# Patient Record
Sex: MALE | Race: WHITE | NOT HISPANIC OR LATINO | Employment: OTHER | URBAN - METROPOLITAN AREA
[De-identification: names, ages, dates, MRNs, and addresses within clinical notes are randomized per-mention and may not be internally consistent; named-entity substitution may affect disease eponyms.]

---

## 2017-02-07 DIAGNOSIS — I10 ESSENTIAL HYPERTENSION: Primary | Chronic | ICD-10-CM

## 2017-02-07 NOTE — TELEPHONE ENCOUNTER
lisinopril (PRINIVIL,ZESTRIL) 2.5 MG tablet      Last Written Prescription Date: 12/21/15  Last Fill Quantity: 90, # refills: 3  Last Office Visit with G, P or [facility] prescribing provider: 9/20/16       POTASSIUM   Date Value Ref Range Status   09/20/2016 4.3 3.4 - 5.3 mmol/L Final     CREATININE   Date Value Ref Range Status   09/20/2016 0.86 0.66 - 1.25 mg/dL Final     BP Readings from Last 3 Encounters:   09/26/16 124/80   09/20/16 110/78   09/02/16 120/70

## 2017-02-09 RX ORDER — LISINOPRIL 2.5 MG/1
2.5 TABLET ORAL DAILY
Qty: 90 TABLET | Refills: 1 | Status: SHIPPED | OUTPATIENT
Start: 2017-02-09 | End: 2017-08-02

## 2017-02-09 NOTE — TELEPHONE ENCOUNTER
Prescription approved per [facility], [facility] or [facility] refill protocol.  Mindy Rajan RN  [facility] [employer]

## 2017-08-02 ENCOUNTER — OFFICE VISIT (OUTPATIENT)
Dept: FAMILY MEDICINE | Facility: CLINIC | Age: 72
End: 2017-08-02
Payer: COMMERCIAL

## 2017-08-02 VITALS
HEART RATE: 76 BPM | RESPIRATION RATE: 14 BRPM | TEMPERATURE: 97 F | SYSTOLIC BLOOD PRESSURE: 136 MMHG | WEIGHT: 164 LBS | BODY MASS INDEX: 24.86 KG/M2 | HEIGHT: 68 IN | DIASTOLIC BLOOD PRESSURE: 94 MMHG

## 2017-08-02 DIAGNOSIS — F43.9 STRESS: ICD-10-CM

## 2017-08-02 DIAGNOSIS — I10 ESSENTIAL HYPERTENSION: Primary | Chronic | ICD-10-CM

## 2017-08-02 PROCEDURE — 99213 OFFICE O/P EST LOW 20 MIN: CPT | Performed by: FAMILY MEDICINE

## 2017-08-02 RX ORDER — CARVEDILOL 3.12 MG/1
3.12 TABLET ORAL 2 TIMES DAILY WITH MEALS
Qty: 60 TABLET | Refills: 1 | Status: SHIPPED | OUTPATIENT
Start: 2017-08-02 | End: 2017-08-30

## 2017-08-02 RX ORDER — LISINOPRIL 2.5 MG/1
2.5 TABLET ORAL DAILY
Qty: 90 TABLET | Refills: 1 | Status: SHIPPED | OUTPATIENT
Start: 2017-08-02 | End: 2017-08-30

## 2017-08-02 NOTE — NURSING NOTE
"Chief Complaint   Patient presents with     Hypertension       Initial BP (!) 140/96  Pulse 76  Temp 97  F (36.1  C) (Tympanic)  Resp 14  Ht 5' 7.5\" (1.715 m)  Wt 164 lb (74.4 kg)  BMI 25.31 kg/m2 Estimated body mass index is 25.31 kg/(m^2) as calculated from the following:    Height as of this encounter: 5' 7.5\" (1.715 m).    Weight as of this encounter: 164 lb (74.4 kg).  Medication Reconciliation: complete     Gosia Fontenot CMA      "

## 2017-08-02 NOTE — PROGRESS NOTES
SUBJECTIVE:                                                    Stan[place] Andersen is a 72 year old male who presents to clinic today for the following health issues:      Hypertension Follow-up      Outpatient blood pressures are being checked at home.  Results are 130-150's/70-90's.    Low Salt Diet: not monitoring salt        Amount of exercise or physical activity: None    Problems taking medications regularly: No    Medication side effects: none  Diet: regular (no restrictions)          Problem list and histories reviewed & adjusted, as indicated.  Additional history: as documented    Patient Active Problem List   Diagnosis     Health Care Home     Chest pain     Hyperlipidemia     Zollinger-Patricio syndrome     Family history of coronary artery disease     Change in bowel function     Essential hypertension     ACP (advance care planning)     Left inguinal hernia     Stress     Past Surgical History:   Procedure Laterality Date     ABDOMEN SURGERY  2003    Exploratory laparoscopy     HC EXCISE VARICOCELE  age 21     varicocele repair     KIDNEY SURGERY      lithotripsy x 3     LAPAROSCOPIC HERNIORRHAPHY INGUINAL Left 9/26/2016    Procedure: LAPAROSCOPIC HERNIORRHAPHY INGUINAL;  Surgeon: Ike Catalan MD;  Location: [facility]     STOMACH SURGERY  2003    exploratory scoping looking for tumors related to ZE syndrome       Social History   Substance Use Topics     Smoking status: Never Smoker     Smokeless tobacco: Never Used     Alcohol use Yes      Comment: seldom     Family History   Problem Relation Age of Onset     HEART DISEASE Mother      ablation     C.A.D. Mother      HEART DISEASE Father      Respiratory Father      COPD     C.A.D. Father      Neurologic Disorder Son      Spinal bifida             Reviewed and updated as needed this visit by clinical staffTobacco  Allergies  Meds       Reviewed and updated as needed this visit by Provider  Tobacco         ROS:  Constitutional, HEENT,  "cardiovascular, pulmonary, gi and gu systems are negative, except as otherwise noted.  CONSTITUTIONAL:NEGATIVE for fever, chills, change in weight  RESP:NEGATIVE for significant cough or SOB  CV: NEGATIVE for chest pain, palpitations or peripheral edema  PSYCHIATRIC: NEGATIVE for changes in mood or affect and stress due to 90 year old mother having lots of heart issues      OBJECTIVE:                                                    BP (!) 136/94  Pulse 76  Temp 97  F (36.1  C) (Tympanic)  Resp 14  Ht 5' 7.5\" (1.715 m)  Wt 164 lb (74.4 kg)  BMI 25.31 kg/m2  Body mass index is 25.31 kg/(m^2).  GENERAL APPEARANCE: healthy, alert and no distress         ASSESSMENT/PLAN:                                                        ICD-10-CM    1. Essential hypertension I10 carvedilol (COREG) 3.125 MG tablet     lisinopril (PRINIVIL/ZESTRIL) 2.5 MG tablet   2. Stress F43.9        Patient Instructions   Will add carvedilol and see back in one month.      Arjun Burns MD  [facility]    "

## 2017-08-02 NOTE — MR AVS SNAPSHOT
"              After Visit Summary   2017    [place] Andersen    MRN: [id number]           Patient Information     Date Of Birth          1945        Visit Information        Provider Department      2017 10:30 AM Arjun Burns MD [facility]        Today's Diagnoses     Essential hypertension    -  1    Stress          Care Instructions    Will add carvedilol and see back in one month.          Follow-ups after your visit        Who to contact     If you have questions or need follow up information about today's clinic visit or your schedule please contact [facility] directly at [phone].  Normal or non-critical lab and imaging results will be communicated to you by MyChart, letter or phone within 4 business days after the clinic has received the results. If you do not hear from us within 7 days, please contact the clinic through MyChart or phone. If you have a critical or abnormal lab result, we will notify you by phone as soon as possible.  Submit refill requests through twtMob or call your pharmacy and they will forward the refill request to us. Please allow 3 business days for your refill to be completed.          Additional Information About Your Visit        MyChart Information     twtMob lets you send messages to your doctor, view your test results, renew your prescriptions, schedule appointments and more. To sign up, go to [email or web address] . Click on \"Log in\" on the left side of the screen, which will take you to the Welcome page. Then click on \"Sign up Now\" on the right side of the page.     You will be asked to enter the access code listed below, as well as some personal information. Please follow the directions to create your username and password.     Your access code is: [email or web address]  Expires: 10/31/2017 11:02 AM     Your access code will  in 90 days. If you need help or a new code, please call your " "[facility] or [phone].        Care EveryWhere ID     This is your Care EveryWhere ID. This could be used by other organizations to access your [place] medical records  [id number]        Your Vitals Were     Pulse Temperature Respirations Height BMI (Body Mass Index)       76 97  F (36.1  C) (Tympanic) 14 5' 7.5\" (1.715 m) 25.31 kg/m2        Blood Pressure from Last 3 Encounters:   08/02/17 (!) 136/94   09/26/16 124/80   09/20/16 110/78    Weight from Last 3 Encounters:   08/02/17 164 lb (74.4 kg)   09/26/16 162 lb 9.6 oz (73.8 kg)   09/20/16 162 lb (73.5 kg)              Today, you had the following     No orders found for display         Today's Medication Changes          These changes are accurate as of: 8/2/17 11:02 AM.  If you have any questions, ask your nurse or doctor.               Start taking these medicines.        Dose/Directions    carvedilol 3.125 MG tablet   Commonly known as:  COREG   Used for:  Essential hypertension   Started by:  Arjun Burns MD        Dose:  3.125 mg   Take 1 tablet (3.125 mg) by mouth 2 times daily (with meals)   Quantity:  60 tablet   Refills:  1            Where to get your medicines      These medications were sent to [id number] - [facility] [address] [facility] [address]  [address] [facility] [address]    Hours:  24-hours Phone:  [phone]     carvedilol 3.125 MG tablet    lisinopril 2.5 MG tablet                Primary Care Provider Office Phone # Fax #    Arjun Burns [phone]       [facility] [address]  [facility] [address]        Equal Access to Services     MARILIN ALANIZ AH: Carla reed Somaxi, waaxda luqadaha, qaybta kaalmada adeegyarodri, zack benjamin. So [facility] [phone].    ATENCIÓN: Si habla español, tiene a fuller disposición servicios gratuitos de asistencia lingüística. Llame al [phone].    We comply " with applicable federal civil rights laws and [place] laws. We do not discriminate on the basis of race, color, national origin, age, disability sex, sexual orientation or gender identity.            Thank you!     Thank you for choosing [facility]  for your care. Our goal is always to provide you with excellent care. Hearing back from our patients is one way we can continue to improve our services. Please take a few minutes to complete the written survey that you may receive in the mail after your visit with us. Thank you!             Your Updated Medication List - Protect others around you: Learn how to safely use, store and throw away your medicines at www.disposemymeds.org.          This list is accurate as of: 8/2/17 11:02 AM.  Always use your most recent med list.                   Brand Name Dispense Instructions for use Diagnosis    aspirin 81 MG EC tablet     90 tablet    Take 1 tablet by mouth daily.    Chest pain       carvedilol 3.125 MG tablet    COREG    60 tablet    Take 1 tablet (3.125 mg) by mouth 2 times daily (with meals)    Essential hypertension       lisinopril 2.5 MG tablet    PRINIVIL/Zestril    90 tablet    Take 1 tablet (2.5 mg) by mouth daily    Essential hypertension       magnesium 250 MG tablet      Take 1 tablet by mouth daily        PRILOSEC PO      Take 20 mg by mouth 3 times daily. Patient takes it in the morning, with dinner and at bedtime for Patricio-Zollinger syndrome. Patient takes Prilosec OTC.        vitamin D 2000 UNITS Caps      Take 1 capsule by mouth daily

## 2017-08-30 ENCOUNTER — OFFICE VISIT (OUTPATIENT)
Dept: FAMILY MEDICINE | Facility: CLINIC | Age: 72
End: 2017-08-30
Payer: COMMERCIAL

## 2017-08-30 VITALS
RESPIRATION RATE: 16 BRPM | HEART RATE: 77 BPM | WEIGHT: 164 LBS | SYSTOLIC BLOOD PRESSURE: 136 MMHG | DIASTOLIC BLOOD PRESSURE: 90 MMHG | BODY MASS INDEX: 25.31 KG/M2 | TEMPERATURE: 97 F

## 2017-08-30 DIAGNOSIS — I10 ESSENTIAL HYPERTENSION: Chronic | ICD-10-CM

## 2017-08-30 PROCEDURE — 99213 OFFICE O/P EST LOW 20 MIN: CPT | Performed by: FAMILY MEDICINE

## 2017-08-30 RX ORDER — LISINOPRIL 5 MG/1
5 TABLET ORAL DAILY
Qty: 30 TABLET | Refills: 1 | Status: SHIPPED | OUTPATIENT
Start: 2017-08-30 | End: 2017-12-18

## 2017-08-30 NOTE — MR AVS SNAPSHOT
"              After Visit Summary   8/30/2017    [place] Andersen    MRN: [id number]           Patient Information     Date Of Birth          1945        Visit Information        Provider Department      8/30/2017 1:00 PM Arjun Burns MD [facility]        Today's Diagnoses     Essential hypertension          Care Instructions    Will see back in one month. Carvedilol was discontinued and lisinopril was increased.          Follow-ups after your visit        Follow-up notes from your care team     Return in about 4 weeks (around 9/27/2017) for BP Recheck.      Who to contact     If you have questions or need follow up information about today's clinic visit or your schedule please contact [facility] directly at [phone].  Normal or non-critical lab and imaging results will be communicated to you by MyChart, letter or phone within 4 business days after the clinic has received the results. If you do not hear from us within 7 days, please contact the clinic through MyChart or phone. If you have a critical or abnormal lab result, we will notify you by phone as soon as possible.  Submit refill requests through GetMyBoat or call your pharmacy and they will forward the refill request to us. Please allow 3 business days for your refill to be completed.          Additional Information About Your Visit        MyChart Information     GetMyBoat lets you send messages to your doctor, view your test results, renew your prescriptions, schedule appointments and more. To sign up, go to [email or web address] . Click on \"Log in\" on the left side of the screen, which will take you to the Welcome page. Then click on \"Sign up Now\" on the right side of the page.     You will be asked to enter the access code listed below, as well as some personal information. Please follow the directions to create your username and password.     Your access code is: " [email or web address]  Expires: 10/31/2017 11:02 AM     Your access code will  in 90 days. If you need help or a new code, please call your [place] clinic or [phone].        Care EveryWhere ID     This is your Care EveryWhere ID. This could be used by other organizations to access your [place] medical records  [id number]        Your Vitals Were     Pulse Temperature Respirations BMI (Body Mass Index)          77 97  F (36.1  C) (Tympanic) 16 25.31 kg/m2         Blood Pressure from Last 3 Encounters:   17 136/90   17 (!) 136/94   16 124/80    Weight from Last 3 Encounters:   17 164 lb (74.4 kg)   17 164 lb (74.4 kg)   16 162 lb 9.6 oz (73.8 kg)              Today, you had the following     No orders found for display         Today's Medication Changes          These changes are accurate as of: 17  1:18 PM.  If you have any questions, ask your nurse or doctor.               These medicines have changed or have updated prescriptions.        Dose/Directions    lisinopril 5 MG tablet   Commonly known as:  PRINIVIL/ZESTRIL   This may have changed:    - medication strength  - how much to take   Used for:  Essential hypertension   Changed by:  Arjun Burns MD        Dose:  5 mg   Take 1 tablet (5 mg) by mouth daily   Quantity:  30 tablet   Refills:  1         Stop taking these medicines if you haven't already. Please contact your care team if you have questions.     carvedilol 3.125 MG tablet   Commonly known as:  COREG   Stopped by:  Arjun Burns MD                Where to get your medicines      These medications were sent to [facility] - [place], [place] - [address] AT [facility] [address], [facility] [address]    Hours:  24-hours Phone:  [phone]     lisinopril 5 MG tablet                Primary Care Provider Office Phone # Fax #    Arjun Burns [phone] [address]  [facility] [address]        Equal Access to Services     SAYRA ALANIZ : Hadii kelly bejarano brianna Sorenettaali, waaxda luqadaha, qaybta kameganrodri lewcandacerodri, zack wrightluismarciano benjamin. So [facility] [phone].    ATENCIÓN: Si habla español, tiene a fuller disposición servicios gratuitos de asistencia lingüística. Llame al [phone].    We comply with applicable federal civil rights laws and [place] laws. We do not discriminate on the basis of race, color, national origin, age, disability sex, sexual orientation or gender identity.            Thank you!     Thank you for choosing [facility]  for your care. Our goal is always to provide you with excellent care. Hearing back from our patients is one way we can continue to improve our services. Please take a few minutes to complete the written survey that you may receive in the mail after your visit with us. Thank you!             Your Updated Medication List - Protect others around you: Learn how to safely use, store and throw away your medicines at www.disposemymeds.org.          This list is accurate as of: 8/30/17  1:18 PM.  Always use your most recent med list.                   Brand Name Dispense Instructions for use Diagnosis    aspirin 81 MG EC tablet     90 tablet    Take 1 tablet by mouth daily.    Chest pain       lisinopril 5 MG tablet    PRINIVIL/ZESTRIL    30 tablet    Take 1 tablet (5 mg) by mouth daily    Essential hypertension       magnesium 250 MG tablet      Take 1 tablet by mouth daily        PRILOSEC PO      Take 20 mg by mouth 3 times daily. Patient takes it in the morning, with dinner and at bedtime for Patricio-Zollinger syndrome. Patient takes Prilosec OTC.        vitamin D 2000 UNITS Caps      Take 1 capsule by mouth daily

## 2017-08-30 NOTE — NURSING NOTE
"Chief Complaint   Patient presents with     Hypertension       Initial /88  Pulse 77  Temp 97  F (36.1  C) (Tympanic)  Resp 16  Wt 164 lb (74.4 kg)  BMI 25.31 kg/m2 Estimated body mass index is 25.31 kg/(m^2) as calculated from the following:    Height as of 8/2/17: 5' 7.5\" (1.715 m).    Weight as of this encounter: 164 lb (74.4 kg).  Medication Reconciliation: complete     Gosia Fontenot CMA      "

## 2017-08-30 NOTE — PROGRESS NOTES
SUBJECTIVE:   [place] Andersen is a 72 year old male who presents to clinic today for the following health issues:      Hypertension Follow-up      Outpatient blood pressures are being checked at home.  Results are 165/90.    Low Salt Diet: no added salt        Amount of exercise or physical activity: None    Problems taking medications regularly: No    Medication side effects: none  Diet: low salt          Problem list and histories reviewed & adjusted, as indicated.  Additional history: getting lightheaded when he takes carvedilol    Patient Active Problem List   Diagnosis     Health Care Home     Chest pain     Hyperlipidemia     Zollinger-Patricio syndrome     Family history of coronary artery disease     Change in bowel function     Essential hypertension     ACP (advance care planning)     Left inguinal hernia     Stress     Past Surgical History:   Procedure Laterality Date     ABDOMEN SURGERY  2003    Exploratory laparoscopy     HC EXCISE VARICOCELE  age 21     varicocele repair     KIDNEY SURGERY      lithotripsy x 3     LAPAROSCOPIC HERNIORRHAPHY INGUINAL Left 9/26/2016    Procedure: LAPAROSCOPIC HERNIORRHAPHY INGUINAL;  Surgeon: Ike Catalan MD;  Location: [facility]     STOMACH SURGERY  2003    exploratory scoping looking for tumors related to ZE syndrome       Social History   Substance Use Topics     Smoking status: Never Smoker     Smokeless tobacco: Never Used     Alcohol use Yes      Comment: seldom     Family History   Problem Relation Age of Onset     HEART DISEASE Mother      ablation     C.A.D. Mother      HEART DISEASE Father      Respiratory Father      COPD     C.A.D. Father      Neurologic Disorder Son      Spinal bifida             Reviewed and updated as needed this visit by clinical staffTobacco  Allergies  Meds  Med Hx  Surg Hx  Fam Hx  Soc Hx      Reviewed and updated as needed this visit by Provider         ROS:  RESP:NEGATIVE for significant cough or SOB  CV: NEGATIVE for  chest pain, palpitations or peripheral edema    OBJECTIVE:                                                    /90  Pulse 77  Temp 97  F (36.1  C) (Tympanic)  Resp 16  Wt 164 lb (74.4 kg)  BMI 25.31 kg/m2  Body mass index is 25.31 kg/(m^2).  GENERAL APPEARANCE: healthy, alert and no distress         ASSESSMENT/PLAN:                                                        ICD-10-CM    1. Essential hypertension I10 lisinopril (PRINIVIL/ZESTRIL) 5 MG tablet       Patient Instructions   Will see back in one month. Carvedilol was discontinued and lisinopril was increased.      Arjun Burns MD  [facility]

## 2017-09-25 ENCOUNTER — OFFICE VISIT (OUTPATIENT)
Dept: FAMILY MEDICINE | Facility: CLINIC | Age: 72
End: 2017-09-25
Payer: COMMERCIAL

## 2017-09-25 VITALS
RESPIRATION RATE: 16 BRPM | TEMPERATURE: 97.4 F | SYSTOLIC BLOOD PRESSURE: 130 MMHG | DIASTOLIC BLOOD PRESSURE: 86 MMHG | BODY MASS INDEX: 25 KG/M2 | HEART RATE: 84 BPM | WEIGHT: 162 LBS

## 2017-09-25 DIAGNOSIS — I10 ESSENTIAL HYPERTENSION: Primary | Chronic | ICD-10-CM

## 2017-09-25 DIAGNOSIS — F43.9 STRESS: ICD-10-CM

## 2017-09-25 DIAGNOSIS — Z12.5 SCREENING FOR PROSTATE CANCER: ICD-10-CM

## 2017-09-25 DIAGNOSIS — E78.2 MIXED HYPERLIPIDEMIA: Chronic | ICD-10-CM

## 2017-09-25 LAB
ALBUMIN UR-MCNC: NEGATIVE MG/DL
APPEARANCE UR: CLEAR
BASOPHILS # BLD AUTO: 0 10E9/L (ref 0–0.2)
BASOPHILS NFR BLD AUTO: 0.3 %
BILIRUB UR QL STRIP: NEGATIVE
COLOR UR AUTO: YELLOW
DIFFERENTIAL METHOD BLD: NORMAL
EOSINOPHIL # BLD AUTO: 0.2 10E9/L (ref 0–0.7)
EOSINOPHIL NFR BLD AUTO: 3.5 %
ERYTHROCYTE [DISTWIDTH] IN BLOOD BY AUTOMATED COUNT: 14.1 % (ref 10–15)
GLUCOSE UR STRIP-MCNC: NEGATIVE MG/DL
HCT VFR BLD AUTO: 48.4 % (ref 40–53)
HGB BLD-MCNC: 16.2 G/DL (ref 13.3–17.7)
HGB UR QL STRIP: ABNORMAL
KETONES UR STRIP-MCNC: NEGATIVE MG/DL
LEUKOCYTE ESTERASE UR QL STRIP: NEGATIVE
LYMPHOCYTES # BLD AUTO: 1.5 10E9/L (ref 0.8–5.3)
LYMPHOCYTES NFR BLD AUTO: 22 %
MCH RBC QN AUTO: 29.1 PG (ref 26.5–33)
MCHC RBC AUTO-ENTMCNC: 33.5 G/DL (ref 31.5–36.5)
MCV RBC AUTO: 87 FL (ref 78–100)
MONOCYTES # BLD AUTO: 0.8 10E9/L (ref 0–1.3)
MONOCYTES NFR BLD AUTO: 11.4 %
NEUTROPHILS # BLD AUTO: 4.4 10E9/L (ref 1.6–8.3)
NEUTROPHILS NFR BLD AUTO: 62.8 %
NITRATE UR QL: NEGATIVE
PH UR STRIP: 5.5 PH (ref 5–7)
PLATELET # BLD AUTO: 252 10E9/L (ref 150–450)
RBC # BLD AUTO: 5.56 10E12/L (ref 4.4–5.9)
RBC #/AREA URNS AUTO: ABNORMAL /HPF
SOURCE: ABNORMAL
SP GR UR STRIP: 1.02 (ref 1–1.03)
UROBILINOGEN UR STRIP-ACNC: 0.2 EU/DL (ref 0.2–1)
WBC # BLD AUTO: 7 10E9/L (ref 4–11)
WBC #/AREA URNS AUTO: ABNORMAL /HPF

## 2017-09-25 PROCEDURE — 85025 COMPLETE CBC W/AUTO DIFF WBC: CPT | Performed by: FAMILY MEDICINE

## 2017-09-25 PROCEDURE — 81001 URINALYSIS AUTO W/SCOPE: CPT | Performed by: FAMILY MEDICINE

## 2017-09-25 PROCEDURE — 99213 OFFICE O/P EST LOW 20 MIN: CPT | Performed by: FAMILY MEDICINE

## 2017-09-25 PROCEDURE — 80053 COMPREHEN METABOLIC PANEL: CPT | Performed by: FAMILY MEDICINE

## 2017-09-25 PROCEDURE — 80061 LIPID PANEL: CPT | Performed by: FAMILY MEDICINE

## 2017-09-25 PROCEDURE — G0103 PSA SCREENING: HCPCS | Performed by: FAMILY MEDICINE

## 2017-09-25 PROCEDURE — 36415 COLL VENOUS BLD VENIPUNCTURE: CPT | Performed by: FAMILY MEDICINE

## 2017-09-25 NOTE — PATIENT INSTRUCTIONS
Will change lisinopril to HS and see back in one month with his machine to cross check to ours. Will check labs as noted.

## 2017-09-25 NOTE — NURSING NOTE
"Chief Complaint   Patient presents with     Hypertension       Initial /88  Pulse 84  Temp 97.4  F (36.3  C) (Tympanic)  Resp 16  Wt 162 lb (73.5 kg)  BMI 25 kg/m2 Estimated body mass index is 25 kg/(m^2) as calculated from the following:    Height as of 8/2/17: 5' 7.5\" (1.715 m).    Weight as of this encounter: 162 lb (73.5 kg).  Medication Reconciliation: complete     Gosia Fontenot CMA      "

## 2017-09-25 NOTE — LETTER
September 30, 2017      [place] Andersen  [address] [address] [place] [id number]        Dear ,    We are writing to inform you of your test results.    Your test results fall within the expected range(s) or remain unchanged from previous results.  Please continue with current treatment plan.    Resulted Orders   UA with Microscopic   Result Value Ref Range    Color Urine Yellow     Appearance Urine Clear     Glucose Urine Negative NEG^Negative mg/dL    Bilirubin Urine Negative NEG^Negative    Ketones Urine Negative NEG^Negative mg/dL    Specific Gravity Urine 1.020 1.003 - 1.035    pH Urine 5.5 5.0 - 7.0 pH    Protein Albumin Urine Negative NEG^Negative mg/dL    Urobilinogen Urine 0.2 0.2 - 1.0 EU/dL    Nitrite Urine Negative NEG^Negative    Blood Urine Moderate (A) NEG^Negative    Leukocyte Esterase Urine Negative NEG^Negative    Source Midstream Urine     WBC Urine O - 2 OTO2^O - 2 /HPF    RBC Urine O - 2 OTO2^O - 2 /HPF   CBC with platelets differential   Result Value Ref Range    WBC 7.0 4.0 - 11.0 10e9/L    RBC Count 5.56 4.4 - 5.9 10e12/L    Hemoglobin 16.2 13.3 - 17.7 g/dL    Hematocrit 48.4 40.0 - 53.0 %    MCV 87 78 - 100 fl    MCH 29.1 26.5 - 33.0 pg    MCHC 33.5 31.5 - 36.5 g/dL    RDW 14.1 10.0 - 15.0 %    Platelet Count 252 150 - 450 10e9/L    Diff Method Automated Method     % Neutrophils 62.8 %    % Lymphocytes 22.0 %    % Monocytes 11.4 %    % Eosinophils 3.5 %    % Basophils 0.3 %    Absolute Neutrophil 4.4 1.6 - 8.3 10e9/L    Absolute Lymphocytes 1.5 0.8 - 5.3 10e9/L    Absolute Monocytes 0.8 0.0 - 1.3 10e9/L    Absolute Eosinophils 0.2 0.0 - 0.7 10e9/L    Absolute Basophils 0.0 0.0 - 0.2 10e9/L   Lipid Profile   Result Value Ref Range    Cholesterol 198 <200 mg/dL    Triglycerides 138 <150 mg/dL      Comment:      Fasting specimen    HDL Cholesterol 42 >39 mg/dL    LDL Cholesterol Calculated 128 (H) <100 mg/dL      Comment:      Above desirable:  100-129 mg/dl  Borderline High:   130-159 mg/dL  High:             160-189 mg/dL  Very high:       >189 mg/dl      Non HDL Cholesterol 156 (H) <130 mg/dL      Comment:      Above Desirable:  130-159 mg/dl  Borderline high:  160-189 mg/dl  High:             190-219 mg/dl  Very high:       >219 mg/dl     Comprehensive metabolic panel   Result Value Ref Range    Sodium 140 133 - 144 mmol/L    Potassium 3.9 3.4 - 5.3 mmol/L    Chloride 106 94 - 109 mmol/L    Carbon Dioxide 27 20 - 32 mmol/L    Anion Gap 7 3 - 14 mmol/L    Glucose 90 70 - 99 mg/dL      Comment:      Fasting specimen    Urea Nitrogen 18 7 - 30 mg/dL    Creatinine 0.87 0.66 - 1.25 mg/dL    GFR Estimate 86 >60 mL/min/1.7m2      Comment:      Non  GFR Calc    GFR Estimate If Black >90 >60 mL/min/1.7m2      Comment:       GFR Calc    Calcium 9.1 8.5 - 10.1 mg/dL    Bilirubin Total 0.9 0.2 - 1.3 mg/dL    Albumin 3.9 3.4 - 5.0 g/dL    Protein Total 6.9 6.8 - 8.8 g/dL    Alkaline Phosphatase 61 40 - 150 U/L    ALT 37 0 - 70 U/L    AST 23 0 - 45 U/L   Prostate spec antigen screen   Result Value Ref Range    PSA 2.40 0 - 4 ug/L      Comment:      Assay Method:  Chemiluminescence using Siemens Vista analyzer       If you have any questions or concerns, please call the clinic at the number listed above.       Sincerely,        Arjun Burns MD

## 2017-09-25 NOTE — PROGRESS NOTES
SUBJECTIVE:   Stanford Andersen is a 72 year old male who presents to clinic today for the following health issues:      Hypertension Follow-up      Outpatient blood pressures are being checked at home.  Results are 139/90 this morning.    Low Salt Diet: no added salt        Amount of exercise or physical activity: None    Problems taking medications regularly: No    Medication side effects: none  Diet: regular (no restrictions)          Problem list and histories reviewed & adjusted, as indicated.  Additional history: as documented    Patient Active Problem List   Diagnosis     Health Care Home     Chest pain     Hyperlipidemia     Zollinger-Patricio syndrome     Family history of coronary artery disease     Change in bowel function     Essential hypertension     ACP (advance care planning)     Left inguinal hernia     Stress     Past Surgical History:   Procedure Laterality Date     ABDOMEN SURGERY  2003    Exploratory laparoscopy     HC EXCISE VARICOCELE  age 21     varicocele repair     KIDNEY SURGERY      lithotripsy x 3     LAPAROSCOPIC HERNIORRHAPHY INGUINAL Left 9/26/2016    Procedure: LAPAROSCOPIC HERNIORRHAPHY INGUINAL;  Surgeon: Ike Catalan MD;  Location: [facility]     STOMACH SURGERY  2003    exploratory scoping looking for tumors related to ZE syndrome       Social History   Substance Use Topics     Smoking status: Never Smoker     Smokeless tobacco: Never Used     Alcohol use Yes      Comment: seldom     Family History   Problem Relation Age of Onset     HEART DISEASE Mother      ablation     C.A.D. Mother      HEART DISEASE Father      Respiratory Father      COPD     C.A.D. Father      Neurologic Disorder Son      Spinal bifida             Reviewed and updated as needed this visit by clinical staffTobacco  Allergies  Meds  Med Hx  Surg Hx  Fam Hx  Soc Hx      Reviewed and updated as needed this visit by Provider  Tobacco         ROS:  Constitutional, HEENT, cardiovascular, pulmonary, gi  and gu systems are negative, except as otherwise noted.  RESP:NEGATIVE for significant cough or SOB  CV: NEGATIVE for chest pain, palpitations or peripheral edema      OBJECTIVE:                                                    /86  Pulse 84  Temp 97.4  F (36.3  C) (Tympanic)  Resp 16  Wt 162 lb (73.5 kg)  BMI 25 kg/m2  Body mass index is 25 kg/(m^2).  GENERAL APPEARANCE: healthy, alert and no distress         ASSESSMENT/PLAN:                                                        ICD-10-CM    1. Essential hypertension I10 UA with Microscopic     CBC with platelets differential     Comprehensive metabolic panel   2. Stress F43.9     caring for his 90 year old mother in his home   3. Mixed hyperlipidemia E78.2 Lipid Profile   4. Screening for prostate cancer Z12.5 Prostate spec antigen screen       Patient Instructions   Will change lisinopril to HS and see back in one month with his machine to cross check to ours. Will check labs as noted.      Arjun Burns MD  [facility]

## 2017-09-25 NOTE — MR AVS SNAPSHOT
After Visit Summary   9/25/2017    Stanford Andersen    MRN: [id number]           Patient Information     Date Of Birth          1945        Visit Information        Provider Department      9/25/2017 1:00 PM Arjun Burns MD [facility]        Today's Diagnoses     Essential hypertension    -  1    Stress        Mixed hyperlipidemia        Screening for prostate cancer          Care Instructions    Will change lisinopril to HS and see back in one month with his machine to cross check to ours. Will check labs as noted.          Follow-ups after your visit        Follow-up notes from your care team     Return in about 4 weeks (around 10/23/2017) for Routine Visit, BP Recheck.      Your next 10 appointments already scheduled     Oct 23, 2017  1:00 PM CDT   SHORT with Arjun Burns MD   [facility]    [address] [place] [address] [id number] [phone]              Who to contact     If you have questions or need follow up information about today's clinic visit or your schedule please contact [facility] directly at [phone].  Normal or non-critical lab and imaging results will be communicated to you by MyChart, letter or phone within 4 business days after the clinic has received the results. If you do not hear from us within 7 days, please contact the clinic through MyChart or phone. If you have a critical or abnormal lab result, we will notify you by phone as soon as possible.  Submit refill requests through [employer] or call your pharmacy and they will forward the refill request to us. Please allow 3 business days for your refill to be completed.          Additional Information About Your Visit        MyChart Information     [employer] lets you send messages to your doctor, view your test results, renew your prescriptions,  "schedule appointments and more. To sign up, go to [email or web address] . Click on \"Log in\" on the left side of the screen, which will take you to the Welcome page. Then click on \"Sign up Now\" on the right side of the page.     You will be asked to enter the access code listed below, as well as some personal information. Please follow the directions to create your username and password.     Your access code is: [email or web address]  Expires: 10/31/2017 11:02 AM     Your access code will  in 90 days. If you need help or a new code, please call your [place] clinic or [phone].        Care EveryWhere ID     This is your Care EveryWhere ID. This could be used by other organizations to access your [place] medical records  [id number]        Your Vitals Were     Pulse Temperature Respirations BMI (Body Mass Index)          84 97.4  F (36.3  C) (Tympanic) 16 25 kg/m2         Blood Pressure from Last 3 Encounters:   17 130/86   17 136/90   17 (!) 136/94    Weight from Last 3 Encounters:   17 162 lb (73.5 kg)   17 164 lb (74.4 kg)   17 164 lb (74.4 kg)              We Performed the Following     CBC with platelets differential     Comprehensive metabolic panel     Lipid Profile     Prostate spec antigen screen     UA with Microscopic        Primary Care Provider Office Phone # Fax #    Arjun Burns [phone] [address] [facility] [address]        Equal Access to Services     [facility]: Hadii aad carlee hadasho Soomaali, waaxda luqadaha, qaybta kaalmada viraj, zack diggs . So [facility] [phone].    ATENCIÓN: Si habla español, tiene a fuller disposición servicios gratuitos de asistencia lingüística. Llame al [phone].    We comply with applicable federal civil rights laws and [place] laws. We do not discriminate on the basis of race, color, national origin, age, disability sex, sexual orientation or gender " identity.            Thank you!     Thank you for choosing [facility]  for your care. Our goal is always to provide you with excellent care. Hearing back from our patients is one way we can continue to improve our services. Please take a few minutes to complete the written survey that you may receive in the mail after your visit with us. Thank you!             Your Updated Medication List - Protect others around you: Learn how to safely use, store and throw away your medicines at www.disposemymeds.org.          This list is accurate as of: 9/25/17 11:59 PM.  Always use your most recent med list.                   Brand Name Dispense Instructions for use Diagnosis    aspirin 81 MG EC tablet     90 tablet    Take 1 tablet by mouth daily.    Chest pain       lisinopril 5 MG tablet    PRINIVIL/ZESTRIL    30 tablet    Take 1 tablet (5 mg) by mouth daily    Essential hypertension       magnesium 250 MG tablet      Take 1 tablet by mouth daily        PRILOSEC PO      Take 20 mg by mouth 3 times daily. Patient takes it in the morning, with dinner and at bedtime for Patricio-Zollinger syndrome. Patient takes Prilosec OTC.        vitamin D 2000 UNITS Caps      Take 1 capsule by mouth daily

## 2017-09-26 LAB
ALBUMIN SERPL-MCNC: 3.9 G/DL (ref 3.4–5)
ALP SERPL-CCNC: 61 U/L (ref 40–150)
ALT SERPL W P-5'-P-CCNC: 37 U/L (ref 0–70)
ANION GAP SERPL CALCULATED.3IONS-SCNC: 7 MMOL/L (ref 3–14)
AST SERPL W P-5'-P-CCNC: 23 U/L (ref 0–45)
BILIRUB SERPL-MCNC: 0.9 MG/DL (ref 0.2–1.3)
BUN SERPL-MCNC: 18 MG/DL (ref 7–30)
CALCIUM SERPL-MCNC: 9.1 MG/DL (ref 8.5–10.1)
CHLORIDE SERPL-SCNC: 106 MMOL/L (ref 94–109)
CHOLEST SERPL-MCNC: 198 MG/DL
CO2 SERPL-SCNC: 27 MMOL/L (ref 20–32)
CREAT SERPL-MCNC: 0.87 MG/DL (ref 0.66–1.25)
GFR SERPL CREATININE-BSD FRML MDRD: 86 ML/MIN/1.7M2
GLUCOSE SERPL-MCNC: 90 MG/DL (ref 70–99)
HDLC SERPL-MCNC: 42 MG/DL
LDLC SERPL CALC-MCNC: 128 MG/DL
NONHDLC SERPL-MCNC: 156 MG/DL
POTASSIUM SERPL-SCNC: 3.9 MMOL/L (ref 3.4–5.3)
PROT SERPL-MCNC: 6.9 G/DL (ref 6.8–8.8)
PSA SERPL-ACNC: 2.4 UG/L (ref 0–4)
SODIUM SERPL-SCNC: 140 MMOL/L (ref 133–144)
TRIGL SERPL-MCNC: 138 MG/DL

## 2017-11-06 ENCOUNTER — OFFICE VISIT (OUTPATIENT)
Dept: FAMILY MEDICINE | Facility: CLINIC | Age: 72
End: 2017-11-06
Payer: COMMERCIAL

## 2017-11-06 VITALS
TEMPERATURE: 97.6 F | HEART RATE: 86 BPM | SYSTOLIC BLOOD PRESSURE: 124 MMHG | RESPIRATION RATE: 16 BRPM | DIASTOLIC BLOOD PRESSURE: 80 MMHG | WEIGHT: 166 LBS | BODY MASS INDEX: 25.62 KG/M2

## 2017-11-06 DIAGNOSIS — I10 ESSENTIAL HYPERTENSION: Primary | Chronic | ICD-10-CM

## 2017-11-06 DIAGNOSIS — M79.672 PAIN OF LEFT HEEL: ICD-10-CM

## 2017-11-06 PROCEDURE — 99213 OFFICE O/P EST LOW 20 MIN: CPT | Mod: 25 | Performed by: FAMILY MEDICINE

## 2017-11-06 PROCEDURE — 28190 REMOVAL OF FOOT FOREIGN BODY: CPT | Performed by: FAMILY MEDICINE

## 2017-11-06 NOTE — PROGRESS NOTES
SUBJECTIVE:   Stanford Andersen is a 72 year old male who presents to clinic today for the following health issues:      Hypertension Follow-up      Outpatient blood pressures are being checked at home.  Results are today was 148/87 this morning.    Low Salt Diet: no added salt        Amount of exercise or physical activity: None    Problems taking medications regularly: No    Medication side effects: none    Diet: regular (no restrictions)    Musculoskeletal problem/pain      Duration: 2 days    Description  Location: bottom of lt foot- heel    Intensity:  moderate    Accompanying signs and symptoms: swelling and redness    History  Previous similar problem: no   Previous evaluation:  none    Precipitating or alleviating factors:  Trauma or overuse: YES- stepped on something 2-3 months ago- no idea what  Aggravating factors include: walking    Therapies tried and outcome: nothing              Problem list and histories reviewed & adjusted, as indicated.  Additional history: as documented    Patient Active Problem List   Diagnosis     Health Care Home     Chest pain     Hyperlipidemia     Zollinger-Patricio syndrome     Family history of coronary artery disease     Change in bowel function     Essential hypertension     ACP (advance care planning)     Left inguinal hernia     Stress     Screening for prostate cancer     Past Surgical History:   Procedure Laterality Date     ABDOMEN SURGERY  2003    Exploratory laparoscopy     HC EXCISE VARICOCELE  age 21     varicocele repair     KIDNEY SURGERY      lithotripsy x 3     LAPAROSCOPIC HERNIORRHAPHY INGUINAL Left 9/26/2016    Procedure: LAPAROSCOPIC HERNIORRHAPHY INGUINAL;  Surgeon: Ike Catalan MD;  [facility]     STOMACH SURGERY  2003    exploratory scoping looking for tumors related to ZE syndrome       Social History   Substance Use Topics     Smoking status: Never Smoker     Smokeless tobacco: Never Used     Alcohol use Yes      Comment: seldom      Family History   Problem Relation Age of Onset     HEART DISEASE Mother      ablation     C.A.D. Mother      HEART DISEASE Father      Respiratory Father      COPD     C.A.D. Father      Neurologic Disorder Son      Spinal bifida             Reviewed and updated as needed this visit by clinical staffTobacco  Allergies  Med Hx  Surg Hx  Fam Hx  Soc Hx      Reviewed and updated as needed this visit by Provider         ROS:  Constitutional, neuro, ENT, endocrine, pulmonary, cardiac, gastrointestinal, genitourinary, musculoskeletal, integument and psychiatric systems are negative, except as otherwise noted.      OBJECTIVE:                                                    /80  Pulse 86  Temp 97.6  F (36.4  C) (Tympanic)  Resp 16  Wt 166 lb (75.3 kg)  BMI 25.62 kg/m2  Body mass index is 25.62 kg/(m^2).  GENERAL APPEARANCE: healthy, alert and no distress  SKIN: The left heel showed a 3 mm open area that was exquisitely tender to touch.  I gently pared that back with a 15 blade scalpel and while I did not see any foreign body when I did get it pared down to the base the discomfort in his heel is gone.  I suspect there was a foreign body in there that I removed with the scalpel blade.         ASSESSMENT/PLAN:                                                        ICD-10-CM    1. Essential hypertension I10    2. Pain of left heel M79.672        Patient Instructions   I suspect that the patient did have a foreign body in his left heel because he was exquisitely tender to touch.  That sensitivity went away after a period the lesion back down to the base.  While I didn't see any foreign body out I suspect it did but I just missed it.  The patient tolerated the procedure well.    I refilled his lisinopril which he now takes at bedtime.  We did discuss the possibility of decreasing that to 2-1/2 mg on a daily basis but will wait until his next visit to see if we should do that.  He will follow up in 3 months  sooner as needed.      Arjun Burns MD  [facility]

## 2017-11-06 NOTE — PATIENT INSTRUCTIONS
I suspect that the patient did have a foreign body in his left heel because he was exquisitely tender to touch.  That sensitivity went away after a period the lesion back down to the base.  While I didn't see any foreign body out I suspect it did but I just missed it.  The patient tolerated the procedure well.    I refilled his lisinopril which he now takes at bedtime.  We did discuss the possibility of decreasing that to 2-1/2 mg on a daily basis but will wait until his next visit to see if we should do that.  He will follow up in 3 months sooner as needed.

## 2017-11-06 NOTE — MR AVS SNAPSHOT
After Visit Summary   11/6/2017    Stanford Andersen    MRN: [id number]           Patient Information     Date Of Birth          1945        Visit Information        Provider Department      11/6/2017 9:15 AM Arjun Burns MD [facility]        Today's Diagnoses     Essential hypertension    -  1    Pain of left heel          Care Instructions    I suspect that the patient did have a foreign body in his left heel because he was exquisitely tender to touch.  That sensitivity went away after a period the lesion back down to the base.  While I didn't see any foreign body out I suspect it did but I just missed it.  The patient tolerated the procedure well.    I refilled his lisinopril which he now takes at bedtime.  We did discuss the possibility of decreasing that to 2-1/2 mg on a daily basis but will wait until his next visit to see if we should do that.  He will follow up in 3 months sooner as needed.          Follow-ups after your visit        Follow-up notes from your care team     Return in about 3 months (around 2/6/2018) for BP Recheck, Routine Visit.      Who to contact     If you have questions or need follow up information about today's clinic visit or your schedule please contact [facility] directly at [phone].  Normal or non-critical lab and imaging results will be communicated to you by MyChart, letter or phone within 4 business days after the clinic has received the results. If you do not hear from us within 7 days, please contact the clinic through MyChart or phone. If you have a critical or abnormal lab result, we will notify you by phone as soon as possible.  Submit refill requests through [employer] or call your pharmacy and they will forward the refill request to us. Please allow 3 business days for your refill to be completed.          Additional Information About Your Visit        MyChart Information      "[facility] lets you send messages to your doctor, view your test results, renew your prescriptions, schedule appointments and more. To sign up, go to [email or web address] . Click on \"Log in\" on the left side of the screen, which will take you to the Welcome page. Then click on \"Sign up Now\" on the right side of the page.     You will be asked to enter the access code listed below, as well as some personal information. Please follow the directions to create your username and password.     Your access code is: [email or web address]  Expires: 2018  2:35 PM     Your access code will  in 90 days. If you need help or a new code, please call your [place] clinic or [phone].        Care EveryWhere ID     This is your [facility] EveryWhere ID. This could be used by other organizations to access your [place] medical records  [id number]        Your Vitals Were     Pulse Temperature Respirations BMI (Body Mass Index)          86 97.6  F (36.4  C) (Tympanic) 16 25.62 kg/m2         Blood Pressure from Last 3 Encounters:   17 124/80   17 130/86   17 136/90    Weight from Last 3 Encounters:   17 166 lb (75.3 kg)   17 162 lb (73.5 kg)   17 164 lb (74.4 kg)              Today, you had the following     No orders found for display       Primary Care Provider Office Phone # Fax #    Arjun Burns [phone]       [address] [facility] [address]        Equal Access to Services     [facility]: Hadii kelly Porras, wacarltonda ronny, qaybta zack brambila. So [facility] [phone].    ATENCIÓN: Si habla español, tiene a fuller disposición servicios gratuitos de asistencia lingüística. [place] al [phone].    We comply with applicable federal civil rights laws and [place] laws. We do not discriminate on the basis of race, color, national origin, age, disability, sex, sexual orientation, or gender identity.       "      Thank you!     Thank you for choosing [facility]  for your care. Our goal is always to provide you with excellent care. Hearing back from our patients is one way we can continue to improve our services. Please take a few minutes to complete the written survey that you may receive in the mail after your visit with us. Thank you!             Your Updated Medication List - Protect others around you: Learn how to safely use, store and throw away your medicines at www.disposemymeds.org.          This list is accurate as of: 11/6/17  2:35 PM.  Always use your most recent med list.                   Brand Name Dispense Instructions for use Diagnosis    aspirin 81 MG EC tablet     90 tablet    Take 1 tablet by mouth daily.    Chest pain       lisinopril 5 MG tablet    PRINIVIL/ZESTRIL    30 tablet    Take 1 tablet (5 mg) by mouth daily    Essential hypertension       magnesium 250 MG tablet      Take 1 tablet by mouth daily        PRILOSEC PO      Take 20 mg by mouth 3 times daily. Patient takes it in the morning, with dinner and at bedtime for Patricio-Zollinger syndrome. Patient takes Prilosec OTC.        vitamin D 2000 UNITS Caps      Take 1 capsule by mouth daily

## 2017-11-06 NOTE — NURSING NOTE
"Chief Complaint   Patient presents with     Hypertension     Foot Pain     LT foot       Initial /78  Pulse 86  Temp 97.6  F (36.4  C) (Tympanic)  Resp 16  Wt 166 lb (75.3 kg)  BMI 25.62 kg/m2 Estimated body mass index is 25.62 kg/(m^2) as calculated from the following:    Height as of 8/2/17: 5' 7.5\" (1.715 m).    Weight as of this encounter: 166 lb (75.3 kg).  Medication Reconciliation: complete     Gosia Fontenot CMA      "

## 2017-12-18 DIAGNOSIS — I10 ESSENTIAL HYPERTENSION: Chronic | ICD-10-CM

## 2017-12-20 RX ORDER — LISINOPRIL 5 MG/1
TABLET ORAL
Qty: 90 TABLET | Refills: 2 | Status: SHIPPED | OUTPATIENT
Start: 2017-12-20 | End: 2018-10-23

## 2017-12-20 NOTE — TELEPHONE ENCOUNTER
Last OV 12/20/2017.  Requested Prescriptions   Pending Prescriptions Disp Refills     lisinopril (PRINIVIL/ZESTRIL) 5 MG tablet [Pharmacy Med Name: LISINOPRIL 5MG TABLETS] 30 tablet 0     Sig: TAKE 1 TABLET(5 MG) BY MOUTH DAILY    ACE Inhibitors (Including Combos) Protocol Failed    12/18/2017  4:02 PM       Failed - Blood pressure under 140/90    BP Readings from Last 3 Encounters:   11/06/17 124/80   09/25/17 130/86   08/30/17 136/90                Passed - Recent or future visit with authorizing provider's specialty    Patient had office visit in the last year or has a visit in the next 30 days with authorizing provider.  See chart review.              Passed - Patient is age 18 or older       Passed - Normal serum creatinine on file in past 12 months    Recent Labs   Lab Test  09/25/17   1318   CR  0.87            Passed - Normal serum potassium on file in past 12 months    Recent Labs   Lab Test  09/25/17   1318   POTASSIUM  3.9

## 2018-09-10 ENCOUNTER — OFFICE VISIT (OUTPATIENT)
Dept: FAMILY MEDICINE | Facility: CLINIC | Age: 73
End: 2018-09-10
Payer: COMMERCIAL

## 2018-09-10 VITALS
DIASTOLIC BLOOD PRESSURE: 70 MMHG | HEART RATE: 88 BPM | BODY MASS INDEX: 25.08 KG/M2 | OXYGEN SATURATION: 92 % | WEIGHT: 162.5 LBS | SYSTOLIC BLOOD PRESSURE: 118 MMHG | TEMPERATURE: 98.3 F

## 2018-09-10 DIAGNOSIS — H91.93 BILATERAL HEARING LOSS, UNSPECIFIED HEARING LOSS TYPE: ICD-10-CM

## 2018-09-10 DIAGNOSIS — R26.9 ABNORMALITY OF GAIT: Primary | ICD-10-CM

## 2018-09-10 PROCEDURE — 99214 OFFICE O/P EST MOD 30 MIN: CPT | Performed by: FAMILY MEDICINE

## 2018-09-10 NOTE — PATIENT INSTRUCTIONS
With the patient's hearing loss and balance issues we decided to start with a consultation from ear nose and throat.  It has been a number of years since the patient had a complete physical so he will set up a an appointment for that in the next month.  We will do his lab tests at that point.  If he starts to have more issues he will return sooner.

## 2018-09-10 NOTE — PROGRESS NOTES
SUBJECTIVE:   Stanford Andersen is a 73 year old male who presents to clinic today for the following health issues:      balance      Duration: since  but worse in the last 3-4 months    Description (location/character/radiation): body    Intensity:  moderate, severe    Accompanying signs and symptoms: fine if walking but once stopped feels like body swaying, if bend down feels like falling over    History (similar episodes/previous evaluation): ongoing    Precipitating or alleviating factors: None    Therapies tried and outcome: None           Problem list and histories reviewed & adjusted, as indicated.  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  She  a couple of months ago.  He is now starting to look at taking care of stuff for himself.    6 years ago he landed in the hospital for 2-3 days with severe vertigo and nausea and vomiting.  Since that time his balance has been off just a little bit.  Now if he is walking, he is just fine.  However if he standing on either an uneven surface or a soft surface he starts to lose his balance and has to move his feet to catch himself.  Doing things like washing his face at the sink in the morning he has to hold onto the sink where he feels like he is going to fall over.  He does not have any trouble driving a car at this point.  Head movements do not seem to make too much difference for him.  He denies any vertigo type symptoms.  However, he has lost some hearing particularly over the last couple of years.  The episode of vertigo that he had 6 years ago was described to him as a virus in his inner ear.    Patient Active Problem List   Diagnosis     Health Care Home     Chest pain     Hyperlipidemia     Zollinger-Patricio syndrome     Family history of coronary artery disease     Change in bowel function     Essential hypertension     ACP (advance care planning)     Left inguinal hernia     Stress      Screening for prostate cancer     Past Surgical History:   Procedure Laterality Date     ABDOMEN SURGERY  2003    Exploratory laparoscopy     HC EXCISE VARICOCELE  age 21     varicocele repair     KIDNEY SURGERY      lithotripsy x 3     LAPAROSCOPIC HERNIORRHAPHY INGUINAL Left 9/26/2016    Procedure: LAPAROSCOPIC HERNIORRHAPHY INGUINAL;  Surgeon: Ike Catalan MD;  Location: [facility]     STOMACH SURGERY  2003    exploratory scoping looking for tumors related to ZE syndrome       Social History   Substance Use Topics     Smoking status: Never Smoker     Smokeless tobacco: Never Used     Alcohol use Yes      Comment: seldom     Family History   Problem Relation Age of Onset     HEART DISEASE Mother      ablation     C.A.D. Mother      HEART DISEASE Father      Respiratory Father      COPD     C.A.D. Father      Neurologic Disorder Son      Spinal bifida           Reviewed and updated as needed this visit by clinical staff  Tobacco  Allergies  Meds  Med Hx  Surg Hx  Fam Hx  Soc Hx      Reviewed and updated as needed this visit by Provider         ROS:  Constitutional, HEENT, cardiovascular, pulmonary, gi and gu systems are negative, except as otherwise noted.    OBJECTIVE:                                                    /70 (Cuff Size: Adult Large)  Pulse 88  Temp 98.3  F (36.8  C) (Tympanic)  Wt 162 lb 8 oz (73.7 kg)  SpO2 92%  BMI 25.08 kg/m2  Body mass index is 25.08 kg/(m^2).  GENERAL APPEARANCE: healthy, alert and no distress  EYES: Eyes grossly normal to inspection, PERRL and conjunctivae and sclerae normal  HENT: ear canals and TM's normal and nose and mouth without ulcers or lesions  MS: extremities normal- no gross deformities noted  NEURO: Normal strength and tone, mentation intact, speech normal, cranial nerves 2-12 intact and Romberg negative         ASSESSMENT/PLAN:                                                        ICD-10-CM    1. Abnormality of gait R26.9 OTOLARYNGOLOGY  REFERRAL   2. Bilateral hearing loss, unspecified hearing loss type H91.93 OTOLARYNGOLOGY REFERRAL   Return in about 4 weeks (around 10/8/2018) for Physical Exam.    Patient Instructions   With the patient's hearing loss and balance issues we decided to start with a consultation from ear nose and throat.  It has been a number of years since the patient had a complete physical so he will set up a an appointment for that in the next month.  We will do his lab tests at that point.  If he starts to have more issues he will return sooner.      Arjun Burns MD  [facility]

## 2018-09-10 NOTE — MR AVS SNAPSHOT
After Visit Summary   9/10/2018    Stanford Andersen    MRN: [id number]           Patient Information     Date Of Birth          1945        Visit Information        Provider Department      9/10/2018 3:45 PM Arjun Burns MD [facility]        Today's Diagnoses     Abnormality of gait    -  1    Bilateral hearing loss, unspecified hearing loss type           Follow-ups after your visit        Additional Services     OTOLARYNGOLOGY REFERRAL       Your provider has referred you to: [facility]: [facility] [place] [phone]   [email or web address]    Please be aware that coverage of these services is subject to the terms and limitations of your health insurance plan.  Call member services at your health plan with any benefit or coverage questions.      Please bring the following with you to your appointment:    (1) Any X-Rays, CTs or MRIs which have been performed.  Contact the facility where they were done to arrange for  prior to your scheduled appointment.   (2) List of current medications  (3) This referral request   (4) Any documents/labs given to you for this referral                  Follow-up notes from your care team     Return in about 4 weeks (around 10/8/2018) for Physical Exam.      Who to contact     If you have questions or need follow up information about today's clinic visit or your schedule please contact [facility] directly at [phone].  Normal or non-critical lab and imaging results will be communicated to you by MyChart, letter or phone within 4 business days after the clinic has received the results. If you do not hear from us within 7 days, please contact the clinic through MyChart or phone. If you have a critical or abnormal lab result, we will notify you by phone as soon as possible.  Submit refill requests through [employer] or call your  pharmacy and they will forward the refill request to us. Please allow 3 business days for your refill to be completed.          Additional Information About Your Visit        Care EveryWhere ID     This is your Care EveryWhere ID. This could be used by other organizations to access your [place] medical records  [id number]        Your Vitals Were     Pulse Temperature Pulse Oximetry BMI (Body Mass Index)          88 98.3  F (36.8  C) (Tympanic) 92% 25.08 kg/m2         Blood Pressure from Last 3 Encounters:   09/10/18 118/70   11/06/17 124/80   09/25/17 130/86    Weight from Last 3 Encounters:   09/10/18 162 lb 8 oz (73.7 kg)   11/06/17 166 lb (75.3 kg)   09/25/17 162 lb (73.5 kg)              We Performed the Following     OTOLARYNGOLOGY REFERRAL        Primary Care Provider Office Phone # Fax #    Arjun Burns [phone]       [address] [facility] [address]        Equal Access to Services     MARILIN ALANIZ : Hadii aad ku hadasho Soomaali, waaxda luqadaha, qaybta kaalmada adeegyada, waxay idiin hayvirgenn vipin diggs . So [facility] [phone].    ATENCIÓN: Si habla español, tiene a fuller disposición servicios gratuitos de asistencia lingüística. [facility] [phone].    We comply with applicable federal civil rights laws and [place] laws. We do not discriminate on the basis of race, color, national origin, age, disability, sex, sexual orientation, or gender identity.            Thank you!     Thank you for choosing [facility]  for your care. Our goal is always to provide you with excellent care. Hearing back from our patients is one way we can continue to improve our services. Please take a few minutes to complete the written survey that you may receive in the mail after your visit with us. Thank you!             Your Updated Medication List - Protect others around you: Learn how to safely use, store and throw away your medicines at  www.disposemymeds.org.          This list is accurate as of 9/10/18  4:06 PM.  Always use your most recent med list.                   Brand Name Dispense Instructions for use Diagnosis    aspirin 81 MG EC tablet     90 tablet    Take 1 tablet by mouth daily.    Chest pain       lisinopril 5 MG tablet    PRINIVIL/ZESTRIL    90 tablet    TAKE 1 TABLET(5 MG) BY MOUTH DAILY    Essential hypertension       magnesium 250 MG tablet      Take 1 tablet by mouth daily        PRILOSEC PO      Take 20 mg by mouth 3 times daily. Patient takes it in the morning, with dinner and at bedtime for Patricio-Zollinger syndrome. Patient takes Prilosec OTC.        vitamin D 2000 units Caps      Take 1 capsule by mouth daily

## 2018-09-17 ENCOUNTER — TRANSFERRED RECORDS (OUTPATIENT)
Dept: HEALTH INFORMATION MANAGEMENT | Facility: CLINIC | Age: 73
End: 2018-09-17

## 2018-10-08 ENCOUNTER — OFFICE VISIT (OUTPATIENT)
Dept: FAMILY MEDICINE | Facility: CLINIC | Age: 73
End: 2018-10-08
Payer: COMMERCIAL

## 2018-10-08 VITALS
TEMPERATURE: 97.2 F | OXYGEN SATURATION: 93 % | SYSTOLIC BLOOD PRESSURE: 120 MMHG | HEART RATE: 72 BPM | WEIGHT: 168 LBS | DIASTOLIC BLOOD PRESSURE: 72 MMHG | BODY MASS INDEX: 25.92 KG/M2

## 2018-10-08 DIAGNOSIS — N52.9 ERECTILE DYSFUNCTION, UNSPECIFIED ERECTILE DYSFUNCTION TYPE: ICD-10-CM

## 2018-10-08 DIAGNOSIS — Z12.5 SCREENING FOR PROSTATE CANCER: ICD-10-CM

## 2018-10-08 DIAGNOSIS — K43.9 VENTRAL HERNIA WITHOUT OBSTRUCTION OR GANGRENE: ICD-10-CM

## 2018-10-08 DIAGNOSIS — Z23 NEED FOR PROPHYLACTIC VACCINATION AND INOCULATION AGAINST INFLUENZA: ICD-10-CM

## 2018-10-08 DIAGNOSIS — R35.1 NOCTURIA: ICD-10-CM

## 2018-10-08 DIAGNOSIS — E78.2 MIXED HYPERLIPIDEMIA: Chronic | ICD-10-CM

## 2018-10-08 DIAGNOSIS — Z00.00 MEDICARE ANNUAL WELLNESS VISIT, SUBSEQUENT: Primary | ICD-10-CM

## 2018-10-08 DIAGNOSIS — R20.2 NUMBNESS AND TINGLING OF BOTH FEET: ICD-10-CM

## 2018-10-08 DIAGNOSIS — I10 ESSENTIAL HYPERTENSION: Chronic | ICD-10-CM

## 2018-10-08 DIAGNOSIS — R20.0 NUMBNESS AND TINGLING OF BOTH FEET: ICD-10-CM

## 2018-10-08 DIAGNOSIS — E16.4 ZOLLINGER-ELLISON SYNDROME: ICD-10-CM

## 2018-10-08 LAB
ALBUMIN UR-MCNC: NEGATIVE MG/DL
APPEARANCE UR: CLEAR
BASOPHILS # BLD AUTO: 0 10E9/L (ref 0–0.2)
BASOPHILS NFR BLD AUTO: 0.4 %
BILIRUB UR QL STRIP: NEGATIVE
COLOR UR AUTO: YELLOW
DIFFERENTIAL METHOD BLD: NORMAL
EOSINOPHIL # BLD AUTO: 0.3 10E9/L (ref 0–0.7)
EOSINOPHIL NFR BLD AUTO: 3.8 %
ERYTHROCYTE [DISTWIDTH] IN BLOOD BY AUTOMATED COUNT: 14 % (ref 10–15)
GLUCOSE UR STRIP-MCNC: NEGATIVE MG/DL
HCT VFR BLD AUTO: 47.4 % (ref 40–53)
HGB BLD-MCNC: 15.7 G/DL
HGB UR QL STRIP: ABNORMAL
KETONES UR STRIP-MCNC: NEGATIVE MG/DL
LEUKOCYTE ESTERASE UR QL STRIP: NEGATIVE
LYMPHOCYTES # BLD AUTO: 1.7 10E9/L (ref 0.8–5.3)
LYMPHOCYTES NFR BLD AUTO: 22.2 %
MCH RBC QN AUTO: 29.3 PG (ref 26.5–33)
MCHC RBC AUTO-ENTMCNC: 33.1 G/DL (ref 31.5–36.5)
MCV RBC AUTO: 88 FL (ref 78–100)
MONOCYTES # BLD AUTO: 0.8 10E9/L (ref 0–1.3)
MONOCYTES NFR BLD AUTO: 10.6 %
NEUTROPHILS # BLD AUTO: 4.7 10E9/L (ref 1.6–8.3)
NEUTROPHILS NFR BLD AUTO: 63 %
NITRATE UR QL: NEGATIVE
PH UR STRIP: 6.5 PH (ref 5–7)
PLATELET # BLD AUTO: 227 10E9/L (ref 150–450)
RBC # BLD AUTO: 5.36 10E12/L (ref 4.4–5.9)
RBC #/AREA URNS AUTO: ABNORMAL /HPF
SOURCE: ABNORMAL
SP GR UR STRIP: 1.01 (ref 1–1.03)
UROBILINOGEN UR STRIP-ACNC: 0.2 EU/DL (ref 0.2–1)
WBC # BLD AUTO: 7.4 10E9/L (ref 4–11)
WBC #/AREA URNS AUTO: ABNORMAL /HPF

## 2018-10-08 PROCEDURE — 80053 COMPREHEN METABOLIC PANEL: CPT | Performed by: FAMILY MEDICINE

## 2018-10-08 PROCEDURE — G0439 PPPS, SUBSEQ VISIT: HCPCS | Mod: 25 | Performed by: FAMILY MEDICINE

## 2018-10-08 PROCEDURE — 81001 URINALYSIS AUTO W/SCOPE: CPT | Performed by: FAMILY MEDICINE

## 2018-10-08 PROCEDURE — 85025 COMPLETE CBC W/AUTO DIFF WBC: CPT | Performed by: FAMILY MEDICINE

## 2018-10-08 PROCEDURE — G0008 ADMIN INFLUENZA VIRUS VAC: HCPCS | Performed by: FAMILY MEDICINE

## 2018-10-08 PROCEDURE — 36415 COLL VENOUS BLD VENIPUNCTURE: CPT | Performed by: FAMILY MEDICINE

## 2018-10-08 PROCEDURE — G0103 PSA SCREENING: HCPCS | Performed by: FAMILY MEDICINE

## 2018-10-08 PROCEDURE — 90662 IIV NO PRSV INCREASED AG IM: CPT | Performed by: FAMILY MEDICINE

## 2018-10-08 PROCEDURE — 80061 LIPID PANEL: CPT | Performed by: FAMILY MEDICINE

## 2018-10-08 RX ORDER — TAMSULOSIN HYDROCHLORIDE 0.4 MG/1
0.4 CAPSULE ORAL DAILY
Qty: 30 CAPSULE | Refills: 3 | Status: SHIPPED | OUTPATIENT
Start: 2018-10-08 | End: 2019-01-07

## 2018-10-08 RX ORDER — FINASTERIDE 5 MG/1
5 TABLET, FILM COATED ORAL DAILY
Qty: 90 TABLET | Refills: 1 | Status: SHIPPED | OUTPATIENT
Start: 2018-10-08 | End: 2019-05-10

## 2018-10-08 ASSESSMENT — ACTIVITIES OF DAILY LIVING (ADL)
CURRENT_FUNCTION: NO ASSISTANCE NEEDED
I_NEED_ASSISTANCE_FOR_THE_FOLLOWING_DAILY_ACTIVITIES:: NO ASSISTANCE IS NEEDED

## 2018-10-08 NOTE — MR AVS SNAPSHOT
After Visit Summary   10/8/2018    Stanford Andersen    MRN: [id number]           Patient Information     Date Of Birth          1945        Visit Information        Provider Department      10/8/2018 10:30 AM Arjun Burns MD [facility]        Today's Diagnoses     Medicare annual wellness visit, subsequent    -  1    Essential hypertension        Mixed hyperlipidemia        Screening for prostate cancer        Zollinger-Patricio syndrome        Nocturia        Erectile dysfunction, unspecified erectile dysfunction type        Ventral hernia without obstruction or gangrene        Numbness and tingling of both feet          Care Instructions      Preventive Health Recommendations:       Male Ages 65 and over    Yearly exam:             See your health care provider every year in order to  o   Review health changes.   o   Discuss preventive care.    o   Review your medicines if your doctor has prescribed any.    Talk with your health care provider about whether you should have a test to screen for prostate cancer (PSA).    Every 3 years, have a diabetes test (fasting glucose). If you are at risk for diabetes, you should have this test more often.    Every 5 years, have a cholesterol test. Have this test more often if you are at risk for high cholesterol or heart disease.     Every 10 years, have a colonoscopy. Or, have a yearly FIT test (stool test). These exams will check for colon cancer.    Talk to with your health care provider about screening for Abdominal Aortic Aneurysm if you have a family history of AAA or have a history of smoking.  Shots:     Get a flu shot each year.     Get a tetanus shot every 10 years.     Talk to your doctor about your pneumonia vaccines. There are now two you should receive - Pneumovax (PPSV 23) and Prevnar (PCV 13).    Talk to your pharmacist about a shingles vaccine.     Talk to your doctor about the hepatitis B  vaccine.  Nutrition:     Eat at least 5 servings of fruits and vegetables each day.     Eat whole-grain bread, whole-wheat pasta and brown rice instead of white grains and rice.     Get adequate Calcium and Vitamin D.   Lifestyle    Exercise for at least 150 minutes a week (30 minutes a day, 5 days a week). This will help you control your weight and prevent disease.     Limit alcohol to one drink per day.     No smoking.     Wear sunscreen to prevent skin cancer.     See your dentist every six months for an exam and cleaning.     See your eye doctor every 1 to 2 years to screen for conditions such as glaucoma, macular degeneration and cataracts.          Follow-ups after your visit        Additional Services     GENERAL SURG ADULT REFERRAL       Your provider has referred you to: [facility]: [facility] [place] [phone]   [email or web address]    Please be aware that coverage of these services is subject to the terms and limitations of your health insurance plan.  Call member services at your health plan with any benefit or coverage questions.      Please bring the following with you to your appointment:    (1) Any X-Rays, CTs or MRIs which have been performed.  Contact the facility where they were done to arrange for  prior to your scheduled appointment.   (2) List of current medications   (3) This referral request   (4) Any documents/labs given to you for this referral            NEUROLOGY ADULT REFERRAL       Your provider has referred you for the following:   [facility] at [facility]: [facility] [place] [phone]   [email or web address]    Please be aware that coverage of these services is subject to the terms and limitations of your health insurance plan.  Call member services at your health plan with any benefit or coverage questions.      Please bring the following with you to your appointment:    (1) Any X-Rays, CTs  or MRIs which have been performed.  Contact the facility where they were done to arrange for  prior to your scheduled appointment.    (2) List of current medications  (3) This referral request   (4) Any documents/labs given to you for this referral                  Follow-up notes from your care team     Return in about 3 months (around 1/8/2019) for nocturia.      Who to contact     If you have questions or need follow up information about today's clinic visit or your schedule please contact [facility] directly at [phone].  Normal or non-critical lab and imaging results will be communicated to you by MyChart, letter or phone within 4 business days after the clinic has received the results. If you do not hear from us within 7 days, please contact the clinic through MyChart or phone. If you have a critical or abnormal lab result, we will notify you by phone as soon as possible.  Submit refill requests through [employer] or call your pharmacy and they will forward the refill request to us. Please allow 3 business days for your refill to be completed.          Additional Information About Your Visit        Care EveryWhere ID     This is your Care EveryWhere ID. This could be used by other organizations to access your [place] medical records  [id number]        Your Vitals Were     Pulse Temperature Pulse Oximetry BMI (Body Mass Index)          72 97.2  F (36.2  C) (Tympanic) 93% 25.92 kg/m2         Blood Pressure from Last 3 Encounters:   10/08/18 120/72   09/10/18 118/70   11/06/17 124/80    Weight from Last 3 Encounters:   10/08/18 168 lb (76.2 kg)   09/10/18 162 lb 8 oz (73.7 kg)   11/06/17 166 lb (75.3 kg)              We Performed the Following     CBC with platelets differential     Comprehensive metabolic panel     GENERAL SURG ADULT REFERRAL     Lipid Profile     NEUROLOGY ADULT REFERRAL     Prostate spec antigen screen     UA with Microscopic          Today's Medication  Changes          These changes are accurate as of 10/8/18 11:08 AM.  If you have any questions, ask your nurse or doctor.               Start taking these medicines.        Dose/Directions    finasteride 5 MG tablet   Commonly known as:  PROSCAR   Used for:  Nocturia   Started by:  Arjun Burns MD        Dose:  5 mg   Take 1 tablet (5 mg) by mouth daily   Quantity:  90 tablet   Refills:  1       tamsulosin 0.4 MG capsule   Commonly known as:  FLOMAX   Used for:  Nocturia   Started by:  Arjun Burns MD        Dose:  0.4 mg   Take 1 capsule (0.4 mg) by mouth daily   Quantity:  30 capsule   Refills:  3            Where to get your medicines      These medications were sent to [facility] [id number] [place] [address] [address] [facility] [address], [facility] [address]     Phone:  [phone]     finasteride 5 MG tablet    tamsulosin 0.4 MG capsule                Primary Care Provider Office Phone # Fax #    Arjun Burns [phone] [address]  [facility] [address]        Equal Access to Services     SAYRA ALANIZ AH: Hadii aad ku hadasho Soomaali, waaxda luqadaha, qaybta kaalmada adeegyada, waxay idiin hayvirgenn vipin kharamarciano labalwinder benjamin. So [facility] [phone].    ATENCIÓN: Si habla español, tiene a fuller disposición servicios gratuitos de asistencia lingüística. [place] al [phone].    We comply with applicable federal civil rights laws and [place] laws. We do not discriminate on the basis of race, color, national origin, age, disability, sex, sexual orientation, or gender identity.            Thank you!     Thank you for choosing [facility]  for your care. Our goal is always to provide you with excellent care. Hearing back from our patients is one way we can continue to improve our services. Please take a few minutes to complete the written survey that you may receive in the mail after your  visit with us. Thank you!             Your Updated Medication List - Protect others around you: Learn how to safely use, store and throw away your medicines at www.disposemymeds.org.          This list is accurate as of 10/8/18 11:08 AM.  Always use your most recent med list.                   Brand Name Dispense Instructions for use Diagnosis    aspirin 81 MG EC tablet     90 tablet    Take 1 tablet by mouth daily.    Chest pain       finasteride 5 MG tablet    PROSCAR    90 tablet    Take 1 tablet (5 mg) by mouth daily    Nocturia       lisinopril 5 MG tablet    PRINIVIL/ZESTRIL    90 tablet    TAKE 1 TABLET(5 MG) BY MOUTH DAILY    Essential hypertension       magnesium 250 MG tablet      Take 1 tablet by mouth daily        PRILOSEC PO      Take 20 mg by mouth 3 times daily. Patient takes it in the morning, with dinner and at bedtime for Patricio-Zollinger syndrome. Patient takes Prilosec OTC.        tamsulosin 0.4 MG capsule    FLOMAX    30 capsule    Take 1 capsule (0.4 mg) by mouth daily    Nocturia       vitamin D 2000 units Caps      Take 1 capsule by mouth daily

## 2018-10-08 NOTE — PROGRESS NOTES

## 2018-10-08 NOTE — PROGRESS NOTES
"  SUBJECTIVE:   Stanford Andersen is a 73 year old male who presents for Preventive Visit.  {PVP to remind patient that this is not necessarily a physical exam; physical exam may or may not be done:632047::\"click delete button to remove this line now\"}  {PVP to inform patient that additional E&M charge may apply, if additional problems addressed:866957::\"click delete button to remove this line now\"}  Are you in the first 12 months of your Medicare Part B coverage?  {No Yes:787952::\"No\"}    Healthy Habits:    Do you get at least three servings of calcium containing foods daily (dairy, green leafy vegetables, etc.)? {YES/NO, DAIRY INTAKE:972324::\"yes\"}    Amount of exercise or daily activities, outside of work: {AMOUNT EXERCISE:785610}    Problems taking medications regularly {Yes /No default:984272::\"No\"}    Medication side effects: {Yes /No default.:889536::\"No\"}    Have you had an eye exam in the past two years? {YESNOBLANK:365768}    Do you see a dentist twice per year? {YESNOBLANK:847582}    Do you have sleep apnea, excessive snoring or daytime drowsiness?{YESNOBLANK:211034}      Ability to successfully perform activities of daily living: {YES/NO (MEDICARE):932513::\"Yes, no assistance needed\"}    Home safety:  {IPPE SAFETY CONCERNS:419068::\"none identified\"}     Hearing impairment: {NO/YES:798951}    Fall risk:  {Document Fall Risk in the Assessments Section of the Navigator:107347}    {If any of the above assessments are answered yes, consider ordering appropriate referrals (Optional):676799::\"click delete button to remove this line now\"}    {AWV Cognitive Screenin}    {Outside tests to abstract? :778945}    {additional problems to add (Optional):618126}    Reviewed and updated as needed this visit by clinical staff  Tobacco  Allergies  Meds  Med Hx  Surg Hx  Fam Hx  Soc Hx        Reviewed and updated as needed this visit by Provider        Social History   Substance Use Topics     Smoking status: " "Never Smoker     Smokeless tobacco: Never Used     Alcohol use Yes      Comment: seldom       If you drink alcohol do you typically have >3 drinks per day or >7 drinks per week? {ETOH :590279}                        Today's PHQ-2 Score:   PHQ-2 ( 1999 [employer]) 10/8/2018 9/25/2017   Q1: Little interest or pleasure in doing things 2 0   Q2: Feeling down, depressed or hopeless 1 0   PHQ-2 Score 3 0   Q1: Little interest or pleasure in doing things More than half the days -   Q2: Feeling down, depressed or hopeless Several days -   PHQ-2 Score 3 -     {PHQ-2 LOOK IN ASSESSMENTS (Optional) :938718}  Do you feel safe in your environment - {YES/NO/NA:612359}    Do you have a Health Care Directive?: {HEALTHCARE DIRECTIVE STATUS:179027}    Current providers sharing in care for this patient include:   Patient Care Team:  Arjun Burns MD as PCP - General (Family Practice)    The following health maintenance items are reviewed in Epic and correct as of today:  Health Maintenance   Topic Date Due     AORTIC ANEURYSM SCREENING (SYSTEM ASSIGNED)  03/17/2011     PNEUMOCOCCAL (2 of 2 - PCV13) 01/04/2013     INFLUENZA VACCINE (1) 09/01/2018     PHQ-2 Q1 YR  09/25/2018     FALL RISK ASSESSMENT  11/06/2018     ADVANCE DIRECTIVE PLANNING Q5 YRS  12/21/2020     LIPID SCREEN Q5 YR MALE (SYSTEM ASSIGNED)  09/25/2022     TETANUS IMMUNIZATION (SYSTEM ASSIGNED)  09/04/2023     COLON CANCER SCREEN (SYSTEM ASSIGNED)  10/01/2023     HEPATITIS C SCREENING  Completed     {Chronicprobdata (Optional):070959}    {Decision Support (Optional):023529}    ROS:  {ROS COMP:883901}    OBJECTIVE:   There were no vitals taken for this visit. Estimated body mass index is 25.08 kg/(m^2) as calculated from the following:    Height as of 8/2/17: 5' 7.5\" (1.715 m).    Weight as of 9/10/18: 162 lb 8 oz (73.7 kg).  EXAM:   {Exam :073384}    {Diagnostic Test Results (Optional):072870::\"Diagnostic Test Results:\",\"none \"}    ASSESSMENT / PLAN:   {Diag " "Picklist:643982}    End of Life Planning:  Patient currently has an advanced directive: { :704347}    COUNSELING:  {Medicare Counselin}    BP Readings from Last 1 Encounters:   09/10/18 118/70     Estimated body mass index is 25.08 kg/(m^2) as calculated from the following:    Height as of 17: 5' 7.5\" (1.715 m).    Weight as of 9/10/18: 162 lb 8 oz (73.7 kg).    {BP Counseling- Complete if BP >= 120/80  (Optional):460095}  {Weight Management Plan (ACO) Complete if BMI is abnormal-  Ages 18-64  BMI >24.9.  Age 65+ with BMI <23 or >30 (Optional):144304}     reports that he has never smoked. He has never used smokeless tobacco.  {Tobacco Cessation -- Complete if patient is a smoker (Optional):007341}    Appropriate preventive services were discussed with this patient, including applicable screening as appropriate for cardiovascular disease, diabetes, osteopenia/osteoporosis, and glaucoma.  As appropriate for age/gender, discussed screening for colorectal cancer, prostate cancer, breast cancer, and cervical cancer. Checklist reviewing preventive services available has been given to the patient.    Reviewed patients plan of care and provided an AVS. The {CarePlan:821350} for Stanford meets the Care Plan requirement. This Care Plan has been established and reviewed with the {PATIENT, FAMILY MEMBER, CAREGIVER:857375}.    Counseling Resources:  ATP IV Guidelines  Pooled Cohorts Equation Calculator  Breast Cancer Risk Calculator  FRAX Risk Assessment  ICSI Preventive Guidelines  Dietary Guidelines for Americans,   [employer]'s MyPlate  ASA Prophylaxis  Lung CA Screening    Arjun Burns MD  [facility]  "

## 2018-10-08 NOTE — LETTER
October 10, 2018      Stanford Andersen  [address] [address] [place] [id number]        Dear ,    We are writing to inform you of your test results.    Your triglycerides are high.  They can be affected by sweets, alcohol, pasta, bread and potatoes.  We should repeat these in 6 months.The rest of your tests looked just fine and need no attention.    Resulted Orders   Lipid Profile   Result Value Ref Range    Cholesterol 196 <200 mg/dL    Triglycerides 164 (H) <150 mg/dL      Comment:      Borderline high:  150-199 mg/dl  High:             200-499 mg/dl  Very high:       >499 mg/dl  Fasting specimen      HDL Cholesterol 41 >39 mg/dL    LDL Cholesterol Calculated 122 (H) <100 mg/dL      Comment:      Above desirable:  100-129 mg/dl  Borderline High:  130-159 mg/dL  High:             160-189 mg/dL  Very high:       >189 mg/dl      Non HDL Cholesterol 155 (H) <130 mg/dL      Comment:      Above Desirable:  130-159 mg/dl  Borderline high:  160-189 mg/dl  High:             190-219 mg/dl  Very high:       >219 mg/dl     UA with Microscopic   Result Value Ref Range    Color Urine Yellow     Appearance Urine Clear     Glucose Urine Negative NEG^Negative mg/dL    Bilirubin Urine Negative NEG^Negative    Ketones Urine Negative NEG^Negative mg/dL    Specific Gravity Urine 1.015 1.003 - 1.035    pH Urine 6.5 5.0 - 7.0 pH    Protein Albumin Urine Negative NEG^Negative mg/dL    Urobilinogen Urine 0.2 0.2 - 1.0 EU/dL    Nitrite Urine Negative NEG^Negative    Blood Urine Trace (A) NEG^Negative    Leukocyte Esterase Urine Negative NEG^Negative    Source Midstream Urine     WBC Urine 0 - 5 OTO5^0 - 5 /HPF    RBC Urine O - 2 OTO2^O - 2 /HPF   CBC with platelets differential   Result Value Ref Range    WBC 7.4 4.0 - 11.0 10e9/L    RBC Count 5.36 4.4 - 5.9 10e12/L    Hemoglobin 15.7 g/dL    Hematocrit 47.4 40.0 - 53.0 %    MCV 88 78 - 100 fl    MCH 29.3 26.5 - 33.0 pg    MCHC 33.1 31.5 - 36.5 g/dL    RDW 14.0 10.0 - 15.0 %     Platelet Count 227 150 - 450 10e9/L    Diff Method Automated Method     % Neutrophils 63.0 %    % Lymphocytes 22.2 %    % Monocytes 10.6 %    % Eosinophils 3.8 %    % Basophils 0.4 %    Absolute Neutrophil 4.7 1.6 - 8.3 10e9/L    Absolute Lymphocytes 1.7 0.8 - 5.3 10e9/L    Absolute Monocytes 0.8 0.0 - 1.3 10e9/L    Absolute Eosinophils 0.3 0.0 - 0.7 10e9/L    Absolute Basophils 0.0 0.0 - 0.2 10e9/L   Comprehensive metabolic panel   Result Value Ref Range    Sodium 141 133 - 144 mmol/L    Potassium 3.8 3.4 - 5.3 mmol/L    Chloride 107 94 - 109 mmol/L    Carbon Dioxide 26 20 - 32 mmol/L    Anion Gap 8 3 - 14 mmol/L    Glucose 81 70 - 99 mg/dL      Comment:      Fasting specimen    Urea Nitrogen 14 7 - 30 mg/dL    Creatinine 0.82 0.66 - 1.25 mg/dL    GFR Estimate >90 >60 mL/min/1.7m2      Comment:      Non  GFR Calc    GFR Estimate If Black >90 >60 mL/min/1.7m2      Comment:       GFR Calc    Calcium 9.0 8.5 - 10.1 mg/dL    Bilirubin Total 0.7 0.2 - 1.3 mg/dL    Albumin 3.7 3.4 - 5.0 g/dL    Protein Total 6.7 (L) 6.8 - 8.8 g/dL    Alkaline Phosphatase 63 40 - 150 U/L    ALT 31 0 - 70 U/L    AST 21 0 - 45 U/L   Prostate spec antigen screen   Result Value Ref Range    PSA 2.03 0 - 4 ug/L      Comment:      Assay Method:  Chemiluminescence using Siemens Vista analyzer       If you have any questions or concerns, please call the clinic at the number listed above.       Sincerely,        Arjun Burns MD

## 2018-10-08 NOTE — PROGRESS NOTES
SUBJECTIVE:   Stanford Andersen is a 73 year old male who presents for Preventive Visit.      Are you in the first 12 months of your Medicare coverage?  No    Physical   Annual:     Getting at least 3 servings of Calcium per day:  NO    Bi-annual eye exam:  NO    Dental care twice a year:  Yes    Sleep apnea or symptoms of sleep apnea:  None    Diet:  Regular (no restrictions)    Frequency of exercise:  1 day/week    Duration of exercise:  Less than 15 minutes    Taking medications regularly:  Yes    Medication side effects:  None    Additional concerns today:  YES    Ability to successfully perform activities of daily living: no assistance needed    Home Safety:  Throw rugs in the hallway    Hearing Impairment: difficulty following dialogue in the theater        Fall risk:  Fallen 2 or more times in the past year?: No  Any fall with injury in the past year?: No    COGNITIVE SCREEN  1) Repeat 3 items (Leader, Season, Table)    2) Clock draw: NORMAL  3) 3 item recall: Recalls 2 objects   Results: NORMAL clock, 1-2 items recalled: COGNITIVE IMPAIRMENT LESS LIKELY    Mini-CogTM Copyright JYOTHI Borja. Licensed by the author for use in [facility]; reprinted with permission (noam@[facility]). All rights reserved.        Reviewed and updated as needed this visit by clinical staff  Tobacco  Allergies  Meds  Med Hx  Surg Hx  Fam Hx  Soc Hx        Reviewed and updated as needed this visit by Provider        Social History   Substance Use Topics     Smoking status: Never Smoker     Smokeless tobacco: Never Used     Alcohol use Yes      Comment: seldom       Alcohol Use 10/8/2018   If you drink alcohol do you typically have greater than 3 drinks per day OR greater than 7 drinks per week? No   No flowsheet data found.            Today's PHQ-2 Score:   PHQ-2 ( 1999 [employer]) 10/8/2018   Q1: Little interest or pleasure in doing things 2   Q2: Feeling down, depressed or hopeless 1   PHQ-2 Score 3   Q1: Little interest or  pleasure in doing things More than half the days   Q2: Feeling down, depressed or hopeless Several days   PHQ-2 Score 3       Do you feel safe in your environment - Yes    Do you have a Health Care Directive?: No: Advance care planning was reviewed with patient; patient declined at this time.    Current providers sharing in care for this patient include:   Patient Care Team:  Arjun Burns MD as PCP - General (Family Practice)    The following health maintenance items are reviewed in Epic and correct as of today:  Health Maintenance   Topic Date Due     AORTIC ANEURYSM SCREENING (SYSTEM ASSIGNED)  03/17/2011     PNEUMOCOCCAL (2 of 2 - PCV13) 01/04/2013     INFLUENZA VACCINE (1) 09/01/2018     PHQ-2 Q1 YR  09/25/2018     FALL RISK ASSESSMENT  11/06/2018     ADVANCE DIRECTIVE PLANNING Q5 YRS  12/21/2020     LIPID SCREEN Q5 YR MALE (SYSTEM ASSIGNED)  09/25/2022     TETANUS IMMUNIZATION (SYSTEM ASSIGNED)  09/04/2023     COLON CANCER SCREEN (SYSTEM ASSIGNED)  10/01/2023     HEPATITIS C SCREENING  Completed     Patient Active Problem List   Diagnosis     Health Care Home     Chest pain     Hyperlipidemia     Zollinger-Patricio syndrome     Family history of coronary artery disease     Change in bowel function     Essential hypertension     ACP (advance care planning)     Left inguinal hernia     Stress     Screening for prostate cancer     Erectile dysfunction, unspecified erectile dysfunction type     Ventral hernia without obstruction or gangrene     Numbness and tingling of both feet     Past Surgical History:   Procedure Laterality Date     ABDOMEN SURGERY  2003    Exploratory laparoscopy     HC EXCISE VARICOCELE  age 21     varicocele repair     KIDNEY SURGERY      lithotripsy x 3     LAPAROSCOPIC HERNIORRHAPHY INGUINAL Left 9/26/2016    Procedure: LAPAROSCOPIC HERNIORRHAPHY INGUINAL;  Surgeon: Ike Catalan MD;  Location: [facility]     LITHOTRIPSY      three times     STOMACH SURGERY  2003     "exploratory scoping looking for tumors related to ZE syndrome       Social History   Substance Use Topics     Smoking status: Never Smoker     Smokeless tobacco: Never Used     Alcohol use Yes      Comment: seldom     Family History   Problem Relation Age of Onset     HEART DISEASE Mother      ablation     C.A.D. Mother      HEART DISEASE Father      Respiratory Father      COPD     C.A.D. Father      Neurologic Disorder Son      Spinal bifida               Review of Systems  CONSTITUTIONAL: NEGATIVE for fever, chills, change in weight  INTEGUMENTARY/SKIN: NEGATIVE for worrisome rashes, moles or lesions  EYES: NEGATIVE for vision changes or irritation  ENT/MOUTH: NEGATIVE for ear, mouth and throat problems  RESP: NEGATIVE for significant cough or SOB  BREAST: NEGATIVE for masses, tenderness or discharge  CV: NEGATIVE for chest pain, palpitations or peripheral edema  GI: POSITIVE for mass just above the belly button   male :positive for and nocturia x 1-2  MUSCULOSKELETAL: NEGATIVE for significant arthralgias or myalgia  NEURO: POSITIVE for dizziness/lightheadedness and is in therapy  ENDOCRINE: NEGATIVE for temperature intolerance, skin/hair changes  HEME: NEGATIVE for bleeding problems  PSYCHIATRIC: NEGATIVE for changes in mood or affect    OBJECTIVE:   /72 (Cuff Size: Adult Large)  Pulse 72  Temp 97.2  F (36.2  C) (Tympanic)  Wt 168 lb (76.2 kg)  SpO2 93%  BMI 25.92 kg/m2 Estimated body mass index is 25.92 kg/(m^2) as calculated from the following:    Height as of 8/2/17: 5' 7.5\" (1.715 m).    Weight as of this encounter: 168 lb (76.2 kg).  Physical Exam  GENERAL: healthy, alert and no distress  EYES: Eyes grossly normal to inspection, PERRL and conjunctivae and sclerae normal  HENT: ear canals and TM's normal, nose and mouth without ulcers or lesions  NECK: no adenopathy, no asymmetry, masses, or scars and thyroid normal to palpation  RESP: lungs clear to auscultation - no rales, rhonchi or " wheezes  CV: regular rate and rhythm, normal S1 S2, no S3 or S4, no murmur, click or rub, no peripheral edema and peripheral pulses strong  ABDOMEN: soft, nontender, no hepatosplenomegaly, no masses and bowel sounds normal   (male): normal male genitalia without lesions or urethral discharge, no hernia  RECTAL: normal sphincter tone, no rectal masses, prostate normal size, smooth, nontender without nodules or masses  MS: no gross musculoskeletal defects noted, no edema  SKIN: no suspicious lesions or rashes  NEURO: Normal strength and tone, mentation intact and speech normal  PSYCH: mentation appears normal, affect normal/bright  LYMPH: no cervical, supraclavicular, axillary, or inguinal adenopathy        ASSESSMENT / PLAN:       ICD-10-CM    1. Medicare annual wellness visit, subsequent Z00.00    2. Essential hypertension I10 UA with Microscopic     CBC with platelets differential     Comprehensive metabolic panel   3. Mixed hyperlipidemia E78.2 Lipid Profile   4. Screening for prostate cancer Z12.5 Prostate spec antigen screen   5. Zollinger-Patricio syndrome E16.4    6. Nocturia R35.1 finasteride (PROSCAR) 5 MG tablet     tamsulosin (FLOMAX) 0.4 MG capsule   7. Erectile dysfunction, unspecified erectile dysfunction type N52.9    8. Ventral hernia without obstruction or gangrene K43.9 GENERAL SURG ADULT REFERRAL   9. Numbness and tingling of both feet R20.0 NEUROLOGY ADULT REFERRAL    R20.2    10. Need for prophylactic vaccination and inoculation against influenza Z23 FLU VACCINE, INCREASED ANTIGEN, PRESV FREE, AGE 65+ [04825]     Vaccine Administration, Initial [61052]       End of Life Planning:  Patient currently has an advanced directive: No.  I have verified the patient's ablity to prepare an advanced directive/make health care decisions.  Literature was provided to assist patient in preparing an advanced directive.    COUNSELING:  Reviewed preventive health counseling, as reflected in patient instructions    "    Regular exercise       Healthy diet/nutrition       Immunizations    Vaccinated for: Influenza          BP Readings from Last 1 Encounters:   10/08/18 120/72     Estimated body mass index is 25.92 kg/(m^2) as calculated from the following:    Height as of 8/2/17: 5' 7.5\" (1.715 m).    Weight as of this encounter: 168 lb (76.2 kg).           reports that he has never smoked. He has never used smokeless tobacco.      Appropriate preventive services were discussed with this patient, including applicable screening as appropriate for cardiovascular disease, diabetes, osteopenia/osteoporosis, and glaucoma.  As appropriate for age/gender, discussed screening for colorectal cancer, prostate cancer, breast cancer, and cervical cancer. Checklist reviewing preventive services available has been given to the patient.    Reviewed patients plan of care and provided an AVS. The Basic Care Plan (routine screening as documented in Health Maintenance) for Stanford meets the Care Plan requirement. This Care Plan has been established and reviewed with the Patient.    Counseling Resources:  ATP IV Guidelines  Pooled Cohorts Equation Calculator  Breast Cancer Risk Calculator  FRAX Risk Assessment  ICSI Preventive Guidelines  Dietary Guidelines for Americans, 2010  [employer]'s MyPlate  ASA Prophylaxis  Lung CA Screening    Arjun Burns MD  [facility]  Answers for HPI/ROS submitted by the patient on 10/8/2018   PHQ-2 Score: 3  PHQ9 TOTAL SCORE: Incomplete    "

## 2018-10-09 LAB
ALBUMIN SERPL-MCNC: 3.7 G/DL (ref 3.4–5)
ALP SERPL-CCNC: 63 U/L (ref 40–150)
ALT SERPL W P-5'-P-CCNC: 31 U/L (ref 0–70)
ANION GAP SERPL CALCULATED.3IONS-SCNC: 8 MMOL/L (ref 3–14)
AST SERPL W P-5'-P-CCNC: 21 U/L (ref 0–45)
BILIRUB SERPL-MCNC: 0.7 MG/DL (ref 0.2–1.3)
BUN SERPL-MCNC: 14 MG/DL (ref 7–30)
CALCIUM SERPL-MCNC: 9 MG/DL (ref 8.5–10.1)
CHLORIDE SERPL-SCNC: 107 MMOL/L (ref 94–109)
CHOLEST SERPL-MCNC: 196 MG/DL
CO2 SERPL-SCNC: 26 MMOL/L (ref 20–32)
CREAT SERPL-MCNC: 0.82 MG/DL (ref 0.66–1.25)
GFR SERPL CREATININE-BSD FRML MDRD: >90 ML/MIN/1.7M2
GLUCOSE SERPL-MCNC: 81 MG/DL (ref 70–99)
HDLC SERPL-MCNC: 41 MG/DL
LDLC SERPL CALC-MCNC: 122 MG/DL
NONHDLC SERPL-MCNC: 155 MG/DL
POTASSIUM SERPL-SCNC: 3.8 MMOL/L (ref 3.4–5.3)
PROT SERPL-MCNC: 6.7 G/DL (ref 6.8–8.8)
PSA SERPL-ACNC: 2.03 UG/L (ref 0–4)
SODIUM SERPL-SCNC: 141 MMOL/L (ref 133–144)
TRIGL SERPL-MCNC: 164 MG/DL

## 2018-10-23 DIAGNOSIS — I10 ESSENTIAL HYPERTENSION: Chronic | ICD-10-CM

## 2018-10-24 RX ORDER — LISINOPRIL 5 MG/1
TABLET ORAL
Qty: 90 TABLET | Refills: 3 | Status: SHIPPED | OUTPATIENT
Start: 2018-10-24 | End: 2019-12-10

## 2018-10-24 NOTE — TELEPHONE ENCOUNTER
"Last OV 10/08/2018.  Requested Prescriptions   Pending Prescriptions Disp Refills     lisinopril (PRINIVIL/ZESTRIL) 5 MG tablet [Pharmacy Med Name: LISINOPRIL 5MG TABLETS] 90 tablet 0     Sig: TAKE 1 TABLET(5 MG) BY MOUTH DAILY    ACE Inhibitors (Including Combos) Protocol Passed    10/23/2018  6:52 PM       Passed - Blood pressure under 140/90 in past 12 months    BP Readings from Last 3 Encounters:   10/08/18 120/72   09/10/18 118/70   11/06/17 124/80                Passed - Recent (12 mo) or future (30 days) visit within the authorizing provider's specialty    Patient had office visit in the last 12 months or has a visit in the next 30 days with authorizing provider or within the authorizing provider's specialty.  See \"Patient Info\" tab in inbasket, or \"Choose Columns\" in Meds & Orders section of the refill encounter.             Passed - Patient is age 18 or older       Passed - Normal serum creatinine on file in past 12 months    Recent Labs   Lab Test  10/08/18   1113   CR  0.82            Passed - Normal serum potassium on file in past 12 months    Recent Labs   Lab Test  10/08/18   1113   POTASSIUM  3.8             Prescription approved per [facility] Refill Protocol.    "

## 2018-11-06 ENCOUNTER — OFFICE VISIT (OUTPATIENT)
Dept: SURGERY | Facility: CLINIC | Age: 73
End: 2018-11-06
Payer: COMMERCIAL

## 2018-11-06 ENCOUNTER — TELEPHONE (OUTPATIENT)
Dept: SURGERY | Facility: CLINIC | Age: 73
End: 2018-11-06

## 2018-11-06 VITALS
OXYGEN SATURATION: 93 % | HEART RATE: 94 BPM | SYSTOLIC BLOOD PRESSURE: 102 MMHG | WEIGHT: 168 LBS | DIASTOLIC BLOOD PRESSURE: 64 MMHG | HEIGHT: 68 IN | BODY MASS INDEX: 25.46 KG/M2

## 2018-11-06 DIAGNOSIS — K43.9 VENTRAL HERNIA WITHOUT OBSTRUCTION OR GANGRENE: Primary | ICD-10-CM

## 2018-11-06 PROCEDURE — 99214 OFFICE O/P EST MOD 30 MIN: CPT | Performed by: SURGERY

## 2018-11-06 NOTE — PROGRESS NOTES
"Surgery Consultation, [facility]         Ike Catalan MD    Stanford Andersen MRN# [id number]   YOB: 1945 Age: 73 year old     PCP:  Arjun Burns [phone]    Chief Complaint:  Umbilical hernia    History of Present Illness:  Stanford Andersen is a 73 year old male who presented with a bulge near the umbilicus. The bulge comes and goes but has gotten larger over time. It is uncomfortable when the patient is engaging in exertional activity.  Patient has a history of previous umbilical hernia repair which was performed primarily.  This was performed in conjunction with a laparoscopic inguinal hernia repair, which I performed some years ago.  The patient is here to discuss possible surgical repair of the umbilical hernia.    PMH:  Stanford Andersen  has a past medical history of Chronic back pain; Gastro-oesophageal reflux disease; Renal disease; and Zollinger-Patricio syndrome.  PSH:  Stanford Andersen  has a past surgical history that includes Kidney surgery; Stomach surgery (2003); Abdomen surgery (2003); Laparoscopic herniorrhaphy inguinal (Left, 9/26/2016); EXCISE VARICOCELE (age 21 ); and lithotripsy.    Home medications and allergies reviewed.    Social History:  Stanford Andersen  reports that he has never smoked. He has never used smokeless tobacco. He reports that he drinks alcohol. He reports that he does not use illicit drugs.  Family History:  Stanford Andersen family history includes C.A.D. in his father and mother; HEART DISEASE in his father and mother; Neurologic Disorder in his son; Respiratory in his father.    ROS:  The 10 point Review of Systems is negative other than noted in the HPI.    Physical Exam:  Blood pressure 102/64, pulse 94, height 5' 8\" (1.727 m), weight 168 lb (76.2 kg), SpO2 93 %.  168 lbs 0 oz  Thin gentleman in no distress.  Pupils equal round and reactive to light.   No cervical lymphadenopathy or thyromegaly.   Lung fields clear, " breathing comfortably.   Heart normal sinus rhythm.  No murmurs rubs or gallops.  Abdomen soft, nontender, nondistended.  Vague area of fullness above the umbilicus.  Mild tenderness.  Scars above and below the umbilicus.       Assessment/plan:  Pleasant healthy gentleman with what appears to be an umbilical hernia. I explained that these are usually not a cause for small bowel incarceration or obstruction, but do become larger over time. They can certainly be uncomfortable and repair is warranted. I recommended an open umbilical hernia repair with mesh. This would normally be done with IV sedation and local. Risks of surgery were explained to the patient, including hernia recurrence, wound infection, or mesh removal.  Patient was going to notify the office when they were ready to schedule surgery.    Dave Catalan M.D.  [facility]  [phone]    Please route or send letter to:  Primary Care Provider (PCP) and Referring Provider

## 2018-11-06 NOTE — MR AVS SNAPSHOT
"              After Visit Summary   11/6/2018    Stanford Andersen    MRN: [id number]           Patient Information     Date Of Birth          1945        Visit Information        Provider Department      11/6/2018 10:45 AM Ike Catalan MD [facility]       Follow-ups after your visit        Your next 10 appointments already scheduled     Nov 06, 2018 10:45 AM CST   CONSULT with Ike Catalan MD   [facility]    [address] [place] [address], [address]  [facility] [id number] [phone]            Jan 07, 2019 11:45 AM CST   SHORT with Arjun Burns MD   [facility]    [address] [place] [address]  [address]  [facility] [id number] [phone]              Who to contact     If you have questions or need follow up information about today's clinic visit or your schedule please contact [facility] directly at [phone].  Normal or non-critical lab and imaging results will be communicated to you by MyChart, letter or phone within 4 business days after the clinic has received the results. If you do not hear from us within 7 days, please contact the clinic through MyChart or phone. If you have a critical or abnormal lab result, we will notify you by phone as soon as possible.  Submit refill requests through [facility] or call your pharmacy and they will forward the refill request to us. Please allow 3 business days for your refill to be completed.          Additional Information About Your Visit        Care EveryWhere ID     This is your Care EveryWhere ID. This could be used by other organizations to access your [facility] medical records  [id number]        Your Vitals Were     Pulse Height Pulse Oximetry BMI (Body Mass Index)          94 5' 8\" (1.727 m) 93% 25.54 kg/m2         Blood Pressure from Last 3 " Encounters:   11/06/18 102/64   10/08/18 120/72   09/10/18 118/70    Weight from Last 3 Encounters:   11/06/18 168 lb (76.2 kg)   10/08/18 168 lb (76.2 kg)   09/10/18 162 lb 8 oz (73.7 kg)              Today, you had the following     No orders found for display       Primary Care Provider Office Phone # Fax #    Arjun Burns [phone] [address] [facility] [address]        Equal Access to Services     [facility]: Hadii aad ku hadasho Soomaali, waaxda luqadaha, qaybta kaalmada adepiliyarodri, zack diggs . So [facility] [phone].    ATENCIÓN: Si habla español, tiene a fuller disposición servicios gratuitos de asistencia lingüística. [facility] [phone].    We comply with applicable federal civil rights laws and [place] laws. We do not discriminate on the basis of race, color, national origin, age, disability, sex, sexual orientation, or gender identity.            Thank you!     Thank you for choosing [facility]  for your care. Our goal is always to provide you with excellent care. Hearing back from our patients is one way we can continue to improve our services. Please take a few minutes to complete the written survey that you may receive in the mail after your visit with us. Thank you!             Your Updated Medication List - Protect others around you: Learn how to safely use, store and throw away your medicines at www.disposemymeds.org.          This list is accurate as of 11/6/18 10:42 AM.  Always use your most recent med list.                   Brand Name Dispense Instructions for use Diagnosis    aspirin 81 MG EC tablet     90 tablet    Take 1 tablet by mouth daily.    Chest pain       finasteride 5 MG tablet    PROSCAR    90 tablet    Take 1 tablet (5 mg) by mouth daily    Nocturia       lisinopril 5 MG tablet    PRINIVIL/ZESTRIL    90 tablet    TAKE 1 TABLET(5 MG) BY MOUTH DAILY    Essential hypertension       magnesium 250  MG tablet      Take 1 tablet by mouth daily        PRILOSEC PO      Take 20 mg by mouth 3 times daily. Patient takes it in the morning, with dinner and at bedtime for Patricio-Zollinger syndrome. Patient takes Prilosec OTC.        tamsulosin 0.4 MG capsule    FLOMAX    30 capsule    Take 1 capsule (0.4 mg) by mouth daily    Nocturia

## 2018-11-06 NOTE — TELEPHONE ENCOUNTER
Type of surgery: Open umbilical hernia repair  Location of surgery: [facility]  Date and time of surgery: 11/30/18 at 12:45pm  Surgeon: Dr. Ike Catalan  Pre-Op Appt Date: Patient to schedule  Post-Op Appt Date: Patient to schedule   Packet sent out: Yes  Pre-cert/Authorization completed:  Not Applicable  Date: 11/6/18

## 2018-11-06 NOTE — LETTER
"Surgery Consultation, [facility]      November 6, 2018    Ike Catalan MD     Stanford Andersen MRN# [id number]   YOB: 1945 Age: 73 year old      PCP:  Arjun Burns [phone]     Chief Complaint:  Umbilical hernia     History of Present Illness:  Stanford Andersen is a 73 year old male who presented with a bulge near the umbilicus. The bulge comes and goes but has gotten larger over time. It is uncomfortable when the patient is engaging in exertional activity.  Patient has a history of previous umbilical hernia repair which was performed primarily.  This was performed in conjunction with a laparoscopic inguinal hernia repair, which I performed some years ago. The patient is here to discuss possible surgical repair of the umbilical hernia.     PMH:  Stanford Andersen  has a past medical history of Chronic back pain; Gastro-oesophageal reflux disease; Renal disease; and Zollinger-Patricio syndrome.  PSH:  Stanford Andersen  has a past surgical history that includes Kidney surgery; Stomach surgery (2003); Abdomen surgery (2003); Laparoscopic herniorrhaphy inguinal (Left, 9/26/2016); EXCISE VARICOCELE (age 21 ); and lithotripsy.     Home medications and allergies reviewed.     Social History:  Stanford Andersen  reports that he has never smoked. He has never used smokeless tobacco. He reports that he drinks alcohol. He reports that he does not use illicit drugs.  Family History:  Stanford Andersen family history includes C.A.D. in his father and mother; HEART DISEASE in his father and mother; Neurologic Disorder in his son; Respiratory in his father.     ROS:  The 10 point Review of Systems is negative other than noted in the HPI.     Physical Exam:  Blood pressure 102/64, pulse 94, height 5' 8\" (1.727 m), weight 168 lb (76.2 kg), SpO2 93 %.  168 lbs 0 oz  Thin gentleman in no distress.  Pupils equal round and reactive to light.   No cervical lymphadenopathy or thyromegaly. "   Lung fields clear, breathing comfortably.   Heart normal sinus rhythm.  No murmurs rubs or gallops.  Abdomen soft, nontender, nondistended.  Vague area of fullness above the umbilicus.  Mild tenderness.  Scars above and below the umbilicus.      Assessment/plan:  Pleasant healthy gentleman with what appears to be an umbilical hernia. I explained that these are usually not a cause for small bowel incarceration or obstruction, but do become larger over time. They can certainly be uncomfortable and repair is warranted. I recommended an open umbilical hernia repair with mesh. This would normally be done with IV sedation and local. Risks of surgery were explained to the patient, including hernia recurrence, wound infection, or mesh removal.  Patient was going to notify the office when they were ready to schedule surgery.     Dave Catalan M.D.  [facility]  [phone]

## 2018-11-19 ENCOUNTER — OFFICE VISIT (OUTPATIENT)
Dept: FAMILY MEDICINE | Facility: CLINIC | Age: 73
End: 2018-11-19
Payer: COMMERCIAL

## 2018-11-19 VITALS
SYSTOLIC BLOOD PRESSURE: 110 MMHG | WEIGHT: 165 LBS | BODY MASS INDEX: 25.09 KG/M2 | TEMPERATURE: 96.5 F | HEART RATE: 86 BPM | DIASTOLIC BLOOD PRESSURE: 70 MMHG | OXYGEN SATURATION: 93 %

## 2018-11-19 DIAGNOSIS — Z01.818 PREOP GENERAL PHYSICAL EXAM: Primary | ICD-10-CM

## 2018-11-19 DIAGNOSIS — E78.2 MIXED HYPERLIPIDEMIA: Chronic | ICD-10-CM

## 2018-11-19 DIAGNOSIS — K43.9 VENTRAL HERNIA WITHOUT OBSTRUCTION OR GANGRENE: ICD-10-CM

## 2018-11-19 DIAGNOSIS — I10 ESSENTIAL HYPERTENSION: Chronic | ICD-10-CM

## 2018-11-19 LAB — HGB BLD-MCNC: 16.2 G/DL (ref 13.3–17.7)

## 2018-11-19 PROCEDURE — 85018 HEMOGLOBIN: CPT | Performed by: FAMILY MEDICINE

## 2018-11-19 PROCEDURE — 80051 ELECTROLYTE PANEL: CPT | Performed by: FAMILY MEDICINE

## 2018-11-19 PROCEDURE — 93000 ELECTROCARDIOGRAM COMPLETE: CPT | Performed by: FAMILY MEDICINE

## 2018-11-19 PROCEDURE — 84520 ASSAY OF UREA NITROGEN: CPT | Performed by: FAMILY MEDICINE

## 2018-11-19 PROCEDURE — 82565 ASSAY OF CREATININE: CPT | Performed by: FAMILY MEDICINE

## 2018-11-19 PROCEDURE — 36415 COLL VENOUS BLD VENIPUNCTURE: CPT | Performed by: FAMILY MEDICINE

## 2018-11-19 PROCEDURE — 99214 OFFICE O/P EST MOD 30 MIN: CPT | Mod: 25 | Performed by: FAMILY MEDICINE

## 2018-11-19 NOTE — MR AVS SNAPSHOT
After Visit Summary   11/19/2018    Stanford Anderesn    MRN: [id number]           Patient Information     Date Of Birth          1945        Visit Information        Provider Department      11/19/2018 1:45 PM Arjun Burns MD [facility]        Today's Diagnoses     Preop general physical exam    -  1    Ventral hernia without obstruction or gangrene        Mixed hyperlipidemia        Essential hypertension          Care Instructions      Before Your Surgery      Call your surgeon if there is any change in your health. This includes signs of a cold or flu (such as a sore throat, runny nose, cough, rash or fever).    Do not smoke, drink alcohol or take over the counter medicine (unless your surgeon or primary care doctor tells you to) for the 24 hours before and after surgery.    If you take prescribed drugs: Follow your doctor s orders about which medicines to take and which to stop until after surgery.    Eating and drinking prior to surgery: follow the instructions from your surgeon    Take a shower or bath the night before surgery. Use the soap your surgeon gave you to gently clean your skin. If you do not have soap from your surgeon, use your regular soap. Do not shave or scrub the surgery site.  Wear clean pajamas and have clean sheets on your bed.           Follow-ups after your visit        Your next 10 appointments already scheduled     Nov 30, 2018   Procedure with Ike Catalan MD   [facility] (--)    [address]  [facility] [address]   [phone]            Nov 30, 2018 12:30 PM CST   [facility] Same Day Surgery with Ike Catalan MD   [facility] ([facility])    [facility] ([facility])   [phone]            Jan 07, 2019 11:45 AM CST   SHORT with Arjun Bursn MD   [facility]  [facility] ([facility])    [address]  [facility] [address] [phone]              Who to contact     If you have questions or need follow up information about today's clinic visit or your schedule please contact [facility] directly at [phone].  Normal or non-critical lab and imaging results will be communicated to you by MyChart, letter or phone within 4 business days after the clinic has received the results. If you do not hear from us within 7 days, please contact the clinic through MyChart or phone. If you have a critical or abnormal lab result, we will notify you by phone as soon as possible.  Submit refill requests through [facility] or call your pharmacy and they will forward the refill request to us. Please allow 3 business days for your refill to be completed.          Additional Information About Your Visit        Care EveryWhere ID     This is your Care EveryWhere ID. This could be used by other organizations to access your [facility] medical records  [id number]        Your Vitals Were     Pulse Temperature Pulse Oximetry BMI (Body Mass Index)          86 96.5  F (35.8  C) (Tympanic) 93% 25.09 kg/m2         Blood Pressure from Last 3 Encounters:   11/19/18 110/70   11/06/18 102/64   10/08/18 120/72    Weight from Last 3 Encounters:   11/19/18 165 lb (74.8 kg)   11/06/18 168 lb (76.2 kg)   10/08/18 168 lb (76.2 kg)              We Performed the Following     Creatinine     EKG 12-lead complete w/read - Clinics     Electrolyte panel (Na, K, Cl, CO2, Anion gap)     Hemoglobin     Urea nitrogen        Primary Care Provider Office Phone # Fax #    Arjun Burns [phone] [address] [facility] [address]        Equal Access to Services     SAYRA ALANIZ : Carla Porras, waaxda luqadaha, qaybta kaalmarodri cali, zack gregory  la'virgenn sourav. So [facility] [phone].    ATENCIÓN: Si habla maurice, tiene a fuller disposición servicios gratuitos de asistencia lingüística. [place] al [phone].    We comply with applicable federal civil rights laws and [place] laws. We do not discriminate on the basis of race, color, national origin, age, disability, sex, sexual orientation, or gender identity.            Thank you!     Thank you for choosing [facility]  for your care. Our goal is always to provide you with excellent care. Hearing back from our patients is one way we can continue to improve our services. Please take a few minutes to complete the written survey that you may receive in the mail after your visit with us. Thank you!             Your Updated Medication List - Protect others around you: Learn how to safely use, store and throw away your medicines at www.disposemymeds.org.          This list is accurate as of 11/19/18  2:21 PM.  Always use your most recent med list.                   Brand Name Dispense Instructions for use Diagnosis    aspirin 81 MG EC tablet     90 tablet    Take 1 tablet by mouth daily.    Chest pain       finasteride 5 MG tablet    PROSCAR    90 tablet    Take 1 tablet (5 mg) by mouth daily    Nocturia       lisinopril 5 MG tablet    PRINIVIL/ZESTRIL    90 tablet    TAKE 1 TABLET(5 MG) BY MOUTH DAILY    Essential hypertension       magnesium 250 MG tablet      Take 1 tablet by mouth daily        PRILOSEC PO      Take 20 mg by mouth 3 times daily. Patient takes it in the morning, with dinner and at bedtime for Patricio-Zollinger syndrome. Patient takes Prilosec OTC.        tamsulosin 0.4 MG capsule    FLOMAX    30 capsule    Take 1 capsule (0.4 mg) by mouth daily    Nocturia

## 2018-11-19 NOTE — PROGRESS NOTES
[facility]  [address]  [facility] [address]  [phone]  Dept: [phone]    PRE-OP EVALUATION:  Today's date: 2018    Stanford Andersen (: 1945) presents for pre-operative evaluation assessment as requested by Dr. cedillo.  He requires evaluation and anesthesia risk assessment prior to undergoing surgery/procedure for treatment of hernia repair .    Proposed Surgery/ Procedure: hernia repair  Date of Surgery/ Procedure: 18  Time of Surgery/ Procedure: 330 pm  Hospital/Surgical Facility: [facility]  Fax number for surgical facility:   Primary Physician: Arjun Burns  Type of Anesthesia Anticipated: to be determined    Patient has a Health Care Directive or Living Will:  NO    1. NO - Do you have a history of heart attack, stroke, stent, bypass or surgery on an artery in the head, neck, heart or legs?  2. yes - Do you ever have any pain or discomfort in your chest?with stress/anxiety  3. NO - Do you have a history of  Heart Failure?  4. NO - Are you troubled by shortness of breath when: walking on the level, up a slight hill or at night?  5. NO - Do you currently have a cold, bronchitis or other respiratory infection?  6. yes - Do you have a cough, shortness of breath or wheezing?cough the last two days  7. NO - Do you sometimes get pains in the calves of your legs when you walk?  8. NO - Do you or anyone in your family have previous history of blood clots?  9. NO - Do you or does anyone in your family have a serious bleeding problem such as prolonged bleeding following surgeries or cuts?  10. NO - Have you ever had problems with anemia or been told to take iron pills?  11. NO - Have you had any abnormal blood loss such as black, tarry or bloody stools, or abnormal vaginal bleeding?  12. NO - Have you ever had a blood transfusion?  13. yes - Have you or any of your relatives ever had problems with anesthesia?mother had  problems waking up  14. NO - Do you have sleep apnea, excessive snoring or daytime drowsiness?  15. NO - Do you have any prosthetic heart valves?  16. NO - Do you have prosthetic joints?  17. NO - Is there any chance that you may be pregnant?      HPI:     HPI related to upcoming procedure: hernia around the umbilicus      See problem list for active medical problems.  Problems all longstanding and stable, except as noted/documented.  See ROS for pertinent symptoms related to these conditions.                                                                                                                                                          .    MEDICAL HISTORY:     Patient Active Problem List    Diagnosis Date Noted     Erectile dysfunction, unspecified erectile dysfunction type 10/08/2018     Priority: Medium     Ventral hernia without obstruction or gangrene 10/08/2018     Priority: Medium     Numbness and tingling of both feet 10/08/2018     Priority: Medium     Screening for prostate cancer 09/25/2017     Priority: Medium     Stress 08/02/2017     Priority: Medium     ACP (advance care planning) 12/21/2015     Priority: Medium     Advance Care Planning 12/21/2015: ACP Review of Chart / Resources Provided:  Reviewed chart for advance care plan.  Stanford Andersen has no plan or code status on file. Discussed available resources and provided with information. . Added by Zee Stewart             Essential hypertension 02/05/2014     Priority: Medium     Hyperlipidemia 09/04/2013     Priority: Medium     Family history of coronary artery disease 09/04/2013     Priority: Medium     Change in bowel function 09/04/2013     Priority: Medium     Zollinger-Patricio syndrome      Priority: Medium     Health Care Home 03/02/2012     Priority: Medium     FPA [facility] for .  Clara Daniels RN-BC    [phone]   DX V65.8 REPLACED WITH [id number]ALTH CARE HOME (04/08/2013)       Chest pain 03/02/2012      Priority: Medium      Past Medical History:   Diagnosis Date     Chronic back pain      Gastro-oesophageal reflux disease      Renal disease     kidney stone     Zollinger-Patricio syndrome      Past Surgical History:   Procedure Laterality Date     ABDOMEN SURGERY  2003    Exploratory laparoscopy     HC EXCISE VARICOCELE  age 21     varicocele repair     KIDNEY SURGERY      lithotripsy x 3     LAPAROSCOPIC HERNIORRHAPHY INGUINAL Left 9/26/2016    Procedure: LAPAROSCOPIC HERNIORRHAPHY INGUINAL;  Surgeon: Ike Catalan MD;  Location:  [facility]     LITHOTRIPSY      three times     STOMACH SURGERY  2003    exploratory scoping looking for tumors related to ZE syndrome     Current Outpatient Prescriptions   Medication Sig Dispense Refill     aspirin 81 MG EC tablet Take 1 tablet by mouth daily. 90 tablet 3     finasteride (PROSCAR) 5 MG tablet Take 1 tablet (5 mg) by mouth daily 90 tablet 1     lisinopril (PRINIVIL/ZESTRIL) 5 MG tablet TAKE 1 TABLET(5 MG) BY MOUTH DAILY 90 tablet 3     magnesium 250 MG tablet Take 1 tablet by mouth daily       Omeprazole (PRILOSEC PO) Take 20 mg by mouth 3 times daily. Patient takes it in the morning, with dinner and at bedtime for Patricio-Zollinger syndrome. Patient takes Prilosec OTC.       tamsulosin (FLOMAX) 0.4 MG capsule Take 1 capsule (0.4 mg) by mouth daily 30 capsule 3     OTC products: None, except as noted above    Allergies   Allergen Reactions     Dye [Contrast Dye] Anaphylaxis and Hives     Penicillins Anaphylaxis     Swelling, arm turned blue     Oxycodone-Acetaminophen Rash      Latex Allergy: NO    Social History   Substance Use Topics     Smoking status: Never Smoker     Smokeless tobacco: Never Used     Alcohol use Yes      Comment: seldom     History   Drug Use No       REVIEW OF SYSTEMS:   CONSTITUTIONAL: NEGATIVE for fever, chills, change in weight  INTEGUMENTARY/SKIN: NEGATIVE for worrisome rashes, moles or lesions  EYES: NEGATIVE for vision changes or  irritation  ENT/MOUTH: NEGATIVE for ear, mouth and throat problems  RESP: NEGATIVE for significant cough or SOB  BREAST: NEGATIVE for masses, tenderness or discharge  CV: NEGATIVE for chest pain, palpitations or peripheral edema  GI: NEGATIVE for nausea, abdominal pain, heartburn, or change in bowel habits  : NEGATIVE for frequency, dysuria, or hematuria  MUSCULOSKELETAL: NEGATIVE for significant arthralgias or myalgia  NEURO: NEGATIVE for weakness, dizziness or paresthesias  ENDOCRINE: NEGATIVE for temperature intolerance, skin/hair changes  HEME: NEGATIVE for bleeding problems  PSYCHIATRIC: NEGATIVE for changes in mood or affect    EXAM:   /70 (Cuff Size: Adult Large)  Pulse 86  Temp 96.5  F (35.8  C) (Tympanic)  Wt 165 lb (74.8 kg)  SpO2 93%  BMI 25.09 kg/m2    GENERAL APPEARANCE: healthy, alert and no distress     EYES: EOMI,  PERRL     HENT: ear canals and TM's normal and nose and mouth without ulcers or lesions     NECK: no adenopathy, no asymmetry, masses, or scars and thyroid normal to palpation     RESP: lungs clear to auscultation - no rales, rhonchi or wheezes     CV: regular rates and rhythm, normal S1 S2, no S3 or S4 and no murmur, click or rub     ABDOMEN:  soft, nontender, no HSM or masses and bowel sounds normal     MS: extremities normal- no gross deformities noted, no evidence of inflammation in joints, FROM in all extremities.     SKIN: no suspicious lesions or rashes     NEURO: Normal strength and tone, sensory exam grossly normal, mentation intact and speech normal     PSYCH: mentation appears normal. and affect normal/bright     LYMPHATICS: No cervical adenopathy    DIAGNOSTICS:   EKG done and normal and unchanged    Recent Labs   Lab Test  10/08/18   1113  09/25/17   1318   HGB  15.7  16.2   PLT  227  252   NA  141  140   POTASSIUM  3.8  3.9   CR  0.82  0.87        IMPRESSION:   Reason for surgery/procedure: ventral hernia      The proposed surgical procedure is considered  INTERMEDIATE risk.    REVISED CARDIAC RISK INDEX  The patient has the following serious cardiovascular risks for perioperative complications such as (MI, PE, VFib and 3  AV Block):  No serious cardiac risks  INTERPRETATION: 0 risks: Class I (very low risk - 0.4% complication rate)    The patient has the following additional risks for perioperative complications:  No identified additional risks      ICD-10-CM    1. Preop general physical exam Z01.818 Hemoglobin     EKG 12-lead complete w/read - Clinics     Electrolyte panel (Na, K, Cl, CO2, Anion gap)     Creatinine     Urea nitrogen   2. Ventral hernia without obstruction or gangrene K43.9    3. Mixed hyperlipidemia E78.2    4. Essential hypertension I10        RECOMMENDATIONS:         --Patient is to take all scheduled medications on the day of surgery EXCEPT for modifications listed below.    APPROVAL GIVEN to proceed with proposed procedure, without further diagnostic evaluation       Signed Electronically by: Arjun Burns MD    Copy of this evaluation report is provided to requesting physician.    [facility] Preop Guidelines    Revised Cardiac Risk Index

## 2018-11-19 NOTE — LETTER
November 20, 2018      Stanford Andersen  [address] [address] [place] [id number]        Dear ,    We are writing to inform you of your test results.    Your tests are all normal.  Have a great holiday season.    Resulted Orders   Hemoglobin   Result Value Ref Range    Hemoglobin 16.2 13.3 - 17.7 g/dL   Electrolyte panel (Na, K, Cl, CO2, Anion gap)   Result Value Ref Range    Sodium 139 133 - 144 mmol/L    Potassium 4.2 3.4 - 5.3 mmol/L    Chloride 104 94 - 109 mmol/L    Carbon Dioxide 28 20 - 32 mmol/L    Anion Gap 7 3 - 14 mmol/L   Creatinine   Result Value Ref Range    Creatinine 0.95 0.66 - 1.25 mg/dL    GFR Estimate 78 >60 mL/min/1.7m2      Comment:      Non  GFR Calc    GFR Estimate If Black >90 >60 mL/min/1.7m2      Comment:       GFR Calc   Urea nitrogen   Result Value Ref Range    Urea Nitrogen 20 7 - 30 mg/dL       If you have any questions or concerns, please call the clinic at the number listed above.       Sincerely,        Arjun Burns MD

## 2018-11-20 LAB
ANION GAP SERPL CALCULATED.3IONS-SCNC: 7 MMOL/L (ref 3–14)
BUN SERPL-MCNC: 20 MG/DL (ref 7–30)
CHLORIDE SERPL-SCNC: 104 MMOL/L (ref 94–109)
CO2 SERPL-SCNC: 28 MMOL/L (ref 20–32)
CREAT SERPL-MCNC: 0.95 MG/DL (ref 0.66–1.25)
GFR SERPL CREATININE-BSD FRML MDRD: 78 ML/MIN/1.7M2
POTASSIUM SERPL-SCNC: 4.2 MMOL/L (ref 3.4–5.3)
SODIUM SERPL-SCNC: 139 MMOL/L (ref 133–144)

## 2018-11-27 NOTE — H&P (VIEW-ONLY)
[facility]  [address]  [facility] [address]  [phone]  Dept: [phone]    PRE-OP EVALUATION:  Today's date: 2018    Stanford Andersen (: 1945) presents for pre-operative evaluation assessment as requested by Dr. cedillo.  He requires evaluation and anesthesia risk assessment prior to undergoing surgery/procedure for treatment of hernia repair .    Proposed Surgery/ Procedure: hernia repair  Date of Surgery/ Procedure: 18  Time of Surgery/ Procedure: 330 pm  Hospital/Surgical Facility: [facility]  Fax number for surgical facility:   Primary Physician: Arjun Burns  Type of Anesthesia Anticipated: to be determined    Patient has a Health Care Directive or Living Will:  NO    1. NO - Do you have a history of heart attack, stroke, stent, bypass or surgery on an artery in the head, neck, heart or legs?  2. yes - Do you ever have any pain or discomfort in your chest?with stress/anxiety  3. NO - Do you have a history of  Heart Failure?  4. NO - Are you troubled by shortness of breath when: walking on the level, up a slight hill or at night?  5. NO - Do you currently have a cold, bronchitis or other respiratory infection?  6. yes - Do you have a cough, shortness of breath or wheezing?cough the last two days  7. NO - Do you sometimes get pains in the calves of your legs when you walk?  8. NO - Do you or anyone in your family have previous history of blood clots?  9. NO - Do you or does anyone in your family have a serious bleeding problem such as prolonged bleeding following surgeries or cuts?  10. NO - Have you ever had problems with anemia or been told to take iron pills?  11. NO - Have you had any abnormal blood loss such as black, tarry or bloody stools, or abnormal vaginal bleeding?  12. NO - Have you ever had a blood transfusion?  13. yes - Have you or any of your relatives ever had problems with anesthesia?mother had  problems waking up  14. NO - Do you have sleep apnea, excessive snoring or daytime drowsiness?  15. NO - Do you have any prosthetic heart valves?  16. NO - Do you have prosthetic joints?  17. NO - Is there any chance that you may be pregnant?      HPI:     HPI related to upcoming procedure: hernia around the umbilicus      See problem list for active medical problems.  Problems all longstanding and stable, except as noted/documented.  See ROS for pertinent symptoms related to these conditions.                                                                                                                                                          .    MEDICAL HISTORY:     Patient Active Problem List    Diagnosis Date Noted     Erectile dysfunction, unspecified erectile dysfunction type 10/08/2018     Priority: Medium     Ventral hernia without obstruction or gangrene 10/08/2018     Priority: Medium     Numbness and tingling of both feet 10/08/2018     Priority: Medium     Screening for prostate cancer 09/25/2017     Priority: Medium     Stress 08/02/2017     Priority: Medium     ACP (advance care planning) 12/21/2015     Priority: Medium     Advance Care Planning 12/21/2015: ACP Review of Chart / Resources Provided:  Reviewed chart for advance care plan.  Stanford Andersen has no plan or code status on file. Discussed available resources and provided with information. . Added by Zee Stewart             Essential hypertension 02/05/2014     Priority: Medium     Hyperlipidemia 09/04/2013     Priority: Medium     Family history of coronary artery disease 09/04/2013     Priority: Medium     Change in bowel function 09/04/2013     Priority: Medium     Zollinger-Patricio syndrome      Priority: Medium     Health Care Home 03/02/2012     Priority: Medium     [facility]PA [facility] for .  Clara Daniels RN-BC    [phone]   DX V65.8 REPLACED WITH [id number] HEALTH CARE HOME (04/08/2013)       Chest pain 03/02/2012      Priority: Medium      Past Medical History:   Diagnosis Date     Chronic back pain      Gastro-oesophageal reflux disease      Renal disease     kidney stone     Zollinger-Patricio syndrome      Past Surgical History:   Procedure Laterality Date     ABDOMEN SURGERY  2003    Exploratory laparoscopy     HC EXCISE VARICOCELE  age 21     varicocele repair     KIDNEY SURGERY      lithotripsy x 3     LAPAROSCOPIC HERNIORRHAPHY INGUINAL Left 9/26/2016    Procedure: LAPAROSCOPIC HERNIORRHAPHY INGUINAL;  Surgeon: Ike Catalan MD;  Location:  [facility]     LITHOTRIPSY      three times     STOMACH SURGERY  2003    exploratory scoping looking for tumors related to ZE syndrome     Current Outpatient Prescriptions   Medication Sig Dispense Refill     aspirin 81 MG EC tablet Take 1 tablet by mouth daily. 90 tablet 3     finasteride (PROSCAR) 5 MG tablet Take 1 tablet (5 mg) by mouth daily 90 tablet 1     lisinopril (PRINIVIL/ZESTRIL) 5 MG tablet TAKE 1 TABLET(5 MG) BY MOUTH DAILY 90 tablet 3     magnesium 250 MG tablet Take 1 tablet by mouth daily       Omeprazole (PRILOSEC PO) Take 20 mg by mouth 3 times daily. Patient takes it in the morning, with dinner and at bedtime for Patricio-Zollinger syndrome. Patient takes Prilosec OTC.       tamsulosin (FLOMAX) 0.4 MG capsule Take 1 capsule (0.4 mg) by mouth daily 30 capsule 3     OTC products: None, except as noted above    Allergies   Allergen Reactions     Dye [Contrast Dye] Anaphylaxis and Hives     Penicillins Anaphylaxis     Swelling, arm turned blue     Oxycodone-Acetaminophen Rash      Latex Allergy: NO    Social History   Substance Use Topics     Smoking status: Never Smoker     Smokeless tobacco: Never Used     Alcohol use Yes      Comment: seldom     History   Drug Use No       REVIEW OF SYSTEMS:   CONSTITUTIONAL: NEGATIVE for fever, chills, change in weight  INTEGUMENTARY/SKIN: NEGATIVE for worrisome rashes, moles or lesions  EYES: NEGATIVE for vision changes or  irritation  ENT/MOUTH: NEGATIVE for ear, mouth and throat problems  RESP: NEGATIVE for significant cough or SOB  BREAST: NEGATIVE for masses, tenderness or discharge  CV: NEGATIVE for chest pain, palpitations or peripheral edema  GI: NEGATIVE for nausea, abdominal pain, heartburn, or change in bowel habits  : NEGATIVE for frequency, dysuria, or hematuria  MUSCULOSKELETAL: NEGATIVE for significant arthralgias or myalgia  NEURO: NEGATIVE for weakness, dizziness or paresthesias  ENDOCRINE: NEGATIVE for temperature intolerance, skin/hair changes  HEME: NEGATIVE for bleeding problems  PSYCHIATRIC: NEGATIVE for changes in mood or affect    EXAM:   /70 (Cuff Size: Adult Large)  Pulse 86  Temp 96.5  F (35.8  C) (Tympanic)  Wt 165 lb (74.8 kg)  SpO2 93%  BMI 25.09 kg/m2    GENERAL APPEARANCE: healthy, alert and no distress     EYES: EOMI,  PERRL     HENT: ear canals and TM's normal and nose and mouth without ulcers or lesions     NECK: no adenopathy, no asymmetry, masses, or scars and thyroid normal to palpation     RESP: lungs clear to auscultation - no rales, rhonchi or wheezes     CV: regular rates and rhythm, normal S1 S2, no S3 or S4 and no murmur, click or rub     ABDOMEN:  soft, nontender, no HSM or masses and bowel sounds normal     MS: extremities normal- no gross deformities noted, no evidence of inflammation in joints, FROM in all extremities.     SKIN: no suspicious lesions or rashes     NEURO: Normal strength and tone, sensory exam grossly normal, mentation intact and speech normal     PSYCH: mentation appears normal. and affect normal/bright     LYMPHATICS: No cervical adenopathy    DIAGNOSTICS:   EKG done and normal and unchanged    Recent Labs   Lab Test  10/08/18   1113  09/25/17   1318   HGB  15.7  16.2   PLT  227  252   NA  141  140   POTASSIUM  3.8  3.9   CR  0.82  0.87        IMPRESSION:   Reason for surgery/procedure: ventral hernia      The proposed surgical procedure is considered  INTERMEDIATE risk.    REVISED CARDIAC RISK INDEX  The patient has the following serious cardiovascular risks for perioperative complications such as (MI, PE, VFib and 3  AV Block):  No serious cardiac risks  INTERPRETATION: 0 risks: Class I (very low risk - 0.4% complication rate)    The patient has the following additional risks for perioperative complications:  No identified additional risks      ICD-10-CM    1. Preop general physical exam Z01.818 Hemoglobin     EKG 12-lead complete w/read - Clinics     Electrolyte panel (Na, K, Cl, CO2, Anion gap)     Creatinine     Urea nitrogen   2. Ventral hernia without obstruction or gangrene K43.9    3. Mixed hyperlipidemia E78.2    4. Essential hypertension I10        RECOMMENDATIONS:         --Patient is to take all scheduled medications on the day of surgery EXCEPT for modifications listed below.    APPROVAL GIVEN to proceed with proposed procedure, without further diagnostic evaluation       Signed Electronically by: Arjun Burns MD    Copy of this evaluation report is provided to requesting physician.    [facility] Preop Guidelines    Revised Cardiac Risk Index

## 2018-11-30 ENCOUNTER — HOSPITAL ENCOUNTER (OUTPATIENT)
Facility: CLINIC | Age: 73
Discharge: HOME | End: 2018-11-30
Attending: SURGERY | Admitting: SURGERY
Payer: COMMERCIAL

## 2018-11-30 ENCOUNTER — APPOINTMENT (OUTPATIENT)
Dept: SURGERY | Facility: PHYSICIAN GROUP | Age: 73
End: 2018-11-30
Payer: COMMERCIAL

## 2018-11-30 ENCOUNTER — ANESTHESIA (OUTPATIENT)
Dept: SURGERY | Facility: CLINIC | Age: 73
End: 2018-11-30
Payer: COMMERCIAL

## 2018-11-30 ENCOUNTER — ANESTHESIA EVENT (OUTPATIENT)
Dept: SURGERY | Facility: CLINIC | Age: 73
End: 2018-11-30
Payer: COMMERCIAL

## 2018-11-30 ENCOUNTER — SURGERY (OUTPATIENT)
Age: 73
End: 2018-11-30

## 2018-11-30 VITALS
TEMPERATURE: 97 F | WEIGHT: 164 LBS | HEIGHT: 68 IN | RESPIRATION RATE: 19 BRPM | BODY MASS INDEX: 24.86 KG/M2 | OXYGEN SATURATION: 94 % | DIASTOLIC BLOOD PRESSURE: 70 MMHG | HEART RATE: 49 BPM | SYSTOLIC BLOOD PRESSURE: 106 MMHG

## 2018-11-30 DIAGNOSIS — K42.9 UMBILICAL HERNIA WITHOUT OBSTRUCTION AND WITHOUT GANGRENE: Primary | ICD-10-CM

## 2018-11-30 PROCEDURE — 25000128 H RX IP 250 OP 636: Performed by: NURSE ANESTHETIST, CERTIFIED REGISTERED

## 2018-11-30 PROCEDURE — 49585 ZZHC REPAIR UMBILICAL HERN,5+Y/O,REDUC: CPT | Mod: AS | Performed by: PHYSICIAN ASSISTANT

## 2018-11-30 PROCEDURE — 37000008 ZZH ANESTHESIA TECHNICAL FEE, 1ST 30 MIN: Performed by: SURGERY

## 2018-11-30 PROCEDURE — 25000125 ZZHC RX 250: Performed by: SURGERY

## 2018-11-30 PROCEDURE — 25000132 ZZH RX MED GY IP 250 OP 250 PS 637: Performed by: SURGERY

## 2018-11-30 PROCEDURE — 49585 ZZHC REPAIR UMBILICAL HERN,5+Y/O,REDUC: CPT | Performed by: SURGERY

## 2018-11-30 PROCEDURE — 36000052 ZZH SURGERY LEVEL 2 EA 15 ADDTL MIN: Performed by: SURGERY

## 2018-11-30 PROCEDURE — 40000169 ZZH STATISTIC PRE-PROCEDURE ASSESSMENT I: Performed by: SURGERY

## 2018-11-30 PROCEDURE — 71000027 ZZH RECOVERY PHASE 2 EACH 15 MINS: Performed by: SURGERY

## 2018-11-30 PROCEDURE — 27210794 ZZH OR GENERAL SUPPLY STERILE: Performed by: SURGERY

## 2018-11-30 PROCEDURE — 37000009 ZZH ANESTHESIA TECHNICAL FEE, EACH ADDTL 15 MIN: Performed by: SURGERY

## 2018-11-30 PROCEDURE — 36000050 ZZH SURGERY LEVEL 2 1ST 30 MIN: Performed by: SURGERY

## 2018-11-30 PROCEDURE — 71000012 ZZH RECOVERY PHASE 1 LEVEL 1 FIRST HR: Performed by: SURGERY

## 2018-11-30 RX ORDER — SODIUM CHLORIDE, SODIUM LACTATE, POTASSIUM CHLORIDE, CALCIUM CHLORIDE 600; 310; 30; 20 MG/100ML; MG/100ML; MG/100ML; MG/100ML
INJECTION, SOLUTION INTRAVENOUS CONTINUOUS
Status: DISCONTINUED | OUTPATIENT
Start: 2018-11-30 | End: 2018-11-30 | Stop reason: HOSPADM

## 2018-11-30 RX ORDER — PROPOFOL 10 MG/ML
INJECTION, EMULSION INTRAVENOUS CONTINUOUS PRN
Status: DISCONTINUED | OUTPATIENT
Start: 2018-11-30 | End: 2018-11-30

## 2018-11-30 RX ORDER — NALOXONE HYDROCHLORIDE 0.4 MG/ML
.1-.4 INJECTION, SOLUTION INTRAMUSCULAR; INTRAVENOUS; SUBCUTANEOUS
Status: DISCONTINUED | OUTPATIENT
Start: 2018-11-30 | End: 2018-11-30 | Stop reason: HOSPADM

## 2018-11-30 RX ORDER — ONDANSETRON 4 MG/1
4 TABLET, ORALLY DISINTEGRATING ORAL EVERY 30 MIN PRN
Status: DISCONTINUED | OUTPATIENT
Start: 2018-11-30 | End: 2018-11-30 | Stop reason: HOSPADM

## 2018-11-30 RX ORDER — TRAMADOL HYDROCHLORIDE 50 MG/1
50 TABLET ORAL
Status: DISCONTINUED | OUTPATIENT
Start: 2018-11-30 | End: 2018-11-30

## 2018-11-30 RX ORDER — ONDANSETRON 2 MG/ML
INJECTION INTRAMUSCULAR; INTRAVENOUS PRN
Status: DISCONTINUED | OUTPATIENT
Start: 2018-11-30 | End: 2018-11-30

## 2018-11-30 RX ORDER — ONDANSETRON 2 MG/ML
4 INJECTION INTRAMUSCULAR; INTRAVENOUS EVERY 30 MIN PRN
Status: DISCONTINUED | OUTPATIENT
Start: 2018-11-30 | End: 2018-11-30 | Stop reason: HOSPADM

## 2018-11-30 RX ORDER — OXYCODONE AND ACETAMINOPHEN 5; 325 MG/1; MG/1
1 TABLET ORAL
Status: DISCONTINUED | OUTPATIENT
Start: 2018-11-30 | End: 2018-11-30 | Stop reason: HOSPADM

## 2018-11-30 RX ORDER — OXYCODONE AND ACETAMINOPHEN 5; 325 MG/1; MG/1
1-2 TABLET ORAL EVERY 6 HOURS PRN
Qty: 20 TABLET | Refills: 0 | Status: SHIPPED | OUTPATIENT
Start: 2018-11-30 | End: 2019-04-16

## 2018-11-30 RX ORDER — FENTANYL CITRATE 50 UG/ML
25-50 INJECTION, SOLUTION INTRAMUSCULAR; INTRAVENOUS
Status: DISCONTINUED | OUTPATIENT
Start: 2018-11-30 | End: 2018-11-30 | Stop reason: HOSPADM

## 2018-11-30 RX ORDER — SODIUM CHLORIDE, SODIUM LACTATE, POTASSIUM CHLORIDE, CALCIUM CHLORIDE 600; 310; 30; 20 MG/100ML; MG/100ML; MG/100ML; MG/100ML
INJECTION, SOLUTION INTRAVENOUS CONTINUOUS PRN
Status: DISCONTINUED | OUTPATIENT
Start: 2018-11-30 | End: 2018-11-30

## 2018-11-30 RX ORDER — CLINDAMYCIN PHOSPHATE 900 MG/50ML
900 INJECTION, SOLUTION INTRAVENOUS SEE ADMIN INSTRUCTIONS
Status: DISCONTINUED | OUTPATIENT
Start: 2018-11-30 | End: 2018-11-30 | Stop reason: HOSPADM

## 2018-11-30 RX ORDER — CLINDAMYCIN PHOSPHATE 900 MG/50ML
900 INJECTION, SOLUTION INTRAVENOUS
Status: COMPLETED | OUTPATIENT
Start: 2018-11-30 | End: 2018-11-30

## 2018-11-30 RX ORDER — FENTANYL CITRATE 50 UG/ML
INJECTION, SOLUTION INTRAMUSCULAR; INTRAVENOUS PRN
Status: DISCONTINUED | OUTPATIENT
Start: 2018-11-30 | End: 2018-11-30

## 2018-11-30 RX ORDER — HYDROMORPHONE HYDROCHLORIDE 1 MG/ML
.3-.5 INJECTION, SOLUTION INTRAMUSCULAR; INTRAVENOUS; SUBCUTANEOUS EVERY 5 MIN PRN
Status: DISCONTINUED | OUTPATIENT
Start: 2018-11-30 | End: 2018-11-30 | Stop reason: HOSPADM

## 2018-11-30 RX ORDER — ACETAMINOPHEN 325 MG/1
650 TABLET ORAL ONCE
Status: COMPLETED | OUTPATIENT
Start: 2018-11-30 | End: 2018-11-30

## 2018-11-30 RX ORDER — PROPOFOL 10 MG/ML
INJECTION, EMULSION INTRAVENOUS PRN
Status: DISCONTINUED | OUTPATIENT
Start: 2018-11-30 | End: 2018-11-30

## 2018-11-30 RX ORDER — TRAMADOL HYDROCHLORIDE 50 MG/1
50 TABLET ORAL EVERY 6 HOURS PRN
Qty: 20 TABLET | Refills: 0 | Status: SHIPPED | OUTPATIENT
Start: 2018-11-30 | End: 2018-11-30

## 2018-11-30 RX ADMIN — SODIUM CHLORIDE, POTASSIUM CHLORIDE, SODIUM LACTATE AND CALCIUM CHLORIDE: 600; 310; 30; 20 INJECTION, SOLUTION INTRAVENOUS at 13:02

## 2018-11-30 RX ADMIN — FENTANYL CITRATE 50 MCG: 50 INJECTION, SOLUTION INTRAMUSCULAR; INTRAVENOUS at 13:06

## 2018-11-30 RX ADMIN — ONDANSETRON 4 MG: 2 INJECTION INTRAMUSCULAR; INTRAVENOUS at 13:12

## 2018-11-30 RX ADMIN — FENTANYL CITRATE 50 MCG: 50 INJECTION, SOLUTION INTRAMUSCULAR; INTRAVENOUS at 13:02

## 2018-11-30 RX ADMIN — ACETAMINOPHEN 650 MG: 325 TABLET, FILM COATED ORAL at 14:30

## 2018-11-30 RX ADMIN — PROPOFOL 20 MG: 10 INJECTION, EMULSION INTRAVENOUS at 13:11

## 2018-11-30 RX ADMIN — BUPIVACAINE HYDROCHLORIDE AND EPINEPHRINE BITARTRATE 20 ML: 5; .005 INJECTION, SOLUTION EPIDURAL; INTRACAUDAL; PERINEURAL at 13:28

## 2018-11-30 RX ADMIN — CLINDAMYCIN PHOSPHATE 900 MG: 18 INJECTION, SOLUTION INTRAVENOUS at 13:06

## 2018-11-30 RX ADMIN — PROPOFOL 50 MCG/KG/MIN: 10 INJECTION, EMULSION INTRAVENOUS at 13:11

## 2018-11-30 ASSESSMENT — LIFESTYLE VARIABLES: TOBACCO_USE: 0

## 2018-11-30 NOTE — OP NOTE
General Surgery Operative Note    PREOPERATIVE DIAGNOSIS:  UMBILLICAL HERNIA     POSTOPERATIVE DIAGNOSIS:  Umbilical hernia, post-operative scarring    PROCEDURE:  OPEN UMBILLICAL HERNIA REPAIR, SCAR REVISION    ANESTHESIA:  MAC and local    PREOPERATIVE MEDICATIONS:  Cleocin IV.    SURGEON:  Iek Catalan MD    ASSISTANT:  Carey Wilson PA-C    INDICATIONS:  Umbilical Hernia, mark--umbilical pain    DESCRIPTION OF PROCEDURE: The patient was taken to the operating suite and given IV sedation. The area around the umbilicus was prepped and draped in a sterile fashion. Surgeon initiated timeout was acknowledged. We made a curvilinear incision below the umbilicus, through an existing scar, and took this down through skin and subcutaneous tissue. I dissected this down to the level of the fascia and began clearing the fascia off. There was significant scar in the area which was removed. I was able to get around the umbilical stalk with a clamp and I cut the umbilical stalk off of the underlying hernia sac and fascial edge. I cleared off the fascial edge around the hernia defect which measured approximately 2 mm.  Given the size of the defect, I elected to close it primarily with an 0-Vicryl suture.   No other areas suspicious for hernia were encountered. The wound was irrigated. I then tacked the underside of the umbilical skin down to the fascia using a 3-0 Vicryl. Wound was closed in layers using 3-0 and 4-0 Vicryl and Steri-Strips. At the conclusion of the case, all lap and needle counts were correct.     ESTIMATED BLOOD LOSS:  2 mL    INTRAOPERATIVE FINDINGS:  2 mm umbilical hernia with surrounding scar.    Ike Catalan MD, MD

## 2018-11-30 NOTE — DISCHARGE INSTRUCTIONS
Same Day Surgery Discharge Instructions for  Sedation and General Anesthesia       It's not unusual to feel dizzy, light-headed or faint for up to 24 hours after surgery or while taking pain medication.  If you have these symptoms: sit for a few minutes before standing and have someone assist you when you get up to walk or use the bathroom.      You should rest and relax for the next 24 hours. We recommend you make arrangements to have an adult stay with you for at least 24 hours after your discharge.  Avoid hazardous and strenuous activity.      DO NOT DRIVE any vehicle or operate mechanical equipment for 24 hours following the end of your surgery.  Even though you may feel normal, your reactions may be affected by the medication you have received.      Do not drink alcoholic beverages for 24 hours following surgery.       Slowly progress to your regular diet as you feel able. It's not unusual to feel nauseated and/or vomit after receiving anesthesia.  If you develop these symptoms, drink clear liquids (apple juice, ginger ale, broth, 7-up, etc. ) until you feel better.  If your nausea and vomiting persists for 24 hours, please notify your surgeon.        All narcotic pain medications, along with inactivity and anesthesia, can cause constipation. Drinking plenty of liquids and increasing fiber intake will help.      For any questions of a medical nature, call your surgeon.      Do not make important decisions for 24 hours.      If you had general anesthesia, you may have a sore throat for a couple of days related to the breathing tube used during surgery.  You may use Cepacol lozenges to help with this discomfort.  If it worsens or if you develop a fever, contact your surgeon.       If you feel your pain is not well managed with the pain medications prescribed by your surgeon, please contact your surgeon's office to let them know so they can address your concerns.     Today you were given 650 mg of Tylenol at 2:30  p.m. The recommended daily maximum dose is 4000 mg.       [facility]  Discharge Instructions: Post-Operative Umbilical or Ventral Hernia    ACTIVITY    Take frequent, short walks and increase your activity gradually.    Avoid strenuous physical activity or heavy lifting greater than 15lbs. for 3-4 weeks.  You may climb stairs.    You may drive without restrictions when you are not using any prescription pain medication and comfortable in a car.    You may return to work/school when you are comfortable without any prescription pain medication.    WOUND CARE    You may remove your bandage and shower 48 hours after the surgery.  Pat your incisions dry and leave open to air.  Re-apply dressing (Band-aid or gauze/tape) as needed for drainage.    You may have steri-strips (looks like tape) on your incision.  You may peel off the steri-strips 2 weeks after your surgery if they have not peeled off on their own.    Do not soak your incisions in a tub or pool for 2 weeks.     Do not apply any lotions, creams, or ointments to your incision.    A ridge under the incision is normal and will gradually resolve.    DIET    Start with liquids, then gradually resume your regular diet as tolerated.    Drink plenty of fluids to stay hydrated.    PAIN    Expect some tenderness and discomfort at the incision site(s).  Use the prescribed pain medication at your discretion.  Expect gradual resolution of your pain over several days.    You may take ibuprofen with food (unless you have been told not to) instead of or in addition to your prescribed pain medication.  If you are taking Norco or Percocet, do not take any additional acetaminophen/APAP/Tylenol.      Do not drink alcohol or drive while you are taking pain medications.    You may apply ice to your incisions in 20 minute intervals as needed for the next 48 hours.  After that time, consider switching to heat if you  prefer.    EXPECTATIONS    Pain medications can cause constipation.  Limit use when possible.  Take over the counter stool softener/stimulant, such as Colace or Senna, 1-2 times a day with plenty of water.  You may take a mild over the counter laxative, such as Miralax or a supository as needed.        You may discontinue these medications once you are having regular bowel movements and/or are no longer taking our narcotic pain medication.      RETURN APPOINTMENT    Our office will contact you approximately 2 weeks to check on your progress and answer questions you may have.  If you are doing well, you will not need to return for a follow up appointment.  If any concerns are identified over the phone, we will help you make an appointment to see a provider.    If you have not received a phone call, have any questions or concerns, or would like to be seen, please call us at [phone] and ask to speak with our nurse.  We are located at [address] [place] [address].      CALL OUR OFFICE IF YOU HAVE:     Chills or fever above 101 F.    Increased redness, warmth or drainage at your incisions.    Significant bleeding.    Pain not relieved by your pain medication or rest.    Increasing pain after the first 48 hours.    Any other concerns or questions.    Revised January 2018  **If you have concerns or questions about your procedure,    please contact Dr Catalan at  [phone]**

## 2018-11-30 NOTE — ANESTHESIA PREPROCEDURE EVALUATION
Procedure: Procedure(s):  OPEN UMBILLICAL HERNIA REPAIR WITH MESH   Preop diagnosis: UMBILLICAL HERNIA     Allergies   Allergen Reactions     Dye [Contrast Dye] Anaphylaxis and Hives     Penicillins Anaphylaxis     Swelling, arm turned blue     Oxycodone-Acetaminophen Rash       Past Medical History:   Diagnosis Date     Chronic back pain      Gastro-oesophageal reflux disease      Renal disease     kidney stone     Zollinger-Patricio syndrome        Past Surgical History:   Procedure Laterality Date     ABDOMEN SURGERY  2003    Exploratory laparoscopy     HC EXCISE VARICOCELE  age 21     varicocele repair     KIDNEY SURGERY      lithotripsy x 3     LAPAROSCOPIC HERNIORRHAPHY INGUINAL Left 9/26/2016    Procedure: LAPAROSCOPIC HERNIORRHAPHY INGUINAL;  Surgeon: Ike Catalan MD;  Location: [facility]     LITHOTRIPSY      three times     STOMACH SURGERY  2003    exploratory scoping looking for tumors related to ZE syndrome       Social History   Substance Use Topics     Smoking status: Never Smoker     Smokeless tobacco: Never Used     Alcohol use Yes      Comment: seldom       Prior to Admission medications    Medication Sig Start Date End Date Taking? Authorizing Provider   aspirin 81 MG EC tablet Take 1 tablet by mouth daily. 3/2/12   Kelsey Barth MD   finasteride (PROSCAR) 5 MG tablet Take 1 tablet (5 mg) by mouth daily 10/8/18   Arjun Burns MD   lisinopril (PRINIVIL/ZESTRIL) 5 MG tablet TAKE 1 TABLET(5 MG) BY MOUTH DAILY 10/24/18   Arjun Burns MD   magnesium 250 MG tablet Take 1 tablet by mouth daily    Reported, Patient   Omeprazole (PRILOSEC PO) Take 20 mg by mouth 3 times daily. Patient takes it in the morning, with dinner and at bedtime for Patricio-Zollinger syndrome. Patient takes Prilosec OTC.    Reported, Patient   tamsulosin (FLOMAX) 0.4 MG capsule Take 1 capsule (0.4 mg) by mouth daily 10/8/18   Arjun Burns MD     Current Facility-Administered Medications Ordered in  Epic   Medication Dose Route Frequency Last Rate Last Dose     clindamycin (CLEOCIN) infusion 900 mg  900 mg Intravenous Pre-Op/Pre-procedure x 1 dose         clindamycin (CLEOCIN) infusion 900 mg  900 mg Intravenous See Admin Instructions         No current [facility]-ordered outpatient prescriptions on file.         Wt Readings from Last 1 Encounters:   11/19/18 74.8 kg (165 lb)     Temp Readings from Last 1 Encounters:   11/19/18 35.8  C (96.5  F) (Tympanic)     BP Readings from Last 6 Encounters:   11/19/18 110/70   11/06/18 102/64   10/08/18 120/72   09/10/18 118/70   11/06/17 124/80   09/25/17 130/86     Pulse Readings from Last 4 Encounters:   11/19/18 86   11/06/18 94   10/08/18 72   09/10/18 88     Resp Readings from Last 1 Encounters:   11/06/17 16     SpO2 Readings from Last 1 Encounters:   11/19/18 93%     Recent Labs   Lab Test  11/19/18   1407  10/08/18   1113  09/25/17   1318   NA  139  141  140   POTASSIUM  4.2  3.8  3.9   CHLORIDE  104  107  106   CO2  28  26  27   ANIONGAP  7  8  7   GLC   --   81  90   BUN  20  14  18   CR  0.95  0.82  0.87   CLIFTON   --   9.0  9.1     Recent Labs   Lab Test  10/08/18   1113  09/25/17   1318   AST  21  23   ALT  31  37   ALKPHOS  63  61   BILITOTAL  0.7  0.9     Recent Labs   Lab Test  11/19/18   1407  10/08/18   1113  09/25/17   1318   WBC   --   7.4  7.0   HGB  16.2  15.7  16.2   PLT   --   227  252     No results for input(s): ABO, RH in the last 03086 hours.  No results for input(s): INR, PTT in the last 56172 hours.   Recent Labs   Lab Test  03/01/12   1940  03/01/12   1600  03/01/12   1125   TROPI  <0.012  <0.012  <0.012     No results for input(s): PH, PCO2, PO2, HCO3 in the last 55137 hours.  No results for input(s): HCG in the last 15477 hours.    No results found for this or any previous visit (from the past 744 hour(s)).    Anesthesia Evaluation     . Pt has had prior anesthetic. Type: General    No history of anesthetic complications          ROS/MED  HX    ENT/Pulmonary:      (-) tobacco use   Neurologic:     (+)neuropathy - Numbness and tingling of both feet,     Cardiovascular:     (+) Dyslipidemia, hypertension--CAD, --. : . . . :. . Previous cardiac testing date:results:date: results:ECG reviewed date:11/9/18 results:NSR date: results:         Angina: Chest pain with stress/anxiety.   METS/Exercise Tolerance:     Hematologic:         Musculoskeletal:   (+) , , other musculoskeletal- Chronic back pain      GI/Hepatic:     (+) GERD Other GI/Hepatic Zollinger-Patricio syndrome      Renal/Genitourinary:     (+) chronic renal disease,       Endo:         Psychiatric:     (+) psychiatric history anxiety      Infectious Disease:         Malignancy:         Other:    (+) H/O Chronic Pain,                   Physical Exam  Normal systems: cardiovascular and pulmonary    Airway   Mallampati: II  TM distance: >3 FB  Neck ROM: full    Dental   (+) caps    Cardiovascular       Pulmonary                     Anesthesia Plan      History & Physical Review  History and physical reviewed and following examination; no interval change.    ASA Status:  2 .    NPO Status:  > 8 hours    Plan for MAC Reason for MAC:  Deep or markedly invasive procedure (G8)  PONV prophylaxis:  Ondansetron (or other 5HT-3)       Postoperative Care  Postoperative pain management:  IV analgesics, Oral pain medications and Multi-modal analgesia.      Consents  Anesthetic plan, risks, benefits and alternatives discussed with:  Patient..                          .

## 2018-11-30 NOTE — ANESTHESIA POSTPROCEDURE EVALUATION
Patient: Stanford Andersen    Procedure(s):  OPEN UMBILLICAL HERNIA REPAIR     Diagnosis:UMBILLICAL HERNIA   Diagnosis Additional Information: No value filed.    Anesthesia Type:  MAC    Note:  Anesthesia Post Evaluation    Patient location during evaluation: PACU  Patient participation: Able to fully participate in evaluation  Level of consciousness: awake and alert  Pain management: adequate  Airway patency: patent  Cardiovascular status: acceptable and hemodynamically stable  Respiratory status: acceptable and unassisted  Hydration status: acceptable  PONV: none             Last vitals:  Vitals:    11/30/18 1400 11/30/18 1415 11/30/18 1430   BP: 113/68 106/70    Pulse:      Resp: 14 19    Temp:      SpO2: 92% 92% 94%         Electronically Signed By: Crispin Schmitz DO  November 30, 2018  4:54 PM

## 2018-11-30 NOTE — ANESTHESIA CARE TRANSFER NOTE
Patient: Stanford Andersen    Procedure(s):  OPEN UMBILLICAL HERNIA REPAIR     Diagnosis: UMBILLICAL HERNIA   Diagnosis Additional Information: No value filed.    Anesthesia Type:   MAC     Note:  Airway :Room Air  Patient transferred to:PACU  Comments: Talking. VSS      Vitals: (Last set prior to Anesthesia Care Transfer)    CRNA VITALS  11/30/2018 1305 - 11/30/2018 1346      11/30/2018             Pulse: 94    Ht Rate: 93    SpO2: 92 %    Resp Rate (set): 10                Electronically Signed By: VARINDER Higgins CRNA  November 30, 2018  1:46 PM

## 2018-11-30 NOTE — BRIEF OP NOTE
[facility] Brief Operative Note    Pre-operative diagnosis: UMBILLICAL HERNIA    Post-operative diagnosis Umbilical hernia      Procedure: Procedure(s):  OPEN UMBILLICAL HERNIA REPAIR    Surgeon(s): Surgeon(s) and Role:     * Ike Catalan MD - Primary     * Carey Wilson PA-C - Assisting   Estimated blood loss: 2 mL    Specimens: * No specimens in log *   Findings: Same as above.

## 2018-11-30 NOTE — OR NURSING
Patient states that Tramado lmade him very itchy last time he had surgery and he prefers Percocet.  Carey Robisoned informed.

## 2018-11-30 NOTE — IP AVS SNAPSHOT
[facility] PreOP/Phase II    [address]    [place] [address]    Phone:  [phone]                                       After Visit Summary   11/30/2018    Stanford Andersen    MRN: [id number]           After Visit Summary Signature Page     I have received my discharge instructions, and my questions have been answered. I have discussed any challenges I see with this plan with the nurse or doctor.    ..........................................................................................................................................  Patient/Patient Representative Signature      ..........................................................................................................................................  Patient Representative Print Name and Relationship to Patient    ..................................................               ................................................  Date                                   Time    ..........................................................................................................................................  Reviewed by Signature/Title    ...................................................              ..............................................  Date                                               Time          22EPIC Rev 08/18

## 2018-11-30 NOTE — IP AVS SNAPSHOT
MRN:[id number]                      After Visit Summary   11/30/2018    Stanford Andersen    MRN: [id number]           Thank you!     Thank you for choosing [facility] for your care. Our goal is always to provide you with excellent care. Hearing back from our patients is one way we can continue to improve our services. Please take a few minutes to complete the written survey that you may receive in the mail after you visit with us. Thank you!        Patient Information     Date Of Birth          1945        About your hospital stay     You were admitted on:  November 30, 2018 You last received care in the:  [facility] PreOP/Phase II    You were discharged on:  November 30, 2018       Who to Call     For medical emergencies, please call 911.  For non-urgent questions about your medical care, please call your primary care provider or clinic, [phone]  For questions related to your surgery, please call your surgery clinic        Attending Provider     Provider Ike Hunt MD Surgery       Primary Care Provider Office Phone # Fax #    Arjun Burns [phone]      After Care Instructions     Discharge Instructions       No follow up is needed. Our office will contact you within 2 weeks to check on your progress and answer any questions you may have.  If you have concerns before then or would like to see a provider, please call our office at [phone]                  Your next 10 appointments already scheduled     Jan 07, 2019 11:45 AM CST   SHORT with Arjun Burns MD   [facility] ([facility])    [address] [place] [id number] [phone]              Further instructions from your care team       Same Day Surgery Discharge Instructions for  Sedation and General Anesthesia       It's not unusual to feel dizzy, light-headed or faint  for up to 24 hours after surgery or while taking pain medication.  If you have these symptoms: sit for a few minutes before standing and have someone assist you when you get up to walk or use the bathroom.      You should rest and relax for the next 24 hours. We recommend you make arrangements to have an adult stay with you for at least 24 hours after your discharge.  Avoid hazardous and strenuous activity.      DO NOT DRIVE any vehicle or operate mechanical equipment for 24 hours following the end of your surgery.  Even though you may feel normal, your reactions may be affected by the medication you have received.      Do not drink alcoholic beverages for 24 hours following surgery.       Slowly progress to your regular diet as you feel able. It's not unusual to feel nauseated and/or vomit after receiving anesthesia.  If you develop these symptoms, drink clear liquids (apple juice, ginger ale, broth, 7-up, etc. ) until you feel better.  If your nausea and vomiting persists for 24 hours, please notify your surgeon.        All narcotic pain medications, along with inactivity and anesthesia, can cause constipation. Drinking plenty of liquids and increasing fiber intake will help.      For any questions of a medical nature, call your surgeon.      Do not make important decisions for 24 hours.      If you had general anesthesia, you may have a sore throat for a couple of days related to the breathing tube used during surgery.  You may use Cepacol lozenges to help with this discomfort.  If it worsens or if you develop a fever, contact your surgeon.       If you feel your pain is not well managed with the pain medications prescribed by your surgeon, please contact your surgeon's office to let them know so they can address your concerns.     Today you were given 650 mg of Tylenol at 2:30 p.m. The recommended daily maximum dose is 4000 mg.       [facility]  Discharge Instructions:  Post-Operative Umbilical or Ventral Hernia    ACTIVITY    Take frequent, short walks and increase your activity gradually.    Avoid strenuous physical activity or heavy lifting greater than 15lbs. for 3-4 weeks.  You may climb stairs.    You may drive without restrictions when you are not using any prescription pain medication and comfortable in a car.    You may return to work/school when you are comfortable without any prescription pain medication.    WOUND CARE    You may remove your bandage and shower 48 hours after the surgery.  Pat your incisions dry and leave open to air.  Re-apply dressing (Band-aid or gauze/tape) as needed for drainage.    You may have steri-strips (looks like tape) on your incision.  You may peel off the steri-strips 2 weeks after your surgery if they have not peeled off on their own.    Do not soak your incisions in a tub or pool for 2 weeks.     Do not apply any lotions, creams, or ointments to your incision.    A ridge under the incision is normal and will gradually resolve.    DIET    Start with liquids, then gradually resume your regular diet as tolerated.    Drink plenty of fluids to stay hydrated.    PAIN    Expect some tenderness and discomfort at the incision site(s).  Use the prescribed pain medication at your discretion.  Expect gradual resolution of your pain over several days.    You may take ibuprofen with food (unless you have been told not to) instead of or in addition to your prescribed pain medication.  If you are taking Norco or Percocet, do not take any additional acetaminophen/APAP/Tylenol.      Do not drink alcohol or drive while you are taking pain medications.    You may apply ice to your incisions in 20 minute intervals as needed for the next 48 hours.  After that time, consider switching to heat if you prefer.    EXPECTATIONS    Pain medications can cause constipation.  Limit use when possible.  Take over the counter stool softener/stimulant, such as Colace or  "Senna, 1-2 times a day with plenty of water.  You may take a mild over the counter laxative, such as Miralax or a supository as needed.        You may discontinue these medications once you are having regular bowel movements and/or are no longer taking our narcotic pain medication.      RETURN APPOINTMENT    Our office will contact you approximately 2 weeks to check on your progress and answer questions you may have.  If you are doing well, you will not need to return for a follow up appointment.  If any concerns are identified over the phone, we will help you make an appointment to see a provider.    If you have not received a phone call, have any questions or concerns, or would like to be seen, please call us at [phone] and ask to speak with our nurse.  We are located at [address] [place] [address].      CALL OUR OFFICE IF YOU HAVE:     Chills or fever above 101 F.    Increased redness, warmth or drainage at your incisions.    Significant bleeding.    Pain not relieved by your pain medication or rest.    Increasing pain after the first 48 hours.    Any other concerns or questions.    Revised January 2018  **If you have concerns or questions about your procedure,    please contact Dr Catalan at  [phone]**    Pending Results     No orders found from 11/28/2018 to 12/1/2018.            Admission Information     Date & Time Provider Department Dept. Phone    11/30/2018 Ike Catalan MD [facility] PreOP/Phase [phone]      Your Vitals Were     Blood Pressure Pulse Temperature Respirations Height Weight    106/70 49 97  F (36.1  C) (Temporal) 19 1.727 m (5' 8\") 74.4 kg (164 lb)    Pulse Oximetry BMI (Body Mass Index)                94% 24.94 kg/m2          Care EveryWhere ID     This is your Care EveryWhere ID. This could be used by other organizations to access your [facility] medical records  [id number]        Equal Access to Services     SAYRA ALANIZ AH: Hadii aad " carlee Porras, waaxda luqadaha, qaybta kaalmada adekuldeep, zack gregory lahandyallison sourav. So [facility] [phone].    ATENCIÓN: Si habla maurice, tiene a fuller disposición servicios gratuitos de asistencia lingüística. [place] al [phone].    We comply with applicable federal civil rights laws and [place] laws. We do not discriminate on the basis of race, color, national origin, age, disability, sex, sexual orientation, or gender identity.               Review of your medicines      START taking        Dose / Directions    oxyCODONE-acetaminophen 5-325 MG tablet   Commonly known as:  PERCOCET   Used for:  Umbilical hernia without obstruction and without gangrene        Dose:  1-2 tablet   Take 1-2 tablets by mouth every 6 hours as needed for pain   Quantity:  20 tablet   Refills:  0         CONTINUE these medicines which have NOT CHANGED        Dose / Directions    aspirin 81 MG EC tablet   Commonly known as:  ASA   Used for:  Chest pain        Dose:  81 mg   Take 1 tablet by mouth daily.   Quantity:  90 tablet   Refills:  3       finasteride 5 MG tablet   Commonly known as:  PROSCAR   Used for:  Nocturia        Dose:  5 mg   Take 1 tablet (5 mg) by mouth daily   Quantity:  90 tablet   Refills:  1       lisinopril 5 MG tablet   Commonly known as:  PRINIVIL/ZESTRIL   Used for:  Essential hypertension        TAKE 1 TABLET(5 MG) BY MOUTH DAILY   Quantity:  90 tablet   Refills:  3       magnesium 250 MG tablet        Dose:  1 tablet   Take 1 tablet by mouth daily   Refills:  0       PRILOSEC PO        Dose:  20 mg   Take 20 mg by mouth 3 times daily. Patient takes it in the morning, with dinner and at bedtime for Patricio-Zollinger syndrome. Patient takes Prilosec OTC.   Refills:  0       tamsulosin 0.4 MG capsule   Commonly known as:  FLOMAX   Used for:  Nocturia        Dose:  0.4 mg   Take 1 capsule (0.4 mg) by mouth daily   Quantity:  30 capsule   Refills:  3            Where to get your medicines       Some of these will need a paper prescription and others can be bought over the counter. Ask your nurse if you have questions.     Bring a paper prescription for each of these medications     oxyCODONE-acetaminophen 5-325 MG tablet                Protect others around you: Learn how to safely use, store and throw away your medicines at www.disposemymeds.org.        Information about OPIOIDS     PRESCRIPTION OPIOIDS: WHAT YOU NEED TO KNOW   We gave you an opioid (narcotic) pain medicine. It is important to manage your pain, but opioids are not always the best choice. You should first try all the other options your care team gave you. Take this medicine for as short a time (and as few doses) as possible.    Some activities can increase your pain, such as bandage changes or therapy sessions. It may help to take your pain medicine 30 to 60 minutes before these activities. Reduce your stress by getting enough sleep, working on hobbies you enjoy and practicing relaxation or meditation. Talk to your care team about ways to manage your pain beyond prescription opioids.    These medicines have risks:    DO NOT drive when on new or higher doses of pain medicine. These medicines can affect your alertness and reaction times, and you could be arrested for driving under the influence (DUI). If you need to use opioids long-term, talk to your care team about driving.    DO NOT operate heavy machinery    DO NOT do any other dangerous activities while taking these medicines.    DO NOT drink any alcohol while taking these medicines.     If the opioid prescribed includes acetaminophen, DO NOT take with any other medicines that contain acetaminophen. Read all labels carefully. Look for the word  acetaminophen  or  Tylenol.  Ask your pharmacist if you have questions or are unsure.    You can get addicted to pain medicines, especially if you have a history of addiction (chemical, alcohol or substance dependence). Talk to your care team  about ways to reduce this risk.    All opioids tend to cause constipation. Drink plenty of water and eat foods that have a lot of fiber, such as fruits, vegetables, prune juice, apple juice and high-fiber cereal. Take a laxative (Miralax, milk of magnesia, Colace, Senna) if you don t move your bowels at least every other day. Other side effects include upset stomach, sleepiness, dizziness, throwing up, tolerance (needing more of the medicine to have the same effect), physical dependence and slowed breathing.    Store your pills in a secure place, locked if possible. We will not replace any lost or stolen medicine. If you don t finish your medicine, please throw away (dispose) as directed by your pharmacist. The [place] Pollution [employer] has more information about safe disposal: https://www.pca.[facility].[place].us/living-green/managing-unwanted-medications             Medication List: This is a list of all your medications and when to take them. Check marks below indicate your daily home schedule. Keep this list as a reference.      Medications           Morning Afternoon Evening Bedtime As Needed    aspirin 81 MG EC tablet   Commonly known as:  ASA   Take 1 tablet by mouth daily.                                finasteride 5 MG tablet   Commonly known as:  PROSCAR   Take 1 tablet (5 mg) by mouth daily                                lisinopril 5 MG tablet   Commonly known as:  PRINIVIL/ZESTRIL   TAKE 1 TABLET(5 MG) BY MOUTH DAILY                                magnesium 250 MG tablet   Take 1 tablet by mouth daily                                oxyCODONE-acetaminophen 5-325 MG tablet   Commonly known as:  PERCOCET   Take 1-2 tablets by mouth every 6 hours as needed for pain                                PRILOSEC PO   Take 20 mg by mouth 3 times daily. Patient takes it in the morning, with dinner and at bedtime for Patricio-Zollinger syndrome. Patient takes Prilosec OTC.                                tamsulosin 0.4  MG capsule   Commonly known as:  FLOMAX   Take 1 capsule (0.4 mg) by mouth daily

## 2018-12-27 ENCOUNTER — TELEPHONE (OUTPATIENT)
Dept: SURGERY | Facility: CLINIC | Age: 73
End: 2018-12-27

## 2018-12-27 NOTE — TELEPHONE ENCOUNTER
Attempted to call patient for post op check.  No answer.  Not able to leave message as no voicemail came up.     Carey Wilson PA-C

## 2019-01-07 ENCOUNTER — OFFICE VISIT (OUTPATIENT)
Dept: FAMILY MEDICINE | Facility: CLINIC | Age: 74
End: 2019-01-07
Payer: COMMERCIAL

## 2019-01-07 VITALS
HEART RATE: 89 BPM | SYSTOLIC BLOOD PRESSURE: 100 MMHG | WEIGHT: 163 LBS | DIASTOLIC BLOOD PRESSURE: 64 MMHG | TEMPERATURE: 97.6 F | OXYGEN SATURATION: 93 % | BODY MASS INDEX: 24.78 KG/M2

## 2019-01-07 DIAGNOSIS — R39.15 URINARY URGENCY: ICD-10-CM

## 2019-01-07 DIAGNOSIS — I10 ESSENTIAL HYPERTENSION: ICD-10-CM

## 2019-01-07 DIAGNOSIS — R29.898 WEAKNESS OF BOTH LOWER EXTREMITIES: ICD-10-CM

## 2019-01-07 DIAGNOSIS — R35.1 NOCTURIA: ICD-10-CM

## 2019-01-07 DIAGNOSIS — R42 DIZZINESS: Primary | ICD-10-CM

## 2019-01-07 PROCEDURE — 99213 OFFICE O/P EST LOW 20 MIN: CPT | Performed by: FAMILY MEDICINE

## 2019-01-07 RX ORDER — TAMSULOSIN HYDROCHLORIDE 0.4 MG/1
0.4 CAPSULE ORAL DAILY
Qty: 90 CAPSULE | Refills: 1 | Status: SHIPPED | OUTPATIENT
Start: 2019-01-07 | End: 2019-09-13

## 2019-01-07 NOTE — PROGRESS NOTES
SUBJECTIVE:   Stanford Andersen is a 73 year old male who presents to clinic today for the following health issues:      Genitourinary symptoms      Duration: ongoing    Description:  When has to go its urgent needs bathroom asap or leaking occurs    Intensity:  moderate    Accompanying signs and symptoms (fever/discharge/nausea/vomiting/back or abdominal pain):  None    History (frequent UTI's/kidney stones/prostate problems): None  Sexually active: no     Precipitating or alleviating factors: new medication    Therapies tried and outcome: none   Outcome: Patient is no longer getting up 2-3 times a night to urinate.  Does occasionally get the urgency during the day and some dribbling at that time.      Dizziness      Duration: 2 months    Description   Feeling faint:  no   Feeling like the surroundings are moving: YES-sometimes  Loss of consciousness or falls: no     Intensity:  moderate    Accompanying signs and symptoms:   Nausea/vomitting: no   Palpitations: no   Weakness in arms or legs: YES-legs sometimes  Vision or speech changes: no   Ringing in ears (Tinnitus): YES  Hearing loss related to dizziness: YES  Other (fevers/chills/sweating/dyspnea): no     History (similar episodes/head trauma/previous evaluation/recent bleeding): Has been seen by ENT and had both an MRI of the head and what sounds like an EMG done of his legs.    Precipitating or alleviating factors (new meds/chemicals): None  Worse with activity/head movement: no     Therapies tried and outcome: None      Problem list and histories reviewed & adjusted, as indicated.  Additional history: The patient has had a consult with ear nose and throat.  We do not have the results of any of his tests noted above.    Patient Active Problem List   Diagnosis     Health Care Home     Chest pain     Hyperlipidemia     Zollinger-Patricio syndrome     Family history of coronary artery disease     Change in bowel function     Essential hypertension     ACP  (advance care planning)     Stress     Screening for prostate cancer     Erectile dysfunction, unspecified erectile dysfunction type     Ventral hernia without obstruction or gangrene     Numbness and tingling of both feet     Past Surgical History:   Procedure Laterality Date     ABDOMEN SURGERY  2003    Exploratory laparoscopy     HC EXCISE VARICOCELE  age 21     varicocele repair     HERNIORRHAPHY UMBILICAL N/A 11/30/2018    Procedure: OPEN UMBILLICAL HERNIA REPAIR ;  Surgeon: Ike Catalan MD;  Location: [facility] OR     KIDNEY SURGERY      lithotripsy x 3     LAPAROSCOPIC HERNIORRHAPHY INGUINAL Left 9/26/2016    Procedure: LAPAROSCOPIC HERNIORRHAPHY INGUINAL;  Surgeon: Ike Catalan MD;  Location:  [facility]     LITHOTRIPSY      three times     STOMACH SURGERY  2003    exploratory scoping looking for tumors related to ZE syndrome       Social History     Tobacco Use     Smoking status: Never Smoker     Smokeless tobacco: Never Used   Substance Use Topics     Alcohol use: Yes     Comment: seldom     Family History   Problem Relation Age of Onset     Heart Disease Mother         ablation     C.A.D. Mother      Heart Disease Father      Respiratory Father         COPD     C.A.D. Father      Neurologic Disorder Son         Spinal bifida           Reviewed and updated as needed this visit by clinical staff  Tobacco  Allergies  Meds  Med Hx  Surg Hx  Fam Hx  Soc Hx      Reviewed and updated as needed this visit by Provider         ROS:  Constitutional, HEENT, cardiovascular, pulmonary, gi and gu systems are negative, except as otherwise noted.    OBJECTIVE:                                                    /64 (Cuff Size: Adult Large)   Pulse 89   Temp 97.6  F (36.4  C) (Tympanic)   Wt 73.9 kg (163 lb)   SpO2 93%   BMI 24.78 kg/m    Body mass index is 24.78 kg/m .  GENERAL APPEARANCE: healthy, alert and no distress    Diagnostic test results:  No results found for this or any previous visit  (from the past 24 hour(s)).     ASSESSMENT/PLAN:                                                        ICD-10-CM    1. Dizziness R42    2. Nocturia R35.1 tamsulosin (FLOMAX) 0.4 MG capsule     CANCELED: UA with Microscopic   3. Urinary urgency R39.15    4. Weakness of both lower extremities R29.898    5. Essential hypertension I10        Patient Instructions   We will obtain testing that was done by ENT and evaluation of his dizziness.  He will follow-up in 3 months on this issue.  It is my understanding to date, that none of his tests have been helpful in coming up with a diagnosis.  He was told he may have both an inner ear and a peripheral problem with dizziness.  Same thing is true of his leg weakness.      Arjun Burns MD  [facility]

## 2019-01-07 NOTE — PATIENT INSTRUCTIONS
We will obtain testing that was done by ENT and evaluation of his dizziness.  He will follow-up in 3 months on this issue.  It is my understanding to date, that none of his tests have been helpful in coming up with a diagnosis.  He was told he may have both an inner ear and a peripheral problem with dizziness.  Same thing is true of his leg weakness.

## 2019-04-16 ENCOUNTER — OFFICE VISIT (OUTPATIENT)
Dept: FAMILY MEDICINE | Facility: CLINIC | Age: 74
End: 2019-04-16
Payer: COMMERCIAL

## 2019-04-16 VITALS
SYSTOLIC BLOOD PRESSURE: 110 MMHG | BODY MASS INDEX: 25.39 KG/M2 | DIASTOLIC BLOOD PRESSURE: 80 MMHG | TEMPERATURE: 98.6 F | OXYGEN SATURATION: 95 % | WEIGHT: 167 LBS | HEART RATE: 109 BPM | RESPIRATION RATE: 16 BRPM

## 2019-04-16 DIAGNOSIS — R10.9 FLANK PAIN: Primary | ICD-10-CM

## 2019-04-16 PROBLEM — R35.1 NOCTURIA: Status: ACTIVE | Noted: 2019-04-16

## 2019-04-16 LAB
ALBUMIN UR-MCNC: NEGATIVE MG/DL
APPEARANCE UR: CLEAR
BILIRUB UR QL STRIP: NEGATIVE
COLOR UR AUTO: YELLOW
GLUCOSE UR STRIP-MCNC: NEGATIVE MG/DL
HGB UR QL STRIP: NEGATIVE
KETONES UR STRIP-MCNC: NEGATIVE MG/DL
LEUKOCYTE ESTERASE UR QL STRIP: NEGATIVE
NITRATE UR QL: NEGATIVE
PH UR STRIP: 5.5 PH (ref 5–7)
SOURCE: NORMAL
SP GR UR STRIP: 1.02 (ref 1–1.03)
UROBILINOGEN UR STRIP-ACNC: 0.2 EU/DL (ref 0.2–1)

## 2019-04-16 PROCEDURE — 81003 URINALYSIS AUTO W/O SCOPE: CPT | Performed by: PHYSICIAN ASSISTANT

## 2019-04-16 PROCEDURE — 99214 OFFICE O/P EST MOD 30 MIN: CPT | Performed by: PHYSICIAN ASSISTANT

## 2019-04-16 NOTE — PATIENT INSTRUCTIONS
Call to schedule your imaging:    [place] or [facility] [phone] or [phone]    [facility]     [phone] or [phone]

## 2019-04-16 NOTE — PROGRESS NOTES
SUBJECTIVE:   Stanford Andersen is a 74 year old male who presents to clinic today for the following   health issues:      Kidney problem      Duration: a couple weeks    Description (location/character/radiation): pt is having pain in his kidneys    Intensity:  moderate    Accompanying signs and symptoms: none    History (similar episodes/previous evaluation): None    Precipitating or alleviating factors: None    Therapies tried and outcome: None     Reviewed and updated as needed this visit by clinical staff  Tobacco  Allergies  Meds  Problems  Med Hx  Surg Hx  Fam Hx  Soc Hx        Reviewed and updated as needed this visit by Provider  Tobacco  Allergies  Meds  Problems  Med Hx  Surg Hx  Fam Hx  Soc Hx        Additional complaints: None    HPI additional notes: Stanford presents today with   Chief Complaint   Patient presents with     Kidney Problem     Reports that pain is in his kidneys, not in his back. Worst pain is on the left side, mild pain on the right.  Last kidney stone was in 1993 and he has had a lithotripsy three times previously.  His UA is normal today.  Notes that he does not have any pain currently.     ROS:  C: Negative for fever, chills, recent change in weight  CV: Negative for chest pain or peripheral edema  : POSITIVE for hestancy. Negative for dysuria, hematuria and frequency, urgency  MS: Positive for flank pain  P: Negative for changes in mood or affect  ROS otherwise negative.    Chart Review:  History   Smoking Status     Never Smoker   Smokeless Tobacco     Never Used       Patient Active Problem List   Diagnosis     Health Care Home     Chest pain     Hyperlipidemia     Zollinger-Patricio syndrome     Family history of coronary artery disease     Change in bowel function     Essential hypertension     ACP (advance care planning)     Stress     Erectile dysfunction, unspecified erectile dysfunction type     Ventral hernia without obstruction or gangrene     Numbness and  tingling of both feet     Nocturia     Past Surgical History:   Procedure Laterality Date     ABDOMEN SURGERY  2003    Exploratory laparoscopy     HC EXCISE VARICOCELE  age 21     varicocele repair     HERNIORRHAPHY UMBILICAL N/A 11/30/2018    Procedure: OPEN UMBILLICAL HERNIA REPAIR ;  Surgeon: Ike Catalan MD;  Location:  OR     KIDNEY SURGERY      lithotripsy x 3     LAPAROSCOPIC HERNIORRHAPHY INGUINAL Left 9/26/2016    Procedure: LAPAROSCOPIC HERNIORRHAPHY INGUINAL;  Surgeon: Ike Catalan MD;  Location:  [facility]     LITHOTRIPSY      three times     STOMACH SURGERY  2003    exploratory scoping looking for tumors related to ZE syndrome     Problem list, Medication list, Allergies, Medical/Social/Surg hx reviewed in [employer], updated as appropriate.   OBJECTIVE:                                                    /80   Pulse 109   Temp 98.6  F (37  C)   Resp 16   Wt 75.8 kg (167 lb)   SpO2 95%   BMI 25.39 kg/m    Body mass index is 25.39 kg/m .  GENERAL: healthy, alert, in no acute distress  EYES: Grossly normal to inspection, EOMI, PERRL  HENT: Mucous mebranes moist.  MS: no gross deformities noted.  No CVA tenderness. No paralumbar tenderness.  SKIN: no suspicious lesions, no rashes  PSYCH: Alert and oriented times 3;  Able to articulate logical thoughts. Affect is normal.    Diagnostic test results:   Results for orders placed or performed in visit on 04/16/19 (from the past 24 hour(s))   UA reflex to Microscopic and Culture   Result Value Ref Range    Color Urine Yellow     Appearance Urine Clear     Glucose Urine Negative NEG^Negative mg/dL    Bilirubin Urine Negative NEG^Negative    Ketones Urine Negative NEG^Negative mg/dL    Specific Gravity Urine 1.020 1.003 - 1.035    Blood Urine Negative NEG^Negative    pH Urine 5.5 5.0 - 7.0 pH    Protein Albumin Urine Negative NEG^Negative mg/dL    Urobilinogen Urine 0.2 0.2 - 1.0 EU/dL    Nitrite Urine Negative NEG^Negative    Leukocyte  "Esterase Urine Negative NEG^Negative    Source Midstream Urine         ASSESSMENT/PLAN:                                                          ICD-10-CM    1. Flank pain R10.9 UA reflex to Microscopic and Culture     US Renal Complete     Pt presents with flank pain that he calls \"kidney pain, not back pain\".  Has a history of three lithotripsies in the past, most recently in 1993 and states he knows what pain due to kidney stones feels like.  Discussed normal urine today.  Will get a KUB US to check for stones.  Discussed he should continue to flomax for the time being.  No indication for antibiotics.  After US returns, will determine if pt needs to see urology or if stone/s will pass on their own.    OTC medications for pain control.    Please see patient instructions for treatment details.    Return in about 1 week (around 4/23/2019) for Recheck if not improving.    Arleen Caicedo PA-C  [facility]       "

## 2019-04-18 ENCOUNTER — HOSPITAL ENCOUNTER (OUTPATIENT)
Dept: ULTRASOUND IMAGING | Facility: CLINIC | Age: 74
Discharge: HOME | End: 2019-04-18
Attending: PHYSICIAN ASSISTANT | Admitting: PHYSICIAN ASSISTANT
Payer: COMMERCIAL

## 2019-04-18 DIAGNOSIS — R10.9 FLANK PAIN: ICD-10-CM

## 2019-04-18 PROCEDURE — 76770 US EXAM ABDO BACK WALL COMP: CPT

## 2019-04-19 DIAGNOSIS — N20.0 CALCULUS OF KIDNEY: Primary | ICD-10-CM

## 2019-04-19 DIAGNOSIS — N20.0 KIDNEY STONE: Primary | ICD-10-CM

## 2019-04-22 ENCOUNTER — OFFICE VISIT (OUTPATIENT)
Dept: UROLOGY | Facility: CLINIC | Age: 74
End: 2019-04-22
Payer: COMMERCIAL

## 2019-04-22 VITALS
SYSTOLIC BLOOD PRESSURE: 124 MMHG | WEIGHT: 167 LBS | HEIGHT: 68 IN | BODY MASS INDEX: 25.31 KG/M2 | DIASTOLIC BLOOD PRESSURE: 66 MMHG | HEART RATE: 80 BPM

## 2019-04-22 DIAGNOSIS — N20.0 KIDNEY STONE: ICD-10-CM

## 2019-04-22 LAB
ALBUMIN UR-MCNC: NEGATIVE MG/DL
APPEARANCE UR: CLEAR
BILIRUB UR QL STRIP: NEGATIVE
COLOR UR AUTO: YELLOW
GLUCOSE UR STRIP-MCNC: NEGATIVE MG/DL
HGB UR QL STRIP: NEGATIVE
KETONES UR STRIP-MCNC: NEGATIVE MG/DL
LEUKOCYTE ESTERASE UR QL STRIP: NEGATIVE
NITRATE UR QL: NEGATIVE
PH UR STRIP: 6 PH (ref 5–7)
PR INTERVAL - MUSE: 26
SOURCE: NORMAL
SP GR UR STRIP: <=1.005 (ref 1–1.03)
UROBILINOGEN UR STRIP-ACNC: 0.2 EU/DL (ref 0.2–1)

## 2019-04-22 PROCEDURE — 51798 US URINE CAPACITY MEASURE: CPT | Performed by: PHYSICIAN ASSISTANT

## 2019-04-22 PROCEDURE — 81003 URINALYSIS AUTO W/O SCOPE: CPT | Performed by: PHYSICIAN ASSISTANT

## 2019-04-22 PROCEDURE — 99204 OFFICE O/P NEW MOD 45 MIN: CPT | Mod: 25 | Performed by: PHYSICIAN ASSISTANT

## 2019-04-22 ASSESSMENT — PAIN SCALES - GENERAL: PAINLEVEL: NO PAIN (0)

## 2019-04-22 ASSESSMENT — MIFFLIN-ST. JEOR: SCORE: 1477.01

## 2019-04-22 NOTE — PROGRESS NOTES
"CC: Flank pain.    HPI: It is a pleasure to see Stanford Andersen, a pleasant 74 year old year old seen today in the urology clinic in consultation from Arleen Caicedo PA-C for evaluation of the above.  The flank pain has been intermittent since 2 weeks ago.  Nocturia 1 time per night (down from 3).     Denies gross hematuria, dysuria, fevers, chills, N/V. Long history of kidney stones.  In the 1980s and 1990s had multiple stone procedures. Was told he had \"sponge\" kidney on the right.    Started on Flomax/finasteride in Jan for urinary urgency and leakage and nocturia. Renal US 4/18/19 showed bilateral hydro. UA neg.     PSA 10/8/18 2.03.    Hx of Zollinger-Patricio syndrome several years ago.     RECENT IMAGING:  RENAL ULTRASOUND   4/18/2019 4:17 PM      HISTORY: Flank pain     COMPARISON: None.     FINDINGS: There is moderate bilateral hydronephrosis. There are  echogenic foci in the left kidney consistent with  stones.                                                                    IMPRESSION:  1. Moderate bilateral hydronephrosis.  2. Stones in the left kidney.    Past Medical History:   Diagnosis Date     Chronic back pain      Complication of anesthesia     URINARY RETENTION     Gastro-oesophageal reflux disease      Hypertension      Renal disease     kidney stone     Zollinger-Patricio syndrome        Past Surgical History:   Procedure Laterality Date     ABDOMEN SURGERY  2003    Exploratory laparoscopy     HC EXCISE VARICOCELE  age 21     varicocele repair     HERNIORRHAPHY UMBILICAL N/A 11/30/2018    Procedure: OPEN UMBILLICAL HERNIA REPAIR ;  Surgeon: Ike Catalan MD;  Location:  OR     KIDNEY SURGERY      lithotripsy x 3     LAPAROSCOPIC HERNIORRHAPHY INGUINAL Left 9/26/2016    Procedure: LAPAROSCOPIC HERNIORRHAPHY INGUINAL;  Surgeon: Ike Catalan MD;  [facility]:  [facility]     LITHOTRIPSY      three times     STOMACH SURGERY  2003    exploratory scoping looking for tumors related to " "ZE syndrome          Allergies   Allergen Reactions     Dye [Contrast Dye] Anaphylaxis and Hives     Penicillins Anaphylaxis     Swelling, arm turned blue     Tramadol Itching     Oxycodone-Acetaminophen Rash       Cannot display prior to admission medications because the patient has not been admitted in this contact.       FAMILY HISTORY: There is no family h/o  malignancy.  There is no family h/o urolithiasis.     SOCIAL HISTORY: The patient does not smoke cigarettes, Denies EtOH and illicit drug use.     ROS: A comprehensive 14 point ROS was obtained and otherwise negative except for that outlined above in the HPI.    GENERAL PHYSICAL EXAM:   Ht 1.727 m (5' 8\")   Wt 75.8 kg (167 lb)   BMI 25.39 kg/m    GEN: NAD  EYES: EOMI  MOUTH: MMM  NECK: Supple  RESP: Unlabored breathing  CARDIAC: No LE edema  SKIN: Warm  ABD: soft  NEURO: AAO      UA neg.      ASSESSMENT AND PLAN: Stanford Andersen is a very pleasant 74 year old year old with flank pain and stone disease hx, bilateral hydro.  -CT w/o to evaluate the pain, will call pt with results and recommended f/u. Discussed ESWL, PCNL, URS and follow up of bilateral hydro.     - Warning signs discussed including fevers, chills, N/V, gross hematuria, severe pain that is refractory to medications which should prompt more urgent evaluation. Patient understands to proceed to the ER should these warning signs occur outside of clinic hours.    I have enjoyed participating in the medical care of this very pleasant patient and will continue to keep you updated on her progress.  Please don't hesitate to contact me with any questions or concerns.      Wanda Owens PA-C  [facility]    30 minutes were spent with the patient today, > 50% in counseling and coordination of care for flank pain.    "

## 2019-04-22 NOTE — NURSING NOTE
Chief Complaint   Patient presents with     Clinic Care Coordination - Follow-up     Hydonephrosis, Stones      Sierra Tamayo LPN

## 2019-04-24 ENCOUNTER — HOSPITAL ENCOUNTER (OUTPATIENT)
Dept: CT IMAGING | Facility: CLINIC | Age: 74
Discharge: HOME | End: 2019-04-24
Attending: PHYSICIAN ASSISTANT | Admitting: PHYSICIAN ASSISTANT
Payer: COMMERCIAL

## 2019-04-24 DIAGNOSIS — N20.0 KIDNEY STONE: ICD-10-CM

## 2019-04-24 PROCEDURE — 74176 CT ABD & PELVIS W/O CONTRAST: CPT

## 2019-04-29 ENCOUNTER — HOSPITAL ENCOUNTER (OUTPATIENT)
Dept: GENERAL RADIOLOGY | Facility: CLINIC | Age: 74
Discharge: HOME | End: 2019-04-29
Attending: PHYSICIAN ASSISTANT | Admitting: PHYSICIAN ASSISTANT
Payer: COMMERCIAL

## 2019-04-29 DIAGNOSIS — N20.0 KIDNEY STONE: ICD-10-CM

## 2019-04-29 DIAGNOSIS — N20.0 KIDNEY STONE: Primary | ICD-10-CM

## 2019-04-29 PROCEDURE — 74019 RADEX ABDOMEN 2 VIEWS: CPT

## 2019-05-01 ENCOUNTER — TELEPHONE (OUTPATIENT)
Dept: UROLOGY | Facility: CLINIC | Age: 74
End: 2019-05-01

## 2019-05-01 DIAGNOSIS — N20.1 RIGHT URETERAL CALCULUS: Primary | ICD-10-CM

## 2019-05-01 RX ORDER — CIPROFLOXACIN 2 MG/ML
400 INJECTION, SOLUTION INTRAVENOUS
Status: CANCELLED | OUTPATIENT
Start: 2019-05-09

## 2019-05-01 NOTE — TELEPHONE ENCOUNTER
Wanted to review recommendations for ureteroscopy and laser lithotripsy for stone extraction. Unable to leave a voicemail at the number provided.

## 2019-05-03 ENCOUNTER — OFFICE VISIT (OUTPATIENT)
Dept: FAMILY MEDICINE | Facility: CLINIC | Age: 74
End: 2019-05-03
Payer: COMMERCIAL

## 2019-05-03 VITALS
BODY MASS INDEX: 25.76 KG/M2 | WEIGHT: 170 LBS | HEART RATE: 97 BPM | OXYGEN SATURATION: 93 % | DIASTOLIC BLOOD PRESSURE: 84 MMHG | TEMPERATURE: 97.5 F | RESPIRATION RATE: 14 BRPM | SYSTOLIC BLOOD PRESSURE: 108 MMHG | HEIGHT: 68 IN

## 2019-05-03 DIAGNOSIS — E78.2 MIXED HYPERLIPIDEMIA: Chronic | ICD-10-CM

## 2019-05-03 DIAGNOSIS — E16.4 ZOLLINGER-ELLISON SYNDROME: ICD-10-CM

## 2019-05-03 DIAGNOSIS — Z01.818 PREOP GENERAL PHYSICAL EXAM: Primary | ICD-10-CM

## 2019-05-03 DIAGNOSIS — N20.0 RECURRENT KIDNEY STONES: ICD-10-CM

## 2019-05-03 DIAGNOSIS — I10 ESSENTIAL HYPERTENSION: Chronic | ICD-10-CM

## 2019-05-03 LAB
ANION GAP SERPL CALCULATED.3IONS-SCNC: 7 MMOL/L (ref 3–14)
BUN SERPL-MCNC: 19 MG/DL (ref 7–30)
CALCIUM SERPL-MCNC: 9.5 MG/DL (ref 8.5–10.1)
CHLORIDE SERPL-SCNC: 107 MMOL/L (ref 94–109)
CO2 SERPL-SCNC: 26 MMOL/L (ref 20–32)
CREAT SERPL-MCNC: 0.98 MG/DL (ref 0.66–1.25)
GFR SERPL CREATININE-BSD FRML MDRD: 76 ML/MIN/{1.73_M2}
GLUCOSE SERPL-MCNC: 102 MG/DL (ref 70–99)
POTASSIUM SERPL-SCNC: 4 MMOL/L (ref 3.4–5.3)
SODIUM SERPL-SCNC: 140 MMOL/L (ref 133–144)

## 2019-05-03 PROCEDURE — 36415 COLL VENOUS BLD VENIPUNCTURE: CPT | Performed by: FAMILY MEDICINE

## 2019-05-03 PROCEDURE — 80048 BASIC METABOLIC PNL TOTAL CA: CPT | Performed by: FAMILY MEDICINE

## 2019-05-03 PROCEDURE — 99214 OFFICE O/P EST MOD 30 MIN: CPT | Performed by: FAMILY MEDICINE

## 2019-05-03 ASSESSMENT — MIFFLIN-ST. JEOR: SCORE: 1490.61

## 2019-05-03 NOTE — LETTER
May 6, 2019      Stanford Andersen  [address] [place] [id number]        Dear ,    We are writing to inform you of your test results.    Your kidney function and electrolytes are NORMAL.  Your blood sugar was just a bit above normal but you were not fasting so this is expected.    Resulted Orders   Basic metabolic panel  (Ca, Cl, CO2, Creat, Gluc, K, Na, BUN)   Result Value Ref Range    Sodium 140 133 - 144 mmol/L    Potassium 4.0 3.4 - 5.3 mmol/L    Chloride 107 94 - 109 mmol/L    Carbon Dioxide 26 20 - 32 mmol/L    Anion Gap 7 3 - 14 mmol/L    Glucose 102 (H) 70 - 99 mg/dL    Urea Nitrogen 19 7 - 30 mg/dL    Creatinine 0.98 0.66 - 1.25 mg/dL    GFR Estimate 76 >60 mL/min/[1.73_m2]      Comment:      Non  GFR Calc  Starting 12/18/2018, serum creatinine based estimated GFR (eGFR) will be   calculated using the Chronic Kidney Disease Epidemiology Collaboration   (CKD-EPI) equation.      GFR Estimate If Black 88 >60 mL/min/[1.73_m2]      Comment:       GFR Calc  Starting 12/18/2018, serum creatinine based estimated GFR (eGFR) will be   calculated using the Chronic Kidney Disease Epidemiology Collaboration   (CKD-EPI) equation.      Calcium 9.5 8.5 - 10.1 mg/dL       If you have any questions or concerns, please call the clinic at the number listed above.       Sincerely,        Kimberly Farooq, DO

## 2019-05-03 NOTE — PROGRESS NOTES
[facility]  [address]  [facility] [address]  [phone]  Dept: [phone]    PRE-OP EVALUATION:  Today's date: 5/3/2019    Stanford Andersen (: 1945) presents for pre-operative evaluation assessment as requested by Dr. Satinder Almanza.  He requires evaluation and anesthesia risk assessment prior to undergoing surgery/procedure for treatment of Kidney Stones.    Proposed Surgery/ Procedure: Cystoscopy right ureteroscopy/laser extraction of stone  Date of Surgery/ Procedure: 2019  Time of Surgery/ Procedure: 2:20PM  Hospital/Surgical Facility: [facility]    Primary Physician: Arjun Burns  Type of Anesthesia Anticipated: General    Patient has a Health Care Directive or Living Will:  NO    1. NO - Do you have a history of heart attack, stroke, stent, bypass or surgery on an artery in the head, neck, heart or legs?  2. YES - Do you ever have any pain or discomfort in your chest?  Thinks it is due to indigestion and anxiety---on/off.  None currently  3. NO - Do you have a history of  Heart Failure?  4. NO - Are you troubled by shortness of breath when: walking on the level, up a slight hill or at night?  5. NO - Do you currently have a cold, bronchitis or other respiratory infection?  6. YES - Do you have a cough, shortness of breath or wheezing?  Cough (due to allergies) for the past few weeks, gets this seasoanlly.    7. NO - Do you sometimes get pains in the calves of your legs when you walk?  8. NO - Do you or anyone in your family have previous history of blood clots?  9. NO - Do you or does anyone in your family have a serious bleeding problem such as prolonged bleeding following surgeries or cuts?  10. NO - Have you ever had problems with anemia or been told to take iron pills?  11. NO - Have you had any abnormal blood loss such as black, tarry or bloody stools, or abnormal vaginal bleeding?  12. NO - Have you ever had a  "blood transfusion?  13. YES - Have you or any of your relatives ever had problems with anesthesia?  Mother could \"never wake up\" and pt has personally had difficulties getting up from anesthesia.   14. NO - Do you have sleep apnea, excessive snoring or daytime drowsiness?  15. NO - Do you have any prosthetic heart valves?  16. NO - Do you have prosthetic joints?  17. NO - Is there any chance that you may be pregnant?      HPI:     HPI related to upcoming procedure:     Per recent Urology OV note and the patient...  Long history of kidney stones.  In the 1980s and 1990s had multiple stone procedures. Was told he had \"sponge\" kidney on the right.  Started on Flomax/finasteride in Jan for urinary urgency and leakage and nocturia. Renal US 4/18/19 showed bilateral hydro. UA was neg.      RENAL ULTRASOUND   4/18/2019:  1. Moderate bilateral hydronephrosis.  2. Stones in the left kidney.    PSA 10/8/18 2.03.    Medical history significant for:   Hx of Zollinger-Patricio syndrome several years ago.     On daily Prilosec for GERD  HTN: well controlled on Lisinopril 5 mg daily.  HLD: not taking any statin.  Diet controlled.   Just stopped taking daily ASA due to upcoming surgery.     MEDICAL HISTORY:     Patient Active Problem List    Diagnosis Date Noted     Recurrent kidney stones 05/03/2019     Priority: Medium     Nocturia 04/16/2019     Priority: Medium     Erectile dysfunction, unspecified erectile dysfunction type 10/08/2018     Priority: Medium     Ventral hernia without obstruction or gangrene 10/08/2018     Priority: Medium     Numbness and tingling of both feet 10/08/2018     Priority: Medium     Stress 08/02/2017     Priority: Medium     ACP (advance care planning) 12/21/2015     Priority: Medium     Advance Care Planning 12/21/2015: ACP Review of Chart / Resources Provided:  Reviewed chart for advance care plan.  Stanford Andersen has no plan or code status on file. Discussed available resources and provided with " information. . Added by Zee Stewart             Essential hypertension 02/05/2014     Priority: Medium     Hyperlipidemia 09/04/2013     Priority: Medium     Family history of coronary artery disease 09/04/2013     Priority: Medium     Change in bowel function 09/04/2013     Priority: Medium     Zollinger-Patricio syndrome      Priority: Medium     Health Care Home 03/02/2012     Priority: Medium     [facility] for .  Clara Bradshawgiulia RN-BC    [phone]   DX V65.8 REPLACED WITH [id number]ALTH CARE HOME (04/08/2013)       Chest pain 03/02/2012     Priority: Medium      Past Medical History:   Diagnosis Date     Chronic back pain      Complication of anesthesia     URINARY RETENTION     Gastro-oesophageal reflux disease      Hypertension      Renal disease     kidney stone     Zollinger-Patricio syndrome      Past Surgical History:   Procedure Laterality Date     ABDOMEN SURGERY  2003    Exploratory laparoscopy     HC EXCISE VARICOCELE  age 21     varicocele repair     HERNIORRHAPHY UMBILICAL N/A 11/30/2018    Procedure: OPEN UMBILLICAL HERNIA REPAIR ;  Surgeon: Ike Catalan MD;  Location: [facility]     KIDNEY SURGERY      lithotripsy x 3     LAPAROSCOPIC HERNIORRHAPHY INGUINAL Left 9/26/2016    Procedure: LAPAROSCOPIC HERNIORRHAPHY INGUINAL;  Surgeon: Ike Catalan MD;  Location:  [facility]     LITHOTRIPSY      three times     STOMACH SURGERY  2003    exploratory scoping looking for tumors related to ZE syndrome     Current Outpatient Medications   Medication Sig Dispense Refill     finasteride (PROSCAR) 5 MG tablet Take 1 tablet (5 mg) by mouth daily 90 tablet 1     lisinopril (PRINIVIL/ZESTRIL) 5 MG tablet TAKE 1 TABLET(5 MG) BY MOUTH DAILY 90 tablet 3     magnesium 250 MG tablet Take 1 tablet by mouth daily       Omeprazole (PRILOSEC PO) Take 20 mg by mouth 3 times daily. Patient takes it in the morning, with dinner and at bedtime for Patricio-Zollinger syndrome. Patient takes Prilosec  "OTC.       tamsulosin (FLOMAX) 0.4 MG capsule Take 1 capsule (0.4 mg) by mouth daily 90 capsule 1     aspirin 81 MG EC tablet Take 1 tablet by mouth daily. 90 tablet 3     OTC products: None, except as noted above    Allergies   Allergen Reactions     Dye [Contrast Dye] Anaphylaxis and Hives     Penicillins Anaphylaxis     Swelling, arm turned blue     Tramadol Itching     Oxycodone-Acetaminophen Rash      Latex Allergy: NO    Social History     Tobacco Use     Smoking status: Never Smoker     Smokeless tobacco: Never Used   Substance Use Topics     Alcohol use: Yes     Comment: seldom     History   Drug Use No       REVIEW OF SYSTEMS:   CONSTITUTIONAL: NEGATIVE for fever, chills, change in weight  INTEGUMENTARY/SKIN: NEGATIVE for worrisome rashes, moles or lesions  EYES: NEGATIVE for vision changes or irritation  ENT/MOUTH: NEGATIVE for ear, mouth and throat problems  RESP: NEGATIVE for significant cough or SOB  CV: NEGATIVE for chest pain, palpitations or peripheral edema  GI: NEGATIVE for nausea, abdominal pain, heartburn, or change in bowel habits  : NEGATIVE for frequency, dysuria, or hematuria  MUSCULOSKELETAL: NEGATIVE for significant arthralgias or myalgia  NEURO: NEGATIVE for weakness, dizziness or paresthesias  HEME: NEGATIVE for bleeding problems  PSYCHIATRIC: NEGATIVE for changes in mood or affect    EXAM:   /84 (BP Location: Left arm, Patient Position: Sitting, Cuff Size: Adult Regular)   Pulse 97   Temp 97.5  F (36.4  C) (Tympanic)   Resp 14   Ht 1.727 m (5' 8\")   Wt 77.1 kg (170 lb)   SpO2 93%   BMI 25.85 kg/m      GENERAL APPEARANCE: healthy, alert and no distress     EYES: EOMI,  PERRL     HENT: ear canals and TM's normal and nose and mouth without ulcers or lesions     NECK: no adenopathy, no asymmetry, masses, or scars and thyroid normal to palpation     RESP: lungs clear to auscultation - no rales, rhonchi or wheezes     CV: regular rates and rhythm, normal S1 S2, no S3 or S4 and " no murmur, click or rub     ABDOMEN:  soft, nontender, no HSM or masses and bowel sounds normal     MS: extremities normal- no gross deformities noted, no evidence of inflammation in joints, FROM in all extremities.     SKIN: no suspicious lesions or rashes     NEURO: Normal strength and tone, sensory exam grossly normal, mentation intact and speech normal     PSYCH: mentation appears normal. and affect normal/bright     LYMPHATICS: No cervical adenopathy    DIAGNOSTICS:     EKG (2018): appears normal, NSR, normal axis, normal intervals, no acute ST/T changes c/w ischemia, no LVH by voltage criteria, unchanged from previous tracings  Labs Resulted Today:   Results for orders placed or performed during the hospital encounter of 19   XR KUB    Narrative    ABDOMEN ONE VIEW 2019 3:26 PM    HISTORY:  74-year-old patient with bilateral nephrolithiasis.      Impression    IMPRESSION: Since CT exam on 2019, left renal  nephrolithiasis is better visualized than right as right is obscured  by overlying bowel gas. Calcification overlies the inferior pole of  the left kidney measuring up to 1.2 cm transverse. Numerous  calcifications overlie the left kidney. No dilated air-filled loops of  small bowel.  Slight elevation of the right hemidiaphragm.    ARMEN WEEKS MD       Recent Labs   Lab Test 18  1407 10/08/18  1113 17  1318   HGB 16.2 15.7 16.2   PLT  --  227 252    141 140   POTASSIUM 4.2 3.8 3.9   CR 0.95 0.82 0.87        IMPRESSION:   Stanford Andersen (: 1945) presents for pre-operative evaluation assessment as requested by Dr. Satinder Almanza.  He requires evaluation and anesthesia risk assessment prior to undergoing surgery/procedure for treatment of Kidney Stones.    The proposed surgical procedure is considered LOW risk.    REVISED CARDIAC RISK INDEX  The patient has the following serious cardiovascular risks for perioperative complications such as (MI, PE, VFib  and 3  AV Block):  No serious cardiac risks  INTERPRETATION: 0 risks: Class I (very low risk - 0.4% complication rate)    The patient has the following additional risks for perioperative complications:  No identified additional risks      ICD-10-CM    1. Preop general physical exam Z01.818 Basic metabolic panel  (Ca, Cl, CO2, Creat, Gluc, K, Na, BUN)   2. Essential hypertension I10 Basic metabolic panel  (Ca, Cl, CO2, Creat, Gluc, K, Na, BUN)   3. Mixed hyperlipidemia E78.2    4. Zollinger-Patricio syndrome E16.4    5. Recurrent kidney stones N20.0        RECOMMENDATIONS:     --Consult hospital rounder / IM to assist post-op medical management    Obstructive Sleep Apnea (or suspected sleep apnea)  Hospital staff are advised to monitor for sleep related oxygen desaturations due to suspicion of NIDHI      --Patient is to take all scheduled medications on the day of surgery EXCEPT for modifications listed below.    APPROVAL GIVEN to proceed with proposed procedure, without further diagnostic evaluation       Signed Electronically by: Kimberly Farooq DO    Copy of this evaluation report is provided to requesting physician.    [facility] Preop Guidelines    Revised Cardiac Risk Index

## 2019-05-07 ENCOUNTER — TELEPHONE (OUTPATIENT)
Dept: UROLOGY | Facility: CLINIC | Age: 74
End: 2019-05-07

## 2019-05-07 NOTE — TELEPHONE ENCOUNTER
Called and informed patient of surgical plan. Cornelius BARBER----- Message from Yuni Ha sent at 5/7/2019  1:06 PM CDT -----      ----- Message -----  From: Satinder Almanza MD  Sent: 5/7/2019  12:47 PM  To: Yuni Ha    Have nurse call him.  Right sided ureteral calculus. Will look at left side also, but no ureteral stone  Add on left retrograde for his surgery please, possible left ureteroscopy  ----- Message -----  From: Yuni Ha  Sent: 5/7/2019  11:09 AM  To: Satinder Almanza MD    Stanford's surgery is scheduled for right side and he's confused because he's been having left flank pain. Is this correct?

## 2019-05-23 ENCOUNTER — APPOINTMENT (OUTPATIENT)
Dept: GENERAL RADIOLOGY | Facility: CLINIC | Age: 74
End: 2019-05-23
Attending: UROLOGY
Payer: COMMERCIAL

## 2019-05-23 ENCOUNTER — ANESTHESIA EVENT (OUTPATIENT)
Dept: SURGERY | Facility: CLINIC | Age: 74
End: 2019-05-23
Payer: COMMERCIAL

## 2019-05-23 ENCOUNTER — HOSPITAL ENCOUNTER (OUTPATIENT)
Facility: CLINIC | Age: 74
Discharge: HOME | End: 2019-05-23
Attending: UROLOGY | Admitting: UROLOGY
Payer: COMMERCIAL

## 2019-05-23 ENCOUNTER — ANESTHESIA (OUTPATIENT)
Dept: SURGERY | Facility: CLINIC | Age: 74
End: 2019-05-23
Payer: COMMERCIAL

## 2019-05-23 VITALS
HEIGHT: 68 IN | HEART RATE: 75 BPM | SYSTOLIC BLOOD PRESSURE: 146 MMHG | WEIGHT: 167 LBS | OXYGEN SATURATION: 94 % | RESPIRATION RATE: 12 BRPM | TEMPERATURE: 98.4 F | DIASTOLIC BLOOD PRESSURE: 92 MMHG | BODY MASS INDEX: 25.31 KG/M2

## 2019-05-23 DIAGNOSIS — N20.1 RIGHT URETERAL CALCULUS: ICD-10-CM

## 2019-05-23 LAB
COPATH REPORT: NORMAL
INTERPRETATION ECG - MUSE: NORMAL

## 2019-05-23 PROCEDURE — 25000125 ZZHC RX 250: Performed by: NURSE ANESTHETIST, CERTIFIED REGISTERED

## 2019-05-23 PROCEDURE — 25000128 H RX IP 250 OP 636: Performed by: NURSE ANESTHETIST, CERTIFIED REGISTERED

## 2019-05-23 PROCEDURE — 36000058 ZZH SURGERY LEVEL 3 EA 15 ADDTL MIN: Performed by: UROLOGY

## 2019-05-23 PROCEDURE — 71000027 ZZH RECOVERY PHASE 2 EACH 15 MINS: Performed by: UROLOGY

## 2019-05-23 PROCEDURE — 25800030 ZZH RX IP 258 OP 636: Performed by: ANESTHESIOLOGY

## 2019-05-23 PROCEDURE — 52356 CYSTO/URETERO W/LITHOTRIPSY: CPT | Mod: RT | Performed by: UROLOGY

## 2019-05-23 PROCEDURE — 37000009 ZZH ANESTHESIA TECHNICAL FEE, EACH ADDTL 15 MIN: Performed by: UROLOGY

## 2019-05-23 PROCEDURE — 27211024 ZZHC OR SUPPLY OTHER OPNP: Performed by: UROLOGY

## 2019-05-23 PROCEDURE — 74019 RADEX ABDOMEN 2 VIEWS: CPT

## 2019-05-23 PROCEDURE — 71000012 ZZH RECOVERY PHASE 1 LEVEL 1 FIRST HR: Performed by: UROLOGY

## 2019-05-23 PROCEDURE — 40000306 ZZH STATISTIC PRE PROC ASSESS II: Performed by: UROLOGY

## 2019-05-23 PROCEDURE — 71000013 ZZH RECOVERY PHASE 1 LEVEL 1 EA ADDTL HR: Performed by: UROLOGY

## 2019-05-23 PROCEDURE — 88300 SURGICAL PATH GROSS: CPT | Mod: 26 | Performed by: UROLOGY

## 2019-05-23 PROCEDURE — C1769 GUIDE WIRE: HCPCS | Performed by: UROLOGY

## 2019-05-23 PROCEDURE — 36000060 ZZH SURGERY LEVEL 3 W FLUORO 1ST 30 MIN: Performed by: UROLOGY

## 2019-05-23 PROCEDURE — C2617 STENT, NON-COR, TEM W/O DEL: HCPCS | Performed by: UROLOGY

## 2019-05-23 PROCEDURE — 40000277 XR SURGERY CARM FLUORO LESS THAN 5 MIN W STILLS: Mod: TC

## 2019-05-23 PROCEDURE — 93010 ELECTROCARDIOGRAM REPORT: CPT | Performed by: INTERNAL MEDICINE

## 2019-05-23 PROCEDURE — 25000128 H RX IP 250 OP 636: Performed by: ANESTHESIOLOGY

## 2019-05-23 PROCEDURE — 25000132 ZZH RX MED GY IP 250 OP 250 PS 637: Performed by: ANESTHESIOLOGY

## 2019-05-23 PROCEDURE — 27210794 ZZH OR GENERAL SUPPLY STERILE: Performed by: UROLOGY

## 2019-05-23 PROCEDURE — 82365 CALCULUS SPECTROSCOPY: CPT | Performed by: UROLOGY

## 2019-05-23 PROCEDURE — 25800025 ZZH RX 258: Performed by: UROLOGY

## 2019-05-23 PROCEDURE — 37000008 ZZH ANESTHESIA TECHNICAL FEE, 1ST 30 MIN: Performed by: UROLOGY

## 2019-05-23 PROCEDURE — 88300 SURGICAL PATH GROSS: CPT | Performed by: UROLOGY

## 2019-05-23 PROCEDURE — 25000128 H RX IP 250 OP 636: Performed by: UROLOGY

## 2019-05-23 DEVICE — STENT URETERAL POLARIS ULTRA 5FRX24CM M0061921220: Type: IMPLANTABLE DEVICE | Site: URETER | Status: FUNCTIONAL

## 2019-05-23 RX ORDER — LIDOCAINE HYDROCHLORIDE 10 MG/ML
INJECTION, SOLUTION INFILTRATION; PERINEURAL PRN
Status: DISCONTINUED | OUTPATIENT
Start: 2019-05-23 | End: 2019-05-23

## 2019-05-23 RX ORDER — LABETALOL 20 MG/4 ML (5 MG/ML) INTRAVENOUS SYRINGE
10 EVERY 5 MIN PRN
Status: DISCONTINUED | OUTPATIENT
Start: 2019-05-23 | End: 2019-05-23 | Stop reason: HOSPADM

## 2019-05-23 RX ORDER — LIDOCAINE 40 MG/G
CREAM TOPICAL
Status: DISCONTINUED | OUTPATIENT
Start: 2019-05-23 | End: 2019-05-23 | Stop reason: HOSPADM

## 2019-05-23 RX ORDER — ALBUTEROL SULFATE 0.83 MG/ML
2.5 SOLUTION RESPIRATORY (INHALATION) EVERY 4 HOURS PRN
Status: DISCONTINUED | OUTPATIENT
Start: 2019-05-23 | End: 2019-05-23 | Stop reason: HOSPADM

## 2019-05-23 RX ORDER — PROPOFOL 10 MG/ML
INJECTION, EMULSION INTRAVENOUS PRN
Status: DISCONTINUED | OUTPATIENT
Start: 2019-05-23 | End: 2019-05-23

## 2019-05-23 RX ORDER — FENTANYL CITRATE 50 UG/ML
25-50 INJECTION, SOLUTION INTRAMUSCULAR; INTRAVENOUS
Status: DISCONTINUED | OUTPATIENT
Start: 2019-05-23 | End: 2019-05-23 | Stop reason: HOSPADM

## 2019-05-23 RX ORDER — HYDROMORPHONE HYDROCHLORIDE 1 MG/ML
.3-.5 INJECTION, SOLUTION INTRAMUSCULAR; INTRAVENOUS; SUBCUTANEOUS EVERY 10 MIN PRN
Status: DISCONTINUED | OUTPATIENT
Start: 2019-05-23 | End: 2019-05-23 | Stop reason: HOSPADM

## 2019-05-23 RX ORDER — ONDANSETRON 2 MG/ML
4 INJECTION INTRAMUSCULAR; INTRAVENOUS EVERY 30 MIN PRN
Status: DISCONTINUED | OUTPATIENT
Start: 2019-05-23 | End: 2019-05-23 | Stop reason: HOSPADM

## 2019-05-23 RX ORDER — MEPERIDINE HYDROCHLORIDE 50 MG/ML
12.5 INJECTION INTRAMUSCULAR; INTRAVENOUS; SUBCUTANEOUS
Status: DISCONTINUED | OUTPATIENT
Start: 2019-05-23 | End: 2019-05-23 | Stop reason: HOSPADM

## 2019-05-23 RX ORDER — NEOSTIGMINE METHYLSULFATE 1 MG/ML
VIAL (ML) INJECTION PRN
Status: DISCONTINUED | OUTPATIENT
Start: 2019-05-23 | End: 2019-05-23

## 2019-05-23 RX ORDER — DEXAMETHASONE SODIUM PHOSPHATE 4 MG/ML
INJECTION, SOLUTION INTRA-ARTICULAR; INTRALESIONAL; INTRAMUSCULAR; INTRAVENOUS; SOFT TISSUE PRN
Status: DISCONTINUED | OUTPATIENT
Start: 2019-05-23 | End: 2019-05-23

## 2019-05-23 RX ORDER — ONDANSETRON 4 MG/1
4 TABLET, ORALLY DISINTEGRATING ORAL EVERY 30 MIN PRN
Status: DISCONTINUED | OUTPATIENT
Start: 2019-05-23 | End: 2019-05-23 | Stop reason: HOSPADM

## 2019-05-23 RX ORDER — SODIUM CHLORIDE, SODIUM LACTATE, POTASSIUM CHLORIDE, CALCIUM CHLORIDE 600; 310; 30; 20 MG/100ML; MG/100ML; MG/100ML; MG/100ML
INJECTION, SOLUTION INTRAVENOUS CONTINUOUS
Status: DISCONTINUED | OUTPATIENT
Start: 2019-05-23 | End: 2019-05-23 | Stop reason: HOSPADM

## 2019-05-23 RX ORDER — GLYCOPYRROLATE 0.2 MG/ML
INJECTION, SOLUTION INTRAMUSCULAR; INTRAVENOUS PRN
Status: DISCONTINUED | OUTPATIENT
Start: 2019-05-23 | End: 2019-05-23

## 2019-05-23 RX ORDER — ONDANSETRON 2 MG/ML
INJECTION INTRAMUSCULAR; INTRAVENOUS PRN
Status: DISCONTINUED | OUTPATIENT
Start: 2019-05-23 | End: 2019-05-23

## 2019-05-23 RX ORDER — NALOXONE HYDROCHLORIDE 0.4 MG/ML
.1-.4 INJECTION, SOLUTION INTRAMUSCULAR; INTRAVENOUS; SUBCUTANEOUS
Status: DISCONTINUED | OUTPATIENT
Start: 2019-05-23 | End: 2019-05-23 | Stop reason: HOSPADM

## 2019-05-23 RX ORDER — CIPROFLOXACIN 2 MG/ML
400 INJECTION, SOLUTION INTRAVENOUS
Status: COMPLETED | OUTPATIENT
Start: 2019-05-23 | End: 2019-05-23

## 2019-05-23 RX ORDER — FENTANYL CITRATE 50 UG/ML
INJECTION, SOLUTION INTRAMUSCULAR; INTRAVENOUS PRN
Status: DISCONTINUED | OUTPATIENT
Start: 2019-05-23 | End: 2019-05-23

## 2019-05-23 RX ADMIN — CIPROFLOXACIN 400 MG: 2 INJECTION, SOLUTION INTRAVENOUS at 13:31

## 2019-05-23 RX ADMIN — LABETALOL 20 MG/4 ML (5 MG/ML) INTRAVENOUS SYRINGE 10 MG: at 15:21

## 2019-05-23 RX ADMIN — Medication 3 MG: at 14:51

## 2019-05-23 RX ADMIN — SODIUM CHLORIDE, POTASSIUM CHLORIDE, SODIUM LACTATE AND CALCIUM CHLORIDE: 600; 310; 30; 20 INJECTION, SOLUTION INTRAVENOUS at 13:31

## 2019-05-23 RX ADMIN — ONDANSETRON 4 MG: 2 INJECTION INTRAMUSCULAR; INTRAVENOUS at 14:10

## 2019-05-23 RX ADMIN — SODIUM CHLORIDE, POTASSIUM CHLORIDE, SODIUM LACTATE AND CALCIUM CHLORIDE: 600; 310; 30; 20 INJECTION, SOLUTION INTRAVENOUS at 14:48

## 2019-05-23 RX ADMIN — FENTANYL CITRATE 50 MCG: 50 INJECTION, SOLUTION INTRAMUSCULAR; INTRAVENOUS at 14:03

## 2019-05-23 RX ADMIN — FENTANYL CITRATE 50 MCG: 50 INJECTION, SOLUTION INTRAMUSCULAR; INTRAVENOUS at 14:37

## 2019-05-23 RX ADMIN — FENTANYL CITRATE 50 MCG: 50 INJECTION, SOLUTION INTRAMUSCULAR; INTRAVENOUS at 16:53

## 2019-05-23 RX ADMIN — DEXAMETHASONE SODIUM PHOSPHATE 4 MG: 4 INJECTION, SOLUTION INTRA-ARTICULAR; INTRALESIONAL; INTRAMUSCULAR; INTRAVENOUS; SOFT TISSUE at 13:38

## 2019-05-23 RX ADMIN — PROPOFOL 150 MG: 10 INJECTION, EMULSION INTRAVENOUS at 13:37

## 2019-05-23 RX ADMIN — GLYCOPYRROLATE 0.6 MG: 0.2 INJECTION, SOLUTION INTRAMUSCULAR; INTRAVENOUS at 14:51

## 2019-05-23 RX ADMIN — ROCURONIUM BROMIDE 25 MG: 10 INJECTION INTRAVENOUS at 13:38

## 2019-05-23 RX ADMIN — LIDOCAINE HYDROCHLORIDE 50 MG: 10 INJECTION, SOLUTION INFILTRATION; PERINEURAL at 13:37

## 2019-05-23 RX ADMIN — PROCHLORPERAZINE EDISYLATE 5 MG: 5 INJECTION INTRAMUSCULAR; INTRAVENOUS at 15:36

## 2019-05-23 RX ADMIN — FENTANYL CITRATE 50 MCG: 50 INJECTION, SOLUTION INTRAMUSCULAR; INTRAVENOUS at 13:37

## 2019-05-23 RX ADMIN — OMEPRAZOLE 20 MG: 20 CAPSULE, DELAYED RELEASE ORAL at 17:20

## 2019-05-23 RX ADMIN — ONDANSETRON 4 MG: 2 INJECTION INTRAMUSCULAR; INTRAVENOUS at 15:09

## 2019-05-23 ASSESSMENT — MIFFLIN-ST. JEOR: SCORE: 1476.88

## 2019-05-23 NOTE — BRIEF OP NOTE
Diagnosis: 9 mm mid right ureteral calculus  Procedure: Video cystoscopy, right ureteroscopy with holmium laser lithotripsy and stone extraction, right double-J stent, EUA  Surgeon: Cami anesthesia: General laryngeal mask    EBL: : 5 ml  Findings: Large mid right ureteral calculus fragmented with 612W

## 2019-05-23 NOTE — DISCHARGE INSTRUCTIONS
Hold aspirin for 1 week longer  Continue flomax(tamsulosin)daily - take 30 minutes after meal    CYSTOSCOPY DISCHARGE INSTRUCTIONS  [facility] / UROLOGY  [address]  [phone]    YOU MAY GO BACK TO YOUR NORMAL DIET AND ACTIVITY, UNLESS YOUR DOCTOR TELLS YOU NOT TO.    FOR THE NEXT TWO DAYS, YOU MAY NOTICE:    SOME BLOOD IN YOUR URINE.  SOME BURNING WHEN YOU URINATE (USE THE TOILET).  AN URGE TO URINATE MORE OFTEN.  BLADDER SPASMS.    THESE ARE NORMAL AFTER THE PROCEDURE.  THEY SHOULD GO AWAY AFTER A DAY OR TWO.  TO RELIEVE THESE PROBLEMS:     DRINK 6 TO 8 LARGE GLASSES OF WATER EACH DAY (INCLUDES DRINKS AT MEALS).  THIS WILL HELP CLEAR THE URINE.    TAKE WARM BATHS TO RELIEVE PAIN AND BLADDER SPASMS.  DO NOT ADD ANYTHING TO THE BATH WATER.    YOUR DOCTOR MAY PRESCRIBE PAIN MEDICINE.  YOU MAY ALSO TAKE TYLENOL (ACETAMINOPHEN) FOR PAIN.    CALL YOUR SURGEON IF YOU HAVE:    A FEVER OVER 101 DEGREES.  CHECK YOUR TEMPERATURE UNDER YOUR TONGUE.    CHILLS.    FAILURE TO URINATE (NO URINE COMES OUT WHEN YOU TRY TO USE THE TOILET).  TRY SOAKING IN A BATHTUB FULL OF WARM WATER.  IF STILL NO URINE, CALL YOUR DOCTOR.    A LOT OF BLOOD IN THE URINE, OR BLOOD CLOTS LARGER THAN A NICKEL.      PAIN IN THE BACK OR BELLY AREA (ABDOMEN).    PAIN OR SPASMS THAT ARE NOT RELIEVED BY WARM TUB BATHS AND PAIN MEDICINE.      SEVERE PAIN, BURNING OR OTHER PROBLEMS WHILE PASSING URINE.    PAIN THAT GETS WORSE AFTER TWO DAYS.            STENT INFORMATION/DISCHARGE INSTRUCTIONS  [facility] / UROLOGY  [facility] [address] [address]  [phone]    During surgery, a stent may be placed in the ureter.  The ureter is the tube that drains urine from the kidney to the bladder.  The stent is placed to dilate (open) the ureter so stone fragments can pass easily through the ureter or to decrease ureteral swelling after surgery or to relieve an obstruction.      The stent is made of silicone.  The upper end of  the stent curls in the kidney while the lower end rests in the bladder.    While the stent is in place you may experience the following symptoms:  Blood and/or small blood clots in the urine  Bladder spasms (frequency and urgency of urination)  Discomfort or aching in the back or side where the stent is  Burning or discomfort at the end of urine stream    To decrease these symptoms you should:  Take antispasmodic medication as prescribed (Detrol, Ditropan, etc.)  Drink plenty of fluids but avoid caffeine and citrus (include cranberry)  If you are having discomfort in back or side, decrease activity    Please call your physician or the physician on call if you experience:  Fever greater than 101 degrees  Severe pain not relieved by pain medication or rest    Please make an appointment for the removal of the stent according to your physician's instructions.        GENERAL ANESTHESIA OR SEDATION ADULT DISCHARGE INSTRUCTIONS   SPECIAL PRECAUTIONS FOR 24 HOURS AFTER SURGERY    IT IS NOT UNUSUAL TO FEEL LIGHT-HEADED OR FAINT, UP TO 24 HOURS AFTER SURGERY OR WHILE TAKING PAIN MEDICATION.  IF YOU HAVE THESE SYMPTOMS; SIT FOR A FEW MINUTES BEFORE STANDING AND HAVE SOMEONE ASSIST YOU WHEN YOU GET UP TO WALK OR USE THE BATHROOM.    YOU SHOULD REST AND RELAX FOR THE NEXT 24 HOURS AND YOU MUST MAKE ARRANGEMENTS TO HAVE SOMEONE STAY WITH YOU FOR AT LEAST 24 HOURS AFTER YOUR DISCHARGE.  AVOID HAZARDOUS AND STRENUOUS ACTIVITIES.  DO NOT MAKE IMPORTANT DECISIONS FOR 24 HOURS.    DO NOT DRIVE ANY VEHICLE OR OPERATE MECHANICAL EQUIPMENT FOR 24 HOURS FOLLOWING THE END OF YOUR SURGERY.  EVEN THOUGH YOU MAY FEEL NORMAL, YOUR REACTIONS MAY BE AFFECTED BY THE MEDICATION YOU HAVE RECEIVED.    DO NOT DRINK ALCOHOLIC BEVERAGES FOR 24 HOURS FOLLOWING YOUR SURGERY.    DRINK CLEAR LIQUIDS (APPLE JUICE, GINGER ALE, 7-UP, BROTH, ETC.).  PROGRESS TO YOUR REGULAR DIET AS YOU FEEL ABLE.    YOU MAY HAVE A DRY MOUTH, A SORE THROAT, MUSCLES ACHES OR  TROUBLE SLEEPING.  THESE SHOULD GO AWAY AFTER 24 HOURS.    CALL YOUR DOCTOR FOR ANY OF THE FOLLOWING:  SIGNS OF INFECTION (FEVER, GROWING TENDERNESS AT THE SURGERY SITE, A LARGE AMOUNT OF DRAINAGE OR BLEEDING, SEVERE PAIN, FOUL-SMELLING DRAINAGE, REDNESS OR SWELLING.    IT HAS BEEN OVER 8 TO 10 HOURS SINCE SURGERY AND YOU ARE STILL NOT ABLE TO URINATE (PASS WATER).

## 2019-05-23 NOTE — ANESTHESIA CARE TRANSFER NOTE
Patient: Stanford Andersen    Procedure(s):  Video cystoscopy, right ureteroscopy, holmium laser lithotripsy, stone extraction, JJ stent placement.    Diagnosis: right kidney stone, left flank pain  Diagnosis Additional Information: No value filed.    Anesthesia Type:   General, ETT     Note:  Airway :Face Mask  Patient transferred to:PACU  Handoff Report: Identifed the Patient, Identified the Reponsible Provider, Reviewed the pertinent medical history, Discussed the surgical course, Reviewed Intra-OP anesthesia mangement and issues during anesthesia, Set expectations for post-procedure period and Allowed opportunity for questions and acknowledgement of understanding      Vitals: (Last set prior to Anesthesia Care Transfer)    CRNA VITALS  5/23/2019 1427 - 5/23/2019 1506      5/23/2019             NIBP:  144/96  (Abnormal)     NIBP Mean:  110                Electronically Signed By: VARINDER Garcia CRNA  May 23, 2019  3:06 PM

## 2019-05-23 NOTE — OP NOTE
Procedure Date: 05/23/2019      PREOPERATIVE DIAGNOSIS:  A 9 mm mid right ureteral calculus.      POSTOPERATIVE DIAGNOSIS:  A 9 mm mid right ureteral calculus.      PROCEDURE:  Video cystoscopy, EUA, right ureteroscopy with holmium laser lithotripsy and stone extractions, right double-J stent (5-Barbadian x 24 cm).      SURGEON:  Satinder Almanza MD      ANESTHESIA:  General laryngeal mask.      ESTIMATED BLOOD LOSS:  5 mL.      INDICATIONS:  The patient is a 74-year-old male with a history of calcium oxalate urolithiasis who used to follow with Sheldon Newman MD.  He has seen Nephrology in the past for metabolic evaluation.  He has been on magnesium supplementation for years but stopped this about 3-4 months ago.  He presented with right flank pain and was found to have a 9 mm stone in the mid right ureter and bilateral renal stones, particularly in the left lower pole dianne.  His laboratory studies reveal pH of 5.5, 6.0.  Electrolytes are normal, creatinine 0.98 and normal serum calcium.  He now presents for ureteroscopy, holmium laser and stone extraction of the right mid ureter and possible double-J stent.  He understands the procedure, alternatives, risks and follow-up.      DESCRIPTION OF THE PROCEDURE:  The patient was given 400 mg of IV Cipro and taken to cystoscopy and placed supine on the operating table.  After adequate general laryngeal mask anesthesia, the patient was placed in lithotomy position and his genitalia were prepped and draped in a sterile fashion.  A #22 Barbadian Storz cystoscope with 30-degree lens and video were used to visualize the urethra and bladder, which revealed normal mucosa, obstructing lateral lobes and a small middle lobe with a high-riding bladder neck.  There was 3 to 4+ trabeculation of the bladder wall and no stones.  Both ureteral orifices were identified.  I passed a Glidewire up the right ureter past his mid ureteral stone until it curled in the region of the right renal  pelvis.  I then removed the cystoscope and passed the ureteroscope over the Glidewire into the right ureter up to the stone and removed the Glidewire.  Using a 365-micron holmium laser fiber at 6 de la paz, I broke the stone into several fragments, which were extracted with difficulty because of a very tortuous ureter.  After all stone fragments were extracted, I repassed the Glidewire and removed the ureteroscope and backloaded the Glidewire on the cystoscope.  I passed the scope in the bladder and passed a 5-Wallisian x 24 cm Polaris stent over the Glidewire into good position.  The stent curled in the right renal pelvis under fluoroscopy and in the bladder with efflux of clear urine.  I emptied the bladder and removed the cystoscope.  Rectal exam revealed no rectal mass and a benign-feeling prostate and normal seminal vesicles.  The patient went to the recovery room in stable condition and will be discharged home with no antibiotic, to continue the Flomax and to stay off his aspirin another several days.  He will follow up with me in 2 weeks to review his stone analysis and remove his stent and I will ask him to see the nephrologist at [facility] again.         RADHA HOOD JR, MD             D: 2019   T: 2019   MT: NIECY      Name:     JOAQUIN CRUZ   MRN:      [id number]        Account:        [id number]   :      1945           Procedure Date: 2019      Document: [id number]       cc: [facility]        Arjun Hood Jr, MD

## 2019-05-23 NOTE — ANESTHESIA POSTPROCEDURE EVALUATION
Patient: Stanford Andersen    Procedure(s):  Video cystoscopy, right ureteroscopy, holmium laser lithotripsy, stone extraction, JJ stent placement.    Diagnosis:right kidney stone, left flank pain  Diagnosis Additional Information: 9 mm mid right ureteral calculus    Anesthesia Type:  General, ETT    Note:  Anesthesia Post Evaluation    Patient location during evaluation: PACU  Patient participation: Able to fully participate in evaluation  Level of consciousness: awake and alert  Pain management: adequate  Airway patency: patent  Cardiovascular status: acceptable  Respiratory status: acceptable  Hydration status: acceptable  PONV: none     Anesthetic complications: None          Last vitals:  Vitals:    05/23/19 1122   BP: 132/86   Resp: 16   Temp: 97  F (36.1  C)   SpO2: 98%         Electronically Signed By: Cornell Oneill MD  May 23, 2019  3:12 PM

## 2019-05-23 NOTE — ANESTHESIA PREPROCEDURE EVALUATION
Anesthesia Pre-Procedure Evaluation    Patient: Stanford Andersen   MRN: [id number] : 1945          Preoperative Diagnosis: right kidney stone, left flank pain    Procedure(s):  Video cystoscopy, right ureteroscopy, holmium laser lithotripsy, stone extraction possible JJ stent placement, left retrogrades, possible left ureteroscopy    Past Medical History:   Diagnosis Date     Arthritis     hand     Chronic back pain      Complication of anesthesia     URINARY RETENTION, Sleepy after surgery     Gastro-oesophageal reflux disease      Hypertension      Renal disease     kidney stone     Zollinger-Patricio syndrome      Past Surgical History:   Procedure Laterality Date     ABDOMEN SURGERY      Exploratory laparoscopy     HC EXCISE VARICOCELE  age 21     varicocele repair     HERNIORRHAPHY UMBILICAL N/A 2018    Procedure: OPEN UMBILLICAL HERNIA REPAIR ;  Surgeon: Ike Catalan MD;  Location: [facility] OR     KIDNEY SURGERY      lithotripsy x 3     LAPAROSCOPIC HERNIORRHAPHY INGUINAL Left 2016    Procedure: LAPAROSCOPIC HERNIORRHAPHY INGUINAL;  Surgeon: Ike Catalan MD;  Location:  [facility]     LITHOTRIPSY      three times     STOMACH SURGERY      exploratory scoping looking for tumors related to ZE syndrome     Anesthesia Evaluation     . Pt has had prior anesthetic. Type: General    No history of anesthetic complications          ROS/MED HX    ENT/Pulmonary:  - neg pulmonary ROS     Neurologic:  - neg neurologic ROS     Cardiovascular:     (+) hypertension----. : . . . :. .       METS/Exercise Tolerance:     Hematologic:  - neg hematologic  ROS       Musculoskeletal:  - neg musculoskeletal ROS       GI/Hepatic:     (+) GERD Other GI/Hepatic Zollinger-Patricio syndrome      Renal/Genitourinary:     (+) Nephrolithiasis ,       Endo:  - neg endo ROS       Psychiatric:  - neg psychiatric ROS       Infectious Disease:  - neg infectious disease ROS       Malignancy:      - no malignancy  "  Other:    - neg other ROS                      Physical Exam  Normal systems: cardiovascular, pulmonary and dental    Airway   Mallampati: II  TM distance: >3 FB  Neck ROM: full    Dental     Cardiovascular       Pulmonary             Lab Results   Component Value Date    WBC 7.4 10/08/2018    HGB 16.2 11/19/2018    HCT 47.4 10/08/2018     10/08/2018     05/03/2019    POTASSIUM 4.0 05/03/2019    CHLORIDE 107 05/03/2019    CO2 26 05/03/2019    BUN 19 05/03/2019    CR 0.98 05/03/2019     (H) 05/03/2019    CLIFTON 9.5 05/03/2019    ALBUMIN 3.7 10/08/2018    PROTTOTAL 6.7 (L) 10/08/2018    ALT 31 10/08/2018    AST 21 10/08/2018    ALKPHOS 63 10/08/2018    BILITOTAL 0.7 10/08/2018       Preop Vitals  BP Readings from Last 3 Encounters:   05/23/19 132/86   05/03/19 108/84   04/22/19 124/66    Pulse Readings from Last 3 Encounters:   05/03/19 97   04/22/19 80   04/16/19 109      Resp Readings from Last 3 Encounters:   05/23/19 16   05/03/19 14   04/16/19 16    SpO2 Readings from Last 3 Encounters:   05/23/19 98%   05/03/19 93%   04/16/19 95%      Temp Readings from Last 1 Encounters:   05/23/19 97  F (36.1  C)    Ht Readings from Last 1 Encounters:   05/23/19 1.727 m (5' 7.99\")      Wt Readings from Last 1 Encounters:   05/23/19 75.8 kg (167 lb)    Estimated body mass index is 25.4 kg/m  as calculated from the following:    Height as of this encounter: 1.727 m (5' 7.99\").    Weight as of this encounter: 75.8 kg (167 lb).       Anesthesia Plan      History & Physical Review  History and physical reviewed and following examination; no interval change.    ASA Status:  3 .        Plan for General and ETT with Intravenous and Propofol induction.   PONV prophylaxis:  Ondansetron (or other 5HT-3) and Dexamethasone or Solumedrol       Postoperative Care  Postoperative pain management:  IV analgesics and Oral pain medications.      Consents  Anesthetic plan, risks, benefits and alternatives discussed with:  " Patient or representative and Patient..                 Cornell Oneill MD                    .

## 2019-05-27 LAB
APPEARANCE STONE: NORMAL
COMPN STONE: NORMAL
NUMBER STONE: 3
SIZE STONE: NORMAL MM
WT STONE: 72 MG

## 2019-06-04 ENCOUNTER — OFFICE VISIT (OUTPATIENT)
Dept: UROLOGY | Facility: CLINIC | Age: 74
End: 2019-06-04
Payer: COMMERCIAL

## 2019-06-04 VITALS
WEIGHT: 165 LBS | SYSTOLIC BLOOD PRESSURE: 138 MMHG | BODY MASS INDEX: 25.01 KG/M2 | HEART RATE: 76 BPM | HEIGHT: 68 IN | DIASTOLIC BLOOD PRESSURE: 78 MMHG

## 2019-06-04 DIAGNOSIS — Z79.2 PROPHYLACTIC ANTIBIOTIC: Primary | ICD-10-CM

## 2019-06-04 DIAGNOSIS — Z87.442 PERSONAL HISTORY OF URINARY CALCULI: ICD-10-CM

## 2019-06-04 PROCEDURE — 52310 CYSTOSCOPY AND TREATMENT: CPT | Performed by: UROLOGY

## 2019-06-04 RX ORDER — LIDOCAINE HYDROCHLORIDE 20 MG/ML
JELLY TOPICAL ONCE
Status: COMPLETED | OUTPATIENT
Start: 2019-06-04 | End: 2019-06-04

## 2019-06-04 RX ORDER — CIPROFLOXACIN 500 MG/1
500 TABLET, FILM COATED ORAL ONCE
Qty: 1 TABLET | Refills: 0 | Status: SHIPPED | OUTPATIENT
Start: 2019-06-04 | End: 2019-11-13

## 2019-06-04 RX ADMIN — LIDOCAINE HYDROCHLORIDE: 20 JELLY TOPICAL at 09:07

## 2019-06-04 ASSESSMENT — MIFFLIN-ST. JEOR: SCORE: 1467.94

## 2019-06-04 ASSESSMENT — PAIN SCALES - GENERAL: PAINLEVEL: NO PAIN (0)

## 2019-06-04 NOTE — NURSING NOTE
Prior to the start of the procedure and with procedural staff participation, I verbally confirmed the patient s identity using two indicators, relevant allergies, that the procedure was appropriate and matched the consent or emergent situation, and that the correct equipment/implants were available. Immediately prior to starting the procedure I conducted the Time Out with the procedural staff and re-confirmed the patient s name, procedure, and site/side. I have wiped the patient off with the povidone-Iodine solution, draped them,  used Lidocaine hydrochloride jelly, and instilled sterile water into the bladder. (The Joint Commission universal protocol was followed.)  Yes    Sedation (Moderate or Deep): None      5mL 2% lidocaine hydrochloride Urojet instilled into urethra.    NDC# [address]-80  Lot #: [id number]  Expiration Date:  12-20  Jennifer Shields LPN

## 2019-06-04 NOTE — LETTER
6/4/2019       RE: Stanford Andersen  [address] [facility] [address]     Dear Colleague,    Thank you for referring your patient, Stanford Andersen, to the [facility] UROLOGY CLINIC [place] at [facility]. Please see a copy of my visit note below.    Stanford Andersen is a pleasant 74-year-old male with a history of calcium oxalate stones.  He underwent extraction of a 9 mm mid right ureteral calculus 10 days ago.  His stone analysis shows calcium oxalate monohydrate.  He has normal renal function and a normal serum calcium level.  Other past medical history reviewed as well as medications and allergies  Exam: Alert and oriented, normal external genitalia  Flexible cystoscopy- normal urethra, lateral lobes of prostate obstructed bladder neck, right double-J stent removed easily  Assessment recurrent calcium oxalate monohydrate stones  Plan: Cipro x1 today.  See [facility] for metabolic stone evaluation.  KUB in 6 months    Again, thank you for allowing me to participate in the care of your patient.      Sincerely,    Satinder Almanza MD

## 2019-06-04 NOTE — PROGRESS NOTES
Stanford Andersen is a pleasant 74-year-old male with a history of calcium oxalate stones.  He underwent extraction of a 9 mm mid right ureteral calculus 10 days ago.  His stone analysis shows calcium oxalate monohydrate.  He has normal renal function and a normal serum calcium level.  Other past medical history reviewed as well as medications and allergies  Exam: Alert and oriented, normal external genitalia  Flexible cystoscopy- normal urethra, lateral lobes of prostate obstructed bladder neck, right double-J stent removed easily  Assessment recurrent calcium oxalate monohydrate stones  Plan: Cipro x1 today.  See [facility] for metabolic stone evaluation.  KUB in 6 months

## 2019-06-04 NOTE — PATIENT INSTRUCTIONS

## 2019-06-04 NOTE — LETTER
6/4/2019      RE: Stanford Andersen  [address] [facility] [address]       Stanford Andersen is a pleasant 74-year-old male with a history of calcium oxalate stones.  He underwent extraction of a 9 mm mid right ureteral calculus 10 days ago.  His stone analysis shows calcium oxalate monohydrate.  He has normal renal function and a normal serum calcium level.  Other past medical history reviewed as well as medications and allergies  Exam: Alert and oriented, normal external genitalia  Flexible cystoscopy- normal urethra, lateral lobes of prostate obstructed bladder neck, right double-J stent removed easily  Assessment recurrent calcium oxalate monohydrate stones  Plan: Cipro x1 today.  See [facility] for metabolic stone evaluation.  KUB in 6 months    Satinder Almanza MD

## 2019-06-10 ENCOUNTER — TRANSFERRED RECORDS (OUTPATIENT)
Dept: HEALTH INFORMATION MANAGEMENT | Facility: CLINIC | Age: 74
End: 2019-06-10

## 2019-07-08 ENCOUNTER — HOSPITAL ENCOUNTER (OUTPATIENT)
Dept: NUCLEAR MEDICINE | Facility: CLINIC | Age: 74
Setting detail: NUCLEAR MEDICINE
Discharge: HOME | End: 2019-07-08
Attending: INTERNAL MEDICINE | Admitting: INTERNAL MEDICINE
Payer: COMMERCIAL

## 2019-07-08 DIAGNOSIS — R79.89 ELEVATED PTHRP LEVEL: ICD-10-CM

## 2019-07-08 DIAGNOSIS — N20.0 NEPHROLITHIASIS: ICD-10-CM

## 2019-07-08 PROCEDURE — 78072 PARATHYRD PLANAR W/SPECT&CT: CPT

## 2019-07-08 PROCEDURE — 34300033 ZZH RX 343: Performed by: INTERNAL MEDICINE

## 2019-07-08 PROCEDURE — A9500 TC99M SESTAMIBI: HCPCS | Performed by: INTERNAL MEDICINE

## 2019-07-08 PROCEDURE — A9516 IODINE I-123 SOD IODIDE MIC: HCPCS | Performed by: INTERNAL MEDICINE

## 2019-07-08 RX ADMIN — Medication 28.2 MILLICURIE: at 10:52

## 2019-07-08 RX ADMIN — Medication 888 UCI.: at 08:52

## 2019-08-01 ENCOUNTER — TRANSFERRED RECORDS (OUTPATIENT)
Dept: HEALTH INFORMATION MANAGEMENT | Facility: CLINIC | Age: 74
End: 2019-08-01

## 2019-09-13 DIAGNOSIS — R35.1 NOCTURIA: ICD-10-CM

## 2019-09-14 NOTE — TELEPHONE ENCOUNTER
"Requested Prescriptions   Pending Prescriptions Disp Refills     tamsulosin (FLOMAX) 0.4 MG capsule [Pharmacy Med Name: TAMSULOSIN 0.4MG CAPSULES]  Last Written Prescription Date:  1/7/2019  Last Fill Quantity: 90 capsule,  # refills: 1   Last office visit: 5/3/2019 with prescribing provider:  Oseas   Future Office Visit:     90 capsule 0     Sig: TAKE ONE CAPSULE BY MOUTH DAILY       Alpha Blockers Passed - 9/13/2019  4:43 PM        Passed - Blood pressure under 140/90 in past 12 months     BP Readings from Last 3 Encounters:   06/04/19 138/78   05/23/19 (!) 146/92   05/03/19 108/84                 Passed - Recent (12 mo) or future (30 days) visit within the authorizing provider's specialty     Patient had office visit in the last 12 months or has a visit in the next 30 days with authorizing provider or within the authorizing provider's specialty.  See \"Patient Info\" tab in inbasket, or \"Choose Columns\" in Meds & Orders section of the refill encounter.              Passed - Patient does not have Tadalafil, Vardenafil, or Sildenafil on their medication list        Passed - Medication is active on med list        Passed - Patient is 18 years of age or older           "

## 2019-09-16 RX ORDER — TAMSULOSIN HYDROCHLORIDE 0.4 MG/1
CAPSULE ORAL
Qty: 90 CAPSULE | Refills: 2 | Status: SHIPPED | OUTPATIENT
Start: 2019-09-16 | End: 2020-07-31

## 2019-11-13 ENCOUNTER — OFFICE VISIT (OUTPATIENT)
Dept: FAMILY MEDICINE | Facility: CLINIC | Age: 74
End: 2019-11-13
Payer: COMMERCIAL

## 2019-11-13 ENCOUNTER — ANCILLARY PROCEDURE (OUTPATIENT)
Dept: GENERAL RADIOLOGY | Facility: CLINIC | Age: 74
End: 2019-11-13
Attending: FAMILY MEDICINE
Payer: COMMERCIAL

## 2019-11-13 VITALS
SYSTOLIC BLOOD PRESSURE: 130 MMHG | TEMPERATURE: 96.7 F | HEART RATE: 107 BPM | OXYGEN SATURATION: 93 % | DIASTOLIC BLOOD PRESSURE: 84 MMHG

## 2019-11-13 DIAGNOSIS — R05.9 COUGH: ICD-10-CM

## 2019-11-13 DIAGNOSIS — R05.9 COUGH: Primary | ICD-10-CM

## 2019-11-13 DIAGNOSIS — I10 ESSENTIAL HYPERTENSION: ICD-10-CM

## 2019-11-13 DIAGNOSIS — Z23 NEED FOR PROPHYLACTIC VACCINATION AND INOCULATION AGAINST INFLUENZA: ICD-10-CM

## 2019-11-13 LAB
BASOPHILS # BLD AUTO: 0 10E9/L (ref 0–0.2)
BASOPHILS NFR BLD AUTO: 0.4 %
DIFFERENTIAL METHOD BLD: NORMAL
EOSINOPHIL # BLD AUTO: 0.2 10E9/L (ref 0–0.7)
EOSINOPHIL NFR BLD AUTO: 1.9 %
ERYTHROCYTE [DISTWIDTH] IN BLOOD BY AUTOMATED COUNT: 14.8 % (ref 10–15)
HCT VFR BLD AUTO: 49.9 % (ref 40–53)
HGB BLD-MCNC: 16.4 G/DL (ref 13.3–17.7)
LYMPHOCYTES # BLD AUTO: 1.6 10E9/L (ref 0.8–5.3)
LYMPHOCYTES NFR BLD AUTO: 19 %
MCH RBC QN AUTO: 28 PG (ref 26.5–33)
MCHC RBC AUTO-ENTMCNC: 32.9 G/DL (ref 31.5–36.5)
MCV RBC AUTO: 85 FL (ref 78–100)
MONOCYTES # BLD AUTO: 1 10E9/L (ref 0–1.3)
MONOCYTES NFR BLD AUTO: 11.8 %
NEUTROPHILS # BLD AUTO: 5.6 10E9/L (ref 1.6–8.3)
NEUTROPHILS NFR BLD AUTO: 66.9 %
PLATELET # BLD AUTO: 262 10E9/L (ref 150–450)
RBC # BLD AUTO: 5.85 10E12/L (ref 4.4–5.9)
WBC # BLD AUTO: 8.4 10E9/L (ref 4–11)

## 2019-11-13 PROCEDURE — 85025 COMPLETE CBC W/AUTO DIFF WBC: CPT | Performed by: FAMILY MEDICINE

## 2019-11-13 PROCEDURE — 36415 COLL VENOUS BLD VENIPUNCTURE: CPT | Performed by: FAMILY MEDICINE

## 2019-11-13 PROCEDURE — 99213 OFFICE O/P EST LOW 20 MIN: CPT | Performed by: FAMILY MEDICINE

## 2019-11-13 PROCEDURE — 71046 X-RAY EXAM CHEST 2 VIEWS: CPT | Mod: FY

## 2019-11-13 NOTE — PROGRESS NOTES
Subjective     Stanford Andersen is a 74 year old male who presents to clinic today for the following health issues:    HPI   Chronic Cough      Duration: Ongoing since May    Description (location/character/radiation): Dry, irritating cough with no chest pain    Intensity:  moderate    Accompanying signs and symptoms: Intermittent episodes of SOB     History (similar episodes/previous evaluation): None    Precipitating or alleviating factors: None    Therapies tried and outcome: Stopped Lisinopril in June with no change in cough         Patient Active Problem List   Diagnosis     Health Care Home     Chest pain     Hyperlipidemia     Zollinger-Patricio syndrome     Family history of coronary artery disease     Change in bowel function     Essential hypertension     ACP (advance care planning)     Stress     Erectile dysfunction, unspecified erectile dysfunction type     Ventral hernia without obstruction or gangrene     Numbness and tingling of both feet     Nocturia     Recurrent kidney stones     Past Surgical History:   Procedure Laterality Date     ABDOMEN SURGERY  2003    Exploratory laparoscopy     COMBINED CYSTOSCOPY, RETROGRADES, URETEROSCOPY, LASER HOLMIUM LITHOTRIPSY URETER(S), INSERT STENT Right 5/23/2019    Procedure: Video cystoscopy, exam under anesthesia, right ureteroscopy with holmium laser lithotripsy and stone extractions, right double-J stent (5-Romanian x 24 cm).;  Surgeon: Satinder Almanza MD;  Location:  OR      EXCISE VARICOCELE  age 21     varicocele repair     HERNIORRHAPHY UMBILICAL N/A 11/30/2018    Procedure: OPEN UMBILLICAL HERNIA REPAIR ;  Surgeon: Ike Catalan MD;  Location: [facility] OR     KIDNEY SURGERY      lithotripsy x 3     LAPAROSCOPIC HERNIORRHAPHY INGUINAL Left 9/26/2016    Procedure: LAPAROSCOPIC HERNIORRHAPHY INGUINAL;  Surgeon: Ike Catalan MD;  [facility]     LITHOTRIPSY      three times     STOMACH SURGERY  2003    exploratory scoping looking for  "tumors related to ZE syndrome       Social History     Tobacco Use     Smoking status: Never Smoker     Smokeless tobacco: Never Used   Substance Use Topics     Alcohol use: Yes     Comment: seldom     Family History   Problem Relation Age of Onset     Heart Disease Mother         ablation     C.A.D. Mother      Heart Disease Father      Respiratory Father         COPD     C.A.D. Father      Neurologic Disorder Son         Spinal bifida             Reviewed and updated as needed this visit by Provider         Review of Systems   ROS COMP: Constitutional, HEENT, cardiovascular, pulmonary, gi and gu systems are negative, except as otherwise noted.      Objective    /84 (Patient Position: Sitting, Cuff Size: Adult Regular)   Pulse 107   Temp 96.7  F (35.9  C) (Tympanic)   SpO2 93%   There is no height or weight on file to calculate BMI.  Physical Exam   GENERAL APPEARANCE: healthy, alert and no distress  EYES: Eyes grossly normal to inspection, PERRL and conjunctivae and sclerae normal  HENT: ear canals and TM's normal and nose and mouth without ulcers or lesions  NECK: no adenopathy and no asymmetry, masses, or scars  RESP: lungs clear to auscultation - no rales, rhonchi or wheezes            Assessment & Plan       ICD-10-CM    1. Cough R05 CBC with platelets differential     XR Chest 2 Views   2. Need for prophylactic vaccination and inoculation against influenza Z23 INFLUENZA (HIGH DOSE) 3 VALENT VACCINE [19254]     ADMIN INFLUENZA (For MEDICARE Patients ONLY) []   3. Essential hypertension I10         BMI:   Estimated body mass index is 25.09 kg/m  as calculated from the following:    Height as of 6/4/19: 1.727 m (5' 8\").    Weight as of 6/4/19: 74.8 kg (165 lb).           Patient Instructions   We will wait for official reading of the chest xray. He will probably need pulmonary consultation.      Return in about 4 weeks (around 12/11/2019) for cough.    Arjun Burns MD  [facility] " [facility]

## 2019-11-13 NOTE — PROGRESS NOTES
"Subjective     Stanford Andersen is a 74 year old male who presents to clinic today for the following health issues:    HPI    ENT Symptoms             Symptoms: cc Present Absent Comment   Fever/Chills       Fatigue       Muscle Aches       Eye Irritation       Sneezing       Nasal Ervin/Drg       Sinus Pressure/Pain       Loss of smell       Dental pain       Sore Throat       Swollen Glands       Ear Pain/Fullness       Cough       Wheeze       Chest Pain       Shortness of breath       Rash       Other         Symptom duration:  ***   Symptom severity:  ***   Treatments tried:  ***   Contacts:  ***       {additonal problems for provider to add (Optional):323554}    {HIST REVIEW/ LINKS 2 (Optional):017018}    {Additional problems for the provider to add (optional):450547}  Reviewed and updated as needed this visit by Provider         Review of Systems   {ROS COMP (Optional):618602}      Objective    There were no vitals taken for this visit.  There is no height or weight on file to calculate BMI.  Physical Exam   {Exam List (Optional):286734}    {Diagnostic Test Results (Optional):951393::\"Diagnostic Test Results:\",\"Labs reviewed in Epic\"}        {PROVIDER CHARTING PREFERENCE:808061}      "

## 2019-11-18 NOTE — PATIENT INSTRUCTIONS
We will wait for official reading of the chest xray. He will probably need pulmonary consultation.

## 2019-12-10 ENCOUNTER — OFFICE VISIT (OUTPATIENT)
Dept: FAMILY MEDICINE | Facility: CLINIC | Age: 74
End: 2019-12-10
Payer: COMMERCIAL

## 2019-12-10 VITALS
BODY MASS INDEX: 26.68 KG/M2 | OXYGEN SATURATION: 95 % | SYSTOLIC BLOOD PRESSURE: 122 MMHG | HEIGHT: 67 IN | WEIGHT: 170 LBS | DIASTOLIC BLOOD PRESSURE: 76 MMHG | RESPIRATION RATE: 16 BRPM | HEART RATE: 85 BPM | TEMPERATURE: 96.7 F

## 2019-12-10 DIAGNOSIS — Z00.00 MEDICARE ANNUAL WELLNESS VISIT, SUBSEQUENT: ICD-10-CM

## 2019-12-10 DIAGNOSIS — R35.1 NOCTURIA: ICD-10-CM

## 2019-12-10 DIAGNOSIS — E78.2 MIXED HYPERLIPIDEMIA: Chronic | ICD-10-CM

## 2019-12-10 DIAGNOSIS — Z12.5 SCREENING FOR PROSTATE CANCER: ICD-10-CM

## 2019-12-10 DIAGNOSIS — E16.4 ZOLLINGER-ELLISON SYNDROME: ICD-10-CM

## 2019-12-10 DIAGNOSIS — I10 ESSENTIAL HYPERTENSION: Primary | Chronic | ICD-10-CM

## 2019-12-10 LAB
ALBUMIN UR-MCNC: NEGATIVE MG/DL
APPEARANCE UR: CLEAR
BASOPHILS # BLD AUTO: 0 10E9/L (ref 0–0.2)
BASOPHILS NFR BLD AUTO: 0.4 %
BILIRUB UR QL STRIP: NEGATIVE
COLOR UR AUTO: YELLOW
DIFFERENTIAL METHOD BLD: NORMAL
EOSINOPHIL # BLD AUTO: 0.2 10E9/L (ref 0–0.7)
EOSINOPHIL NFR BLD AUTO: 2.9 %
ERYTHROCYTE [DISTWIDTH] IN BLOOD BY AUTOMATED COUNT: 14.6 % (ref 10–15)
GLUCOSE UR STRIP-MCNC: NEGATIVE MG/DL
HCT VFR BLD AUTO: 49.4 % (ref 40–53)
HGB BLD-MCNC: 16.2 G/DL (ref 13.3–17.7)
HGB UR QL STRIP: NEGATIVE
KETONES UR STRIP-MCNC: NEGATIVE MG/DL
LEUKOCYTE ESTERASE UR QL STRIP: NEGATIVE
LYMPHOCYTES # BLD AUTO: 1.5 10E9/L (ref 0.8–5.3)
LYMPHOCYTES NFR BLD AUTO: 20.6 %
MCH RBC QN AUTO: 28.4 PG (ref 26.5–33)
MCHC RBC AUTO-ENTMCNC: 32.8 G/DL (ref 31.5–36.5)
MCV RBC AUTO: 87 FL (ref 78–100)
MONOCYTES # BLD AUTO: 1 10E9/L (ref 0–1.3)
MONOCYTES NFR BLD AUTO: 12.9 %
NEUTROPHILS # BLD AUTO: 4.6 10E9/L (ref 1.6–8.3)
NEUTROPHILS NFR BLD AUTO: 63.2 %
NITRATE UR QL: NEGATIVE
PH UR STRIP: 6 PH (ref 5–7)
PLATELET # BLD AUTO: 246 10E9/L (ref 150–450)
RBC # BLD AUTO: 5.7 10E12/L (ref 4.4–5.9)
RBC #/AREA URNS AUTO: NORMAL /HPF
SOURCE: NORMAL
SP GR UR STRIP: 1.01 (ref 1–1.03)
UROBILINOGEN UR STRIP-ACNC: 0.2 EU/DL (ref 0.2–1)
WBC # BLD AUTO: 7.3 10E9/L (ref 4–11)
WBC #/AREA URNS AUTO: NORMAL /HPF

## 2019-12-10 PROCEDURE — 85025 COMPLETE CBC W/AUTO DIFF WBC: CPT | Performed by: FAMILY MEDICINE

## 2019-12-10 PROCEDURE — 80061 LIPID PANEL: CPT | Performed by: FAMILY MEDICINE

## 2019-12-10 PROCEDURE — 80053 COMPREHEN METABOLIC PANEL: CPT | Performed by: FAMILY MEDICINE

## 2019-12-10 PROCEDURE — 99213 OFFICE O/P EST LOW 20 MIN: CPT | Mod: 25 | Performed by: FAMILY MEDICINE

## 2019-12-10 PROCEDURE — 81001 URINALYSIS AUTO W/SCOPE: CPT | Performed by: FAMILY MEDICINE

## 2019-12-10 PROCEDURE — G0103 PSA SCREENING: HCPCS | Performed by: FAMILY MEDICINE

## 2019-12-10 PROCEDURE — 99397 PER PM REEVAL EST PAT 65+ YR: CPT | Performed by: FAMILY MEDICINE

## 2019-12-10 PROCEDURE — 36415 COLL VENOUS BLD VENIPUNCTURE: CPT | Performed by: FAMILY MEDICINE

## 2019-12-10 ASSESSMENT — MIFFLIN-ST. JEOR: SCORE: 1474.74

## 2019-12-10 ASSESSMENT — ACTIVITIES OF DAILY LIVING (ADL): CURRENT_FUNCTION: NO ASSISTANCE NEEDED

## 2019-12-10 NOTE — PROGRESS NOTES
"SUBJECTIVE:   Stanford Andersen is a 74 year old male who presents for Preventive Visit.  Are you in the first 12 months of your Medicare coverage?  No    Healthy Habits:     In general, how would you rate your overall health?  Fair    Frequency of exercise:  None    Do you usually eat at least 4 servings of fruit and vegetables a day, include whole grains    & fiber and avoid regularly eating high fat or \"junk\" foods?  No    Taking medications regularly:  Yes    Medication side effects:  None    Ability to successfully perform activities of daily living:  No assistance needed    Home Safety:  No safety concerns identified    Hearing Impairment:  Difficulty following dialogue in the theater, find that men's voices are easier to understand than woman's and difficulty understanding soft or whispered speech    In the past 6 months, have you been bothered by leaking of urine? Yes    In general, how would you rate your overall mental or emotional health?  Good      PHQ-2 Total Score: 2    Additional concerns today:  No    Do you feel safe in your environment? Yes    Have you ever done Advance Care Planning? (For example, a Health Directive, POLST, or a discussion with a medical provider or your loved ones about your wishes): No, advance care planning information given to patient to review.  Patient declined advance care planning discussion at this time.      Fall risk  Fallen 2 or more times in the past year?: Yes  Any fall with injury in the past year?: No    Cognitive Screening   1) Repeat 3 items (Leader, Season, Table)    2) Clock draw: NORMAL  3) 3 item recall: Recalls 3 objects  Results: 3 items recalled: COGNITIVE IMPAIRMENT LESS LIKELY    Mini-CogTM Copyright JYOTHI Borja. Licensed by the author for use in [facility]; reprinted with permission (noam@.[facility]). All rights reserved.          Reviewed and updated as needed this visit by clinical staff         Reviewed and updated as needed this visit by " Provider        Social History     Tobacco Use     Smoking status: Never Smoker     Smokeless tobacco: Never Used   Substance Use Topics     Alcohol use: Yes     Comment: seldom         Alcohol Use 12/10/2019   Prescreen: >3 drinks/day or >7 drinks/week? No   Prescreen: >3 drinks/day or >7 drinks/week? -               Current providers sharing in care for this patient include:   Patient Care Team:  Arjun Burns MD as PCP - General (Family Practice)  Arjun Burns MD as Assigned PCP    The following health maintenance items are reviewed in Epic and correct as of today:  Health Maintenance   Topic Date Due     ZOSTER IMMUNIZATION (1 of 2) 03/17/1996     PNEUMOCOCCAL IMMUNIZATION 65+ LOW/MEDIUM RISK (2 of 2 - PCV13) 01/04/2013     INFLUENZA VACCINE (1) 09/01/2019     MEDICARE ANNUAL WELLNESS VISIT  10/08/2019     FALL RISK ASSESSMENT  11/13/2020     ADVANCE CARE PLANNING  12/21/2020     DTAP/TDAP/TD IMMUNIZATION (3 - Td) 09/04/2023     COLONOSCOPY  10/01/2023     LIPID  10/08/2023     HEPATITIS C SCREENING  Completed     PHQ-2  Completed     AORTIC ANEURYSM SCREENING (SYSTEM ASSIGNED)  Completed     IPV IMMUNIZATION  Aged Out     MENINGITIS IMMUNIZATION  Aged Out     Patient Active Problem List   Diagnosis     Health Care Home     Chest pain     Hyperlipidemia     Zollinger-Patricio syndrome     Family history of coronary artery disease     Change in bowel function     Essential hypertension     ACP (advance care planning)     Stress     Erectile dysfunction, unspecified erectile dysfunction type     Ventral hernia without obstruction or gangrene     Numbness and tingling of both feet     Nocturia     Recurrent kidney stones     Past Surgical History:   Procedure Laterality Date     ABDOMEN SURGERY  2003    Exploratory laparoscopy     COMBINED CYSTOSCOPY, RETROGRADES, URETEROSCOPY, LASER HOLMIUM LITHOTRIPSY URETER(S), INSERT STENT Right 5/23/2019    Procedure: Video cystoscopy, exam under  anesthesia, right ureteroscopy with holmium laser lithotripsy and stone extractions, right double-J stent (5-Czech x 24 cm).;  Surgeon: Satinder Almanza MD;  Location: [facility] OR     HC EXCISE VARICOCELE  age 21     varicocele repair     HERNIORRHAPHY UMBILICAL N/A 11/30/2018    Procedure: OPEN UMBILLICAL HERNIA REPAIR ;  Surgeon: Ike Catalan MD;  Location: [facility] OR     KIDNEY SURGERY      lithotripsy x 3     LAPAROSCOPIC HERNIORRHAPHY INGUINAL Left 9/26/2016    Procedure: LAPAROSCOPIC HERNIORRHAPHY INGUINAL;  Surgeon: Ike Catalan MD;  Location:  [facility]     LITHOTRIPSY      three times     STOMACH SURGERY  2003    exploratory scoping looking for tumors related to ZE syndrome       Social History     Tobacco Use     Smoking status: Never Smoker     Smokeless tobacco: Never Used   Substance Use Topics     Alcohol use: Yes     Comment: seldom     Family History   Problem Relation Age of Onset     Heart Disease Mother         ablation     C.A.D. Mother      Heart Disease Father      Respiratory Father         COPD     C.A.D. Father      Spina bifida Son              Review of Systems  CONSTITUTIONAL: NEGATIVE for fever, chills, change in weight  INTEGUMENTARY/SKIN: NEGATIVE for worrisome rashes, moles or lesions  EYES: NEGATIVE for vision changes or irritation  ENT/MOUTH: NEGATIVE for ear, mouth and throat problems  RESP:POSITIVE for cough-non productive  BREAST: NEGATIVE for masses, tenderness or discharge  CV: NEGATIVE for chest pain, palpitations or peripheral edema  GI: NEGATIVE for nausea, abdominal pain, heartburn, or change in bowel habits  : NEGATIVE for frequency, dysuria, or hematuria  MUSCULOSKELETAL:POSITIVE  for muscle weakness and no stamina  NEURO: NEGATIVE for weakness, dizziness or paresthesias  ENDOCRINE: NEGATIVE for temperature intolerance, skin/hair changes  HEME: NEGATIVE for bleeding problems  PSYCHIATRIC: NEGATIVE for changes in mood or affect    OBJECTIVE:   There were no vitals  "taken for this visit. Estimated body mass index is 25.09 kg/m  as calculated from the following:    Height as of 6/4/19: 1.727 m (5' 8\").    Weight as of 6/4/19: 74.8 kg (165 lb).  Physical Exam  GENERAL: healthy, alert and no distress  EYES: Eyes grossly normal to inspection, PERRL and conjunctivae and sclerae normal  HENT: ear canals and TM's normal, nose and mouth without ulcers or lesions  NECK: no adenopathy, no asymmetry, masses, or scars and thyroid normal to palpation  RESP: lungs clear to auscultation - no rales, rhonchi or wheezes  CV: regular rate and rhythm, normal S1 S2, no S3 or S4, no murmur, click or rub, no peripheral edema and peripheral pulses strong  ABDOMEN: soft, nontender, no hepatosplenomegaly, no masses and bowel sounds normal   (male): normal male genitalia without lesions or urethral discharge, no hernia  RECTAL: normal sphincter tone, no rectal masses, prostate normal size, smooth, nontender without nodules or masses  MS: no gross musculoskeletal defects noted, no edema  SKIN: no suspicious lesions or rashes  NEURO: Normal strength and tone, mentation intact and speech normal  PSYCH: mentation appears normal, affect normal/bright  LYMPH: no cervical, supraclavicular, axillary, or inguinal adenopathy        ASSESSMENT / PLAN:       ICD-10-CM    1. Essential hypertension I10 UA with Microscopic     CBC with platelets differential     Comprehensive metabolic panel   2. Mixed hyperlipidemia E78.2 Lipid Profile   3. Zollinger-Patricio syndrome E16.4    4. Nocturia R35.1    5. Medicare annual wellness visit, subsequent Z00.00    6. Screening for prostate cancer Z12.5 Prostate spec antigen screen       COUNSELING:  Reviewed preventive health counseling, as reflected in patient instructions       Regular exercise       Prostate cancer screening    Estimated body mass index is 25.09 kg/m  as calculated from the following:    Height as of 6/4/19: 1.727 m (5' 8\").    Weight as of 6/4/19: 74.8 kg " (165 lb).         reports that he has never smoked. He has never used smokeless tobacco.    Will increase omeprazole to 40mg twice a day. Walk in place 30 minutes daily. Follow up in one month.  Appropriate preventive services were discussed with this patient, including applicable screening as appropriate for cardiovascular disease, diabetes, osteopenia/osteoporosis, and glaucoma.  As appropriate for age/gender, discussed screening for colorectal cancer, prostate cancer, breast cancer, and cervical cancer. Checklist reviewing preventive services available has been given to the patient.    Reviewed patients plan of care and provided an AVS. The Basic Care Plan (routine screening as documented in Health Maintenance) for Stanford meets the Care Plan requirement. This Care Plan has been established and reviewed with the Patient.    Counseling Resources:  ATP IV Guidelines  Pooled Cohorts Equation Calculator  Breast Cancer Risk Calculator  FRAX Risk Assessment  ICSI Preventive Guidelines  Dietary Guidelines for Americans, 2010  USDA's MyPlate  ASA Prophylaxis  Lung CA Screening    Arjun Burns MD  [facility]    Identified Health Risks:

## 2019-12-10 NOTE — LETTER
December 11, 2019      Stanford Andersen  [address] [address] [place] [phone]        Dear ,    We are writing to inform you of your test results.    The 10-year ASCVD risk score (Uvaldo OROURKE Jr., et al., 2013) is: 22.6%    Values used to calculate the score:      Age: 74 years      Sex: Male      Is Non- : No      Diabetic: No      Tobacco smoker: No      Systolic Blood Pressure: 122 mmHg      Is BP treated: No      HDL Cholesterol: 42 mg/dL      Total Cholesterol: 224 mg/dL  As you can see from the information above your risk of having a heart attack or stroke in the next 10 years according to the American Heart Association risk calculator is 22.6%.  Their recommendation is that you be on a statin medication for your cholesterol.  Let me know how you would like to proceed.  The remainder of your tests were all normal.  Resulted Orders   UA with Microscopic   Result Value Ref Range    Color Urine Yellow     Appearance Urine Clear     Glucose Urine Negative NEG^Negative mg/dL    Bilirubin Urine Negative NEG^Negative    Ketones Urine Negative NEG^Negative mg/dL    Specific Gravity Urine 1.015 1.003 - 1.035    pH Urine 6.0 5.0 - 7.0 pH    Protein Albumin Urine Negative NEG^Negative mg/dL    Urobilinogen Urine 0.2 0.2 - 1.0 EU/dL    Nitrite Urine Negative NEG^Negative    Blood Urine Negative NEG^Negative    Leukocyte Esterase Urine Negative NEG^Negative    Source Midstream Urine     WBC Urine 0 - 5 OTO5^0 - 5 /HPF    RBC Urine O - 2 OTO2^O - 2 /HPF   CBC with platelets differential   Result Value Ref Range    WBC 7.3 4.0 - 11.0 10e9/L    RBC Count 5.70 4.4 - 5.9 10e12/L    Hemoglobin 16.2 13.3 - 17.7 g/dL    Hematocrit 49.4 40.0 - 53.0 %    MCV 87 78 - 100 fl    MCH 28.4 26.5 - 33.0 pg    MCHC 32.8 31.5 - 36.5 g/dL    RDW 14.6 10.0 - 15.0 %    Platelet Count 246 150 - 450 10e9/L    Diff Method Automated Method     % Neutrophils 63.2 %    % Lymphocytes 20.6 %    % Monocytes 12.9 %    %  Eosinophils 2.9 %    % Basophils 0.4 %    Absolute Neutrophil 4.6 1.6 - 8.3 10e9/L    Absolute Lymphocytes 1.5 0.8 - 5.3 10e9/L    Absolute Monocytes 1.0 0.0 - 1.3 10e9/L    Absolute Eosinophils 0.2 0.0 - 0.7 10e9/L    Absolute Basophils 0.0 0.0 - 0.2 10e9/L   Comprehensive metabolic panel   Result Value Ref Range    Sodium 138 133 - 144 mmol/L    Potassium 4.0 3.4 - 5.3 mmol/L    Chloride 106 94 - 109 mmol/L    Carbon Dioxide 31 20 - 32 mmol/L    Anion Gap 1 (L) 3 - 14 mmol/L    Glucose 98 70 - 99 mg/dL      Comment:      Fasting specimen    Urea Nitrogen 19 7 - 30 mg/dL    Creatinine 0.92 0.66 - 1.25 mg/dL    GFR Estimate 82 >60 mL/min/[1.73_m2]      Comment:      Non  GFR Calc  Starting 12/18/2018, serum creatinine based estimated GFR (eGFR) will be   calculated using the Chronic Kidney Disease Epidemiology Collaboration   (CKD-EPI) equation.      GFR Estimate If Black >90 >60 mL/min/[1.73_m2]      Comment:       GFR Calc  Starting 12/18/2018, serum creatinine based estimated GFR (eGFR) will be   calculated using the Chronic Kidney Disease Epidemiology Collaboration   (CKD-EPI) equation.      Calcium 9.3 8.5 - 10.1 mg/dL    Bilirubin Total 0.8 0.2 - 1.3 mg/dL    Albumin 3.9 3.4 - 5.0 g/dL    Protein Total 6.9 6.8 - 8.8 g/dL    Alkaline Phosphatase 76 40 - 150 U/L    ALT 28 0 - 70 U/L    AST 17 0 - 45 U/L   Lipid Profile   Result Value Ref Range    Cholesterol 224 (H) <200 mg/dL      Comment:      Desirable:       <200 mg/dl    Triglycerides 188 (H) <150 mg/dL      Comment:      Borderline high:  150-199 mg/dl  High:             200-499 mg/dl  Very high:       >499 mg/dl  Fasting specimen      HDL Cholesterol 42 >39 mg/dL    LDL Cholesterol Calculated 144 (H) <100 mg/dL      Comment:      Above desirable:  100-129 mg/dl  Borderline High:  130-159 mg/dL  High:             160-189 mg/dL  Very high:       >189 mg/dl      Non HDL Cholesterol 182 (H) <130 mg/dL      Comment:      Above  Desirable:  130-159 mg/dl  Borderline high:  160-189 mg/dl  High:             190-219 mg/dl  Very high:       >219 mg/dl     Prostate spec antigen screen   Result Value Ref Range    PSA 0.92 0 - 4 ug/L      Comment:      Assay Method:  Chemiluminescence using Siemens Vista analyzer       If you have any questions or concerns, please call the clinic at the number listed above.       Sincerely,        Arjun Burns MD

## 2019-12-11 LAB
ALBUMIN SERPL-MCNC: 3.9 G/DL (ref 3.4–5)
ALP SERPL-CCNC: 76 U/L (ref 40–150)
ALT SERPL W P-5'-P-CCNC: 28 U/L (ref 0–70)
ANION GAP SERPL CALCULATED.3IONS-SCNC: 1 MMOL/L (ref 3–14)
AST SERPL W P-5'-P-CCNC: 17 U/L (ref 0–45)
BILIRUB SERPL-MCNC: 0.8 MG/DL (ref 0.2–1.3)
BUN SERPL-MCNC: 19 MG/DL (ref 7–30)
CALCIUM SERPL-MCNC: 9.3 MG/DL (ref 8.5–10.1)
CHLORIDE SERPL-SCNC: 106 MMOL/L (ref 94–109)
CHOLEST SERPL-MCNC: 224 MG/DL
CO2 SERPL-SCNC: 31 MMOL/L (ref 20–32)
CREAT SERPL-MCNC: 0.92 MG/DL (ref 0.66–1.25)
GFR SERPL CREATININE-BSD FRML MDRD: 82 ML/MIN/{1.73_M2}
GLUCOSE SERPL-MCNC: 98 MG/DL (ref 70–99)
HDLC SERPL-MCNC: 42 MG/DL
LDLC SERPL CALC-MCNC: 144 MG/DL
NONHDLC SERPL-MCNC: 182 MG/DL
POTASSIUM SERPL-SCNC: 4 MMOL/L (ref 3.4–5.3)
PROT SERPL-MCNC: 6.9 G/DL (ref 6.8–8.8)
PSA SERPL-ACNC: 0.92 UG/L (ref 0–4)
SODIUM SERPL-SCNC: 138 MMOL/L (ref 133–144)
TRIGL SERPL-MCNC: 188 MG/DL

## 2020-03-18 ENCOUNTER — VIRTUAL VISIT (OUTPATIENT)
Dept: FAMILY MEDICINE | Facility: CLINIC | Age: 75
End: 2020-03-18
Payer: COMMERCIAL

## 2020-03-18 DIAGNOSIS — E78.2 MIXED HYPERLIPIDEMIA: ICD-10-CM

## 2020-03-18 DIAGNOSIS — M26.609 TMJ DYSFUNCTION: Primary | ICD-10-CM

## 2020-03-18 PROCEDURE — G2012 BRIEF CHECK IN BY MD/QHP: HCPCS | Performed by: FAMILY MEDICINE

## 2020-03-18 NOTE — PROGRESS NOTES
"Stanford Andersen is a 75 year old male who is being evaluated via a billable telephone visit.      The patient has been notified of following:     \"This telephone visit will be conducted via a call between you and your physician/provider. We have found that certain health care needs can be provided without the need for a physical exam.  This service lets us provide the care you need with a short phone conversation.  If a prescription is necessary we can send it directly to your pharmacy.  If lab work is needed we can place an order for that and you can then stop by our lab to have the test done at a later time.    If during the course of the call the physician/provider feels a telephone visit is not appropriate, you will not be charged for this service.\"       Stanford Andersen complains of  Cough and left sided jaw pain    I have reviewed and updated the patient's Past Medical History, Social History, Family History and Medication List.    ALLERGIES  Dye [contrast dye]; Penicillins; Tramadol; and Oxycodone-acetaminophen    Anyi Cortes LPN     Additional provider notes: Joint or Musculoskeletal Pain  Duration of complaint: 3-4 months   Description:   Location: Left sided jaw  Character: Sharp and has clicking in the joint from time to time.  Intensity: moderate  Progression of Symptoms: intermittent  Accompanying Signs & Symptoms: Other symptoms: none  History: Previous similar pain: no    Precipitating factors: Trauma or overuse: no  Alleviating factors: Improved by: nothing  Therapies Tried and outcome: None  Hyperlipidemia Follow-Up      Are you regularly taking any medication or supplement to lower your cholesterol?   No    Are you having muscle aches or other side effects that you think could be caused by your cholesterol lowering medication?  N/A      Assessment/Plan:  TMJ dysfunction  (primary encounter diagnosis)  Mixed hyperlipidemia    I have reviewed the note as documented above.  This accurately " captures the substance of my conversation with the patient.  I discussed with the patient is increased risk for having a heart attack or stroke due to his cholesterol numbers.  I will start him on Lipitor 20 mg daily and we will check his labs again in about 6 to 8 weeks.  I also referred him to ENT for evaluation of his left TM joint dysfunction.    Phone call contact time  Call Started at 3:45  Call Ended at 4:00    Arjun Burns MD

## 2020-03-26 DIAGNOSIS — E78.2 MIXED HYPERLIPIDEMIA: Primary | Chronic | ICD-10-CM

## 2020-03-26 NOTE — TELEPHONE ENCOUNTER
Reason for Call:  Medication or medication refill:    Do you use a [facility]?  Name of the pharmacy and phone number for the current request:  [id number] [address]     Name of the medication requested: lipitor 20mg    Other request: Pt had phone visit with Dr Burns on 3-18.  He was suppose to prescribe Lipitor 20mg for pt (see phone visit note )  This was never received by pharmacy.    Can we leave a detailed message on this number? YES    Phone number patient can be reached at: Home number on file [phone] (home)    Best Time: any    Call taken on 3/26/2020 at 2:02 PM by NAKUL MORIN

## 2020-03-27 RX ORDER — ATORVASTATIN CALCIUM 20 MG/1
20 TABLET, FILM COATED ORAL DAILY
Qty: 90 TABLET | Refills: 0 | Status: SHIPPED | OUTPATIENT
Start: 2020-03-27 | End: 2020-06-24

## 2020-03-27 NOTE — TELEPHONE ENCOUNTER
"Requested Prescriptions   Pending Prescriptions Disp Refills     atorvastatin (LIPITOR) 20 MG tablet    Not active on medication list    Last Written Prescription Date:  n/a  Last Fill Quantity: n/a,  # refills: n/a   Last office visit: 3/18/2020 with prescribing provider:  Katy   Future Office Visit:           Sig: Take 1 tablet (20 mg) by mouth daily       Statins Protocol Failed - 3/27/2020  8:29 AM        Failed - Medication is active on med list        Passed - LDL on file in past 12 months     Recent Labs   Lab Test 12/10/19  1049   *             Passed - No abnormal creatine kinase in past 12 months     No lab results found.             Passed - Recent (12 mo) or future (30 days) visit within the authorizing provider's specialty     Patient has had an office visit with the authorizing provider or a provider within the authorizing providers department within the previous 12 mos or has a future within next 30 days. See \"Patient Info\" tab in inbasket, or \"Choose Columns\" in Meds & Orders section of the refill encounter.              Passed - Patient is age 18 or older              "

## 2020-03-27 NOTE — TELEPHONE ENCOUNTER
Rx was approved per virtual visit  03/18/2020 with providers notes. I will start him on Lipitor 20 mg daily.     Left voice message informing patient of Rx approval.

## 2020-06-22 DIAGNOSIS — E78.2 MIXED HYPERLIPIDEMIA: ICD-10-CM

## 2020-06-22 PROCEDURE — 36415 COLL VENOUS BLD VENIPUNCTURE: CPT | Performed by: FAMILY MEDICINE

## 2020-06-22 PROCEDURE — 84460 ALANINE AMINO (ALT) (SGPT): CPT | Performed by: FAMILY MEDICINE

## 2020-06-22 PROCEDURE — 80061 LIPID PANEL: CPT | Performed by: FAMILY MEDICINE

## 2020-06-23 LAB
ALT SERPL W P-5'-P-CCNC: 27 U/L (ref 0–70)
CHOLEST SERPL-MCNC: 148 MG/DL
HDLC SERPL-MCNC: 41 MG/DL
LDLC SERPL CALC-MCNC: 76 MG/DL
NONHDLC SERPL-MCNC: 107 MG/DL
TRIGL SERPL-MCNC: 156 MG/DL

## 2020-07-31 DIAGNOSIS — R35.1 NOCTURIA: ICD-10-CM

## 2020-07-31 RX ORDER — TAMSULOSIN HYDROCHLORIDE 0.4 MG/1
CAPSULE ORAL
Qty: 90 CAPSULE | Refills: 1 | Status: SHIPPED | OUTPATIENT
Start: 2020-07-31 | End: 2021-03-02

## 2021-01-30 NOTE — LETTER
November 20, 2019      Stanford Andersen  [address] [address] [place] [phone]        Dear ,    We are writing to inform you of your test results.    Your test results fall within the expected range(s) or remain unchanged from previous results.  Please continue with current treatment plan.    Resulted Orders   CBC with platelets differential   Result Value Ref Range    WBC 8.4 4.0 - 11.0 10e9/L    RBC Count 5.85 4.4 - 5.9 10e12/L    Hemoglobin 16.4 13.3 - 17.7 g/dL    Hematocrit 49.9 40.0 - 53.0 %    MCV 85 78 - 100 fl    MCH 28.0 26.5 - 33.0 pg    MCHC 32.9 31.5 - 36.5 g/dL    RDW 14.8 10.0 - 15.0 %    Platelet Count 262 150 - 450 10e9/L    % Neutrophils 66.9 %    % Lymphocytes 19.0 %    % Monocytes 11.8 %    % Eosinophils 1.9 %    % Basophils 0.4 %    Absolute Neutrophil 5.6 1.6 - 8.3 10e9/L    Absolute Lymphocytes 1.6 0.8 - 5.3 10e9/L    Absolute Monocytes 1.0 0.0 - 1.3 10e9/L    Absolute Eosinophils 0.2 0.0 - 0.7 10e9/L    Absolute Basophils 0.0 0.0 - 0.2 10e9/L    Diff Method Automated Method        If you have any questions or concerns, please call the clinic at the number listed above.       Sincerely,        Arjun Burns MD                
Negative

## 2021-03-02 DIAGNOSIS — R35.1 NOCTURIA: ICD-10-CM

## 2021-03-02 RX ORDER — TAMSULOSIN HYDROCHLORIDE 0.4 MG/1
0.4 CAPSULE ORAL DAILY
Qty: 90 CAPSULE | Refills: 0 | Status: SHIPPED | OUTPATIENT
Start: 2021-03-02 | End: 2021-06-17

## 2021-03-02 NOTE — TELEPHONE ENCOUNTER
Tamsulosin  Routing refill request to provider for review/approval because:  Patient needs to be seen because it has been more than 1 year since last office visit.  BP not current     BP Readings from Last 3 Encounters:   12/10/19 122/76   11/13/19 130/84   06/04/19 138/78

## 2021-03-02 NOTE — LETTER
[facility]  [address] [place] [address]  [phone]      3/5/2021       Stanford Andersen  [address] [address] [place] [phone]        Dear Stanford,  Your are overdue for an Annual Visit with Dr. Arjun Burns. Your medications have been refilled, but will not be approved for renewal until you have seen Dr. Burns for your Annual Visit.    Please contact the number listed above or schedule via [employer].   If you have already scheduled an appointment, please disregard the request.     Sincerely,      Arjun Burns MD/Abena Madden, [facility]  Internal Medicine

## 2021-03-20 ENCOUNTER — HEALTH MAINTENANCE LETTER (OUTPATIENT)
Age: 76
End: 2021-03-20

## 2021-03-23 ENCOUNTER — IMMUNIZATION (OUTPATIENT)
Dept: NURSING | Facility: CLINIC | Age: 76
End: 2021-03-23
Payer: COMMERCIAL

## 2021-03-23 PROCEDURE — 0001A PR COVID VAC PFIZER DIL RECON 30 MCG/0.3 ML IM: CPT

## 2021-03-23 PROCEDURE — 91300 PR COVID VAC PFIZER DIL RECON 30 MCG/0.3 ML IM: CPT

## 2021-04-13 ENCOUNTER — IMMUNIZATION (OUTPATIENT)
Dept: NURSING | Facility: CLINIC | Age: 76
End: 2021-04-13
Attending: INTERNAL MEDICINE
Payer: COMMERCIAL

## 2021-04-13 PROCEDURE — 91300 PR COVID VAC PFIZER DIL RECON 30 MCG/0.3 ML IM: CPT

## 2021-04-13 PROCEDURE — 0002A PR COVID VAC PFIZER DIL RECON 30 MCG/0.3 ML IM: CPT

## 2021-04-20 ENCOUNTER — OFFICE VISIT (OUTPATIENT)
Dept: INTERNAL MEDICINE | Facility: CLINIC | Age: 76
End: 2021-04-20
Payer: COMMERCIAL

## 2021-04-20 VITALS
DIASTOLIC BLOOD PRESSURE: 80 MMHG | WEIGHT: 176.5 LBS | RESPIRATION RATE: 16 BRPM | HEIGHT: 68 IN | OXYGEN SATURATION: 96 % | HEART RATE: 96 BPM | BODY MASS INDEX: 26.75 KG/M2 | TEMPERATURE: 97.3 F | SYSTOLIC BLOOD PRESSURE: 130 MMHG

## 2021-04-20 DIAGNOSIS — H04.123 DRY EYES: ICD-10-CM

## 2021-04-20 DIAGNOSIS — E78.2 MIXED HYPERLIPIDEMIA: Chronic | ICD-10-CM

## 2021-04-20 DIAGNOSIS — I10 ESSENTIAL HYPERTENSION: Chronic | ICD-10-CM

## 2021-04-20 DIAGNOSIS — Z12.5 SCREENING FOR PROSTATE CANCER: ICD-10-CM

## 2021-04-20 DIAGNOSIS — Z00.00 ENCOUNTER FOR MEDICARE ANNUAL WELLNESS EXAM: Primary | ICD-10-CM

## 2021-04-20 PROCEDURE — 99397 PER PM REEVAL EST PAT 65+ YR: CPT | Performed by: FAMILY MEDICINE

## 2021-04-20 PROCEDURE — 99214 OFFICE O/P EST MOD 30 MIN: CPT | Mod: 25 | Performed by: FAMILY MEDICINE

## 2021-04-20 ASSESSMENT — MIFFLIN-ST. JEOR: SCORE: 1510.1

## 2021-04-20 NOTE — PATIENT INSTRUCTIONS
Patient Education   Personalized Prevention Plan  You are due for the preventive services outlined below.  Your care team is available to assist you in scheduling these services.  If you have already completed any of these items, please share that information with your care team to update in your medical record.  Health Maintenance Due   Topic Date Due     Zoster (Shingles) Vaccine (1 of 2) Never done     Flu Vaccine (1) 09/01/2020     FALL RISK ASSESSMENT  12/10/2020     PHQ-2  01/01/2021

## 2021-04-20 NOTE — PROGRESS NOTES
"  SUBJECTIVE:   Stanford Andersen is a 76 year old male who presents for Preventive Visit.      Patient has been advised of split billing requirements and indicates understanding: Yes  Are you in the first 12 months of your Medicare Part B coverage?  No    Physical Health:    In general, how would you rate your overall physical health? fair    Outside of work, how many days during the week do you exercise? none    Outside of work, approximately how many minutes a day do you exercise?not applicable    If you drink alcohol do you typically have >3 drinks per day or >7 drinks per week? No    Do you usually eat at least 4 servings of fruit and vegetables a day, include whole grains & fiber and avoid regularly eating high fat or \"junk\" foods? NO    Do you have any problems taking medications regularly?  No    Do you have any side effects from medications? muscle aches from the atorvastatin so stopped taking them    Needs assistance for the following daily activities: no assistance needed    Which of the following safety concerns are present in your home?  none identified     Hearing impairment: Yes, Need to ask people to speak up or repeat themselves.    In the past 6 months, have you been bothered by leaking of urine? no    Mental Health:    In general, how would you rate your overall mental or emotional health? fair  PHQ-2 Score:      Do you feel safe in your environment? Yes    Have you ever done Advance Care Planning? (For example, a Health Directive, POLST, or a discussion with a medical provider or your loved ones about your wishes): No, advance care planning information given to patient to review.  Patient declined advance care planning discussion at this time.    Additional concerns to address?  No    Fall risk:  Fallen 2 or more times in the past year?: No  Any fall with injury in the past year?: No    Cognitive Screenin) Repeat 3 items (Leader, Season, Table)    2) Clock draw: NORMAL  3) 3 item recall: " Recalls 3 objects  Results: 3 items recalled: COGNITIVE IMPAIRMENT LESS LIKELY    Mini-CogTM Copyright JYOTHI Borja. Licensed by the author for use in [facility]; reprinted with permission (noam@[facility]). All rights reserved.      Do you have sleep apnea, excessive snoring or daytime drowsiness?: no but, does have some daytime drowsiness        Hyperlipidemia Follow-Up      Are you regularly taking any medication or supplement to lower your cholesterol?   Yes- statin    Are you having muscle aches or other side effects that you think could be caused by your cholesterol lowering medication?  No    Hypertension Follow-up      Do you check your blood pressure regularly outside of the clinic? No     Are you following a low salt diet? Yes    Are your blood pressures ever more than 140 on the top number (systolic) OR more   than 90 on the bottom number (diastolic), for example 140/90? n/a      Reviewed and updated as needed this visit by clinical staff  Tobacco  Allergies  Meds              Reviewed and updated as needed this visit by Provider                Social History     Tobacco Use     Smoking status: Never Smoker     Smokeless tobacco: Never Used   Substance Use Topics     Alcohol use: Yes     Comment: seldom                           Current providers sharing in care for this patient include:   Patient Care Team:  Arjun Burns MD as PCP - General (Family Practice)  Arjun Burns MD as Assigned PCP    The following health maintenance items are reviewed in Epic and correct as of today:  Health Maintenance   Topic Date Due     ANNUAL REVIEW OF HM ORDERS  Never done     ZOSTER IMMUNIZATION (1 of 2) Never done     MEDICARE ANNUAL WELLNESS VISIT  04/20/2022     FALL RISK ASSESSMENT  04/20/2022     DTAP/TDAP/TD IMMUNIZATION (3 - Td) 09/04/2023     COLORECTAL CANCER SCREENING  10/01/2023     ADVANCE CARE PLANNING  04/20/2026     HEPATITIS C SCREENING  Completed     PHQ-2  Completed      INFLUENZA VACCINE  Completed     Pneumococcal Vaccine: 65+ Years  Completed     AORTIC ANEURYSM SCREENING (SYSTEM ASSIGNED)  Completed     COVID-19 Vaccine  Completed     Pneumococcal Vaccine: Pediatrics (0 to 5 Years) and At-Risk Patients (6 to 64 Years)  Aged Out     IPV IMMUNIZATION  Aged Out     MENINGITIS IMMUNIZATION  Aged Out     HEPATITIS B IMMUNIZATION  Aged Out     Lab work is in process  Patient Active Problem List   Diagnosis     Health Care Home     Chest pain     Hyperlipidemia     Zollinger-Patricio syndrome     Family history of coronary artery disease     Change in bowel function     Essential hypertension     ACP (advance care planning)     Stress     Erectile dysfunction, unspecified erectile dysfunction type     Ventral hernia without obstruction or gangrene     Numbness and tingling of both feet     Nocturia     Recurrent kidney stones     TMJ dysfunction     Past Surgical History:   Procedure Laterality Date     ABDOMEN SURGERY  2003    Exploratory laparoscopy     COMBINED CYSTOSCOPY, RETROGRADES, URETEROSCOPY, LASER HOLMIUM LITHOTRIPSY URETER(S), INSERT STENT Right 5/23/2019    Procedure: Video cystoscopy, exam under anesthesia, right ureteroscopy with holmium laser lithotripsy and stone extractions, right double-J stent (5-Swedish x 24 cm).;  Surgeon: Satinder Almanza MD;  Location: [facility] OR     HC EXCISE VARICOCELE  age 21     varicocele repair     HERNIORRHAPHY UMBILICAL N/A 11/30/2018    Procedure: OPEN UMBILLICAL HERNIA REPAIR ;  Surgeon: Ike Catalan MD;  Location: [facility] OR     KIDNEY SURGERY      lithotripsy x 3     LAPAROSCOPIC HERNIORRHAPHY INGUINAL Left 9/26/2016    Procedure: LAPAROSCOPIC HERNIORRHAPHY INGUINAL;  Surgeon: Ike Catalan MD;  Location:  [facility]     LITHOTRIPSY      three times     STOMACH SURGERY  2003    exploratory scoping looking for tumors related to ZE syndrome       Social History     Tobacco Use     Smoking status: Never Smoker     Smokeless tobacco:  "Never Used   Substance Use Topics     Alcohol use: Yes     Comment: seldom     Family History   Problem Relation Age of Onset     Heart Disease Mother         ablation     C.A.D. Mother      Heart Disease Father      Respiratory Father         COPD     C.A.D. Father      Spina bifida Son              ROS:  Constitutional, HEENT, cardiovascular, pulmonary, gi and gu systems are negative, except as otherwise noted.    OBJECTIVE:   /80 (BP Location: Right arm, Patient Position: Chair, Cuff Size: Adult Regular)   Pulse 96   Temp 97.3  F (36.3  C) (Temporal)   Resp 16   Ht 1.727 m (5' 8\")   Wt 80.1 kg (176 lb 8 oz)   SpO2 96%   BMI 26.84 kg/m   Estimated body mass index is 26.84 kg/m  as calculated from the following:    Height as of this encounter: 1.727 m (5' 8\").    Weight as of this encounter: 80.1 kg (176 lb 8 oz).  EXAM:   GENERAL: healthy, alert and no distress  EYES: Eyes grossly normal to inspection, PERRL and conjunctivae and sclerae normal  HENT: ear canals and TM's normal, nose and mouth without ulcers or lesions  NECK: no adenopathy, no asymmetry, masses, or scars and thyroid normal to palpation  RESP: lungs clear to auscultation - no rales, rhonchi or wheezes  CV: regular rate and rhythm, normal S1 S2, no S3 or S4, no murmur, click or rub, no peripheral edema and peripheral pulses strong  ABDOMEN: soft, nontender, no hepatosplenomegaly, no masses and bowel sounds normal   (male): normal male genitalia without lesions or urethral discharge, no hernia  RECTAL: normal sphincter tone, no rectal masses, prostate normal size, smooth, nontender without nodules or masses  MS: no gross musculoskeletal defects noted, no edema  SKIN: no suspicious lesions or rashes  NEURO: Normal strength and tone, mentation intact and speech normal  PSYCH: mentation appears normal, affect normal/bright  LYMPH: no cervical, supraclavicular, axillary, or inguinal adenopathy        ASSESSMENT / PLAN:       ICD-10-CM    1. " "Encounter for Medicare annual wellness exam  Z00.00    2. Mixed hyperlipidemia  E78.2 Lipid Profile   3. Essential hypertension  I10 UA with Microscopic     CBC with platelets differential     Comprehensive metabolic panel   4. Screening for prostate cancer  Z12.5 Prostate spec antigen screen   5. Dry eyes  H04.123 EYE ADULT REFERRAL       Patient has been advised of split billing requirements and indicates understanding: Yes    COUNSELING:  Reviewed preventive health counseling, as reflected in patient instructions       Regular exercise       Healthy diet/nutrition       Vision screening       Prostate cancer screening    Estimated body mass index is 26.84 kg/m  as calculated from the following:    Height as of this encounter: 1.727 m (5' 8\").    Weight as of this encounter: 80.1 kg (176 lb 8 oz).        He reports that he has never smoked. He has never used smokeless tobacco.    Appropriate preventive services were discussed with this patient, including applicable screening as appropriate for cardiovascular disease, diabetes, osteopenia/osteoporosis, and glaucoma.  As appropriate for age/gender, discussed screening for colorectal cancer, prostate cancer, breast cancer, and cervical cancer. Checklist reviewing preventive services available has been given to the patient.    Reviewed patients plan of care and provided an AVS. The Basic Care Plan (routine screening as documented in Health Maintenance) for Stanford meets the Care Plan requirement. This Care Plan has been established and reviewed with the Patient.    Counseling Resources:  ATP IV Guidelines  Pooled Cohorts Equation Calculator  Breast Cancer Risk Calculator  BRCA-Related Cancer Risk Assessment: FHS-7 Tool  FRAX Risk Assessment  ICSI Preventive Guidelines  Dietary Guidelines for Americans, 2010  [employer]'s MyPlate  ASA Prophylaxis  Lung CA Screening    Arjun Burns MD  [facility]  "

## 2021-04-30 DIAGNOSIS — E78.2 MIXED HYPERLIPIDEMIA: Chronic | ICD-10-CM

## 2021-04-30 DIAGNOSIS — I10 ESSENTIAL HYPERTENSION: Chronic | ICD-10-CM

## 2021-04-30 DIAGNOSIS — Z12.5 SCREENING FOR PROSTATE CANCER: ICD-10-CM

## 2021-04-30 LAB
ALBUMIN SERPL-MCNC: 3.5 G/DL (ref 3.4–5)
ALP SERPL-CCNC: 69 U/L (ref 40–150)
ALT SERPL W P-5'-P-CCNC: 25 U/L (ref 0–70)
ANION GAP SERPL CALCULATED.3IONS-SCNC: 2 MMOL/L (ref 3–14)
AST SERPL W P-5'-P-CCNC: 17 U/L (ref 0–45)
BASOPHILS # BLD AUTO: 0 10E9/L (ref 0–0.2)
BASOPHILS NFR BLD AUTO: 0.6 %
BILIRUB SERPL-MCNC: 0.8 MG/DL (ref 0.2–1.3)
BUN SERPL-MCNC: 18 MG/DL (ref 7–30)
CALCIUM SERPL-MCNC: 8.9 MG/DL (ref 8.5–10.1)
CHLORIDE SERPL-SCNC: 107 MMOL/L (ref 94–109)
CHOLEST SERPL-MCNC: 190 MG/DL
CO2 SERPL-SCNC: 30 MMOL/L (ref 20–32)
CREAT SERPL-MCNC: 0.95 MG/DL (ref 0.66–1.25)
DIFFERENTIAL METHOD BLD: NORMAL
EOSINOPHIL # BLD AUTO: 0.4 10E9/L (ref 0–0.7)
EOSINOPHIL NFR BLD AUTO: 5.9 %
ERYTHROCYTE [DISTWIDTH] IN BLOOD BY AUTOMATED COUNT: 14.2 % (ref 10–15)
GFR SERPL CREATININE-BSD FRML MDRD: 78 ML/MIN/{1.73_M2}
GLUCOSE SERPL-MCNC: 94 MG/DL (ref 70–99)
HCT VFR BLD AUTO: 46.3 % (ref 40–53)
HDLC SERPL-MCNC: 45 MG/DL
HGB BLD-MCNC: 14.7 G/DL (ref 13.3–17.7)
LDLC SERPL CALC-MCNC: 112 MG/DL
LYMPHOCYTES # BLD AUTO: 1.8 10E9/L (ref 0.8–5.3)
LYMPHOCYTES NFR BLD AUTO: 26.2 %
MCH RBC QN AUTO: 28.2 PG (ref 26.5–33)
MCHC RBC AUTO-ENTMCNC: 31.7 G/DL (ref 31.5–36.5)
MCV RBC AUTO: 89 FL (ref 78–100)
MONOCYTES # BLD AUTO: 0.9 10E9/L (ref 0–1.3)
MONOCYTES NFR BLD AUTO: 13 %
NEUTROPHILS # BLD AUTO: 3.8 10E9/L (ref 1.6–8.3)
NEUTROPHILS NFR BLD AUTO: 54.3 %
NONHDLC SERPL-MCNC: 145 MG/DL
PLATELET # BLD AUTO: 223 10E9/L (ref 150–450)
POTASSIUM SERPL-SCNC: 3.7 MMOL/L (ref 3.4–5.3)
PROT SERPL-MCNC: 6.6 G/DL (ref 6.8–8.8)
PSA SERPL-ACNC: 1.2 UG/L (ref 0–4)
RBC # BLD AUTO: 5.22 10E12/L (ref 4.4–5.9)
SODIUM SERPL-SCNC: 139 MMOL/L (ref 133–144)
TRIGL SERPL-MCNC: 165 MG/DL
WBC # BLD AUTO: 7 10E9/L (ref 4–11)

## 2021-04-30 PROCEDURE — 36415 COLL VENOUS BLD VENIPUNCTURE: CPT | Performed by: FAMILY MEDICINE

## 2021-04-30 PROCEDURE — 80061 LIPID PANEL: CPT | Performed by: FAMILY MEDICINE

## 2021-04-30 PROCEDURE — 80053 COMPREHEN METABOLIC PANEL: CPT | Performed by: FAMILY MEDICINE

## 2021-04-30 PROCEDURE — 85025 COMPLETE CBC W/AUTO DIFF WBC: CPT | Performed by: FAMILY MEDICINE

## 2021-04-30 PROCEDURE — G0103 PSA SCREENING: HCPCS | Performed by: FAMILY MEDICINE

## 2021-05-10 DIAGNOSIS — R35.1 NOCTURIA: ICD-10-CM

## 2021-05-10 RX ORDER — FINASTERIDE 5 MG/1
TABLET, FILM COATED ORAL
Qty: 90 TABLET | Refills: 2 | Status: SHIPPED | OUTPATIENT
Start: 2021-05-10 | End: 2021-12-21

## 2021-06-15 DIAGNOSIS — R35.1 NOCTURIA: ICD-10-CM

## 2021-06-17 RX ORDER — TAMSULOSIN HYDROCHLORIDE 0.4 MG/1
CAPSULE ORAL
Qty: 90 CAPSULE | Refills: 2 | Status: SHIPPED | OUTPATIENT
Start: 2021-06-17 | End: 2021-12-21

## 2021-06-29 ENCOUNTER — OFFICE VISIT (OUTPATIENT)
Dept: INTERNAL MEDICINE | Facility: CLINIC | Age: 76
End: 2021-06-29
Payer: COMMERCIAL

## 2021-06-29 VITALS
BODY MASS INDEX: 26.3 KG/M2 | RESPIRATION RATE: 16 BRPM | SYSTOLIC BLOOD PRESSURE: 146 MMHG | WEIGHT: 173 LBS | DIASTOLIC BLOOD PRESSURE: 98 MMHG | OXYGEN SATURATION: 95 % | HEART RATE: 89 BPM | TEMPERATURE: 97.6 F

## 2021-06-29 DIAGNOSIS — E78.2 MIXED HYPERLIPIDEMIA: Chronic | ICD-10-CM

## 2021-06-29 DIAGNOSIS — I10 ESSENTIAL HYPERTENSION: Primary | Chronic | ICD-10-CM

## 2021-06-29 DIAGNOSIS — N20.0 RECURRENT KIDNEY STONES: ICD-10-CM

## 2021-06-29 PROCEDURE — 99214 OFFICE O/P EST MOD 30 MIN: CPT | Performed by: FAMILY MEDICINE

## 2021-06-29 RX ORDER — AMLODIPINE BESYLATE 2.5 MG/1
2.5 TABLET ORAL DAILY
Qty: 30 TABLET | Refills: 3 | Status: SHIPPED | OUTPATIENT
Start: 2021-06-29 | End: 2021-07-21

## 2021-06-29 NOTE — PATIENT INSTRUCTIONS
The patient has been on numerous medications for his blood pressure.  He is not certain which blood pressure medicines caused which problems, but he did have problems with one where he felt very fatigued.  He had another that made him feel very lightheaded.  Again, he is not sure which ones did what.  I opted to place him on amlodipine 2.5 mg daily.  He will follow-up in approximately 3 weeks on his blood pressure.    With respect to his feeling like he is getting recurrent kidney stones I referred him to the [facility] urology clinic in [place].  Their contact information was given to the patient.    When he returns in about 3 weeks he will come in fasting so we can repeat his blood tests.

## 2021-06-29 NOTE — PROGRESS NOTES
Assessment & Plan     Essential hypertension    - amLODIPine (NORVASC) 2.5 MG tablet; Take 1 tablet (2.5 mg) by mouth daily    Recurrent kidney stones      Mixed hyperlipidemia                 Patient Instructions   The patient has been on numerous medications for his blood pressure.  He is not certain which blood pressure medicines caused which problems, but he did have problems with one where he felt very fatigued.  He had another that made him feel very lightheaded.  Again, he is not sure which ones did what.  I opted to place him on amlodipine 2.5 mg daily.  He will follow-up in approximately 3 weeks on his blood pressure.    With respect to his feeling like he is getting recurrent kidney stones I referred him to the [facility] urology clinic in [place].  Their contact information was given to the patient.    When he returns in about 3 weeks he will come in fasting so we can repeat his blood tests.      Return in about 3 weeks (around 7/20/2021) for hypertension, labs, dyslipidemia.    Arjun Burns MD  [facility]    Celsa Coyne is a 76 year old who presents for the following health issues     HPI     Chief Complaint   Patient presents with     Results     Patient wants to review Labs results completed in April     Recheck Medication     Hyperlipidemia Follow-Up      Are you regularly taking any medication or supplement to lower your cholesterol?   No    Are you having muscle aches or other side effects that you think could be caused by your cholesterol lowering medication?  When the patient was tried on atorvastatin his hands cramped up.  He stopped the medicine and the issue went away.  He then started it back up again and it came right back so he is not taking atorvastatin at present.    Hypertension Follow-up      Do you check your blood pressure regularly outside of the clinic? No     Are you following a low salt diet? Yes    Are your blood pressures  ever more than 140 on the top number (systolic) OR more   than 90 on the bottom number (diastolic), for example 140/90?  N/A.      Review of Systems   Constitutional, HEENT, cardiovascular, pulmonary, gi and gu systems are negative, except as otherwise noted.  He is having problems with cataracts and will need surgery sometime in the future.  He is also feeling like he starting to get his kidney stones recurring again.  His previous urologist is now retired.      Objective    BP (!) 146/98 (BP Location: Right arm, Patient Position: Sitting, Cuff Size: Adult Regular)   Pulse 89   Temp 97.6  F (36.4  C) (Temporal)   Resp 16   Wt 78.5 kg (173 lb)   SpO2 95%   BMI 26.30 kg/m    Body mass index is 26.3 kg/m .  Physical Exam   GENERAL APPEARANCE: healthy, alert and no distress

## 2021-07-05 ENCOUNTER — TELEPHONE (OUTPATIENT)
Dept: UROLOGY | Facility: CLINIC | Age: 76
End: 2021-07-05

## 2021-07-05 NOTE — TELEPHONE ENCOUNTER
----- Message from Wanda Owens PA-C sent at 7/5/2021 11:58 AM CDT -----  Pls offer them a virtual visit in his time slot next week.  MH

## 2021-07-19 ENCOUNTER — OFFICE VISIT (OUTPATIENT)
Dept: UROLOGY | Facility: CLINIC | Age: 76
End: 2021-07-19
Attending: FAMILY MEDICINE
Payer: COMMERCIAL

## 2021-07-19 VITALS
BODY MASS INDEX: 25.76 KG/M2 | WEIGHT: 170 LBS | OXYGEN SATURATION: 96 % | SYSTOLIC BLOOD PRESSURE: 140 MMHG | HEART RATE: 99 BPM | DIASTOLIC BLOOD PRESSURE: 80 MMHG | HEIGHT: 68 IN

## 2021-07-19 DIAGNOSIS — N20.0 KIDNEY STONE: ICD-10-CM

## 2021-07-19 DIAGNOSIS — N20.0 KIDNEY STONE: Primary | ICD-10-CM

## 2021-07-19 LAB
ALBUMIN UR-MCNC: NEGATIVE MG/DL
APPEARANCE UR: CLEAR
BILIRUB UR QL STRIP: NEGATIVE
COLOR UR AUTO: YELLOW
GLUCOSE UR STRIP-MCNC: NEGATIVE MG/DL
HGB UR QL STRIP: ABNORMAL
KETONES UR STRIP-MCNC: NEGATIVE MG/DL
LEUKOCYTE ESTERASE UR QL STRIP: NEGATIVE
NITRATE UR QL: NEGATIVE
PH UR STRIP: 5.5 [PH] (ref 5–7)
SP GR UR STRIP: <=1.005 (ref 1–1.03)
UROBILINOGEN UR STRIP-ACNC: 0.2 E.U./DL

## 2021-07-19 PROCEDURE — 99213 OFFICE O/P EST LOW 20 MIN: CPT | Performed by: PHYSICIAN ASSISTANT

## 2021-07-19 PROCEDURE — 81003 URINALYSIS AUTO W/O SCOPE: CPT | Mod: QW | Performed by: PHYSICIAN ASSISTANT

## 2021-07-19 ASSESSMENT — MIFFLIN-ST. JEOR: SCORE: 1480.61

## 2021-07-19 ASSESSMENT — PAIN SCALES - GENERAL: PAINLEVEL: NO PAIN (0)

## 2021-07-19 NOTE — PROGRESS NOTES
CC: Kidney stones    HPI: Stanford Andersen is a pleasant 76-year-old male with a history of calcium oxalate stones.  He underwent extraction of a 9 mm mid right ureteral calculus in 2019 (he has known renal stones).  His last stone analysis showed calcium oxalate monohydrate.  He has normal renal function and a normal serum calcium level.    Noting some flank pain over the last month. Can occur on both sides and even groin discomfort. No gross hematuria.     Has Zollinger-Patricio syndrome    Past Medical History:   Diagnosis Date     Arthritis     hand     Chronic back pain      Complication of anesthesia     URINARY RETENTION, Sleepy after surgery     Gastro-oesophageal reflux disease      Hypertension      Renal disease     kidney stone     Zollinger-Patricio syndrome      Current Outpatient Medications   Medication     amLODIPine (NORVASC) 2.5 MG tablet     finasteride (PROSCAR) 5 MG tablet     magnesium 250 MG tablet     Omeprazole (PRILOSEC PO)     tamsulosin (FLOMAX) 0.4 MG capsule     No current facility-administered medications for this visit.       Assessment: recurrent calcium oxalate monohydrate stones  Plan: CT to eval stone burden.  Continue with follow-up with Nephrology.    Wanda Owens PA-C  [facility]    22 minutes spent on the date of the encounter doing chart review, review of test results, patient visit and documentation

## 2021-07-19 NOTE — LETTER
7/19/2021       RE: Stanford Andersen  [address]  [place] [address]     Dear Colleague,    Thank you for referring your patient, Stanford Andersen, to the [facility] at [facility]. Please see a copy of my visit note below.    CC: Kidney stones    HPI: Stanford Andersen is a pleasant 76-year-old male with a history of calcium oxalate stones.  He underwent extraction of a 9 mm mid right ureteral calculus in 2019 (he has known renal stones).  His last stone analysis showed calcium oxalate monohydrate.  He has normal renal function and a normal serum calcium level.    Noting some flank pain over the last month. Can occur on both sides and even groin discomfort. No gross hematuria.     Has Zollinger-Patricio syndrome    Past Medical History:   Diagnosis Date     Arthritis     hand     Chronic back pain      Complication of anesthesia     URINARY RETENTION, Sleepy after surgery     Gastro-oesophageal reflux disease      Hypertension      Renal disease     kidney stone     Zollinger-Patricio syndrome      Current Outpatient Medications   Medication     amLODIPine (NORVASC) 2.5 MG tablet     finasteride (PROSCAR) 5 MG tablet     magnesium 250 MG tablet     Omeprazole (PRILOSEC PO)     tamsulosin (FLOMAX) 0.4 MG capsule     No current facility-administered medications for this visit.       Assessment: recurrent calcium oxalate monohydrate stones  Plan: CT to eval stone burden.  Continue with follow-up with Nephrology.    Wanda Owens PA-C  [facility]    22 minutes spent on the date of the encounter doing chart review, review of test results, patient visit and documentation

## 2021-07-20 ENCOUNTER — HOSPITAL ENCOUNTER (OUTPATIENT)
Dept: CT IMAGING | Facility: CLINIC | Age: 76
Discharge: HOME | End: 2021-07-20
Attending: PHYSICIAN ASSISTANT | Admitting: PHYSICIAN ASSISTANT
Payer: COMMERCIAL

## 2021-07-20 DIAGNOSIS — N20.0 KIDNEY STONE: Primary | ICD-10-CM

## 2021-07-20 DIAGNOSIS — N20.0 KIDNEY STONE: ICD-10-CM

## 2021-07-20 PROCEDURE — 74176 CT ABD & PELVIS W/O CONTRAST: CPT

## 2021-07-21 ENCOUNTER — OFFICE VISIT (OUTPATIENT)
Dept: INTERNAL MEDICINE | Facility: CLINIC | Age: 76
End: 2021-07-21
Payer: COMMERCIAL

## 2021-07-21 VITALS
SYSTOLIC BLOOD PRESSURE: 124 MMHG | DIASTOLIC BLOOD PRESSURE: 66 MMHG | HEART RATE: 82 BPM | TEMPERATURE: 98.3 F | WEIGHT: 174 LBS | BODY MASS INDEX: 26.37 KG/M2 | OXYGEN SATURATION: 94 % | HEIGHT: 68 IN

## 2021-07-21 DIAGNOSIS — E78.2 MIXED HYPERLIPIDEMIA: ICD-10-CM

## 2021-07-21 DIAGNOSIS — I10 ESSENTIAL HYPERTENSION: Primary | Chronic | ICD-10-CM

## 2021-07-21 DIAGNOSIS — N20.0 RECURRENT KIDNEY STONES: ICD-10-CM

## 2021-07-21 LAB
ALT SERPL W P-5'-P-CCNC: 25 U/L (ref 0–70)
ANION GAP SERPL CALCULATED.3IONS-SCNC: 1 MMOL/L (ref 3–14)
BUN SERPL-MCNC: 16 MG/DL (ref 7–30)
CALCIUM SERPL-MCNC: 9.3 MG/DL (ref 8.5–10.1)
CHLORIDE BLD-SCNC: 106 MMOL/L (ref 94–109)
CHOLEST SERPL-MCNC: 205 MG/DL
CO2 SERPL-SCNC: 30 MMOL/L (ref 20–32)
CREAT SERPL-MCNC: 0.93 MG/DL (ref 0.66–1.25)
FASTING STATUS PATIENT QL REPORTED: YES
GFR SERPL CREATININE-BSD FRML MDRD: 80 ML/MIN/1.73M2
GLUCOSE BLD-MCNC: 103 MG/DL (ref 70–99)
HDLC SERPL-MCNC: 42 MG/DL
LDLC SERPL CALC-MCNC: 131 MG/DL
NONHDLC SERPL-MCNC: 163 MG/DL
POTASSIUM BLD-SCNC: 3.8 MMOL/L (ref 3.4–5.3)
SODIUM SERPL-SCNC: 137 MMOL/L (ref 133–144)
TRIGL SERPL-MCNC: 162 MG/DL

## 2021-07-21 PROCEDURE — 80061 LIPID PANEL: CPT | Performed by: FAMILY MEDICINE

## 2021-07-21 PROCEDURE — 84460 ALANINE AMINO (ALT) (SGPT): CPT | Performed by: FAMILY MEDICINE

## 2021-07-21 PROCEDURE — 36415 COLL VENOUS BLD VENIPUNCTURE: CPT | Performed by: FAMILY MEDICINE

## 2021-07-21 PROCEDURE — 80048 BASIC METABOLIC PNL TOTAL CA: CPT | Performed by: FAMILY MEDICINE

## 2021-07-21 PROCEDURE — 99214 OFFICE O/P EST MOD 30 MIN: CPT | Performed by: FAMILY MEDICINE

## 2021-07-21 RX ORDER — AMLODIPINE BESYLATE 2.5 MG/1
2.5 TABLET ORAL DAILY
Qty: 90 TABLET | Refills: 3 | Status: SHIPPED | OUTPATIENT
Start: 2021-07-21 | End: 2021-12-21

## 2021-07-21 ASSESSMENT — MIFFLIN-ST. JEOR: SCORE: 1498.76

## 2021-07-21 NOTE — PATIENT INSTRUCTIONS
The patient only notices that he is on his high blood pressure medicine when it is very hot and humid out.  However, it is much improved from any of the other medicines he has been on.  He does not have enough issues with that he wants to change anything.  I therefore refilled his amlodipine for 90 days with refills.  Follow-up will be in 3 months.  The patient will be continuing his care at [facility].    Patient has an upcoming appointment on Friday with urology.  He does have increased stone burden in both kidneys.  They are nonobstructing at present.  He is also still waiting to have the ophthalmologist get back to him about what they are going to do about his vision problems.

## 2021-07-21 NOTE — PROGRESS NOTES
Assessment & Plan     Essential hypertension    - amLODIPine (NORVASC) 2.5 MG tablet; Take 1 tablet (2.5 mg) by mouth daily  - Basic metabolic panel  (Ca, Cl, CO2, Creat, Gluc, K, Na, BUN); Future  - Lipid panel reflex to direct LDL Fasting; Future  - ALT; Future    Mixed hyperlipidemia                 Patient Instructions   The patient only notices that he is on his high blood pressure medicine when it is very hot and humid out.  However, it is much improved from any of the other medicines he has been on.  He does not have enough issues with that he wants to change anything.  I therefore refilled his amlodipine for 90 days with refills.  Follow-up will be in 3 months.  The patient will be continuing his care at [facility].    Patient has an upcoming appointment on Friday with urology.  He does have increased stone burden in both kidneys.  They are nonobstructing at present.  He is also still waiting to have the ophthalmologist get back to him about what they are going to do about his vision problems.      No follow-ups on file.    Arjun Burns MD  [facility]    Celsa Coyne is a 76 year old who presents for the following health issues     HPI     Hyperlipidemia Follow-Up      Are you regularly taking any medication or supplement to lower your cholesterol?   No    Are you having muscle aches or other side effects that you think could be caused by your cholesterol lowering medication?  N/A    Hypertension Follow-up      Do you check your blood pressure regularly outside of the clinic? Yes     Are you following a low salt diet? Yes    Are your blood pressures ever more than 140 on the top number (systolic) OR more   than 90 on the bottom number (diastolic), for example 140/90? No -not lately    How many servings of fruits and vegetables do you eat daily?  0-1    On average, how many sweetened beverages do you drink each day (Examples: soda, juice, sweet tea,  "etc.  Do NOT count diet or artificially sweetened beverages)?   1    How many days per week do you exercise enough to make your heart beat faster? 0    How many minutes a day do you exercise enough to make your heart beat faster? 0    How many days per week do you miss taking your medication? 0        Review of Systems         Objective    /66   Pulse 82   Temp 98.3  F (36.8  C) (Tympanic)   Ht 1.727 m (5' 8\")   Wt 78.9 kg (174 lb)   SpO2 94%   BMI 26.46 kg/m    Body mass index is 26.46 kg/m .  Physical Exam           \plain        "

## 2021-07-23 ENCOUNTER — HOSPITAL ENCOUNTER (OUTPATIENT)
Dept: GENERAL RADIOLOGY | Facility: CLINIC | Age: 76
Discharge: HOME | End: 2021-07-23
Attending: PHYSICIAN ASSISTANT | Admitting: PHYSICIAN ASSISTANT
Payer: COMMERCIAL

## 2021-07-23 ENCOUNTER — OFFICE VISIT (OUTPATIENT)
Dept: UROLOGY | Facility: CLINIC | Age: 76
End: 2021-07-23
Payer: COMMERCIAL

## 2021-07-23 VITALS
SYSTOLIC BLOOD PRESSURE: 120 MMHG | BODY MASS INDEX: 26.22 KG/M2 | DIASTOLIC BLOOD PRESSURE: 70 MMHG | HEART RATE: 91 BPM | WEIGHT: 173 LBS | OXYGEN SATURATION: 96 % | HEIGHT: 68 IN

## 2021-07-23 DIAGNOSIS — N20.0 RENAL STONE: Primary | ICD-10-CM

## 2021-07-23 DIAGNOSIS — N20.0 RENAL STONE: ICD-10-CM

## 2021-07-23 PROCEDURE — 74019 RADEX ABDOMEN 2 VIEWS: CPT

## 2021-07-23 PROCEDURE — 99213 OFFICE O/P EST LOW 20 MIN: CPT | Performed by: PHYSICIAN ASSISTANT

## 2021-07-23 ASSESSMENT — MIFFLIN-ST. JEOR: SCORE: 1494.22

## 2021-07-23 ASSESSMENT — PAIN SCALES - GENERAL: PAINLEVEL: NO PAIN (1)

## 2021-07-23 NOTE — PROGRESS NOTES
CC: Kidney stones    HPI: Stanford Andersen is a pleasant 76-year-old male with a history of calcium oxalate stones.  He underwent extraction of a 9 mm mid right ureteral calculus in 2019 (he has known renal stones).  His last stone analysis showed calcium oxalate monohydrate.  He has normal renal function and a normal serum calcium level.    Noting some flank pain over the last month. Can occur on both sides and even groin discomfort. No gross hematuria. CT notes worsening of stone burden and up to 1.5cm on the left. Has had ESWL x 3.     Has Zollinger-Patricio syndrome    Past Medical History:   Diagnosis Date     Arthritis     hand     Chronic back pain      Complication of anesthesia     URINARY RETENTION, Sleepy after surgery     Gastro-oesophageal reflux disease      Hypertension      Renal disease     kidney stone     Zollinger-Patricio syndrome      Current Outpatient Medications   Medication     amLODIPine (NORVASC) 2.5 MG tablet     finasteride (PROSCAR) 5 MG tablet     magnesium 250 MG tablet     Omeprazole (PRILOSEC PO)     tamsulosin (FLOMAX) 0.4 MG capsule     No current facility-administered medications for this visit.     CT ABDOMEN AND PELVIS WITHOUT CONTRAST  7/20/2021 4:59 PM     CLINICAL HISTORY: Kidney stone.     TECHNIQUE: CT scan of the abdomen and pelvis was performed without IV  contrast. Multiplanar reformats were obtained. Dose reduction  techniques were used.  CONTRAST: None.     COMPARISON: 4/24/2019     FINDINGS:   LOWER CHEST: Normal.     HEPATOBILIARY: Multiple benign hepatic cysts.     PANCREAS: Normal.     SPLEEN: Normal.     ADRENAL GLANDS: Normal.     KIDNEYS/BLADDER: Multiple parapelvic renal cysts bilaterally. No  hydronephrosis. On the right there are 13 calculi, the largest at the  lower pole measuring 6 mm.     On the left, there are 11 calculi, the largest at the lower pole  measuring 1.5 cm.     No hydronephrosis or ureteral calculus on either side.     BOWEL: Sigmoid colonic  diverticulosis without signs of diverticulitis.     LYMPH NODES: Normal.     VASCULATURE: Unremarkable.     PELVIC ORGANS: Normal.     OTHER: None.     MUSCULOSKELETAL: Normal.                                                                      IMPRESSION:   1.  Multiple nonobstructing bilateral renal calculi, increased from  previous. The largest calculus on the left measures 1.5 cm.  2.  Multiple parapelvic renal cysts bilaterally are unchanged. No  hydronephrosis or ureteral calculus.         Assessment: recurrent calcium oxalate monohydrate stones  Plan: KUB to assess for ESWL. Will call home number for results and discussion.  Continue with follow-up with Nephrology.    Wanda Owens PA-C  [facility]    26 minutes spent on the date of the encounter doing chart review, review of test results, patient visit and documentation , review of CT images

## 2021-07-23 NOTE — PATIENT INSTRUCTIONS
Will review images with Dr. Bergeron.     No fish oil, aspirin or Advil within 10 days.     Pre-op exam and COVID-19 screening.

## 2021-07-23 NOTE — LETTER
7/23/2021       RE: Stanford Andersen  [address]  [facility] [address]     Dear Colleague,    Thank you for referring your patient, Stanford Andersen, to the [facility] at [facility]. Please see a copy of my visit note below.    CC: Kidney stones    HPI: Stanford Andersen is a pleasant 76-year-old male with a history of calcium oxalate stones.  He underwent extraction of a 9 mm mid right ureteral calculus in 2019 (he has known renal stones).  His last stone analysis showed calcium oxalate monohydrate.  He has normal renal function and a normal serum calcium level.    Noting some flank pain over the last month. Can occur on both sides and even groin discomfort. No gross hematuria. CT notes worsening of stone burden and up to 1.5cm on the left. Has had ESWL x 3.     Has Zollinger-Patricio syndrome    Past Medical History:   Diagnosis Date     Arthritis     hand     Chronic back pain      Complication of anesthesia     URINARY RETENTION, Sleepy after surgery     Gastro-oesophageal reflux disease      Hypertension      Renal disease     kidney stone     Zollinger-Patricio syndrome      Current Outpatient Medications   Medication     amLODIPine (NORVASC) 2.5 MG tablet     finasteride (PROSCAR) 5 MG tablet     magnesium 250 MG tablet     Omeprazole (PRILOSEC PO)     tamsulosin (FLOMAX) 0.4 MG capsule     No current facility-administered medications for this visit.     CT ABDOMEN AND PELVIS WITHOUT CONTRAST  7/20/2021 4:59 PM     CLINICAL HISTORY: Kidney stone.     TECHNIQUE: CT scan of the abdomen and pelvis was performed without IV  contrast. Multiplanar reformats were obtained. Dose reduction  techniques were used.  CONTRAST: None.     COMPARISON: 4/24/2019     FINDINGS:   LOWER CHEST: Normal.     HEPATOBILIARY: Multiple benign hepatic cysts.     PANCREAS: Normal.     SPLEEN: Normal.     ADRENAL GLANDS: Normal.     KIDNEYS/BLADDER: Multiple  parapelvic renal cysts bilaterally. No  hydronephrosis. On the right there are 13 calculi, the largest at the  lower pole measuring 6 mm.     On the left, there are 11 calculi, the largest at the lower pole  measuring 1.5 cm.     No hydronephrosis or ureteral calculus on either side.     BOWEL: Sigmoid colonic diverticulosis without signs of diverticulitis.     LYMPH NODES: Normal.     VASCULATURE: Unremarkable.     PELVIC ORGANS: Normal.     OTHER: None.     MUSCULOSKELETAL: Normal.                                                                      IMPRESSION:   1.  Multiple nonobstructing bilateral renal calculi, increased from  previous. The largest calculus on the left measures 1.5 cm.  2.  Multiple parapelvic renal cysts bilaterally are unchanged. No  hydronephrosis or ureteral calculus.         Assessment: recurrent calcium oxalate monohydrate stones  Plan: KUB to assess for ESWL. Will call home number for results and discussion.  Continue with follow-up with Nephrology.    Wanda Owens PA-C  [facility]    26 minutes spent on the date of the encounter doing chart review, review of test results, patient visit and documentation , review of CT images

## 2021-07-26 DIAGNOSIS — N20.0 KIDNEY STONE: Primary | ICD-10-CM

## 2021-07-29 ENCOUNTER — TELEPHONE (OUTPATIENT)
Dept: UROLOGY | Facility: CLINIC | Age: 76
End: 2021-07-29

## 2021-07-29 NOTE — TELEPHONE ENCOUNTER
Patient called nurse line and BECKA. He stated that he receive a Message on [employer] from Wanda saying that she tried to call him. No result notes indicating this as far as I could tell. Returned phone call to patient and informed him I could not find message. It may perhaps have been an old message. Patient was not overly concerned. I explained that the PA was out of the office this week. Informed him that I would send a message to our PA just to be sure.     Sierra Tamayo LPN

## 2021-07-30 DIAGNOSIS — N20.0 KIDNEY STONE: Primary | ICD-10-CM

## 2021-07-30 DIAGNOSIS — Z11.59 ENCOUNTER FOR SCREENING FOR OTHER VIRAL DISEASES: ICD-10-CM

## 2021-08-13 ENCOUNTER — HOSPITAL ENCOUNTER (EMERGENCY)
Facility: CLINIC | Age: 76
Discharge: HOME | End: 2021-08-13
Attending: EMERGENCY MEDICINE | Admitting: EMERGENCY MEDICINE
Payer: COMMERCIAL

## 2021-08-13 ENCOUNTER — APPOINTMENT (OUTPATIENT)
Dept: GENERAL RADIOLOGY | Facility: CLINIC | Age: 76
End: 2021-08-13
Attending: EMERGENCY MEDICINE
Payer: COMMERCIAL

## 2021-08-13 VITALS
DIASTOLIC BLOOD PRESSURE: 70 MMHG | HEART RATE: 80 BPM | OXYGEN SATURATION: 97 % | WEIGHT: 170 LBS | RESPIRATION RATE: 16 BRPM | TEMPERATURE: 97.2 F | HEIGHT: 68 IN | BODY MASS INDEX: 25.76 KG/M2 | SYSTOLIC BLOOD PRESSURE: 130 MMHG

## 2021-08-13 DIAGNOSIS — M62.838 NECK MUSCLE SPASM: ICD-10-CM

## 2021-08-13 PROCEDURE — 99283 EMERGENCY DEPT VISIT LOW MDM: CPT

## 2021-08-13 PROCEDURE — 250N000013 HC RX MED GY IP 250 OP 250 PS 637: Performed by: EMERGENCY MEDICINE

## 2021-08-13 PROCEDURE — 72040 X-RAY EXAM NECK SPINE 2-3 VW: CPT

## 2021-08-13 RX ORDER — ACETAMINOPHEN 500 MG
1000 TABLET ORAL ONCE
Status: COMPLETED | OUTPATIENT
Start: 2021-08-13 | End: 2021-08-13

## 2021-08-13 RX ORDER — METHOCARBAMOL 750 MG/1
750-1500 TABLET, FILM COATED ORAL 3 TIMES DAILY PRN
Qty: 30 TABLET | Refills: 0 | Status: SHIPPED | OUTPATIENT
Start: 2021-08-13 | End: 2021-08-18

## 2021-08-13 RX ORDER — METHOCARBAMOL 750 MG/1
1500 TABLET, FILM COATED ORAL ONCE
Status: COMPLETED | OUTPATIENT
Start: 2021-08-13 | End: 2021-08-13

## 2021-08-13 RX ADMIN — METHOCARBAMOL 1500 MG: 750 TABLET ORAL at 10:04

## 2021-08-13 RX ADMIN — ACETAMINOPHEN 1000 MG: 500 TABLET, FILM COATED ORAL at 10:05

## 2021-08-13 ASSESSMENT — ENCOUNTER SYMPTOMS
VOMITING: 0
SHORTNESS OF BREATH: 0
NAUSEA: 1
HEADACHES: 0
WEAKNESS: 1
ABDOMINAL PAIN: 0
NUMBNESS: 0
NECK STIFFNESS: 1
NECK PAIN: 1

## 2021-08-13 ASSESSMENT — MIFFLIN-ST. JEOR: SCORE: 1480.61

## 2021-08-13 NOTE — ED TRIAGE NOTES
Pt reports neck pain without injury since last night. Pt states pain got worse this morning. Pt states can't move neck

## 2021-08-13 NOTE — DISCHARGE INSTRUCTIONS
1. -Take acetaminophen 500 to 1000 mg by mouth every 4 to 6 hours as needed for pain or fever.  Do not take more than 4000 mg in 24 hours.  Do not take within 6 hours of another acetaminophen containing medication such as norco (vicodin) or percocet.  - Take ibuprofen 600 to 800 mg by mouth every 6 to 8 hours as needed for pain or fever  2. Use ice as needed for pain and/or swelling in the first 48 hours..  3. Follow-up with your primary doctor as needed.  4. You may return to the ED as needed for new or worsening symptoms such as reinjury, severe and uncontrollable pain, focal weakness, severe numbness and tingling, any other concerning symptoms.

## 2021-08-13 NOTE — ED PROVIDER NOTES
"  History   Chief Complaint:  Neck Pain    The history is provided by the patient.      Stanford Andersen is a 76 year old male with history of broken neck, arthritis, hypertension, and hyperlipidemia who presents with neck pain beginning last night and worsening since into his presentation to the ED today. He reports that he was unable to sleep last night due to worsening pain, noting radiation to his right ear and shoulder along with weakness in his right arm. At symptom onset, the patient was sitting down watching TV. This morning, he experienced restricted movement of his neck and had difficulty getting out of bed or walking to due severe pain. For self treatment, he made a homemade C-collar with a towel for neck stabilization, which has helped. The patient describes a current severity of 7/10 and notes that he has not taken any pain relieving medication. He notes some nausea earlier this morning but denies any current abdominal pain or emesis. The patient denies any new weakness in his legs, although notes that at baseline, he experiences poor feeling and strength to his lower extremities. He denies any numbness or tingling to his arms along with any chest pain, shortness of breath, headache, or double or blurry vision. The patient has had no changes to urinary or bowel and states that at baseline, these are \"not normal\". He denies any new incontinence. Of note, the patient broke his neck when he was 16 years old, although has had no trouble with pain to the area since. He has never taken muscle relaxants in the past. He reports that he has an upcoming operation to remove kidney stones on 8/31. The patient additionally notes a penicillin allergy.    Review of Systems   Constitutional:        (+) difficulty walking and getting out of bed   Eyes: Negative for visual disturbance (-) double or blurry vision.   Respiratory: Negative for shortness of breath.    Cardiovascular: Negative for chest pain. " "  Gastrointestinal: Positive for nausea. Negative for abdominal pain and vomiting.        (-) bowel changes   Genitourinary: Negative.    Musculoskeletal: Positive for neck pain (radiating to right ear and shoulder) and neck stiffness.   Neurological: Positive for weakness (+) right arm, (-) legs. Negative for numbness (or tingling) and headaches.   All other systems reviewed and are negative.      Allergies:  Contrast Dye  Penicillins  Tramadol  Oxycodone-Acetaminophen    Medications:  Amlodipine  Finasteride  Magnesium  Omeprazole  Tamsulosin  Allopurinol    Past Medical History:    Arthritis  GERD  Hypertension  Renal disease  Zollinger-Patricio syndrome  TMJ dysfunction  Recurrent kidney stones  Nocturia  Erectile dysfunction  Ventral hernia w/o obstruction or gangrene  Hyperlipidemia  Angina  Calculus of kidney  Prostatic hyperplasia    Past Surgical History:    Exploratory laparoscopy  Combined cystoscopy, retrogrades, ureteroscopy, laser holmium lithotripsy ureters, right  Excise varicocele  Herniorrhaphy umbilical  Lithotrispy x3  Laparoscopic herniorrhaphy inguinal, left  Exploratory scoping looking for tumors related to ZE syndrome    Family History:    CAD  Heart disease  COPD  Spina bifida    Social History:  The patient presented to the ED with his son today.  He is an army .  The patient does not use tobacco or drugs.    Physical Exam     Patient Vitals for the past 24 hrs:   BP Temp Temp src Pulse Resp SpO2 Height Weight   08/13/21 1205 130/70 -- -- 80 16 97 % -- --   08/13/21 0801 131/79 97.2  F (36.2  C) Temporal 94 16 95 % 1.727 m (5' 8\") 77.1 kg (170 lb)       Physical Exam  Constitutional: Well developed, nontox appearance  Head: Atraumatic.   Neck:  no stridor, right-sided trapezius spasm, no midline C-spine tenderness, no LAD, no stridor  Eyes: no scleral icterus  Cardiovascular: RRR, 2+ bilat radial pulses  Pulmonary/Chest: nml resp effort  Ext: Warm, well perfused, no " edema  Neurological: A&O, symmetric facies, moves ext x4, 5/5 strength throughout bilateral upper and lower extremities, sensation grossly intact  Skin: Skin is warm and dry.   Psychiatric: Behavior is normal. Thought content normal.   Nursing note and vitals reviewed.    Emergency Department Course     Imaging:  Cervical spine XR, 2-3 View  Mild degenerative anterolisthesis of C4 upon C5, C5 upon C6 and C7 upon T1. Mild left convex curvature centered at the mid cervical spine. Alignment otherwise normal. Moderate facet arthropathy  throughout the cervical spine. Moderate degenerative endplate spurring at C6-C7. No evidence for fracture.  Results Per Radiology    Emergency Department Course:    Reviewed:  I reviewed nursing notes, vitals, past medical history and care everywhere.    Assessments:  0946 I obtained history and examined the patient as noted above.   1149 I rechecked the patient and believe that they are safe for discharge at this time.     Interventions:  1004 Robaxin 1,500 mg PO  1005 Tylenol 1,000 mg PO    Disposition:  The patient was discharged to home.     Impression & Plan     Medical Decision Makin year old male presenting w/ right-sided neck pain     Patient's presentation is consistent with torticollis and/or right trapezius/levator scapulae/scalene muscle spasm. Doubt meningitis, fracture although given age, screening plain films ordered and significant for no acute fracture as described above. I do not feel blood work is indicated at this time.  Interventions as noted above with significant improvement in symptoms.  At this time I feel the pt is safe for discharge.  Recommendations given regarding follow up with PCP and return to the emergency department as needed for new or worsening symptoms.  Pt counseled on all results, disposition and diagnosis.  They are understanding and agreeable to plan. Patient discharged in stable condition.      Diagnosis:    ICD-10-CM    1. Neck muscle  spasm  M62.838      Discharge Medications:  Discharge Medication List as of 8/13/2021 11:57 AM      START taking these medications    Details   methocarbamol (ROBAXIN) 750 MG tablet Take 1-2 tablets (750-1,500 mg) by mouth 3 times daily as needed for muscle spasms, Disp-30 tablet, R-0, Local Print           Scribe Disclosure:  JAMILAH, Nanci Feng, am serving as a scribe at 9:36 AM on 8/13/2021 to document services personally performed by Deangelo Raza MD based on my observations and the provider's statements to me.     Deangelo Raza MD  08/13/21 3049

## 2021-08-18 ENCOUNTER — OFFICE VISIT (OUTPATIENT)
Dept: INTERNAL MEDICINE | Facility: CLINIC | Age: 76
End: 2021-08-18
Payer: COMMERCIAL

## 2021-08-18 VITALS
TEMPERATURE: 98.4 F | SYSTOLIC BLOOD PRESSURE: 142 MMHG | DIASTOLIC BLOOD PRESSURE: 80 MMHG | BODY MASS INDEX: 26.46 KG/M2 | WEIGHT: 174 LBS | OXYGEN SATURATION: 94 % | HEART RATE: 83 BPM

## 2021-08-18 DIAGNOSIS — E16.4 ZOLLINGER-ELLISON SYNDROME: ICD-10-CM

## 2021-08-18 DIAGNOSIS — Z01.818 PREOP GENERAL PHYSICAL EXAM: Primary | ICD-10-CM

## 2021-08-18 DIAGNOSIS — I10 ESSENTIAL HYPERTENSION: ICD-10-CM

## 2021-08-18 DIAGNOSIS — N20.0 RECURRENT KIDNEY STONES: ICD-10-CM

## 2021-08-18 LAB
HGB BLD-MCNC: 15.1 G/DL (ref 13.3–17.7)
PLATELET # BLD AUTO: 260 10E3/UL (ref 150–450)

## 2021-08-18 PROCEDURE — 99214 OFFICE O/P EST MOD 30 MIN: CPT | Performed by: INTERNAL MEDICINE

## 2021-08-18 PROCEDURE — 85049 AUTOMATED PLATELET COUNT: CPT | Performed by: INTERNAL MEDICINE

## 2021-08-18 PROCEDURE — 84132 ASSAY OF SERUM POTASSIUM: CPT | Performed by: INTERNAL MEDICINE

## 2021-08-18 PROCEDURE — 36415 COLL VENOUS BLD VENIPUNCTURE: CPT | Performed by: INTERNAL MEDICINE

## 2021-08-18 PROCEDURE — 93000 ELECTROCARDIOGRAM COMPLETE: CPT | Performed by: INTERNAL MEDICINE

## 2021-08-18 PROCEDURE — 85018 HEMOGLOBIN: CPT | Performed by: INTERNAL MEDICINE

## 2021-08-18 NOTE — H&P (VIEW-ONLY)
[address] [address]  Phone: [phone]  Primary Provider: Arjun Burns  Pre-op Performing Provider: HUGO ALONSO    PREOPERATIVE EVALUATION:  Today's date: 8/18/2021    Stanford Andersen is a 76 year old male who presents for a preoperative evaluation.    Surgical Information:  Surgery/Procedure: Bilateral extracorporeal shockwave lithotripsy Cystoscopy with left ureteral stent placement  Surgery Location: [facility]  Surgeon: Dr Bergeron  Surgery Date: 8/31/21  Time of Surgery: 1:55pm  Where patient plans to recover: At home with family  Fax number for surgical facility: Note does not need to be faxed, will be available electronically in Epic.    Type of Anesthesia Anticipated: General    Assessment & Plan     The proposed surgical procedure is considered LOW risk.    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  His symptoms treated with PPI therapy.    Risks and Recommendations:  The patient has the following additional risks and recommendations for perioperative complications:   - No identified additional risk factors other than previously addressed    Medication Instructions:   - aspirin/NSAIDS/OTC products: Discontinue 7 days prior to procedure to reduce bleeding risk. It should be resumed postoperatively.   - Discussed with patient a.m. dosing of hypertensive medication    RECOMMENDATION:  APPROVAL GIVEN to proceed with proposed procedure, without  further diagnostic evaluation.    30 minutes spent on the date of the encounter doing chart review, review of test results, interpretation of tests, patient visit and documentation     Subjective     HPI related to upcoming procedure: Lithotripsy      Preop Questions 8/18/2021   1. Have you ever had a heart attack or stroke? No   2. Have you ever had surgery on your heart or blood vessels, such as a stent placement, a coronary artery bypass, or surgery on an artery in your head, neck, heart, or legs? UNKNOWN -    3. Do you have chest pain with activity? No   4. Do you have a history of  heart failure? No   5. Do you currently have a cold, bronchitis or symptoms of other infection? No   6. Do you have a cough, shortness of breath, or wheezing? No    7. Do you or anyone in your family have previous history of blood clots? No   8. Do you or does anyone in your family have a serious bleeding problem such as prolonged bleeding following surgeries or cuts? No   9. Have you ever had problems with anemia or been told to take iron pills? No   10. Have you had any abnormal blood loss such as black, tarry or bloody stools? No     Health Care Directive:  Patient does not have a Health Care Directive or Living Will: none  6}  Status of Chronic Conditions:  See problem list for active medical problems.  Problems all longstanding and stable, except as noted/documented.  See ROS for pertinent symptoms related to these conditions.    Review of Systems  CONSTITUTIONAL: NEGATIVE for fever, chills, change in weight  EYES: NEGATIVE for vision changes or irritation  ENT/MOUTH: NEGATIVE for ear, mouth and throat problems  RESP: NEGATIVE for significant SOB  CV: NEGATIVE for chest pain, palpitations or peripheral edema  : NEGATIVE for frequency, dysuria, or hematuria  MUSCULOSKELETAL: NEGATIVE for significant arthralgias or myalgia  NEURO: NEGATIVE for weakness, dizziness or paresthesias  HEME: NEGATIVE for bleeding  problems  PSYCHIATRIC: NEGATIVE for changes in mood or affect    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     Priority: Medium     Left       Recurrent kidney stones 05/03/2019     Priority: Medium     Nocturia 04/16/2019     Priority: Medium     Erectile dysfunction, unspecified erectile dysfunction type 10/08/2018     Priority: Medium     Ventral hernia without obstruction or gangrene 10/08/2018     Priority: Medium     Numbness and tingling of both feet 10/08/2018     Priority: Medium     Stress 08/02/2017     Priority: Medium     ACP (advance care planning) 12/21/2015     Priority: Medium     Advance Care Planning 12/21/2015: ACP Review of Chart / Resources Provided:  Reviewed chart for advance care plan.  Stanford ZAINA Andersen has no plan or code status on file. Discussed available resources and provided with information. . Added by Zee Setwart             Essential hypertension 02/05/2014     Priority: Medium     Hyperlipidemia 09/04/2013     Priority: Medium     Family history of coronary artery disease 09/04/2013     Priority: Medium     Change in bowel function 09/04/2013     Priority: Medium     Zollinger-Patricio syndrome      Priority: Medium     Health Care Home 03/02/2012     Priority: Medium     [facility] for .  Clara Daniels RN-BC    [phone]   DX V65.8 REPLACED WITH 07207 HEALTH CARE HOME (04/08/2013)       Chest pain 03/02/2012     Priority: Medium      Past Medical History:   Diagnosis Date     Arthritis     hand     Chronic back pain      Complication of anesthesia     URINARY RETENTION, Sleepy after surgery     Gastro-oesophageal reflux disease      Hypertension      Renal disease     kidney stone     Zollinger-Patricio syndrome      Past Surgical History:   Procedure Laterality Date     ABDOMEN SURGERY  2003    Exploratory laparoscopy     COMBINED CYSTOSCOPY, RETROGRADES, URETEROSCOPY, LASER HOLMIUM LITHOTRIPSY URETER(S), INSERT STENT  Right 5/23/2019    Procedure: Video cystoscopy, exam under anesthesia, right ureteroscopy with holmium laser lithotripsy and stone extractions, right double-J stent (5-French x 24 cm).;  Surgeon: Satinder Almanza MD;  Location: [facility] OR     HC EXCISE VARICOCELE  age 21     varicocele repair     HERNIORRHAPHY UMBILICAL N/A 11/30/2018    Procedure: OPEN UMBILLICAL HERNIA REPAIR ;  Surgeon: Ike Catalan MD;  Location: [facility] OR     KIDNEY SURGERY      lithotripsy x 3     LAPAROSCOPIC HERNIORRHAPHY INGUINAL Left 9/26/2016    Procedure: LAPAROSCOPIC HERNIORRHAPHY INGUINAL;  Surgeon: Ike Catalan MD;  Location:  [facility]     LITHOTRIPSY      three times     STOMACH SURGERY  2003    exploratory scoping looking for tumors related to ZE syndrome     Current Outpatient Medications   Medication Sig Dispense Refill     amLODIPine (NORVASC) 2.5 MG tablet Take 1 tablet (2.5 mg) by mouth daily 90 tablet 3     finasteride (PROSCAR) 5 MG tablet TAKE 1 TABLET(5 MG) BY MOUTH DAILY 90 tablet 2     magnesium 250 MG tablet Take 1 tablet by mouth daily       methocarbamol (ROBAXIN) 750 MG tablet Take 1-2 tablets (750-1,500 mg) by mouth 3 times daily as needed for muscle spasms 30 tablet 0     Omeprazole (PRILOSEC PO) Take 20 mg by mouth 3 times daily. Patient takes it in the morning, with dinner and at bedtime for Patricio-Zollinger syndrome. Patient takes Prilosec OTC.       tamsulosin (FLOMAX) 0.4 MG capsule TAKE 1 CAPSULE(0.4 MG) BY MOUTH DAILY 90 capsule 2       Allergies   Allergen Reactions     Dye [Contrast Dye] Anaphylaxis and Hives     Penicillins Anaphylaxis     Swelling, arm turned blue     Tramadol Itching     Oxycodone-Acetaminophen Rash        Social History     Tobacco Use     Smoking status: Never Smoker     Smokeless tobacco: Never Used   Substance Use Topics     Alcohol use: Yes     Comment: seldom       History   Drug Use No         Objective     BP (!) 142/80   Pulse 83   Temp 98.4  F (36.9  C) (Oral)   Wt  78.9 kg (174 lb)   SpO2 94%   BMI 26.46 kg/m      Physical Exam    GENERAL APPEARANCE: alert and no distress     EYES: EOMI,  PERRL     HENT: ear canals and TM's normal and nose and mouth without ulcers or lesions     NECK: no adenopathy, no asymmetry, masses, or scars and thyroid normal to palpation     RESP: lungs clear to auscultation - no rales, rhonchi or wheezes     CV: regular rates and rhythm, normal S1 S2, no S3 or S4 and no click or rub     ABDOMEN:  soft, nontender, no HSM or masses and bowel sounds normal     MS: extremities normal- no gross deformities noted.     NEURO: No focal changes     PSYCH: mentation appears normal and affect normal/bright     Verruca lesion dollar sized mid scalp.    Recent Labs   Lab Test 07/21/21  1020 04/30/21  0847 12/10/19  1049   HGB  --  14.7 16.2   PLT  --  223 246    139 138   POTASSIUM 3.8 3.7 4.0   CR 0.93 0.95 0.92        Diagnostics:  Labs pending at this time.  Results will be reviewed when available.   EKG demonstrates a normal sinus rhythm with a heart rate of 80 bpm.  There are no acute changes noted.    Revised Cardiac Risk Index (RCRI):  The patient has the following serious cardiovascular risks for perioperative complications:   - No serious cardiac risks = 0 points     RCRI Interpretation: 1 point: Class II (low risk - 0.9% complication rate)       Signed Electronically by: Chon Mckeon MD  Copy of this evaluation report is provided to requesting physician, Dr. Bergeron

## 2021-08-18 NOTE — PROGRESS NOTES
[address] [address]  Phone: [phone]  Primary Provider: Arjun Burns  Pre-op Performing Provider: HUGO ALONSO    PREOPERATIVE EVALUATION:  Today's date: 8/18/2021    Stanford Andersen is a 76 year old male who presents for a preoperative evaluation.    Surgical Information:  Surgery/Procedure: Bilateral extracorporeal shockwave lithotripsy Cystoscopy with left ureteral stent placement  Surgery Location: [facility]  Surgeon: Dr Bergeron  Surgery Date: 8/31/21  Time of Surgery: 1:55pm  Where patient plans to recover: At home with family  Fax number for surgical facility: Note does not need to be faxed, will be available electronically in Epic.    Type of Anesthesia Anticipated: General    Assessment & Plan     The proposed surgical procedure is considered LOW risk.    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  His symptoms treated with PPI therapy.    Risks and Recommendations:  The patient has the following additional risks and recommendations for perioperative complications:   - No identified additional risk factors other than previously addressed    Medication Instructions:   - aspirin/NSAIDS/OTC products: Discontinue 7 days prior to procedure to reduce bleeding risk. It should be resumed postoperatively.   - Discussed with patient a.m. dosing of hypertensive medication    RECOMMENDATION:  APPROVAL GIVEN to proceed with proposed procedure, without  further diagnostic evaluation.    30 minutes spent on the date of the encounter doing chart review, review of test results, interpretation of tests, patient visit and documentation     Subjective     HPI related to upcoming procedure: Lithotripsy      Preop Questions 8/18/2021   1. Have you ever had a heart attack or stroke? No   2. Have you ever had surgery on your heart or blood vessels, such as a stent placement, a coronary artery bypass, or surgery on an artery in your head, neck, heart, or legs? UNKNOWN -    3. Do you have chest pain with activity? No   4. Do you have a history of  heart failure? No   5. Do you currently have a cold, bronchitis or symptoms of other infection? No   6. Do you have a cough, shortness of breath, or wheezing? No    7. Do you or anyone in your family have previous history of blood clots? No   8. Do you or does anyone in your family have a serious bleeding problem such as prolonged bleeding following surgeries or cuts? No   9. Have you ever had problems with anemia or been told to take iron pills? No   10. Have you had any abnormal blood loss such as black, tarry or bloody stools? No     Health Care Directive:  Patient does not have a Health Care Directive or Living Will: none  6}  Status of Chronic Conditions:  See problem list for active medical problems.  Problems all longstanding and stable, except as noted/documented.  See ROS for pertinent symptoms related to these conditions.    Review of Systems  CONSTITUTIONAL: NEGATIVE for fever, chills, change in weight  EYES: NEGATIVE for vision changes or irritation  ENT/MOUTH: NEGATIVE for ear, mouth and throat problems  RESP: NEGATIVE for significant SOB  CV: NEGATIVE for chest pain, palpitations or peripheral edema  : NEGATIVE for frequency, dysuria, or hematuria  MUSCULOSKELETAL: NEGATIVE for significant arthralgias or myalgia  NEURO: NEGATIVE for weakness, dizziness or paresthesias  HEME: NEGATIVE for bleeding  problems  PSYCHIATRIC: NEGATIVE for changes in mood or affect    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     Priority: Medium     Left       Recurrent kidney stones 05/03/2019     Priority: Medium     Nocturia 04/16/2019     Priority: Medium     Erectile dysfunction, unspecified erectile dysfunction type 10/08/2018     Priority: Medium     Ventral hernia without obstruction or gangrene 10/08/2018     Priority: Medium     Numbness and tingling of both feet 10/08/2018     Priority: Medium     Stress 08/02/2017     Priority: Medium     ACP (advance care planning) 12/21/2015     Priority: Medium     Advance Care Planning 12/21/2015: ACP Review of Chart / Resources Provided:  Reviewed chart for advance care plan.  Stanford ZAINA Andersen has no plan or code status on file. Discussed available resources and provided with information. . Added by Zee Stewart             Essential hypertension 02/05/2014     Priority: Medium     Hyperlipidemia 09/04/2013     Priority: Medium     Family history of coronary artery disease 09/04/2013     Priority: Medium     Change in bowel function 09/04/2013     Priority: Medium     Zollinger-Patricio syndrome      Priority: Medium     Health Care Home 03/02/2012     Priority: Medium     [facility] for .  Clara Daniels RN-BC    [phone]   DX V65.8 REPLACED WITH [facility]ALTH CARE HOME (04/08/2013)       Chest pain 03/02/2012     Priority: Medium      Past Medical History:   Diagnosis Date     Arthritis     hand     Chronic back pain      Complication of anesthesia     URINARY RETENTION, Sleepy after surgery     Gastro-oesophageal reflux disease      Hypertension      Renal disease     kidney stone     Zollinger-Patricio syndrome      Past Surgical History:   Procedure Laterality Date     ABDOMEN SURGERY  2003    Exploratory laparoscopy     COMBINED CYSTOSCOPY, RETROGRADES, URETEROSCOPY, LASER HOLMIUM LITHOTRIPSY URETER(S), INSERT STENT  Right 5/23/2019    Procedure: Video cystoscopy, exam under anesthesia, right ureteroscopy with holmium laser lithotripsy and stone extractions, right double-J stent (5-French x 24 cm).;  Surgeon: Satinder Almanza MD;  Location: [facility] OR     HC EXCISE VARICOCELE  age 21     varicocele repair     HERNIORRHAPHY UMBILICAL N/A 11/30/2018    Procedure: OPEN UMBILLICAL HERNIA REPAIR ;  Surgeon: Ike Catalan MD;  Location: [facility] OR     KIDNEY SURGERY      lithotripsy x 3     LAPAROSCOPIC HERNIORRHAPHY INGUINAL Left 9/26/2016    Procedure: LAPAROSCOPIC HERNIORRHAPHY INGUINAL;  Surgeon: Ike Catalan MD;  Location:  [facility]     LITHOTRIPSY      three times     STOMACH SURGERY  2003    exploratory scoping looking for tumors related to ZE syndrome     Current Outpatient Medications   Medication Sig Dispense Refill     amLODIPine (NORVASC) 2.5 MG tablet Take 1 tablet (2.5 mg) by mouth daily 90 tablet 3     finasteride (PROSCAR) 5 MG tablet TAKE 1 TABLET(5 MG) BY MOUTH DAILY 90 tablet 2     magnesium 250 MG tablet Take 1 tablet by mouth daily       methocarbamol (ROBAXIN) 750 MG tablet Take 1-2 tablets (750-1,500 mg) by mouth 3 times daily as needed for muscle spasms 30 tablet 0     Omeprazole (PRILOSEC PO) Take 20 mg by mouth 3 times daily. Patient takes it in the morning, with dinner and at bedtime for Patricio-Zollinger syndrome. Patient takes Prilosec OTC.       tamsulosin (FLOMAX) 0.4 MG capsule TAKE 1 CAPSULE(0.4 MG) BY MOUTH DAILY 90 capsule 2       Allergies   Allergen Reactions     Dye [Contrast Dye] Anaphylaxis and Hives     Penicillins Anaphylaxis     Swelling, arm turned blue     Tramadol Itching     Oxycodone-Acetaminophen Rash        Social History     Tobacco Use     Smoking status: Never Smoker     Smokeless tobacco: Never Used   Substance Use Topics     Alcohol use: Yes     Comment: seldom       History   Drug Use No         Objective     BP (!) 142/80   Pulse 83   Temp 98.4  F (36.9  C) (Oral)   Wt  78.9 kg (174 lb)   SpO2 94%   BMI 26.46 kg/m      Physical Exam    GENERAL APPEARANCE: alert and no distress     EYES: EOMI,  PERRL     HENT: ear canals and TM's normal and nose and mouth without ulcers or lesions     NECK: no adenopathy, no asymmetry, masses, or scars and thyroid normal to palpation     RESP: lungs clear to auscultation - no rales, rhonchi or wheezes     CV: regular rates and rhythm, normal S1 S2, no S3 or S4 and no click or rub     ABDOMEN:  soft, nontender, no HSM or masses and bowel sounds normal     MS: extremities normal- no gross deformities noted.     NEURO: No focal changes     PSYCH: mentation appears normal and affect normal/bright     Verruca lesion dollar sized mid scalp.    Recent Labs   Lab Test 07/21/21  1020 04/30/21  0847 12/10/19  1049   HGB  --  14.7 16.2   PLT  --  223 246    139 138   POTASSIUM 3.8 3.7 4.0   CR 0.93 0.95 0.92        Diagnostics:  Labs pending at this time.  Results will be reviewed when available.   EKG demonstrates a normal sinus rhythm with a heart rate of 80 bpm.  There are no acute changes noted.    Revised Cardiac Risk Index (RCRI):  The patient has the following serious cardiovascular risks for perioperative complications:   - No serious cardiac risks = 0 points     RCRI Interpretation: 1 point: Class II (low risk - 0.9% complication rate)       Signed Electronically by: Chon Mckeon MD  Copy of this evaluation report is provided to requesting physician, Dr. Bergeron

## 2021-08-19 LAB — POTASSIUM BLD-SCNC: 3.9 MMOL/L (ref 3.4–5.3)

## 2021-08-27 ENCOUNTER — LAB (OUTPATIENT)
Dept: URGENT CARE | Facility: URGENT CARE | Age: 76
End: 2021-08-27
Attending: UROLOGY
Payer: COMMERCIAL

## 2021-08-27 DIAGNOSIS — Z11.59 ENCOUNTER FOR SCREENING FOR OTHER VIRAL DISEASES: ICD-10-CM

## 2021-08-27 PROCEDURE — U0005 INFEC AGEN DETEC AMPLI PROBE: HCPCS

## 2021-08-27 PROCEDURE — U0003 INFECTIOUS AGENT DETECTION BY NUCLEIC ACID (DNA OR RNA); SEVERE ACUTE RESPIRATORY SYNDROME CORONAVIRUS 2 (SARS-COV-2) (CORONAVIRUS DISEASE [COVID-19]), AMPLIFIED PROBE TECHNIQUE, MAKING USE OF HIGH THROUGHPUT TECHNOLOGIES AS DESCRIBED BY CMS-2020-01-R: HCPCS

## 2021-08-28 LAB — SARS-COV-2 RNA RESP QL NAA+PROBE: NEGATIVE

## 2021-08-31 ENCOUNTER — HOSPITAL ENCOUNTER (OUTPATIENT)
Facility: CLINIC | Age: 76
Discharge: HOME | End: 2021-08-31
Attending: UROLOGY | Admitting: UROLOGY
Payer: COMMERCIAL

## 2021-08-31 ENCOUNTER — ANESTHESIA EVENT (OUTPATIENT)
Dept: SURGERY | Facility: CLINIC | Age: 76
End: 2021-08-31
Payer: COMMERCIAL

## 2021-08-31 ENCOUNTER — ANESTHESIA (OUTPATIENT)
Dept: SURGERY | Facility: CLINIC | Age: 76
End: 2021-08-31
Payer: COMMERCIAL

## 2021-08-31 VITALS
BODY MASS INDEX: 25.98 KG/M2 | TEMPERATURE: 97 F | HEART RATE: 77 BPM | OXYGEN SATURATION: 94 % | WEIGHT: 171.4 LBS | SYSTOLIC BLOOD PRESSURE: 155 MMHG | HEIGHT: 68 IN | DIASTOLIC BLOOD PRESSURE: 94 MMHG | RESPIRATION RATE: 17 BRPM

## 2021-08-31 DIAGNOSIS — N20.0 KIDNEY STONE: ICD-10-CM

## 2021-08-31 DIAGNOSIS — N20.0 RECURRENT KIDNEY STONES: Primary | ICD-10-CM

## 2021-08-31 PROCEDURE — 250N000009 HC RX 250: Performed by: NURSE ANESTHETIST, CERTIFIED REGISTERED

## 2021-08-31 PROCEDURE — 370N000017 HC ANESTHESIA TECHNICAL FEE, PER MIN: Performed by: UROLOGY

## 2021-08-31 PROCEDURE — 258N000003 HC RX IP 258 OP 636: Performed by: NURSE ANESTHETIST, CERTIFIED REGISTERED

## 2021-08-31 PROCEDURE — 999N000094 HC STATISTIC LITHOTRIPSY IDENTIFIER: Performed by: UROLOGY

## 2021-08-31 PROCEDURE — 52332 CYSTOSCOPY AND TREATMENT: CPT | Mod: LT | Performed by: UROLOGY

## 2021-08-31 PROCEDURE — 250N000011 HC RX IP 250 OP 636: Performed by: NURSE ANESTHETIST, CERTIFIED REGISTERED

## 2021-08-31 PROCEDURE — 710N000009 HC RECOVERY PHASE 1, LEVEL 1, PER MIN: Performed by: UROLOGY

## 2021-08-31 PROCEDURE — 250N000011 HC RX IP 250 OP 636: Performed by: ANESTHESIOLOGY

## 2021-08-31 PROCEDURE — 710N000012 HC RECOVERY PHASE 2, PER MINUTE: Performed by: UROLOGY

## 2021-08-31 PROCEDURE — 50590 FRAGMENTING OF KIDNEY STONE: CPT | Mod: 50 | Performed by: UROLOGY

## 2021-08-31 PROCEDURE — C1769 GUIDE WIRE: HCPCS | Performed by: UROLOGY

## 2021-08-31 PROCEDURE — 360N000077 HC SURGERY LEVEL 4, PER MIN: Performed by: UROLOGY

## 2021-08-31 PROCEDURE — 250N000025 HC SEVOFLURANE, PER MIN: Performed by: UROLOGY

## 2021-08-31 PROCEDURE — 272N000001 HC OR GENERAL SUPPLY STERILE: Performed by: UROLOGY

## 2021-08-31 PROCEDURE — 250N000009 HC RX 250: Performed by: UROLOGY

## 2021-08-31 PROCEDURE — C2617 STENT, NON-COR, TEM W/O DEL: HCPCS | Performed by: UROLOGY

## 2021-08-31 PROCEDURE — 999N000141 HC STATISTIC PRE-PROCEDURE NURSING ASSESSMENT: Performed by: UROLOGY

## 2021-08-31 DEVICE — URETERAL STENT
Type: IMPLANTABLE DEVICE | Site: URETER | Status: NON-FUNCTIONAL
Brand: POLARIS™ ULTRA
Removed: 2021-09-30

## 2021-08-31 RX ORDER — MEPERIDINE HYDROCHLORIDE 25 MG/ML
12.5 INJECTION INTRAMUSCULAR; INTRAVENOUS; SUBCUTANEOUS
Status: DISCONTINUED | OUTPATIENT
Start: 2021-08-31 | End: 2021-08-31 | Stop reason: HOSPADM

## 2021-08-31 RX ORDER — ONDANSETRON 4 MG/1
4 TABLET, ORALLY DISINTEGRATING ORAL EVERY 30 MIN PRN
Status: DISCONTINUED | OUTPATIENT
Start: 2021-08-31 | End: 2021-08-31 | Stop reason: HOSPADM

## 2021-08-31 RX ORDER — PROPOFOL 10 MG/ML
INJECTION, EMULSION INTRAVENOUS PRN
Status: DISCONTINUED | OUTPATIENT
Start: 2021-08-31 | End: 2021-08-31

## 2021-08-31 RX ORDER — FENTANYL CITRATE 50 UG/ML
INJECTION, SOLUTION INTRAMUSCULAR; INTRAVENOUS PRN
Status: DISCONTINUED | OUTPATIENT
Start: 2021-08-31 | End: 2021-08-31

## 2021-08-31 RX ORDER — CIPROFLOXACIN 2 MG/ML
INJECTION, SOLUTION INTRAVENOUS PRN
Status: DISCONTINUED | OUTPATIENT
Start: 2021-08-31 | End: 2021-08-31

## 2021-08-31 RX ORDER — DEXAMETHASONE SODIUM PHOSPHATE 4 MG/ML
INJECTION, SOLUTION INTRA-ARTICULAR; INTRALESIONAL; INTRAMUSCULAR; INTRAVENOUS; SOFT TISSUE PRN
Status: DISCONTINUED | OUTPATIENT
Start: 2021-08-31 | End: 2021-08-31

## 2021-08-31 RX ORDER — FENTANYL CITRATE 0.05 MG/ML
50 INJECTION, SOLUTION INTRAMUSCULAR; INTRAVENOUS
Status: DISCONTINUED | OUTPATIENT
Start: 2021-08-31 | End: 2021-08-31 | Stop reason: HOSPADM

## 2021-08-31 RX ORDER — SODIUM CHLORIDE, SODIUM LACTATE, POTASSIUM CHLORIDE, CALCIUM CHLORIDE 600; 310; 30; 20 MG/100ML; MG/100ML; MG/100ML; MG/100ML
INJECTION, SOLUTION INTRAVENOUS CONTINUOUS
Status: DISCONTINUED | OUTPATIENT
Start: 2021-08-31 | End: 2021-08-31 | Stop reason: HOSPADM

## 2021-08-31 RX ORDER — ONDANSETRON 2 MG/ML
4 INJECTION INTRAMUSCULAR; INTRAVENOUS EVERY 30 MIN PRN
Status: DISCONTINUED | OUTPATIENT
Start: 2021-08-31 | End: 2021-08-31 | Stop reason: HOSPADM

## 2021-08-31 RX ORDER — CEFAZOLIN SODIUM 2 G/100ML
2 INJECTION, SOLUTION INTRAVENOUS SEE ADMIN INSTRUCTIONS
Status: DISCONTINUED | OUTPATIENT
Start: 2021-08-31 | End: 2021-08-31 | Stop reason: HOSPADM

## 2021-08-31 RX ORDER — TAMSULOSIN HYDROCHLORIDE 0.4 MG/1
0.4 CAPSULE ORAL DAILY
Qty: 30 CAPSULE | Refills: 11 | Status: SHIPPED | OUTPATIENT
Start: 2021-08-31 | End: 2021-09-27

## 2021-08-31 RX ORDER — ONDANSETRON 2 MG/ML
4 INJECTION INTRAMUSCULAR; INTRAVENOUS
Status: DISCONTINUED | OUTPATIENT
Start: 2021-08-31 | End: 2021-08-31 | Stop reason: HOSPADM

## 2021-08-31 RX ORDER — LIDOCAINE HYDROCHLORIDE 20 MG/ML
INJECTION, SOLUTION INFILTRATION; PERINEURAL PRN
Status: DISCONTINUED | OUTPATIENT
Start: 2021-08-31 | End: 2021-08-31

## 2021-08-31 RX ORDER — SODIUM CHLORIDE, SODIUM LACTATE, POTASSIUM CHLORIDE, CALCIUM CHLORIDE 600; 310; 30; 20 MG/100ML; MG/100ML; MG/100ML; MG/100ML
INJECTION, SOLUTION INTRAVENOUS CONTINUOUS PRN
Status: DISCONTINUED | OUTPATIENT
Start: 2021-08-31 | End: 2021-08-31

## 2021-08-31 RX ORDER — ONDANSETRON 2 MG/ML
INJECTION INTRAMUSCULAR; INTRAVENOUS PRN
Status: DISCONTINUED | OUTPATIENT
Start: 2021-08-31 | End: 2021-08-31

## 2021-08-31 RX ORDER — HYDROMORPHONE HCL IN WATER/PF 6 MG/30 ML
0.2 PATIENT CONTROLLED ANALGESIA SYRINGE INTRAVENOUS EVERY 5 MIN PRN
Status: DISCONTINUED | OUTPATIENT
Start: 2021-08-31 | End: 2021-08-31 | Stop reason: HOSPADM

## 2021-08-31 RX ORDER — CEFAZOLIN SODIUM 2 G/100ML
2 INJECTION, SOLUTION INTRAVENOUS
Status: DISCONTINUED | OUTPATIENT
Start: 2021-08-31 | End: 2021-08-31 | Stop reason: HOSPADM

## 2021-08-31 RX ORDER — FENTANYL CITRATE 0.05 MG/ML
25 INJECTION, SOLUTION INTRAMUSCULAR; INTRAVENOUS EVERY 5 MIN PRN
Status: DISCONTINUED | OUTPATIENT
Start: 2021-08-31 | End: 2021-08-31 | Stop reason: HOSPADM

## 2021-08-31 RX ORDER — EPHEDRINE SULFATE 50 MG/ML
INJECTION, SOLUTION INTRAMUSCULAR; INTRAVENOUS; SUBCUTANEOUS PRN
Status: DISCONTINUED | OUTPATIENT
Start: 2021-08-31 | End: 2021-08-31

## 2021-08-31 RX ORDER — NEOSTIGMINE METHYLSULFATE 1 MG/ML
VIAL (ML) INJECTION PRN
Status: DISCONTINUED | OUTPATIENT
Start: 2021-08-31 | End: 2021-08-31

## 2021-08-31 RX ORDER — GLYCOPYRROLATE 0.2 MG/ML
INJECTION, SOLUTION INTRAMUSCULAR; INTRAVENOUS PRN
Status: DISCONTINUED | OUTPATIENT
Start: 2021-08-31 | End: 2021-08-31

## 2021-08-31 RX ADMIN — PHENYLEPHRINE HYDROCHLORIDE 50 MCG: 10 INJECTION INTRAVENOUS at 14:57

## 2021-08-31 RX ADMIN — PROPOFOL 60 MG: 10 INJECTION, EMULSION INTRAVENOUS at 15:08

## 2021-08-31 RX ADMIN — ONDANSETRON 4 MG: 2 INJECTION INTRAMUSCULAR; INTRAVENOUS at 14:18

## 2021-08-31 RX ADMIN — ROCURONIUM BROMIDE 20 MG: 10 INJECTION INTRAVENOUS at 14:03

## 2021-08-31 RX ADMIN — NEOSTIGMINE METHYLSULFATE 4 MG: 1 INJECTION, SOLUTION INTRAVENOUS at 15:23

## 2021-08-31 RX ADMIN — PHENYLEPHRINE HYDROCHLORIDE 50 MCG: 10 INJECTION INTRAVENOUS at 14:12

## 2021-08-31 RX ADMIN — PHENYLEPHRINE HYDROCHLORIDE 100 MCG: 10 INJECTION INTRAVENOUS at 14:17

## 2021-08-31 RX ADMIN — CIPROFLOXACIN 400 MG: 2 INJECTION INTRAVENOUS at 14:01

## 2021-08-31 RX ADMIN — DEXMEDETOMIDINE 12 MCG: 100 INJECTION, SOLUTION, CONCENTRATE INTRAVENOUS at 13:57

## 2021-08-31 RX ADMIN — DEXAMETHASONE SODIUM PHOSPHATE 4 MG: 4 INJECTION, SOLUTION INTRA-ARTICULAR; INTRALESIONAL; INTRAMUSCULAR; INTRAVENOUS; SOFT TISSUE at 13:56

## 2021-08-31 RX ADMIN — HYDROMORPHONE HYDROCHLORIDE 0.2 MG: 0.2 INJECTION, SOLUTION INTRAMUSCULAR; INTRAVENOUS; SUBCUTANEOUS at 16:14

## 2021-08-31 RX ADMIN — LIDOCAINE HYDROCHLORIDE 100 MG: 20 INJECTION, SOLUTION INFILTRATION; PERINEURAL at 13:51

## 2021-08-31 RX ADMIN — ROCURONIUM BROMIDE 10 MG: 10 INJECTION INTRAVENOUS at 15:08

## 2021-08-31 RX ADMIN — FENTANYL CITRATE 100 MCG: 50 INJECTION, SOLUTION INTRAMUSCULAR; INTRAVENOUS at 13:51

## 2021-08-31 RX ADMIN — PHENYLEPHRINE HYDROCHLORIDE 100 MCG: 10 INJECTION INTRAVENOUS at 14:15

## 2021-08-31 RX ADMIN — PROPOFOL 200 MG: 10 INJECTION, EMULSION INTRAVENOUS at 13:51

## 2021-08-31 RX ADMIN — Medication 5 MG: at 14:57

## 2021-08-31 RX ADMIN — HYDROMORPHONE HYDROCHLORIDE 0.2 MG: 0.2 INJECTION, SOLUTION INTRAMUSCULAR; INTRAVENOUS; SUBCUTANEOUS at 16:36

## 2021-08-31 RX ADMIN — Medication 5 MG: at 14:34

## 2021-08-31 RX ADMIN — ONDANSETRON 4 MG: 2 INJECTION INTRAMUSCULAR; INTRAVENOUS at 16:53

## 2021-08-31 RX ADMIN — PHENYLEPHRINE HYDROCHLORIDE 50 MCG: 10 INJECTION INTRAVENOUS at 15:17

## 2021-08-31 RX ADMIN — Medication 5 MG: at 14:20

## 2021-08-31 RX ADMIN — PROPOFOL 60 MG: 10 INJECTION, EMULSION INTRAVENOUS at 14:02

## 2021-08-31 RX ADMIN — Medication 5 MG: at 15:17

## 2021-08-31 RX ADMIN — SODIUM CHLORIDE, POTASSIUM CHLORIDE, SODIUM LACTATE AND CALCIUM CHLORIDE: 600; 310; 30; 20 INJECTION, SOLUTION INTRAVENOUS at 13:47

## 2021-08-31 RX ADMIN — GLYCOPYRROLATE 0.7 MG: 0.2 INJECTION, SOLUTION INTRAMUSCULAR; INTRAVENOUS at 15:23

## 2021-08-31 RX ADMIN — PHENYLEPHRINE HYDROCHLORIDE 50 MCG: 10 INJECTION INTRAVENOUS at 14:34

## 2021-08-31 ASSESSMENT — COPD QUESTIONNAIRES: COPD: 0

## 2021-08-31 ASSESSMENT — LIFESTYLE VARIABLES: TOBACCO_USE: 0

## 2021-08-31 ASSESSMENT — MIFFLIN-ST. JEOR: SCORE: 1486.97

## 2021-08-31 ASSESSMENT — ENCOUNTER SYMPTOMS
DYSRHYTHMIAS: 0
ORTHOPNEA: 0
SEIZURES: 0

## 2021-08-31 NOTE — DISCHARGE INSTRUCTIONS
**If you have questions or concerns about your procedure,   call Dr. Bergeron at [phone]**        Same Day Surgery Discharge Instructions for  Sedation and General Anesthesia       It's not unusual to feel dizzy, light-headed or faint for up to 24 hours after surgery or while taking pain medication.  If you have these symptoms: sit for a few minutes before standing and have someone assist you when you get up to walk or use the bathroom.      You should rest and relax for the next 24 hours. We recommend you make arrangements to have an adult stay with you for at least 24 hours after your discharge.  Avoid hazardous and strenuous activity.      DO NOT DRIVE any vehicle or operate mechanical equipment for 24 hours following the end of your surgery.  Even though you may feel normal, your reactions may be affected by the medication you have received.      Do not drink alcoholic beverages for 24 hours following surgery.       Slowly progress to your regular diet as you feel able. It's not unusual to feel nauseated and/or vomit after receiving anesthesia.  If you develop these symptoms, drink clear liquids (apple juice, ginger ale, broth, 7-up, etc. ) until you feel better.  If your nausea and vomiting persists for 24 hours, please notify your surgeon.        All narcotic pain medications, along with inactivity and anesthesia, can cause constipation. Drinking plenty of liquids and increasing fiber intake will help.      For any questions of a medical nature, call your surgeon.      Do not make important decisions for 24 hours.      If you had general anesthesia, you may have a sore throat for a couple of days related to the breathing tube used during surgery.  You may use Cepacol lozenges to help with this discomfort.  If it worsens or if you develop a fever, contact your surgeon.       If you feel your pain is not well managed with the pain medications prescribed by your surgeon, please contact your surgeon's office  to let them know so they can address your concerns.       CoVid 19 Information    We want to give you information regarding Covid. Please consult your primary care provider with any questions you might have.     Patient who have symptoms (cough, fever, or shortness of breath), need to isolate for 7 days from when symptoms started OR 72 hours after fever resolves (without fever reducing medications) AND improvement of respiratory symptoms (whichever is longer).      Isolate yourself at home (in own room/own bathroom if possible)    Do Not allow any visitors    Do Not go to work or school    Do Not go to [employer],  centers, shopping, or other public places.    Do Not shake hands.    Avoid close and intimate contact with others (hugging, kissing).    Follow CDC recommendations for household cleaning of frequently touched services.     After the initial 7 days, continue to isolate yourself from household members as much as possible. To continue decrease the risk of community spread and exposure, you and any members of your household should limit activities in public for 14 days after starting home isolation.     You can reference the following CDC link for helpful home isolation/care tips:  https://www.cdc.gov/coronavirus/2019-ncov/downloads/10Things.pdf    Protect Others:    Cover Your Mouth and Nose with a mask, disposable tissue or wash cloth to avoid spreading germs to others.    Wash your hands and face frequently with soap and water    Call Your Primary Doctor If: Breathing difficulty develops or you become worse.    For more information about COVID19 and options for caring for yourself at home, please visit the CDC website at https://www.cdc.gov/coronavirus/2019-ncov/about/steps-when-sick.html  For more options for care at [facility], please visit our website at [email or web address]      DISCHARGE INSTRUCTIONS FOLLOWING EXTRACORPOREAL SHOCK LITHOTRIPSY (ESWL)      Your  stone(s) has been fragmented into many tiny pieces, which must now pass in your urine.  Usually this process is uneventful.  Most fragments pass in the first one or two week, but some may continue to pass for three months or more.  Some pain or discomfort may accompany the passage of these fragments.    To aid in the passage of fragments, drink lots of fluid.  Aim for 1 glass an hour for the next 1 to 2 weeks (2 quarts or more a day).  Most stone patients will benefit from a continued high fluid intake and urine output indefinitely, even after the fragments are gone.  This helps prevent new stone formation.    Strain your urine.  Take the stone fragments to your urologist.  They will have them analyzed to help determine the cause of your stone(s).    You should walk around and resume every day activities.  Activity may help the stone fragments pass.  You should, however, avoid sports or really strenuous exercise for about a week, or at least until there is no more blood in your urine.    You may resume regular diet.    Call your urologist if you have:  a. Persistent severe pain not relieved by oral medications.  b. Fever (over 101 ).  c. Persistent vomiting.    See your urologist as directed.  They will need to take an x-ray to check your progress.  Take your stone fragments to your follow-up appointment.

## 2021-08-31 NOTE — ANESTHESIA CARE TRANSFER NOTE
Patient: Stanford Andersen    Procedure(s):  Bilateral extracorporeal shockwave lithotripsy  Cystoscopy with left ureteral stent placement    Diagnosis: Kidney stone [N20.0]  Diagnosis Additional Information: No value filed.    Anesthesia Type:   General     Note:    Oropharynx: oropharynx clear of all foreign objects and spontaneously breathing  Level of Consciousness: awake  Oxygen Supplementation: face mask  Level of Supplemental Oxygen (L/min / FiO2): 8  Independent Airway: airway patency satisfactory and stable  Dentition: dentition unchanged  Vital Signs Stable: post-procedure vital signs reviewed and stable  Report to RN Given: handoff report given  Patient transferred to: PACU  Comments: Neuromuscular blockade reversed after TOF 2/4, spontaneous respirations, adequate tidal volumes, oropharynx suctioned with soft flexible catheter, extubated atraumatically, extubated with suction, airway patent after extubation.  Oxygen via facemask at 8 liters per minute to PACU. Oxygen tubing connected to wall O2 in PACU, SpO2, NiBP, and EKG monitors and alarms on and functioning, report on patient's clinical status given to PACU RN, RN questions answered.     Handoff Report: Identifed the Patient, Identified the Reponsible Provider, Reviewed the pertinent medical history, Discussed the surgical course, Reviewed Intra-OP anesthesia mangement and issues during anesthesia, Set expectations for post-procedure period and Allowed opportunity for questions and acknowledgement of understanding      Vitals:  Vitals Value Taken Time   /98 08/31/21 1538   Temp     Pulse 83 08/31/21 1542   Resp 11 08/31/21 1542   SpO2 94 % 08/31/21 1542   Vitals shown include unvalidated device data.    Electronically Signed By: VARINDER Stallworth CRNA  August 31, 2021  3:42 PM

## 2021-08-31 NOTE — ANESTHESIA PREPROCEDURE EVALUATION
Anesthesia Pre-Procedure Evaluation    Patient: Stanford Andersen   MRN: [id number] : 1945        Preoperative Diagnosis: Kidney stone [N20.0]   Procedure : Procedure(s):  Bilateral extracorporeal shockwave lithotripsy  Cystoscopy with left ureteral stent placement     Past Medical History:   Diagnosis Date     Arthritis     hand     Chronic back pain      Complication of anesthesia     URINARY RETENTION, Sleepy after surgery     Gastro-oesophageal reflux disease      Hypertension      Renal disease     kidney stone     Zollinger-Patricio syndrome       Past Surgical History:   Procedure Laterality Date     ABDOMEN SURGERY      Exploratory laparoscopy     COMBINED CYSTOSCOPY, RETROGRADES, URETEROSCOPY, LASER HOLMIUM LITHOTRIPSY URETER(S), INSERT STENT Right 2019    Procedure: Video cystoscopy, exam under anesthesia, right ureteroscopy with holmium laser lithotripsy and stone extractions, right double-J stent (5-Arabic x 24 cm).;  Surgeon: Satinder Almanza MD;  Location: [facility] OR     HC EXCISE VARICOCELE  age 21     varicocele repair     HERNIORRHAPHY UMBILICAL N/A 2018    Procedure: OPEN UMBILLICAL HERNIA REPAIR ;  Surgeon: Ike Catalan MD;  Location: [facility] OR     KIDNEY SURGERY      lithotripsy x 3     LAPAROSCOPIC HERNIORRHAPHY INGUINAL Left 2016    Procedure: LAPAROSCOPIC HERNIORRHAPHY INGUINAL;  Surgeon: Ike Catalan MD;  Location:  [facility]     LITHOTRIPSY      three times     STOMACH SURGERY      exploratory scoping looking for tumors related to ZE syndrome      Allergies   Allergen Reactions     Dye [Contrast Dye] Anaphylaxis and Hives     Penicillins Anaphylaxis     Swelling, arm turned blue     Tramadol Itching     Oxycodone-Acetaminophen Rash      Social History     Tobacco Use     Smoking status: Never Smoker     Smokeless tobacco: Never Used   Substance Use Topics     Alcohol use: Yes     Comment: seldom      Wt Readings from Last 1 Encounters:   21 77.7 kg  (171 lb 6.4 oz)        Prior to Admission medications    Medication Sig Start Date End Date Taking? Authorizing Provider   amLODIPine (NORVASC) 2.5 MG tablet Take 1 tablet (2.5 mg) by mouth daily 7/21/21  Yes Arjun Burns MD   finasteride (PROSCAR) 5 MG tablet TAKE 1 TABLET(5 MG) BY MOUTH DAILY 5/10/21  Yes Arjun Burns MD   magnesium 250 MG tablet Take 1 tablet by mouth daily   Yes Reported, Patient   Omeprazole (PRILOSEC PO) Take 20 mg by mouth 3 times daily. Patient takes it in the morning, with dinner and at bedtime for Patricio-Zollinger syndrome. Patient takes Prilosec OTC.   Yes Reported, Patient   tamsulosin (FLOMAX) 0.4 MG capsule TAKE 1 CAPSULE(0.4 MG) BY MOUTH DAILY 6/17/21  Yes Arjun Burns MD       Recent Results (from the past 744 hour(s))   Cervical spine XR, 2-3 views    Narrative    CERVICAL SPINE TWO TO THREE VIEWS  8/13/2021 10:16 AM     COMPARISON: None    HISTORY: Right-sided neck pain.      Impression    IMPRESSION: Mild degenerative anterolisthesis of C4 upon C5, C5 upon  C6 and C7 upon T1. Mild left convex curvature centered at the mid  cervical spine. Alignment otherwise normal. Moderate facet arthropathy  throughout the cervical spine. Moderate degenerative endplate spurring  at C6-C7. No evidence for fracture.    MICAH YOUNGER MD         SYSTEM ID:  [id number]       Anesthesia Evaluation   Pt has had prior anesthetic. Type: General (Handy 2 = Grade 1 View previously).    History of anesthetic complications   slow to wake up per patient.    ROS/MED HX  ENT/Pulmonary:    (-) tobacco use, asthma, COPD, sleep apnea and recent URI   Neurologic:    (-) no seizures and no CVA   Cardiovascular:     (+) hypertension-----Previous cardiac testing   Echo: Date: Results:    Stress Test: Date: 2012 Results:  IMPRESSIONS:        I   Lexiscan Test   1.   Lexiscan infusion dictated under separate cover.   2.   ECG report done under separate cover.        II   Myocardial  Perfusion Scintigraphy   1.     Myocardial perfusion using single isotope technique appeared   normal.  Regadenoson did not induce any significant reversible   myocardial ischemia.   2.  Gating demonstrated normal left ventricular systolic contraction   and wall thickening, both at rest and post stress.     3.  The ejection fraction post stress was 54%.   4.  The left ventricular volumes were normal.   5.  Regadenoson did not induce any significant transient ischemic   dilatation of the left ventricle.   6.  No previous study was available for comparison.     ECG Reviewed: Date: 8/2021 Results:  NSR, no significant abnormalities  Cath: Date: Results:   (-) angina, CAD, CHF, ELLISON, orthopnea/PND, syncope, arrhythmias, irregular heartbeat/palpitations, valvular problems/murmurs, angina, murmur and wheezes   METS/Exercise Tolerance: >4 METS    Hematologic:       Musculoskeletal:       GI/Hepatic: Comment: Zollinger-Patricio Syndrome    (+) GERD, Asymptomatic on medication,  (-) liver disease   Renal/Genitourinary:     (+) Nephrolithiasis , BPH,  (-) renal disease   Endo:    (-) Type II DM, thyroid disease and chronic steroid usage   Psychiatric/Substance Use:    (-) alcohol abuse history   Infectious Disease:       Malignancy:       Other:            Physical Exam    Airway        Mallampati: I   TM distance: > 3 FB   Neck ROM: full   Mouth opening: > 3 cm    Respiratory Devices and Support         Dental       (+) caps      Cardiovascular          Rhythm and rate: regular and normal (-) no murmur    Pulmonary           breath sounds clear to auscultation   (-) no rhonchi and no wheezes        OUTSIDE LABS:  CBC:   Lab Results   Component Value Date    WBC 7.0 04/30/2021    WBC 7.3 12/10/2019    HGB 15.1 08/18/2021    HGB 14.7 04/30/2021    HCT 46.3 04/30/2021    HCT 49.4 12/10/2019     08/18/2021     04/30/2021     BMP:   Lab Results   Component Value Date     07/21/2021     04/30/2021     POTASSIUM 3.9 08/18/2021    POTASSIUM 3.8 07/21/2021    CHLORIDE 106 07/21/2021    CHLORIDE 107 04/30/2021    CO2 30 07/21/2021    CO2 30 04/30/2021    BUN 16 07/21/2021    BUN 18 04/30/2021    CR 0.93 07/21/2021    CR 0.95 04/30/2021     (H) 07/21/2021    GLC 94 04/30/2021     HEPATIC:   Lab Results   Component Value Date    ALBUMIN 3.5 04/30/2021    PROTTOTAL 6.6 (L) 04/30/2021    ALT 25 07/21/2021    AST 17 04/30/2021    ALKPHOS 69 04/30/2021    BILITOTAL 0.8 04/30/2021     OTHER:   Lab Results   Component Value Date    CLIFTON 9.3 07/21/2021       Anesthesia Plan    ASA Status:  2   NPO Status:  NPO Appropriate    Anesthesia Type: General.     - Airway: ETT   Induction: Propofol, Intravenous, RSI.   Maintenance: Balanced.        Consents    Anesthesia Plan(s) and associated risks, benefits, and realistic alternatives discussed. Questions answered and patient/representative(s) expressed understanding.     - Discussed with:  Patient         Postoperative Care    Pain management: Multi-modal analgesia.   PONV prophylaxis: Ondansetron (or other 5HT-3), Dexamethasone or Solumedrol     Comments:    ETT/RSI secondary to Z-E Syndrome History            Duane Elder MD

## 2021-08-31 NOTE — OP NOTE
Procedure Date: 08/31/2021    SURGEON:  Devan Bergeron MD    PREOPERATIVE DIAGNOSES:  Bilateral kidney stones.    POSTOPERATIVE DIAGNOSES:  Bilateral kidney stones.    PROCEDURES PERFORMED:  Cystoscopy, placement of left ureteral stent, bilateral extracorporeal shock wave lithotripsy.    ANESTHESIA:  General endotracheal.    COMPLICATIONS:  None.    INDICATIONS FOR PROCEDURE:  Stanford Andersen is a 76-year-old gentleman with bilateral kidney stones, who now presents for shockwave lithotripsy.  I recommended stent placed on the left side as well due to the stone burden on the left side.  All risks and benefits of the procedure were explained in detail to the patient and informed consent was obtained.      DESCRIPTION OF PROCEDURE:  The patient was brought to the operating room and placed supine on the operating room table, where he underwent general endotracheal anesthetic.  He was then moved down to the dorsal lithotomy position, where he was prepped and draped in standard sterile fashion.    The procedure was begun by introducing a 22-Togolese rigid cystoscope through the urethra and into the bladder to perform cystoscopy.  There were no urothelial abnormalities identified.  I cannulated the left ureteral orifice through the catheter.  I passed a Glidewire into the left kidney under fluoroscopic guidance.  I removed the ureteral catheter.  I then passed a 6 x 26 double-J ureteral stent over the wire.  The wire was pulled back and a good curl could be seen in the renal pelvis on fluoroscopy.  A good curl was seen in the bladder under direct visualization.  The bladder was drained, scope was removed.  I then brought the patient back to supine position.  I positioned him over the lithotripter.  I then delivered a total of 2400 shocks with maximum power of 7 to the left-sided stones.  They appeared to fragment partially.  I then repositioned the lithotripter and delivered another 2400 shocks with maximum level 6 to the  right side and these appeared to fragment partially as well.  The patient was woke up and the procedure was concluded.    The patient tolerated the procedure well without complications.  He went to post-anesthetic care unit in good condition.  He will go home from there.  I will see him back in 6 weeks for KUB and stent removal in the office.      Devan Bergeron MD        D: 2021   T: 2021   MT: [id number]    Name:     JOAQUIN CRUZ  MRN:      [id number]        Account:        [id number]   :      1945           Procedure Date: 2021     Document: [id number]

## 2021-09-01 NOTE — ANESTHESIA POSTPROCEDURE EVALUATION
Patient: Stanford Andersen    Procedure(s):  Bilateral extracorporeal shockwave lithotripsy  Cystoscopy with left ureteral stent placement    Diagnosis:Kidney stone [N20.0]  Diagnosis Additional Information: No value filed.    Anesthesia Type:  General    Note:  Disposition: Outpatient   Postop Pain Control: Uneventful            Sign Out: Well controlled pain   PONV: No   Neuro/Psych: Uneventful            Sign Out: Acceptable/Baseline neuro status   Airway/Respiratory: Uneventful            Sign Out: Acceptable/Baseline resp. status   CV/Hemodynamics: Uneventful            Sign Out: Acceptable CV status; No obvious hypovolemia; No obvious fluid overload   Other NRE: NONE   DID A NON-ROUTINE EVENT OCCUR? No           Last vitals:  Vitals Value Taken Time   /92 08/31/21 1630   Temp 35.8  C (96.5  F) 08/31/21 1538   Pulse 69 08/31/21 1643   Resp 12 08/31/21 1643   SpO2 92 % 08/31/21 1643   Vitals shown include unvalidated device data.    Electronically Signed By: Charles Epperson MD  August 31, 2021  10:21 PM

## 2021-09-04 ENCOUNTER — HEALTH MAINTENANCE LETTER (OUTPATIENT)
Age: 76
End: 2021-09-04

## 2021-09-09 ENCOUNTER — HOSPITAL ENCOUNTER (OUTPATIENT)
Dept: GENERAL RADIOLOGY | Facility: CLINIC | Age: 76
Discharge: HOME | End: 2021-09-09
Attending: UROLOGY | Admitting: UROLOGY
Payer: COMMERCIAL

## 2021-09-09 ENCOUNTER — OFFICE VISIT (OUTPATIENT)
Dept: UROLOGY | Facility: CLINIC | Age: 76
End: 2021-09-09
Payer: COMMERCIAL

## 2021-09-09 VITALS
OXYGEN SATURATION: 96 % | HEART RATE: 80 BPM | SYSTOLIC BLOOD PRESSURE: 130 MMHG | HEIGHT: 68 IN | WEIGHT: 175 LBS | DIASTOLIC BLOOD PRESSURE: 80 MMHG | BODY MASS INDEX: 26.52 KG/M2

## 2021-09-09 DIAGNOSIS — N20.0 KIDNEY STONE: Primary | ICD-10-CM

## 2021-09-09 DIAGNOSIS — N20.0 KIDNEY STONE: ICD-10-CM

## 2021-09-09 PROCEDURE — 74019 RADEX ABDOMEN 2 VIEWS: CPT

## 2021-09-09 PROCEDURE — 99024 POSTOP FOLLOW-UP VISIT: CPT | Performed by: UROLOGY

## 2021-09-09 RX ORDER — LIDOCAINE HYDROCHLORIDE 20 MG/ML
JELLY TOPICAL ONCE
Status: DISCONTINUED | OUTPATIENT
Start: 2021-09-09 | End: 2021-09-09

## 2021-09-09 ASSESSMENT — MIFFLIN-ST. JEOR: SCORE: 1503.29

## 2021-09-09 ASSESSMENT — PAIN SCALES - GENERAL: PAINLEVEL: NO PAIN (0)

## 2021-09-09 NOTE — LETTER
9/9/2021       RE: Stanford Andersen  [address]  [place] [address]     Dear Colleague,    Thank you for referring your patient, Stanford Andersen, to the [facility] at [facility]. Please see a copy of my visit note below.    Office Visit Note  [facility]  [phone]    UROLOGIC DIAGNOSES:   Bilateral kidney stones    CURRENT INTERVENTIONS:   S/P bilateral ESWL plus left stent    HISTORY:   Stanford returns to clinic today for kidney stone follow-up.  He reports feeling well since his bilateral ESWL.  The stent is not bothersome to him.      PAST MEDICAL HISTORY:   Past Medical History:   Diagnosis Date     Arthritis     hand     Chronic back pain      Complication of anesthesia     URINARY RETENTION, Sleepy after surgery     Gastro-oesophageal reflux disease      Hypertension      Renal disease     kidney stone     Zollinger-Patricio syndrome        PAST SURGICAL HISTORY:   Past Surgical History:   Procedure Laterality Date     ABDOMEN SURGERY  2003    Exploratory laparoscopy     COMBINED CYSTOSCOPY, INSERT STENT URETER(S) Left 8/31/2021    Procedure: Cystoscopy with left ureteral stent placement;  Surgeon: Devan Bergeron MD;  Location:  OR     COMBINED CYSTOSCOPY, RETROGRADES, URETEROSCOPY, LASER HOLMIUM LITHOTRIPSY URETER(S), INSERT STENT Right 5/23/2019    Procedure: Video cystoscopy, exam under anesthesia, right ureteroscopy with holmium laser lithotripsy and stone extractions, right double-J stent (5-Chinese x 24 cm).;  Surgeon: Satinder Almanza MD;  Location:  OR     EXTRACORPOREAL SHOCK WAVE LITHOTRIPSY (ESWL) Left 8/31/2021    Procedure: Bilateral extracorporeal shockwave lithotripsy;  Surgeon: Devan Bergeron MD;  Location:  OR     HC EXCISE VARICOCELE  age 21     varicocele repair     HERNIORRHAPHY UMBILICAL N/A 11/30/2018    Procedure: OPEN UMBILLICAL HERNIA REPAIR ;  Surgeon: Hossein  Ike Roach MD;  Location:  OR     KIDNEY SURGERY      lithotripsy x 3     LAPAROSCOPIC HERNIORRHAPHY INGUINAL Left 9/26/2016    Procedure: LAPAROSCOPIC HERNIORRHAPHY INGUINAL;  Surgeon: Ike Catalan MD;  Location:  SD     LITHOTRIPSY      three times     STOMACH SURGERY  2003    exploratory scoping looking for tumors related to ZE syndrome       FAMILY HISTORY:   Family History   Problem Relation Age of Onset     Heart Disease Mother         ablation     C.A.D. Mother      Heart Disease Father      Respiratory Father         COPD     C.A.D. Father      Spina bifida Son        SOCIAL HISTORY:   Social History     Socioeconomic History     Marital status:      Spouse name: None     Number of children: None     Years of education: None     Highest education level: None   Occupational History     None   Tobacco Use     Smoking status: Never Smoker     Smokeless tobacco: Never Used   Substance and Sexual Activity     Alcohol use: Yes     Comment: seldom     Drug use: No     Sexual activity: Not Currently   Other Topics Concern     Parent/sibling w/ CABG, MI or angioplasty before 65F 55M? No   Social History Narrative     None     Social Determinants of Health     Financial Resource Strain:      Difficulty of Paying Living Expenses:    Food Insecurity:      Worried About Running Out of Food in the Last Year:      Ran Out of Food in the Last Year:    Transportation Needs:      Lack of Transportation (Medical):      Lack of Transportation (Non-Medical):    Physical Activity:      Days of Exercise per Week:      Minutes of Exercise per Session:    Stress:      Feeling of Stress :    Social Connections:      Frequency of Communication with Friends and Family:      Frequency of Social Gatherings with Friends and Family:      Attends Faith Services:      Active Member of Clubs or Organizations:      Attends Club or Organization Meetings:      Marital Status:    Intimate Partner Violence:      Fear  "of Current or Ex-Partner:      Emotionally Abused:      Physically Abused:      Sexually Abused:        Review Of Systems:  Skin: No rash, pruritis, or skin pigmentation  Eyes: No changes in vision  Ears/Nose/Throat: No changes in hearing, no nosebleeds  Respiratory: No shortness of breath, dyspnea on exertion, cough, or hemoptysis  Cardiovascular: No chest pain or palpitations  Gastrointestinal: No diarrhea or constipation. No abdominal pain. No hematochezia  Genitourinary: see HPI  Musculoskeletal: No pain or swelling of joints, normal range of motion  Neurologic: No weakness or tremors  Psychiatric: No recent changes in memory or mood  Hematologic/Lymphatic/Immunologic: No easy bruising or enlarged lymph nodes  Endocrine: No weight gain or loss      PHYSICAL EXAM:    /80   Pulse 80   Ht 1.727 m (5' 8\")   Wt 79.4 kg (175 lb)   SpO2 96%   BMI 26.61 kg/m      Constitutional: Well developed. Conversant and in no acute distress  Eyes: Anicteric sclera, conjunctiva clear, normal extraocular movements  ENT: Normocephalic and atraumatic,   Skin: Warm and dry. No rashes or lesions  Cardiac: No peripheral edema  Back/Flank: Not done  CNS/PNS: Normal musculature and movements, moves all extremities normally  Respiratory: Normal non-labored breathing  Abdomen: Soft nontender and nondistended  Peripheral Vascular: No peripheral edema  Mental Status/Psych: Alert and Oriented x 3. Normal mood and affect    Penis: Not done  Scrotal Skin: Not done  Testicles: Not done  Epididymis: Not done  Digital Rectal Exam:     Cystoscopy: Not done    Imaging: I reviewed his KUB images and the right-sided stones are gone.  The left-sided stent is in good position.  The stones on the left side appear relatively unchanged.    Urinalysis: UA RESULTS:  Recent Labs   Lab Test 07/19/21  1449 12/10/19  1047   COLOR Yellow Yellow   APPEARANCE Clear Clear   URINEGLC Negative Negative   URINEBILI Negative Negative   URINEKETONE Negative " Negative   SG <=1.005 1.015   UBLD Trace* Negative   URINEPH 5.5 6.0   PROTEIN Negative Negative   UROBILINOGEN 0.2 0.2   NITRITE Negative Negative   LEUKEST Negative Negative   RBCU  --  O - 2   WBCU  --  0 - 5       PSA:     Post Void Residual:     Other labs: None today      IMPRESSION:  Left-sided kidney stones    PLAN:  We discussed his KUB findings.  Unfortunately, the stones on the left side appear relatively unchanged.  I recommended that we return to the operating room for a left-sided ureteroscopy, holmium laser lithotripsy, stone basketing and stent replacement.  I discussed the surgery in detail today along with its risks and expected recovery.  He wishes to proceed.  The stent is not bothersome to him at this time so we will simply leave the stent in until surgery date.      Devan Bergeron M.D.

## 2021-09-09 NOTE — NURSING NOTE
Chief Complaint   Patient presents with     Left ureteral stone     Here for a in office cystoscopy for a stent removal     Prior to the start of the procedure and with procedural staff participation, I verbally confirmed the patient s identity using two indicators, relevant allergies, that the procedure was appropriate and matched the consent or emergent situation, and that the correct equipment/implants were available. Immediately prior to starting the procedure I conducted the Time Out with the procedural staff and re-confirmed the patient s name, procedure, and site/side. I have wiped the patient off with the povidone-Iodine solution, draped them,  used Lidocaine hydrochloride jelly, and instilled sterile water into the bladder. (The Joint Commission universal protocol was followed.)  Yes    Sedation (Moderate or Deep): Urojet    5mL 2% lidocaine hydrochloride Urojet instilled into urethra.    NDC# [address]-1  Lot #: [id number]  Expiration Date:  7-22    Cindy Mendez

## 2021-09-09 NOTE — PROGRESS NOTES
Office Visit Note  [facility]  [phone]    UROLOGIC DIAGNOSES:   Bilateral kidney stones    CURRENT INTERVENTIONS:   S/P bilateral ESWL plus left stent    HISTORY:   Stanford returns to clinic today for kidney stone follow-up.  He reports feeling well since his bilateral ESWL.  The stent is not bothersome to him.      PAST MEDICAL HISTORY:   Past Medical History:   Diagnosis Date     Arthritis     hand     Chronic back pain      Complication of anesthesia     URINARY RETENTION, Sleepy after surgery     Gastro-oesophageal reflux disease      Hypertension      Renal disease     kidney stone     Zollinger-Patricio syndrome        PAST SURGICAL HISTORY:   Past Surgical History:   Procedure Laterality Date     ABDOMEN SURGERY  2003    Exploratory laparoscopy     COMBINED CYSTOSCOPY, INSERT STENT URETER(S) Left 8/31/2021    Procedure: Cystoscopy with left ureteral stent placement;  Surgeon: Devan Bergeron MD;  Location:  OR     COMBINED CYSTOSCOPY, RETROGRADES, URETEROSCOPY, LASER HOLMIUM LITHOTRIPSY URETER(S), INSERT STENT Right 5/23/2019    Procedure: Video cystoscopy, exam under anesthesia, right ureteroscopy with holmium laser lithotripsy and stone extractions, right double-J stent (5-Brazilian x 24 cm).;  Surgeon: Satinder Almanza MD;  Location:  OR     EXTRACORPOREAL SHOCK WAVE LITHOTRIPSY (ESWL) Left 8/31/2021    Procedure: Bilateral extracorporeal shockwave lithotripsy;  Surgeon: Devan Bergeron MD;  Location:  OR     HC EXCISE VARICOCELE  age 21     varicocele repair     HERNIORRHAPHY UMBILICAL N/A 11/30/2018    Procedure: OPEN UMBILLICAL HERNIA REPAIR ;  Surgeon: Ike Catalan MD;  Location:  OR     KIDNEY SURGERY      lithotripsy x 3     LAPAROSCOPIC HERNIORRHAPHY INGUINAL Left 9/26/2016    Procedure: LAPAROSCOPIC HERNIORRHAPHY INGUINAL;  Surgeon: Ike Catalan MD;  Location:  SD     LITHOTRIPSY      three times     STOMACH SURGERY  2003    exploratory  scoping looking for tumors related to ZE syndrome       FAMILY HISTORY:   Family History   Problem Relation Age of Onset     Heart Disease Mother         ablation     C.A.D. Mother      Heart Disease Father      Respiratory Father         COPD     C.A.D. Father      Spina bifida Son        SOCIAL HISTORY:   Social History     Socioeconomic History     Marital status:      Spouse name: None     Number of children: None     Years of education: None     Highest education level: None   Occupational History     None   Tobacco Use     Smoking status: Never Smoker     Smokeless tobacco: Never Used   Substance and Sexual Activity     Alcohol use: Yes     Comment: seldom     Drug use: No     Sexual activity: Not Currently   Other Topics Concern     Parent/sibling w/ CABG, MI or angioplasty before 65F 55M? No   Social History Narrative     None     Social Determinants of Health     Financial Resource Strain:      Difficulty of Paying Living Expenses:    Food Insecurity:      Worried About Running Out of Food in the Last Year:      Ran Out of Food in the Last Year:    Transportation Needs:      Lack of Transportation (Medical):      Lack of Transportation (Non-Medical):    Physical Activity:      Days of Exercise per Week:      Minutes of Exercise per Session:    Stress:      Feeling of Stress :    Social Connections:      Frequency of Communication with Friends and Family:      Frequency of Social Gatherings with Friends and Family:      Attends L[employer]-day Services:      Active Member of Clubs or Organizations:      Attends Club or Organization Meetings:      Marital Status:    Intimate Partner Violence:      Fear of Current or Ex-Partner:      Emotionally Abused:      Physically Abused:      Sexually Abused:        Review Of Systems:  Skin: No rash, pruritis, or skin pigmentation  Eyes: No changes in vision  Ears/Nose/Throat: No changes in hearing, no nosebleeds  Respiratory: No shortness of breath, dyspnea on  "exertion, cough, or hemoptysis  Cardiovascular: No chest pain or palpitations  Gastrointestinal: No diarrhea or constipation. No abdominal pain. No hematochezia  Genitourinary: see HPI  Musculoskeletal: No pain or swelling of joints, normal range of motion  Neurologic: No weakness or tremors  Psychiatric: No recent changes in memory or mood  Hematologic/Lymphatic/Immunologic: No easy bruising or enlarged lymph nodes  Endocrine: No weight gain or loss      PHYSICAL EXAM:    /80   Pulse 80   Ht 1.727 m (5' 8\")   Wt 79.4 kg (175 lb)   SpO2 96%   BMI 26.61 kg/m      Constitutional: Well developed. Conversant and in no acute distress  Eyes: Anicteric sclera, conjunctiva clear, normal extraocular movements  ENT: Normocephalic and atraumatic,   Skin: Warm and dry. No rashes or lesions  Cardiac: No peripheral edema  Back/Flank: Not done  CNS/PNS: Normal musculature and movements, moves all extremities normally  Respiratory: Normal non-labored breathing  Abdomen: Soft nontender and nondistended  Peripheral Vascular: No peripheral edema  Mental Status/Psych: Alert and Oriented x 3. Normal mood and affect    Penis: Not done  Scrotal Skin: Not done  Testicles: Not done  Epididymis: Not done  Digital Rectal Exam:     Cystoscopy: Not done    Imaging: I reviewed his KUB images and the right-sided stones are gone.  The left-sided stent is in good position.  The stones on the left side appear relatively unchanged.    Urinalysis: UA RESULTS:  Recent Labs   Lab Test 07/19/21  1449 12/10/19  1047   COLOR Yellow Yellow   APPEARANCE Clear Clear   URINEGLC Negative Negative   URINEBILI Negative Negative   URINEKETONE Negative Negative   SG <=1.005 1.015   UBLD Trace* Negative   URINEPH 5.5 6.0   PROTEIN Negative Negative   UROBILINOGEN 0.2 0.2   NITRITE Negative Negative   LEUKEST Negative Negative   RBCU  --  O - 2   WBCU  --  0 - 5       PSA:     Post Void Residual:     Other labs: None today      IMPRESSION:  Left-sided " kidney stones    PLAN:  We discussed his KUB findings.  Unfortunately, the stones on the left side appear relatively unchanged.  I recommended that we return to the operating room for a left-sided ureteroscopy, holmium laser lithotripsy, stone basketing and stent replacement.  I discussed the surgery in detail today along with its risks and expected recovery.  He wishes to proceed.  The stent is not bothersome to him at this time so we will simply leave the stent in until surgery date.      Devan Bergeron M.D.

## 2021-09-10 DIAGNOSIS — Z11.59 ENCOUNTER FOR SCREENING FOR OTHER VIRAL DISEASES: ICD-10-CM

## 2021-09-26 ENCOUNTER — LAB (OUTPATIENT)
Dept: URGENT CARE | Facility: URGENT CARE | Age: 76
End: 2021-09-26
Payer: COMMERCIAL

## 2021-09-26 DIAGNOSIS — Z11.59 ENCOUNTER FOR SCREENING FOR OTHER VIRAL DISEASES: ICD-10-CM

## 2021-09-26 PROCEDURE — U0005 INFEC AGEN DETEC AMPLI PROBE: HCPCS

## 2021-09-26 PROCEDURE — U0003 INFECTIOUS AGENT DETECTION BY NUCLEIC ACID (DNA OR RNA); SEVERE ACUTE RESPIRATORY SYNDROME CORONAVIRUS 2 (SARS-COV-2) (CORONAVIRUS DISEASE [COVID-19]), AMPLIFIED PROBE TECHNIQUE, MAKING USE OF HIGH THROUGHPUT TECHNOLOGIES AS DESCRIBED BY CMS-2020-01-R: HCPCS

## 2021-09-27 ENCOUNTER — OFFICE VISIT (OUTPATIENT)
Dept: INTERNAL MEDICINE | Facility: CLINIC | Age: 76
End: 2021-09-27
Payer: COMMERCIAL

## 2021-09-27 VITALS
SYSTOLIC BLOOD PRESSURE: 124 MMHG | OXYGEN SATURATION: 96 % | HEIGHT: 68 IN | WEIGHT: 174.5 LBS | TEMPERATURE: 98.4 F | HEART RATE: 84 BPM | BODY MASS INDEX: 26.45 KG/M2 | RESPIRATION RATE: 18 BRPM | DIASTOLIC BLOOD PRESSURE: 76 MMHG

## 2021-09-27 DIAGNOSIS — E78.2 MIXED HYPERLIPIDEMIA: Chronic | ICD-10-CM

## 2021-09-27 DIAGNOSIS — I10 ESSENTIAL HYPERTENSION: Chronic | ICD-10-CM

## 2021-09-27 DIAGNOSIS — Z23 NEED FOR PROPHYLACTIC VACCINATION AND INOCULATION AGAINST INFLUENZA: ICD-10-CM

## 2021-09-27 DIAGNOSIS — N20.0 RECURRENT KIDNEY STONES: ICD-10-CM

## 2021-09-27 DIAGNOSIS — Z01.818 PREOP GENERAL PHYSICAL EXAM: Primary | ICD-10-CM

## 2021-09-27 LAB — SARS-COV-2 RNA RESP QL NAA+PROBE: NEGATIVE

## 2021-09-27 PROCEDURE — 90662 IIV NO PRSV INCREASED AG IM: CPT | Performed by: PHYSICIAN ASSISTANT

## 2021-09-27 PROCEDURE — 99214 OFFICE O/P EST MOD 30 MIN: CPT | Mod: 25 | Performed by: PHYSICIAN ASSISTANT

## 2021-09-27 PROCEDURE — G0008 ADMIN INFLUENZA VIRUS VAC: HCPCS | Performed by: PHYSICIAN ASSISTANT

## 2021-09-27 ASSESSMENT — MIFFLIN-ST. JEOR: SCORE: 1501.03

## 2021-09-27 NOTE — PROGRESS NOTES
[address] [address]  Phone: [phone]  Primary Provider: Arjun Burns  Pre-op Performing Provider: NANY AUSTIN      PREOPERATIVE EVALUATION:  Today's date: 9/27/2021    Stanford Andersen is a 76 year old male who presents for a preoperative evaluation.    Surgical Information:  Surgery/Procedure: Cystoscopy, left ureteral stent exchange, left ureteroscopy with holmium laser lithotripsy and stone basketing.  Surgery Location: [facility]   Surgeon: Dr Bergeron  Surgery Date: 09/30/2021  Time of Surgery: 1130AM  Where patient plans to recover: At home with family  Fax number for surgical facility: Note does not need to be faxed, will be available electronically in Epic.    Type of Anesthesia Anticipated: General    Assessment & Plan     The proposed surgical procedure is considered INTERMEDIATE risk.    Preop general physical exam      Recurrent kidney stones      Essential hypertension      Mixed hyperlipidemia      Need for prophylactic vaccination and inoculation against influenza    - INFLUENZA, QUAD, HIGH DOSE, PF, 65YR + (FLUZONE HD)      Risks and Recommendations:  The patient has the following additional risks and recommendations for perioperative complications:   - No identified additional risk factors other than previously addressed    Medication Instructions:  Patient is to take all scheduled medications on the day of surgery EXCEPT for modifications listed below:   - aspirin: Discontinue aspirin 7-10 days prior to procedure to reduce bleeding risk. It should be resumed postoperatively.    - Calcium Channel Blockers: May be continued on the day of surgery.    RECOMMENDATION:  APPROVAL GIVEN to proceed with proposed procedure, without further diagnostic evaluation.                      Subjective     HPI related to upcoming procedure: recurrent kidney stones       Preop Questions 8/18/2021   1. Have you ever  had a heart attack or stroke? No   2. Have you ever had surgery on your heart or blood vessels, such as a stent placement, a coronary artery bypass, or surgery on an artery in your head, neck, heart, or legs? UNKNOWN -    3. Do you have chest pain with activity? No   4. Do you have a history of  heart failure? No   5. Do you currently have a cold, bronchitis or symptoms of other infection? No   6. Do you have a cough, shortness of breath, or wheezing? YES - mild intermittent   No acute coughing   7. Do you or anyone in your family have previous history of blood clots? No   8. Do you or does anyone in your family have a serious bleeding problem such as prolonged bleeding following surgeries or cuts? No   9. Have you ever had problems with anemia or been told to take iron pills? No   10. Have you had any abnormal blood loss such as black, tarry or bloody stools? No recent issues   11. Have you ever had a blood transfusion? No   12. Are you willing to have a blood transfusion if it is medically needed before, during, or after your surgery? Yes   13. Have you or any of your relatives ever had problems with anesthesia? NO   14. Do you have sleep apnea, excessive snoring or daytime drowsiness? No   15. Do you have any artifical heart valves or other implanted medical devices like a pacemaker, defibrillator, or continuous glucose monitor? No   16. Do you have artificial joints? No   17. Are you allergic to latex? No     Health Care Directive:  Patient does not have a Health Care Directive or Living Will:     Preoperative Review of :   reviewed - no record of controlled substances prescribed.      Status of Chronic Conditions:  HYPERLIPIDEMIA - Patient has a long history of significant Hyperlipidemia requiring medication for treatment with recent good control. Patient reports no problems or side effects with the medication.     HYPERTENSION - Patient has longstanding history of HTN , currently denies any symptoms  referable to elevated blood pressure. Specifically denies chest pain, palpitations, dyspnea, orthopnea, PND or peripheral edema. Blood pressure readings have been in normal range. Current medication regimen is as listed below. Patient denies any side effects of medication.       Review of Systems  CONSTITUTIONAL: NEGATIVE for fever, chills, change in weight  INTEGUMENTARY/SKIN: NEGATIVE for worrisome rashes, moles or lesions  ENT/MOUTH: NEGATIVE for ear, mouth and throat problems  RESP: NEGATIVE for significant cough or SOB  CV: NEGATIVE for chest pain, palpitations or peripheral edema  GI: NEGATIVE for nausea, abdominal pain, heartburn, or change in bowel habits  HEME/ALLERGY/IMMUNE: NEGATIVE for bleeding problems  PSYCHIATRIC: NEGATIVE for changes in mood or affect  ROS otherwise negative    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     Priority: Medium     Left       Recurrent kidney stones 05/03/2019     Priority: Medium     Nocturia 04/16/2019     Priority: Medium     Erectile dysfunction, unspecified erectile dysfunction type 10/08/2018     Priority: Medium     Ventral hernia without obstruction or gangrene 10/08/2018     Priority: Medium     Numbness and tingling of both feet 10/08/2018     Priority: Medium     Stress 08/02/2017     Priority: Medium     ACP (advance care planning) 12/21/2015     Priority: Medium     Advance Care Planning 12/21/2015: ACP Review of Chart / Resources Provided:  Reviewed chart for advance care plan.  Stanford Andersen has no plan or code status on file. Discussed available resources and provided with information. . Added by Zee Stewart             Essential hypertension 02/05/2014     Priority: Medium     Hyperlipidemia 09/04/2013     Priority: Medium     Family history of coronary artery disease 09/04/2013     Priority: Medium     Change in bowel function 09/04/2013     Priority: Medium     Zollinger-Patricio syndrome      Priority:  Medium     Health Care Home 03/02/2012     Priority: Medium     [facility] for .  Clara Daniels RN-BC    [phone]   DX V65.8 REPLACED WITH 71925 HEALTH CARE HOME (04/08/2013)       Chest pain 03/02/2012     Priority: Medium      Past Medical History:   Diagnosis Date     Arthritis     hand     Chronic back pain      Complication of anesthesia     URINARY RETENTION, Sleepy after surgery     Gastro-oesophageal reflux disease      Hypertension      Renal disease     kidney stone     Zollinger-Patricio syndrome      Past Surgical History:   Procedure Laterality Date     ABDOMEN SURGERY  2003    Exploratory laparoscopy     COMBINED CYSTOSCOPY, INSERT STENT URETER(S) Left 8/31/2021    Procedure: Cystoscopy with left ureteral stent placement;  Surgeon: Devan Bergeron MD;  Location:  OR     COMBINED CYSTOSCOPY, RETROGRADES, URETEROSCOPY, LASER HOLMIUM LITHOTRIPSY URETER(S), INSERT STENT Right 5/23/2019    Procedure: Video cystoscopy, exam under anesthesia, right ureteroscopy with holmium laser lithotripsy and stone extractions, right double-J stent (5-Jamaican x 24 cm).;  Surgeon: Satinder Almanza MD;  Location:  OR     EXTRACORPOREAL SHOCK WAVE LITHOTRIPSY (ESWL) Left 8/31/2021    Procedure: Bilateral extracorporeal shockwave lithotripsy;  Surgeon: Devan Bergeron MD;  Location:  OR     HC EXCISE VARICOCELE  age 21     varicocele repair     HERNIORRHAPHY UMBILICAL N/A 11/30/2018    Procedure: OPEN UMBILLICAL HERNIA REPAIR ;  Surgeon: Ike Catalan MD;  Location:  OR     KIDNEY SURGERY      lithotripsy x 3     LAPAROSCOPIC HERNIORRHAPHY INGUINAL Left 9/26/2016    Procedure: LAPAROSCOPIC HERNIORRHAPHY INGUINAL;  Surgeon: Ike Catalan MD;  Location:  [facility]     LITHOTRIPSY      three times     STOMACH SURGERY  2003    exploratory scoping looking for tumors related to ZE syndrome     Current Outpatient Medications   Medication Sig Dispense Refill     amLODIPine  "(NORVASC) 2.5 MG tablet Take 1 tablet (2.5 mg) by mouth daily 90 tablet 3     finasteride (PROSCAR) 5 MG tablet TAKE 1 TABLET(5 MG) BY MOUTH DAILY 90 tablet 2     Omeprazole (PRILOSEC PO) Take 20 mg by mouth 3 times daily. Patient takes it in the morning, with dinner and at bedtime for Patricio-Zollinger syndrome. Patient takes Prilosec OTC.       tamsulosin (FLOMAX) 0.4 MG capsule TAKE 1 CAPSULE(0.4 MG) BY MOUTH DAILY 90 capsule 2       Allergies   Allergen Reactions     Dye [Contrast Dye] Anaphylaxis and Hives     Penicillins Anaphylaxis     Swelling, arm turned blue     Tramadol Itching     Oxycodone-Acetaminophen Rash        Social History     Tobacco Use     Smoking status: Never Smoker     Smokeless tobacco: Never Used   Substance Use Topics     Alcohol use: Yes     Comment: seldom       History   Drug Use No         Objective     /76   Pulse 84   Temp 98.4  F (36.9  C) (Tympanic)   Resp 18   Ht 1.727 m (5' 8\")   Wt 79.2 kg (174 lb 8 oz)   SpO2 96%   BMI 26.53 kg/m      Physical Exam  GENERAL APPEARANCE: healthy, alert and no distress  HENT: ear canals and TM's normal and nose and mouth without ulcers or lesions  RESP: lungs clear to auscultation - no rales, rhonchi or wheezes  CV: regular rate and rhythm, normal S1 S2, no S3 or S4 and no murmur, click or rub   MS: extremities normal- no gross deformities noted  SKIN: no suspicious lesions or rashes  NEURO: Normal strength and tone, sensory exam grossly normal, mentation intact and speech normal  PSYCH: mentation appears normal and affect normal/bright    Recent Labs   Lab Test 08/18/21  1610 07/21/21  1020 04/30/21  0847   HGB 15.1  --  14.7     --  223   NA  --  137 139   POTASSIUM 3.9 3.8 3.7   CR  --  0.93 0.95        Diagnostics:  No labs were ordered during this visit.   No EKG this visit, completed in the last 90 days.    Revised Cardiac Risk Index (RCRI):  The patient has the following serious cardiovascular risks for perioperative " complications:   - No serious cardiac risks = 0 points     RCRI Interpretation: 0 points: Class I (very low risk - 0.4% complication rate)           Signed Electronically by: Abena Hoffmann PA-C  Copy of this evaluation report is provided to requesting physician.

## 2021-09-27 NOTE — PATIENT INSTRUCTIONS
Take usual morning medications with a sip of water    Preparing for Your Surgery  Getting started  A nurse will call you to review your health history and instructions. They will give you an arrival time based on your scheduled surgery time.  Please be ready to share the following:    Your doctor's clinic name and phone number    Your medical, surgical and anesthesia history    A list of allergies and sensitivities    A list of medicines, including herbal treatments and over-the-counter drugs    Whether the patient has a legal guardian (ask how to send us the papers in advance)  If you have a child who's having surgery, please ask for a copy of Preparing for Your Child's Surgery.    Preparing for surgery    Within 30 days of surgery: Have a pre-op exam (sometimes called an H&P, or History and Physical). This can be done at a clinic or pre-operative center.  ? If you're having a , you may not need this exam. Talk to your care team    At your pre-op exam, talk to your care team about all medicines you take. If you need to stop any medicines before surgery, ask when to start taking them again.  ? We do this for your safety. Many medicines can make you bleed too much during surgery. Some change how well surgery (anesthesia) drugs work.    Call your insurance company to let them know you're having surgery. (If you don't have insurance, call [phone].)    Call your clinic if there's any change in your health. This includes signs of a cold or flu (sore throat, runny nose, cough, rash, fever). It also includes a scrape or scratch near the surgery site.    If you have questions on the day of surgery, call your hospital or surgery center.  Eating and drinking guidelines  For your safety: Unless your surgeon tells you otherwise, follow the guidelines below.    Eat and drink as usual until 8 hours before surgery. After that, no food or milk.    Drink clear liquids until 2 hours before surgery. These are liquids you  can see through, like water, Gatorade and Propel Water. You may also have black coffee and tea (no cream or milk).    Nothing by mouth within 2 hours of surgery. This includes gum, candy and breath mints.    If you drink, stop drinking alcohol the night before surgery.    If your care team tells you to take medicine on the morning of surgery, it's okay to take it with a sip of water.  Preventing infection    Shower or bathe the night before and morning of your surgery. Follow the instructions your clinic gave you. (If no instructions, use regular soap.)    Don't shave or clip hair near your surgery site. We'll remove the hair if needed.    Don't smoke or vape the morning of surgery. You may chew nicotine gum up to 2 hours before surgery. A nicotine patch is okay.  ? Note: Some surgeries require you to completely quit smoking and nicotine. Check with your surgeon.    Your care team will make every effort to keep you safe from infection. We will:  ? Clean our hands often with soap and water (or an alcohol-based hand rub).  ? Clean the skin at your surgery site with a special soap that kills germs.  ? Give you a special gown to keep you warm. (Cold raises the risk of infection.)  ? Wear special hair covers, masks, gowns and gloves during surgery.  ? Give antibiotic medicine, if prescribed. Not all surgeries need antibiotics.  What to bring on the day of surgery    Photo ID and insurance card    Copy of your health care directive, if you have one    Glasses and hearing aides (bring cases)  ? You can't wear contacts during surgery    Inhaler and eye drops, if you use them (tell us about these when you arrive)    CPAP machine or breathing device, if you use them    A few personal items, if spending the night    If you have . . .  ? A pacemaker or ICD (cardiac defibrillator): Bring the ID card.  ? An implanted stimulator: Bring the remote control.  ? A legal guardian: Bring a copy of the certified (court-stamped)  guardianship papers.  Please remove any jewelry, including body piercings. Leave jewelry and other valuables at home.  If you're going home the day of surgery  Important: If you don't follow the rules below, we must cancel your surgery.     Arrange for someone to drive you home after surgery. You may not drive, take a taxi or take public transportation by yourself (unless you'll have local anesthesia only).    Arrange for a responsible adult to stay with you overnight. If you don't, we may keep you in the hospital overnight, and you may need to pay the costs yourself.  Questions?   If you have any questions for your care team, list them here: _________________________________________________________________________________________________________________________________________________________________________________________________________________________________________________________________________________________________________________________  For informational purposes only. Not to replace the advice of your health care provider. Copyright   2003, 2019 [employer]. All rights reserved. Clinically reviewed by Marie Chavarria MD. [employer] [id number] - REV 4/20.

## 2021-09-27 NOTE — H&P (VIEW-ONLY)
[address] [address]  Phone: [phone]  Primary Provider: Arjun Burns  Pre-op Performing Provider: NANY AUSTIN      PREOPERATIVE EVALUATION:  Today's date: 9/27/2021    Stanford Andersen is a 76 year old male who presents for a preoperative evaluation.    Surgical Information:  Surgery/Procedure: Cystoscopy, left ureteral stent exchange, left ureteroscopy with holmium laser lithotripsy and stone basketing.  Surgery Location: [facility]   Surgeon: Dr Bergeron  Surgery Date: 09/30/2021  Time of Surgery: 1130AM  Where patient plans to recover: At home with family  Fax number for surgical facility: Note does not need to be faxed, will be available electronically in Epic.    Type of Anesthesia Anticipated: General    Assessment & Plan     The proposed surgical procedure is considered INTERMEDIATE risk.    Preop general physical exam      Recurrent kidney stones      Essential hypertension      Mixed hyperlipidemia      Need for prophylactic vaccination and inoculation against influenza    - INFLUENZA, QUAD, HIGH DOSE, PF, 65YR + (FLUZONE HD)      Risks and Recommendations:  The patient has the following additional risks and recommendations for perioperative complications:   - No identified additional risk factors other than previously addressed    Medication Instructions:  Patient is to take all scheduled medications on the day of surgery EXCEPT for modifications listed below:   - aspirin: Discontinue aspirin 7-10 days prior to procedure to reduce bleeding risk. It should be resumed postoperatively.    - Calcium Channel Blockers: May be continued on the day of surgery.    RECOMMENDATION:  APPROVAL GIVEN to proceed with proposed procedure, without further diagnostic evaluation.                      Subjective     HPI related to upcoming procedure: recurrent kidney stones       Preop Questions 8/18/2021   1. Have you ever  had a heart attack or stroke? No   2. Have you ever had surgery on your heart or blood vessels, such as a stent placement, a coronary artery bypass, or surgery on an artery in your head, neck, heart, or legs? UNKNOWN -    3. Do you have chest pain with activity? No   4. Do you have a history of  heart failure? No   5. Do you currently have a cold, bronchitis or symptoms of other infection? No   6. Do you have a cough, shortness of breath, or wheezing? YES - mild intermittent   No acute coughing   7. Do you or anyone in your family have previous history of blood clots? No   8. Do you or does anyone in your family have a serious bleeding problem such as prolonged bleeding following surgeries or cuts? No   9. Have you ever had problems with anemia or been told to take iron pills? No   10. Have you had any abnormal blood loss such as black, tarry or bloody stools? No recent issues   11. Have you ever had a blood transfusion? No   12. Are you willing to have a blood transfusion if it is medically needed before, during, or after your surgery? Yes   13. Have you or any of your relatives ever had problems with anesthesia? NO   14. Do you have sleep apnea, excessive snoring or daytime drowsiness? No   15. Do you have any artifical heart valves or other implanted medical devices like a pacemaker, defibrillator, or continuous glucose monitor? No   16. Do you have artificial joints? No   17. Are you allergic to latex? No     Health Care Directive:  Patient does not have a Health Care Directive or Living Will:     Preoperative Review of :   reviewed - no record of controlled substances prescribed.      Status of Chronic Conditions:  HYPERLIPIDEMIA - Patient has a long history of significant Hyperlipidemia requiring medication for treatment with recent good control. Patient reports no problems or side effects with the medication.     HYPERTENSION - Patient has longstanding history of HTN , currently denies any symptoms  referable to elevated blood pressure. Specifically denies chest pain, palpitations, dyspnea, orthopnea, PND or peripheral edema. Blood pressure readings have been in normal range. Current medication regimen is as listed below. Patient denies any side effects of medication.       Review of Systems  CONSTITUTIONAL: NEGATIVE for fever, chills, change in weight  INTEGUMENTARY/SKIN: NEGATIVE for worrisome rashes, moles or lesions  ENT/MOUTH: NEGATIVE for ear, mouth and throat problems  RESP: NEGATIVE for significant cough or SOB  CV: NEGATIVE for chest pain, palpitations or peripheral edema  GI: NEGATIVE for nausea, abdominal pain, heartburn, or change in bowel habits  HEME/ALLERGY/IMMUNE: NEGATIVE for bleeding problems  PSYCHIATRIC: NEGATIVE for changes in mood or affect  ROS otherwise negative    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     Priority: Medium     Left       Recurrent kidney stones 05/03/2019     Priority: Medium     Nocturia 04/16/2019     Priority: Medium     Erectile dysfunction, unspecified erectile dysfunction type 10/08/2018     Priority: Medium     Ventral hernia without obstruction or gangrene 10/08/2018     Priority: Medium     Numbness and tingling of both feet 10/08/2018     Priority: Medium     Stress 08/02/2017     Priority: Medium     ACP (advance care planning) 12/21/2015     Priority: Medium     Advance Care Planning 12/21/2015: ACP Review of Chart / Resources Provided:  Reviewed chart for advance care plan.  Stanford Andersen has no plan or code status on file. Discussed available resources and provided with information. . Added by Zee Stewart             Essential hypertension 02/05/2014     Priority: Medium     Hyperlipidemia 09/04/2013     Priority: Medium     Family history of coronary artery disease 09/04/2013     Priority: Medium     Change in bowel function 09/04/2013     Priority: Medium     Zollinger-Patricio syndrome      Priority:  Medium     Health Care Home 03/02/2012     Priority: Medium     A [facility] for .  Clara Daniels RN-BC    [phone]   DX V65.8 REPLACED WITH 70938 HEALTH CARE HOME (04/08/2013)       Chest pain 03/02/2012     Priority: Medium      Past Medical History:   Diagnosis Date     Arthritis     hand     Chronic back pain      Complication of anesthesia     URINARY RETENTION, Sleepy after surgery     Gastro-oesophageal reflux disease      Hypertension      Renal disease     kidney stone     Zollinger-Patricio syndrome      Past Surgical History:   Procedure Laterality Date     ABDOMEN SURGERY  2003    Exploratory laparoscopy     COMBINED CYSTOSCOPY, INSERT STENT URETER(S) Left 8/31/2021    Procedure: Cystoscopy with left ureteral stent placement;  Surgeon: Devan Bergeron MD;  Location:  OR     COMBINED CYSTOSCOPY, RETROGRADES, URETEROSCOPY, LASER HOLMIUM LITHOTRIPSY URETER(S), INSERT STENT Right 5/23/2019    Procedure: Video cystoscopy, exam under anesthesia, right ureteroscopy with holmium laser lithotripsy and stone extractions, right double-J stent (5-Lith[place]ian x 24 cm).;  Surgeon: Satinder Almanza MD;  Location:  OR     EXTRACORPOREAL SHOCK WAVE LITHOTRIPSY (ESWL) Left 8/31/2021    Procedure: Bilateral extracorporeal shockwave lithotripsy;  Surgeon: Devan Bergeron MD;  Location:  OR     HC EXCISE VARICOCELE  age 21     varicocele repair     HERNIORRHAPHY UMBILICAL N/A 11/30/2018    Procedure: OPEN UMBILLICAL HERNIA REPAIR ;  Surgeon: Ike Catalan MD;  Location:  OR     KIDNEY SURGERY      lithotripsy x 3     LAPAROSCOPIC HERNIORRHAPHY INGUINAL Left 9/26/2016    Procedure: LAPAROSCOPIC HERNIORRHAPHY INGUINAL;  Surgeon: Ike Catalan MD;  Location:  SD     LITHOTRIPSY      three times     STOMACH SURGERY  2003    exploratory scoping looking for tumors related to ZE syndrome     Current Outpatient Medications   Medication Sig Dispense Refill     amLODIPine  "(NORVASC) 2.5 MG tablet Take 1 tablet (2.5 mg) by mouth daily 90 tablet 3     finasteride (PROSCAR) 5 MG tablet TAKE 1 TABLET(5 MG) BY MOUTH DAILY 90 tablet 2     Omeprazole (PRILOSEC PO) Take 20 mg by mouth 3 times daily. Patient takes it in the morning, with dinner and at bedtime for Patricio-Zollinger syndrome. Patient takes Prilosec OTC.       tamsulosin (FLOMAX) 0.4 MG capsule TAKE 1 CAPSULE(0.4 MG) BY MOUTH DAILY 90 capsule 2       Allergies   Allergen Reactions     Dye [Contrast Dye] Anaphylaxis and Hives     Penicillins Anaphylaxis     Swelling, arm turned blue     Tramadol Itching     Oxycodone-Acetaminophen Rash        Social History     Tobacco Use     Smoking status: Never Smoker     Smokeless tobacco: Never Used   Substance Use Topics     Alcohol use: Yes     Comment: seldom       History   Drug Use No         Objective     /76   Pulse 84   Temp 98.4  F (36.9  C) (Tympanic)   Resp 18   Ht 1.727 m (5' 8\")   Wt 79.2 kg (174 lb 8 oz)   SpO2 96%   BMI 26.53 kg/m      Physical Exam  GENERAL APPEARANCE: healthy, alert and no distress  HENT: ear canals and TM's normal and nose and mouth without ulcers or lesions  RESP: lungs clear to auscultation - no rales, rhonchi or wheezes  CV: regular rate and rhythm, normal S1 S2, no S3 or S4 and no murmur, click or rub   MS: extremities normal- no gross deformities noted  SKIN: no suspicious lesions or rashes  NEURO: Normal strength and tone, sensory exam grossly normal, mentation intact and speech normal  PSYCH: mentation appears normal and affect normal/bright    Recent Labs   Lab Test 08/18/21  1610 07/21/21  1020 04/30/21  0847   HGB 15.1  --  14.7     --  223   NA  --  137 139   POTASSIUM 3.9 3.8 3.7   CR  --  0.93 0.95        Diagnostics:  No labs were ordered during this visit.   No EKG this visit, completed in the last 90 days.    Revised Cardiac Risk Index (RCRI):  The patient has the following serious cardiovascular risks for perioperative " complications:   - No serious cardiac risks = 0 points     RCRI Interpretation: 0 points: Class I (very low risk - 0.4% complication rate)           Signed Electronically by: Abena Hoffmann PA-C  Copy of this evaluation report is provided to requesting physician.

## 2021-09-30 ENCOUNTER — ANESTHESIA (OUTPATIENT)
Dept: SURGERY | Facility: CLINIC | Age: 76
End: 2021-09-30
Payer: COMMERCIAL

## 2021-09-30 ENCOUNTER — HOSPITAL ENCOUNTER (OUTPATIENT)
Facility: CLINIC | Age: 76
Discharge: HOME | End: 2021-09-30
Attending: UROLOGY | Admitting: UROLOGY
Payer: COMMERCIAL

## 2021-09-30 ENCOUNTER — ANESTHESIA EVENT (OUTPATIENT)
Dept: SURGERY | Facility: CLINIC | Age: 76
End: 2021-09-30
Payer: COMMERCIAL

## 2021-09-30 ENCOUNTER — APPOINTMENT (OUTPATIENT)
Dept: GENERAL RADIOLOGY | Facility: CLINIC | Age: 76
End: 2021-09-30
Attending: UROLOGY
Payer: COMMERCIAL

## 2021-09-30 VITALS
WEIGHT: 174 LBS | BODY MASS INDEX: 26.37 KG/M2 | DIASTOLIC BLOOD PRESSURE: 90 MMHG | HEIGHT: 68 IN | RESPIRATION RATE: 16 BRPM | OXYGEN SATURATION: 95 % | HEART RATE: 83 BPM | TEMPERATURE: 98.3 F | SYSTOLIC BLOOD PRESSURE: 145 MMHG

## 2021-09-30 PROCEDURE — C1769 GUIDE WIRE: HCPCS | Performed by: UROLOGY

## 2021-09-30 PROCEDURE — 258N000003 HC RX IP 258 OP 636: Performed by: NURSE ANESTHETIST, CERTIFIED REGISTERED

## 2021-09-30 PROCEDURE — 52356 CYSTO/URETERO W/LITHOTRIPSY: CPT | Mod: 22 | Performed by: UROLOGY

## 2021-09-30 PROCEDURE — 250N000009 HC RX 250: Performed by: NURSE ANESTHETIST, CERTIFIED REGISTERED

## 2021-09-30 PROCEDURE — 74420 UROGRAPHY RTRGR +-KUB: CPT | Mod: 26 | Performed by: UROLOGY

## 2021-09-30 PROCEDURE — C2617 STENT, NON-COR, TEM W/O DEL: HCPCS | Performed by: UROLOGY

## 2021-09-30 PROCEDURE — 370N000017 HC ANESTHESIA TECHNICAL FEE, PER MIN: Performed by: UROLOGY

## 2021-09-30 PROCEDURE — 360N000076 HC SURGERY LEVEL 3, PER MIN: Performed by: UROLOGY

## 2021-09-30 PROCEDURE — 999N000141 HC STATISTIC PRE-PROCEDURE NURSING ASSESSMENT: Performed by: UROLOGY

## 2021-09-30 PROCEDURE — 999N000179 XR SURGERY CARM FLUORO LESS THAN 5 MIN W STILLS

## 2021-09-30 PROCEDURE — 250N000011 HC RX IP 250 OP 636: Performed by: ANESTHESIOLOGY

## 2021-09-30 PROCEDURE — C1894 INTRO/SHEATH, NON-LASER: HCPCS | Performed by: UROLOGY

## 2021-09-30 PROCEDURE — 82365 CALCULUS SPECTROSCOPY: CPT | Performed by: UROLOGY

## 2021-09-30 PROCEDURE — 710N000012 HC RECOVERY PHASE 2, PER MINUTE: Performed by: UROLOGY

## 2021-09-30 PROCEDURE — 250N000011 HC RX IP 250 OP 636: Performed by: NURSE ANESTHETIST, CERTIFIED REGISTERED

## 2021-09-30 PROCEDURE — 250N000009 HC RX 250: Performed by: UROLOGY

## 2021-09-30 PROCEDURE — 250N000025 HC SEVOFLURANE, PER MIN: Performed by: UROLOGY

## 2021-09-30 PROCEDURE — 272N000001 HC OR GENERAL SUPPLY STERILE: Performed by: UROLOGY

## 2021-09-30 PROCEDURE — 250N000011 HC RX IP 250 OP 636: Performed by: UROLOGY

## 2021-09-30 PROCEDURE — 710N000010 HC RECOVERY PHASE 1, LEVEL 2, PER MIN: Performed by: UROLOGY

## 2021-09-30 DEVICE — URETERAL STENT
Type: IMPLANTABLE DEVICE | Site: URETER | Status: FUNCTIONAL
Brand: POLARIS™ ULTRA

## 2021-09-30 RX ORDER — HYDROMORPHONE HCL IN WATER/PF 6 MG/30 ML
0.2 PATIENT CONTROLLED ANALGESIA SYRINGE INTRAVENOUS EVERY 5 MIN PRN
Status: DISCONTINUED | OUTPATIENT
Start: 2021-09-30 | End: 2021-09-30 | Stop reason: HOSPADM

## 2021-09-30 RX ORDER — SODIUM CHLORIDE, SODIUM LACTATE, POTASSIUM CHLORIDE, CALCIUM CHLORIDE 600; 310; 30; 20 MG/100ML; MG/100ML; MG/100ML; MG/100ML
INJECTION, SOLUTION INTRAVENOUS CONTINUOUS
Status: DISCONTINUED | OUTPATIENT
Start: 2021-09-30 | End: 2021-09-30 | Stop reason: HOSPADM

## 2021-09-30 RX ORDER — ONDANSETRON 2 MG/ML
INJECTION INTRAMUSCULAR; INTRAVENOUS PRN
Status: DISCONTINUED | OUTPATIENT
Start: 2021-09-30 | End: 2021-09-30

## 2021-09-30 RX ORDER — ONDANSETRON 4 MG/1
4 TABLET, ORALLY DISINTEGRATING ORAL EVERY 30 MIN PRN
Status: DISCONTINUED | OUTPATIENT
Start: 2021-09-30 | End: 2021-09-30 | Stop reason: HOSPADM

## 2021-09-30 RX ORDER — MEPERIDINE HYDROCHLORIDE 25 MG/ML
12.5 INJECTION INTRAMUSCULAR; INTRAVENOUS; SUBCUTANEOUS
Status: DISCONTINUED | OUTPATIENT
Start: 2021-09-30 | End: 2021-09-30 | Stop reason: HOSPADM

## 2021-09-30 RX ORDER — HYDRALAZINE HYDROCHLORIDE 20 MG/ML
5 INJECTION INTRAMUSCULAR; INTRAVENOUS ONCE
Status: COMPLETED | OUTPATIENT
Start: 2021-09-30 | End: 2021-09-30

## 2021-09-30 RX ORDER — ATROPA BELLADONNA AND OPIUM 16.2; 3 MG/1; MG/1
SUPPOSITORY RECTAL PRN
Status: DISCONTINUED | OUTPATIENT
Start: 2021-09-30 | End: 2021-09-30 | Stop reason: HOSPADM

## 2021-09-30 RX ORDER — PROPOFOL 10 MG/ML
INJECTION, EMULSION INTRAVENOUS PRN
Status: DISCONTINUED | OUTPATIENT
Start: 2021-09-30 | End: 2021-09-30

## 2021-09-30 RX ORDER — FENTANYL CITRATE 0.05 MG/ML
25 INJECTION, SOLUTION INTRAMUSCULAR; INTRAVENOUS
Status: DISCONTINUED | OUTPATIENT
Start: 2021-09-30 | End: 2021-09-30 | Stop reason: HOSPADM

## 2021-09-30 RX ORDER — DEXAMETHASONE SODIUM PHOSPHATE 4 MG/ML
INJECTION, SOLUTION INTRA-ARTICULAR; INTRALESIONAL; INTRAMUSCULAR; INTRAVENOUS; SOFT TISSUE PRN
Status: DISCONTINUED | OUTPATIENT
Start: 2021-09-30 | End: 2021-09-30

## 2021-09-30 RX ORDER — FENTANYL CITRATE 50 UG/ML
INJECTION, SOLUTION INTRAMUSCULAR; INTRAVENOUS PRN
Status: DISCONTINUED | OUTPATIENT
Start: 2021-09-30 | End: 2021-09-30

## 2021-09-30 RX ORDER — CEFAZOLIN SODIUM 2 G/100ML
2 INJECTION, SOLUTION INTRAVENOUS
Status: COMPLETED | OUTPATIENT
Start: 2021-09-30 | End: 2021-09-30

## 2021-09-30 RX ORDER — SODIUM CHLORIDE, SODIUM LACTATE, POTASSIUM CHLORIDE, CALCIUM CHLORIDE 600; 310; 30; 20 MG/100ML; MG/100ML; MG/100ML; MG/100ML
INJECTION, SOLUTION INTRAVENOUS CONTINUOUS PRN
Status: DISCONTINUED | OUTPATIENT
Start: 2021-09-30 | End: 2021-09-30

## 2021-09-30 RX ORDER — LIDOCAINE HYDROCHLORIDE 20 MG/ML
INJECTION, SOLUTION INFILTRATION; PERINEURAL PRN
Status: DISCONTINUED | OUTPATIENT
Start: 2021-09-30 | End: 2021-09-30

## 2021-09-30 RX ORDER — ONDANSETRON 2 MG/ML
4 INJECTION INTRAMUSCULAR; INTRAVENOUS EVERY 30 MIN PRN
Status: DISCONTINUED | OUTPATIENT
Start: 2021-09-30 | End: 2021-09-30 | Stop reason: HOSPADM

## 2021-09-30 RX ORDER — FENTANYL CITRATE 0.05 MG/ML
25 INJECTION, SOLUTION INTRAMUSCULAR; INTRAVENOUS EVERY 5 MIN PRN
Status: DISCONTINUED | OUTPATIENT
Start: 2021-09-30 | End: 2021-09-30 | Stop reason: HOSPADM

## 2021-09-30 RX ORDER — CEFAZOLIN SODIUM 2 G/100ML
2 INJECTION, SOLUTION INTRAVENOUS SEE ADMIN INSTRUCTIONS
Status: DISCONTINUED | OUTPATIENT
Start: 2021-09-30 | End: 2021-09-30 | Stop reason: HOSPADM

## 2021-09-30 RX ADMIN — SODIUM CHLORIDE, POTASSIUM CHLORIDE, SODIUM LACTATE AND CALCIUM CHLORIDE: 600; 310; 30; 20 INJECTION, SOLUTION INTRAVENOUS at 11:59

## 2021-09-30 RX ADMIN — FENTANYL CITRATE 25 MCG: 50 INJECTION, SOLUTION INTRAMUSCULAR; INTRAVENOUS at 13:51

## 2021-09-30 RX ADMIN — LIDOCAINE HYDROCHLORIDE 60 MG: 20 INJECTION, SOLUTION INFILTRATION; PERINEURAL at 12:05

## 2021-09-30 RX ADMIN — FENTANYL CITRATE 50 MCG: 50 INJECTION, SOLUTION INTRAMUSCULAR; INTRAVENOUS at 12:17

## 2021-09-30 RX ADMIN — DEXAMETHASONE SODIUM PHOSPHATE 4 MG: 4 INJECTION, SOLUTION INTRA-ARTICULAR; INTRALESIONAL; INTRAMUSCULAR; INTRAVENOUS; SOFT TISSUE at 12:10

## 2021-09-30 RX ADMIN — ONDANSETRON 4 MG: 2 INJECTION INTRAMUSCULAR; INTRAVENOUS at 12:10

## 2021-09-30 RX ADMIN — FENTANYL CITRATE 50 MCG: 50 INJECTION, SOLUTION INTRAMUSCULAR; INTRAVENOUS at 12:00

## 2021-09-30 RX ADMIN — HYDRALAZINE HYDROCHLORIDE 5 MG: 20 INJECTION INTRAMUSCULAR; INTRAVENOUS at 14:06

## 2021-09-30 RX ADMIN — CEFAZOLIN SODIUM 2 G: 2 INJECTION, SOLUTION INTRAVENOUS at 11:59

## 2021-09-30 RX ADMIN — PROPOFOL 150 MG: 10 INJECTION, EMULSION INTRAVENOUS at 12:05

## 2021-09-30 RX ADMIN — PHENYLEPHRINE HYDROCHLORIDE 100 MCG: 10 INJECTION INTRAVENOUS at 12:21

## 2021-09-30 ASSESSMENT — MIFFLIN-ST. JEOR: SCORE: 1498.76

## 2021-09-30 ASSESSMENT — LIFESTYLE VARIABLES: TOBACCO_USE: 0

## 2021-09-30 ASSESSMENT — ENCOUNTER SYMPTOMS: SEIZURES: 0

## 2021-09-30 NOTE — ANESTHESIA CARE TRANSFER NOTE
Patient: Stanford Andersen    Procedure(s):  Cystoscopy, left ureteral stent exchange, left ureteroscopy with holmium laser lithotripsy and stone basketing. left retrogrades    Diagnosis: Kidney stone [N20.0]  Diagnosis Additional Information: No value filed.    Anesthesia Type:   General     Note:    Oropharynx: oropharynx clear of all foreign objects  Level of Consciousness: awake  Oxygen Supplementation: face mask  Level of Supplemental Oxygen (L/min / FiO2): 6    Dentition: dentition unchanged      Patient transferred to: PACU  Comments: At end of procedure, spontaneous respirations, adequate tidal volumes, followed commands to voice, LMA removed atraumatically, airway patent after LMA removal. Oxygen via facemask at 6 liters per minute to PACU. Oxygen tubing connected to wall O2 in PACU, SpO2, NiBP, and EKG monitors and alarms on and functioning, Melany Hugger warmer connected to patient gown, report on patient's clinical status given to PACU RN, RN questions answered.    Handoff Report: Identifed the Patient, Identified the Reponsible Provider, Reviewed the pertinent medical history, Discussed the surgical course, Reviewed Intra-OP anesthesia mangement and issues during anesthesia, Set expectations for post-procedure period and Allowed opportunity for questions and acknowledgement of understanding      Vitals:  Vitals Value Taken Time   /117 09/30/21 1339   Temp     Pulse 86 09/30/21 1341   Resp 11 09/30/21 1341   SpO2 96 % 09/30/21 1341   Vitals shown include unvalidated device data.    Electronically Signed By: VARINDER Bucio CRNA  September 30, 2021  1:42 PM

## 2021-09-30 NOTE — ANESTHESIA POSTPROCEDURE EVALUATION
Patient: Stanford Andersen    Procedure(s):  Cystoscopy, left ureteral stent exchange, left ureteroscopy with holmium laser lithotripsy and stone basketing. left retrogrades    Diagnosis:Kidney stone [N20.0]  Diagnosis Additional Information: No value filed.    Anesthesia Type:  General    Note:  Disposition: Outpatient   Postop Pain Control: Uneventful            Sign Out: Well controlled pain   PONV: No   Neuro/Psych: Uneventful            Sign Out: Acceptable/Baseline neuro status   Airway/Respiratory: Uneventful            Sign Out: Acceptable/Baseline resp. status   CV/Hemodynamics: Uneventful            Sign Out: Acceptable CV status; No obvious hypovolemia; No obvious fluid overload   Other NRE: NONE   DID A NON-ROUTINE EVENT OCCUR? No           Last vitals:  Vitals Value Taken Time   /90 09/30/21 1415   Temp 36.8  C (98.3  F) 09/30/21 1335   Pulse 82 09/30/21 1417   Resp 22 09/30/21 1417   SpO2 90 % 09/30/21 1416   Vitals shown include unvalidated device data.    Electronically Signed By: Efrem Shannon MD  September 30, 2021  2:23 PM

## 2021-09-30 NOTE — ANESTHESIA PREPROCEDURE EVALUATION
Anesthesia Pre-Procedure Evaluation    Patient: Stanford Andersen   MRN: [id number] : 1945        Preoperative Diagnosis: Kidney stone [N20.0]   Procedure : Procedure(s):  Cystoscopy, left ureteral stent exchange, left ureteroscopy with holmium laser lithotripsy and stone basketing.     Past Medical History:   Diagnosis Date     Arthritis     hand     Chronic back pain      Complication of anesthesia     URINARY RETENTION, Sleepy after surgery     Gastro-oesophageal reflux disease      Hypertension      Renal disease     kidney stone     Zollinger-Patricio syndrome       Past Surgical History:   Procedure Laterality Date     ABDOMEN SURGERY      Exploratory laparoscopy     COMBINED CYSTOSCOPY, INSERT STENT URETER(S) Left 2021    Procedure: Cystoscopy with left ureteral stent placement;  Surgeon: Devan Bergeron MD;  Location:  OR     COMBINED CYSTOSCOPY, RETROGRADES, URETEROSCOPY, LASER HOLMIUM LITHOTRIPSY URETER(S), INSERT STENT Right 2019    Procedure: Video cystoscopy, exam under anesthesia, right ureteroscopy with holmium laser lithotripsy and stone extractions, right double-J stent (5-[place] x 24 cm).;  Surgeon: Satinder Almanza MD;  Location:  OR     EXTRACORPOREAL SHOCK WAVE LITHOTRIPSY (ESWL) Left 2021    Procedure: Bilateral extracorporeal shockwave lithotripsy;  Surgeon: Devan Bergeron MD;  Location:  OR     HC EXCISE VARICOCELE  age 21     varicocele repair     HERNIORRHAPHY UMBILICAL N/A 2018    Procedure: OPEN UMBILLICAL HERNIA REPAIR ;  Surgeon: Ike Catalan MD;  Location:  OR     KIDNEY SURGERY      lithotripsy x 3     LAPAROSCOPIC HERNIORRHAPHY INGUINAL Left 2016    Procedure: LAPAROSCOPIC HERNIORRHAPHY INGUINAL;  Surgeon: Ike Catalan MD;  Location:  SD     LITHOTRIPSY      three times     STOMACH SURGERY      exploratory scoping looking for tumors related to ZE syndrome      Allergies   Allergen Reactions      Dye [Contrast Dye] Anaphylaxis and Hives     Penicillins Anaphylaxis     Swelling, arm turned blue     Tramadol Itching     Oxycodone-Acetaminophen Rash      Social History     Tobacco Use     Smoking status: Never Smoker     Smokeless tobacco: Never Used   Substance Use Topics     Alcohol use: Yes     Comment: seldom      Wt Readings from Last 1 Encounters:   09/30/21 78.9 kg (174 lb)        Anesthesia Evaluation   Pt has had prior anesthetic. Type: General (Handy 2 = Grade 1 View previously).    History of anesthetic complications   slow to wake up per patient.    ROS/MED HX  ENT/Pulmonary:    (-) tobacco use and sleep apnea   Neurologic:    (-) no seizures and no CVA   Cardiovascular:     (+) hypertension-----Previous cardiac testing   Echo: Date: Results:    Stress Test: Date: 2012 Results:  IMPRESSIONS:        I   Lexiscan Test   1.   Lexiscan infusion dictated under separate cover.   2.   ECG report done under separate cover.        II   Myocardial Perfusion Scintigraphy   1.     Myocardial perfusion using single isotope technique appeared   normal.  Regadenoson did not induce any significant reversible   myocardial ischemia.   2.  Gating demonstrated normal left ventricular systolic contraction   and wall thickening, both at rest and post stress.     3.  The ejection fraction post stress was 54%.   4.  The left ventricular volumes were normal.   5.  Regadenoson did not induce any significant transient ischemic   dilatation of the left ventricle.   6.  No previous study was available for comparison.     ECG Reviewed: Date: 8/2021 Results:  NSR, no significant abnormalities  Cath: Date: Results:   (-) angina, CAD, ELLISON, syncope and angina   METS/Exercise Tolerance: >4 METS    Hematologic:       Musculoskeletal:       GI/Hepatic: Comment: Zollinger-Patricio Syndrome    (+) GERD, Asymptomatic on medication,  (-) liver disease   Renal/Genitourinary:     (+) Nephrolithiasis , BPH,  (-) renal disease   Endo:    (-)  Type II DM and thyroid disease   Psychiatric/Substance Use:    (-) alcohol abuse history   Infectious Disease:       Malignancy:       Other:            Physical Exam    Airway        Mallampati: I   TM distance: > 3 FB   Neck ROM: full   Mouth opening: > 3 cm    Respiratory Devices and Support         Dental       (+) caps      Cardiovascular   cardiovascular exam normal          Pulmonary   pulmonary exam normal                OUTSIDE LABS:  CBC:   Lab Results   Component Value Date    WBC 7.0 04/30/2021    WBC 7.3 12/10/2019    HGB 15.1 08/18/2021    HGB 14.7 04/30/2021    HCT 46.3 04/30/2021    HCT 49.4 12/10/2019     08/18/2021     04/30/2021     BMP:   Lab Results   Component Value Date     07/21/2021     04/30/2021    POTASSIUM 3.9 08/18/2021    POTASSIUM 3.8 07/21/2021    CHLORIDE 106 07/21/2021    CHLORIDE 107 04/30/2021    CO2 30 07/21/2021    CO2 30 04/30/2021    BUN 16 07/21/2021    BUN 18 04/30/2021    CR 0.93 07/21/2021    CR 0.95 04/30/2021     (H) 07/21/2021    GLC 94 04/30/2021     COAGS: No results found for: PTT, INR, FIBR  POC: No results found for: BGM, HCG, HCGS  HEPATIC:   Lab Results   Component Value Date    ALBUMIN 3.5 04/30/2021    PROTTOTAL 6.6 (L) 04/30/2021    ALT 25 07/21/2021    AST 17 04/30/2021    ALKPHOS 69 04/30/2021    BILITOTAL 0.8 04/30/2021     OTHER:   Lab Results   Component Value Date    CLIFTON 9.3 07/21/2021       Anesthesia Plan    ASA Status:  2   NPO Status:  NPO Appropriate    Anesthesia Type: General.     - Airway: LMA   Induction: Intravenous.   Maintenance: Balanced.        Consents    Anesthesia Plan(s) and associated risks, benefits, and realistic alternatives discussed. Questions answered and patient/representative(s) expressed understanding.     - Discussed with:  Patient         Postoperative Care    Pain management: Multi-modal analgesia.   PONV prophylaxis: Ondansetron (or other 5HT-3), Dexamethasone or Solumedrol, Background  Propofol Infusion     Comments:                Efrem Shannon MD

## 2021-09-30 NOTE — DISCHARGE INSTRUCTIONS
Cystoscopy, Holmium Laser with Stent Placement Discharge Instructions    Holmium laser lithotripsy was used to break up your kidney stone(s). It is normal to have visible blood in the urine, burning, urgency and frequency following this procedure. These symptoms may last for a few days to weeks.     Diet:    To help pass stone fragments and clear the blood out of the urine, it is important to drink 6-8 glasses of fluids per day at home - at least 3-4 glasses should be water.       Return to the diet that you were on before the procedure, unless you are given specific diet instructions.    Activity:    Walk short distances and increase as your strength allows.    You may climb stairs.    Do not do strenuous exercise or heavy lifting until approved by surgeon.    Do not drive while taking narcotic pain medications.    Bathing:    You may take a shower.          Stent information    During surgery, a stent was placed in the ureter.  The ureter is the tube that drains urine from the kidney to the bladder.  The stent is placed to dilate (open) the ureter so the stone fragments can pass easily through the ureter or to decrease ureteral swelling after surgery, or to relieve an obstruction. The stent is made of rubber. The upper end of the stent curls in the kidney while the lower end rests in the bladder.    While the stent is in place you may experience the following symptoms:    Blood and/or small blood clots in urine.    Bladder spasm (frequency and urgency of urination).    Discomfort or aching in the back or side where the stent is.    Burning or discomfort at the end of urine stream.    To decrease these symptoms you should:    Take pain medication as prescribed.    Drink plenty of fluids.    If you experience pain at the end of urination try not emptying your bladder completely.    If having discomfort in back or side, decrease activity.    Call your physician if these signs/symptoms are present:    Pain that is not  relieved by a short rest or ordered pain medications.    Temperature at or above 101.0 F or chills.    Inability or difficulty urinating.     Excessive blood in urine.    Any questions or concerns.    **If you have questions or concerns about your procedure,   call Dr. Bergeron at [phone]**    Same Day Surgery Discharge Instructions for  Sedation and General Anesthesia       It's not unusual to feel dizzy, light-headed or faint for up to 24 hours after surgery or while taking pain medication.  If you have these symptoms: sit for a few minutes before standing and have someone assist you when you get up to walk or use the bathroom.      You should rest and relax for the next 24 hours. We recommend you make arrangements to have an adult stay with you for at least 24 hours after your discharge.  Avoid hazardous and strenuous activity.      DO NOT DRIVE any vehicle or operate mechanical equipment for 24 hours following the end of your surgery.  Even though you may feel normal, your reactions may be affected by the medication you have received.      Do not drink alcoholic beverages for 24 hours following surgery.       Slowly progress to your regular diet as you feel able. It's not unusual to feel nauseated and/or vomit after receiving anesthesia.  If you develop these symptoms, drink clear liquids (apple juice, ginger ale, broth, 7-up, etc. ) until you feel better.  If your nausea and vomiting persists for 24 hours, please notify your surgeon.        All narcotic pain medications, along with inactivity and anesthesia, can cause constipation. Drinking plenty of liquids and increasing fiber intake will help.      For any questions of a medical nature, call your surgeon.      Do not make important decisions for 24 hours.      If you had general anesthesia, you may have a sore throat for a couple of days related to the breathing tube used during surgery.  You may use Cepacol lozenges to help with this discomfort.  If it  worsens or if you develop a fever, contact your surgeon.       If you feel your pain is not well managed with the pain medications prescribed by your surgeon, please contact your surgeon's office to let them know so they can address your concerns.       CoVid 19 Information    We want to give you information regarding Covid. Please consult your primary care provider with any questions you might have.     Patient who have symptoms (cough, fever, or shortness of breath), need to isolate for 7 days from when symptoms started OR 72 hours after fever resolves (without fever reducing medications) AND improvement of respiratory symptoms (whichever is longer).      Isolate yourself at home (in own room/own bathroom if possible)    Do Not allow any visitors    Do Not go to work or school    Do Not go to [employer],  centers, shopping, or other public places.    Do Not shake hands.    Avoid close and intimate contact with others (hugging, kissing).    Follow CDC recommendations for household cleaning of frequently touched services.     After the initial 7 days, continue to isolate yourself from household members as much as possible. To continue decrease the risk of community spread and exposure, you and any members of your household should limit activities in public for 14 days after starting home isolation.     You can reference the following CDC link for helpful home isolation/care tips:  https://www.cdc.gov/coronavirus/2019-ncov/downloads/10Things.pdf    Protect Others:    Cover Your Mouth and Nose with a mask, disposable tissue or wash cloth to avoid spreading germs to others.    Wash your hands and face frequently with soap and water    Call Your Primary Doctor If: Breathing difficulty develops or you become worse.    For more information about COVID19 and options for caring for yourself at home, please visit the CDC website at https://www.cdc.gov/coronavirus/2019-ncov/about/steps-when-sick.html  For more options  for care at [facility], please visit our website at [email or web address]

## 2021-09-30 NOTE — OP NOTE
Procedure Date: 09/30/2021    SURGEON:  Devan Bergeron MD    PREOPERATIVE DIAGNOSES:  Left kidney stones.    POSTOPERATIVE DIAGNOSES:  Left kidney stones.    PROCEDURES PERFORMED:  Cystoscopy, left ureteral stent exchange, left retrograde pyelogram, interpretation of fluoroscopic images, left ureteroscopy with holmium lithotripsy and stone basketing, 22 modifier for difficult lengthy case.    ANESTHESIA:  General.    COMPLICATIONS:  None.    INDICATIONS FOR PROCEDURE:  Dakotah Andersen is a 76-year-old gentleman who previously underwent bilateral shock wave lithotripsy and left-sided stent placement.  The shockwave lithotripsy resolved his right-sided stones completely, but there are still remaining fragments in the left side.  I recommended ureteroscopy to remove any remaining fragments.    DESCRIPTION OF PROCEDURE:  The risks and benefits of the procedure were explained in detail to the patient, informed consent was obtained.  The patient was brought to the operating room and placed supine on the operating table, where he underwent general endotracheal anesthetic.  He was then moved down to the dorsal lithotomy position, where he was prepped and draped in standard sterile fashion.    Procedure began by introducing a 22-Botswanan cystoscope through the urethra and into the bladder to perform cystoscopy.  There were no urothelial abnormalities identified.  I grasped the stent and pulled it to the level of urethral meatus.  I cannulated the stent over the Glidewire and then removed the Glidewire under fluoroscopic guidance.  Alongside the Glidewire, I passed the rigid ureteroscope through the urethra and into the bladder and up the left ureter.  There were multiple stone fragments in the left ureter that were basketed out and placed in the bladder.  I then performed rigid ureteroscopy up to the left kidney and the left ureter was clear.  I passed a second Glidewire into the left kidney and backloaded off the rigid scope.   Over this second Glidewire, I passed a 12/14-Tunisian x 46 cm ureteral access sheath to the level of the ureteropelvic junction on fluoroscopic guidance.  I removed the inner sheath and the inner Glidewire, leaving the safety wire in place.  I then passed the Storz flexible ureteroscope through the access sheath and performed a complete renoscopy.  There were multiple stones throughout the left kidney.  Some of the larger stones required 200 micron holmium laser fire to break them up into multiple fragments.  I then basketed out all fragments.  This part of the case was quite lengthy due to the patient's stone burden.  I spent 45 minutes just lasering the stones and basketing out stones alone.  Once I was completed with this, I performed retrograde pyelogram through the scope and performed complete renoscopy to make certain all stones had been removed.  I removed the ureteroscope.  I removed the ureteral access sheath.  I reloaded the cystoscope over the Glidewire until the left orifice was visualized.  I passed a 5 x 24 double-J ureteral stent over the wire.  The wire was pulled back and a good curl could be seen in the renal pelvis under fluoroscopy.  A good curl was seen in the bladder under direct visualization.  The bladder was drained and stone fragments were collected and sent for analysis.  I removed the scope.  I placed a B and O suppository at the end of the case.    The patient tolerated the procedure well without complications.  He went to post-anesthetic care unit in good condition.  He will go home from there.  I will see him back in 2 weeks for stent removal in the office.    Devan Bergeron MD        D: 2021   T: 2021   MT: [id number]    Name:     JOAQUIN CRUZ  MRN:      [id number]        Account:        [id number]   :      1945           Procedure Date: 2021     Document: [id number]

## 2021-09-30 NOTE — ANESTHESIA PROCEDURE NOTES
Airway       Patient location during procedure: OR ([facility] - Operating Room or Procedural Area)  Staff -        CRNA: Adriana Justice APRN CRNA       Performed By: CRNA  Consent for Airway        Urgency: elective  Indications and Patient Condition       Indications for airway management: mark-procedural       Induction type:intravenous       Mask difficulty assessment: 0 - not attempted    Final Airway Details       Final airway type: supraglottic airway    Supraglottic Airway Details        Type: LMA       Brand: Ambu AuraGain       LMA size: 5    Post intubation assessment        Number of attempts at approach: 1       Number of other approaches attempted: 0       Ease of procedure: easy       Dentition: Intact and Unchanged

## 2021-10-04 LAB
APPEARANCE STONE: NORMAL
COMPN STONE: NORMAL
NUMBER STONE: NORMAL
SIZE STONE: NORMAL MM
SPECIMEN WT: 337 MG

## 2021-10-14 ENCOUNTER — OFFICE VISIT (OUTPATIENT)
Dept: UROLOGY | Facility: CLINIC | Age: 76
End: 2021-10-14
Payer: COMMERCIAL

## 2021-10-14 VITALS
SYSTOLIC BLOOD PRESSURE: 132 MMHG | HEIGHT: 68 IN | BODY MASS INDEX: 25.76 KG/M2 | HEART RATE: 84 BPM | DIASTOLIC BLOOD PRESSURE: 88 MMHG | WEIGHT: 170 LBS

## 2021-10-14 DIAGNOSIS — Z79.2 PROPHYLACTIC ANTIBIOTIC: Primary | ICD-10-CM

## 2021-10-14 DIAGNOSIS — N20.0 CALCULUS OF KIDNEY: ICD-10-CM

## 2021-10-14 PROCEDURE — 52310 CYSTOSCOPY AND TREATMENT: CPT | Mod: 58 | Performed by: UROLOGY

## 2021-10-14 RX ORDER — CIPROFLOXACIN 500 MG/1
500 TABLET, FILM COATED ORAL ONCE
Qty: 1 TABLET | Refills: 0 | Status: SHIPPED | OUTPATIENT
Start: 2021-10-14 | End: 2021-10-14

## 2021-10-14 RX ORDER — LIDOCAINE HYDROCHLORIDE 20 MG/ML
JELLY TOPICAL ONCE
Status: COMPLETED | OUTPATIENT
Start: 2021-10-14 | End: 2021-10-14

## 2021-10-14 RX ADMIN — LIDOCAINE HYDROCHLORIDE: 20 JELLY TOPICAL at 09:12

## 2021-10-14 ASSESSMENT — PAIN SCALES - GENERAL: PAINLEVEL: MILD PAIN (2)

## 2021-10-14 ASSESSMENT — MIFFLIN-ST. JEOR: SCORE: 1480.61

## 2021-10-14 NOTE — PROGRESS NOTES
Office Visit Note  [facility]  [phone]    UROLOGIC DIAGNOSES:   Bilateral kidney stones    CURRENT INTERVENTIONS:   S/P bilateral ESWL and left ureteroscopy    HISTORY:   [place] underwent an extensive left ureteroscopy in order to remove his residual stone burden in the operating room 2 weeks ago. A stent was replaced at the end of the case. He has felt well since his procedure. He is here today for stent removal. His stones were composed of calcium oxalate monohydrate.      PAST MEDICAL HISTORY:   Past Medical History:   Diagnosis Date     Arthritis     hand     Chronic back pain      Complication of anesthesia     URINARY RETENTION, Sleepy after surgery     Gastro-oesophageal reflux disease      Hypertension      Renal disease     kidney stone     Zollinger-Patricio syndrome        PAST SURGICAL HISTORY:   Past Surgical History:   Procedure Laterality Date     ABDOMEN SURGERY  2003    Exploratory laparoscopy     COMBINED CYSTOSCOPY, INSERT STENT URETER(S) Left 8/31/2021    Procedure: Cystoscopy with left ureteral stent placement;  Surgeon: Devan Bergeron MD;  Location:  OR     COMBINED CYSTOSCOPY, RETROGRADES, URETEROSCOPY, LASER HOLMIUM LITHOTRIPSY URETER(S), INSERT STENT Right 5/23/2019    Procedure: Video cystoscopy, exam under anesthesia, right ureteroscopy with holmium laser lithotripsy and stone extractions, right double-J stent (5-Korean x 24 cm).;  Surgeon: Satinder Almanza MD;  Location:  OR     COMBINED CYSTOSCOPY, RETROGRADES, URETEROSCOPY, LASER HOLMIUM LITHOTRIPSY URETER(S), INSERT STENT Left 9/30/2021    Procedure: Cystoscopy, left ureteral stent exchange, left ureteroscopy with holmium laser lithotripsy and stone basketing. left retrogrades;  Surgeon: Devan Bergeron MD;  Location:  OR     EXTRACORPOREAL SHOCK WAVE LITHOTRIPSY (ESWL) Left 8/31/2021    Procedure: Bilateral extracorporeal shockwave lithotripsy;  Surgeon: Devan Bergeron MD;   Location: [facility] OR     HC EXCISE VARICOCELE  age 21     varicocele repair     HERNIORRHAPHY UMBILICAL N/A 11/30/2018    Procedure: OPEN UMBILLICAL HERNIA REPAIR ;  Surgeon: Ike Catalan MD;  Location: [facility] OR     KIDNEY SURGERY      lithotripsy x 3     LAPAROSCOPIC HERNIORRHAPHY INGUINAL Left 9/26/2016    Procedure: LAPAROSCOPIC HERNIORRHAPHY INGUINAL;  Surgeon: Ike Catalan MD;  Location:  [facility]     LITHOTRIPSY      three times     STOMACH SURGERY  2003    exploratory scoping looking for tumors related to ZE syndrome       FAMILY HISTORY:   Family History   Problem Relation Age of Onset     Heart Disease Mother         ablation     C.A.D. Mother      Heart Disease Father      Respiratory Father         COPD     C.A.D. Father      Spina bifida Son        SOCIAL HISTORY:   Social History     Socioeconomic History     Marital status:      Spouse name: Not on file     Number of children: Not on file     Years of education: Not on file     Highest education level: Not on file   Occupational History     Not on file   Tobacco Use     Smoking status: Never Smoker     Smokeless tobacco: Never Used   Vaping Use     Vaping Use: Never assessed   Substance and Sexual Activity     Alcohol use: Yes     Comment: seldom     Drug use: No     Sexual activity: Not Currently   Other Topics Concern     Parent/sibling w/ CABG, MI or angioplasty before 65F 55M? No   Social History Narrative     Not on file     Social Determinants of Health     Financial Resource Strain:      Difficulty of Paying Living Expenses:    Food Insecurity:      Worried About Running Out of Food in the Last Year:      Ran Out of Food in the Last Year:    Transportation Needs:      Lack of Transportation (Medical):      Lack of Transportation (Non-Medical):    Physical Activity:      Days of Exercise per Week:      Minutes of Exercise per Session:    Stress:      Feeling of Stress :    Social Connections:      Frequency of Communication with  "Friends and Family:      Frequency of Social Gatherings with Friends and Family:      Attends Faith Services:      Active Member of Clubs or Organizations:      Attends Club or Organization Meetings:      Marital Status:    Intimate Partner Violence:      Fear of Current or Ex-Partner:      Emotionally Abused:      Physically Abused:      Sexually Abused:        Review Of Systems:  Skin: No rash, pruritis, or skin pigmentation  Eyes: No changes in vision  Ears/Nose/Throat: No changes in hearing, no nosebleeds  Respiratory: No shortness of breath, dyspnea on exertion, cough, or hemoptysis  Cardiovascular: No chest pain or palpitations  Gastrointestinal: No diarrhea or constipation. No abdominal pain. No hematochezia  Genitourinary: see HPI  Musculoskeletal: No pain or swelling of joints, normal range of motion  Neurologic: No weakness or tremors  Psychiatric: No recent changes in memory or mood  Hematologic/Lymphatic/Immunologic: No easy bruising or enlarged lymph nodes  Endocrine: No weight gain or loss      PHYSICAL EXAM:    /88   Pulse 84   Ht 1.727 m (5' 8\")   Wt 77.1 kg (170 lb)   BMI 25.85 kg/m      Constitutional: Well developed. Conversant and in no acute distress  Eyes: Anicteric sclera, conjunctiva clear, normal extraocular movements  ENT: Normocephalic and atraumatic,   Skin: Warm and dry. No rashes or lesions  Cardiac: No peripheral edema  Back/Flank: Not done  CNS/PNS: Normal musculature and movements, moves all extremities normally  Respiratory: Normal non-labored breathing  Abdomen: Soft nontender and nondistended  Peripheral Vascular: No peripheral edema  Mental Status/Psych: Alert and Oriented x 3. Normal mood and affect    Penis: Not done  Scrotal Skin: Not done  Testicles: Not done  Epididymis: Not done  Digital Rectal Exam:     Cystoscopy: I performed flexible cystoscopy and the stent was removed without difficulty    Imaging: None    Urinalysis: UA RESULTS:  Recent Labs   Lab Test " 07/19/21  1449 12/10/19  1047   COLOR Yellow Yellow   APPEARANCE Clear Clear   URINEGLC Negative Negative   URINEBILI Negative Negative   URINEKETONE Negative Negative   SG <=1.005 1.015   UBLD Trace* Negative   URINEPH 5.5 6.0   PROTEIN Negative Negative   UROBILINOGEN 0.2 0.2   NITRITE Negative Negative   LEUKEST Negative Negative   RBCU  --  O - 2   WBCU  --  0 - 5       PSA:     Post Void Residual:     Other labs: None today      IMPRESSION:  Doing well, stent removed    PLAN:  We discussed prevention methods for future stones.  He has had many stones through the years and was asking about more specific recommendations and wondering if any further work-up can be done.  I recommended that he seek consultation at the kidney stone prevention clinic at the [facility].  I recommended he come back and see me again in 1 year with a KUB.    Total time spent today in review of outside records and test results, discussion with the patient, and documentation: 15 minutes      Devan Bergeron M.D.

## 2021-10-14 NOTE — NURSING NOTE
Chief Complaint   Patient presents with     Kidney Stone     Here for in office Cystoscopy for stent out     Prior to the start of the procedure and with procedural staff participation, I verbally confirmed the patient s identity using two indicators, relevant allergies, that the procedure was appropriate and matched the consent or emergent situation, and that the correct equipment/implants were available. Immediately prior to starting the procedure I conducted the Time Out with the procedural staff and re-confirmed the patient s name, procedure, and site/side. I have wiped the patient off with the povidone-Iodine solution, draped them,  used Lidocaine hydrochloride jelly, and instilled sterile water into the bladder. (The Joint Commission universal protocol was followed.)  Yes    Sedation (Moderate or Deep): None    5mL 2% lidocaine hydrochloride Urojet instilled into urethra.    NDC# [address]-1  Lot #: [id number]  Expiration Date:  05/23    Mikayla Gabriel CMA

## 2021-10-14 NOTE — LETTER
10/14/2021       RE: Stanford Andersen  [address] [facility] [address]     Dear Colleague,    Thank you for referring your patient, Stanford Andersen, to the [facility] at [facility]. Please see a copy of my visit note below.    Office Visit Note  [facility]  [phone]    UROLOGIC DIAGNOSES:   Bilateral kidney stones    CURRENT INTERVENTIONS:   S/P bilateral ESWL and left ureteroscopy    HISTORY:   Stanford underwent an extensive left ureteroscopy in order to remove his residual stone burden in the operating room 2 weeks ago. A stent was replaced at the end of the case. He has felt well since his procedure. He is here today for stent removal. His stones were composed of calcium oxalate monohydrate.      PAST MEDICAL HISTORY:   Past Medical History:   Diagnosis Date     Arthritis     hand     Chronic back pain      Complication of anesthesia     URINARY RETENTION, Sleepy after surgery     Gastro-oesophageal reflux disease      Hypertension      Renal disease     kidney stone     Zollinger-Patricio syndrome        PAST SURGICAL HISTORY:   Past Surgical History:   Procedure Laterality Date     ABDOMEN SURGERY  2003    Exploratory laparoscopy     COMBINED CYSTOSCOPY, INSERT STENT URETER(S) Left 8/31/2021    Procedure: Cystoscopy with left ureteral stent placement;  Surgeon: Devan Bergeron MD;  Location: [facility] OR     COMBINED CYSTOSCOPY, RETROGRADES, URETEROSCOPY, LASER HOLMIUM LITHOTRIPSY URETER(S), INSERT STENT Right 5/23/2019    Procedure: Video cystoscopy, exam under anesthesia, right ureteroscopy with holmium laser lithotripsy and stone extractions, right double-J stent (5-Qatari x 24 cm).;  Surgeon: Satinder Almanza MD;  Location: [facility] OR     COMBINED CYSTOSCOPY, RETROGRADES, URETEROSCOPY, LASER HOLMIUM LITHOTRIPSY URETER(S), INSERT STENT Left 9/30/2021    Procedure: Cystoscopy, left ureteral stent exchange,  left ureteroscopy with holmium laser lithotripsy and stone basketing. left retrogrades;  Surgeon: Devan Bergeron MD;  Location:  OR     EXTRACORPOREAL SHOCK WAVE LITHOTRIPSY (ESWL) Left 8/31/2021    Procedure: Bilateral extracorporeal shockwave lithotripsy;  Surgeon: Devan Bergeron MD;  Location:  OR     HC EXCISE VARICOCELE  age 21     varicocele repair     HERNIORRHAPHY UMBILICAL N/A 11/30/2018    Procedure: OPEN UMBILLICAL HERNIA REPAIR ;  Surgeon: Ike Catalan MD;  Location:  OR     KIDNEY SURGERY      lithotripsy x 3     LAPAROSCOPIC HERNIORRHAPHY INGUINAL Left 9/26/2016    Procedure: LAPAROSCOPIC HERNIORRHAPHY INGUINAL;  Surgeon: Ike Catalan MD;  Location:  [facility]     LITHOTRIPSY      three times     STOMACH SURGERY  2003    exploratory scoping looking for tumors related to ZE syndrome       FAMILY HISTORY:   Family History   Problem Relation Age of Onset     Heart Disease Mother         ablation     C.A.D. Mother      Heart Disease Father      Respiratory Father         COPD     C.A.D. Father      Spina bifida Son        SOCIAL HISTORY:   Social History     Socioeconomic History     Marital status:      Spouse name: Not on file     Number of children: Not on file     Years of education: Not on file     Highest education level: Not on file   Occupational History     Not on file   Tobacco Use     Smoking status: Never Smoker     Smokeless tobacco: Never Used   Vaping Use     Vaping Use: Never assessed   Substance and Sexual Activity     Alcohol use: Yes     Comment: seldom     Drug use: No     Sexual activity: Not Currently   Other Topics Concern     Parent/sibling w/ CABG, MI or angioplasty before 65F 55M? No   Social History Narrative     Not on file     Social Determinants of Health     Financial Resource Strain:      Difficulty of Paying Living Expenses:    Food Insecurity:      Worried About Running Out of Food in the Last Year:      Ran Out of Food in the  "Last Year:    Transportation Needs:      Lack of Transportation (Medical):      Lack of Transportation (Non-Medical):    Physical Activity:      Days of Exercise per Week:      Minutes of Exercise per Session:    Stress:      Feeling of Stress :    Social Connections:      Frequency of Communication with Friends and Family:      Frequency of Social Gatherings with Friends and Family:      Attends Restorationist Services:      Active Member of Clubs or Organizations:      Attends Club or Organization Meetings:      Marital Status:    Intimate Partner Violence:      Fear of Current or Ex-Partner:      Emotionally Abused:      Physically Abused:      Sexually Abused:        Review Of Systems:  Skin: No rash, pruritis, or skin pigmentation  Eyes: No changes in vision  Ears/Nose/Throat: No changes in hearing, no nosebleeds  Respiratory: No shortness of breath, dyspnea on exertion, cough, or hemoptysis  Cardiovascular: No chest pain or palpitations  Gastrointestinal: No diarrhea or constipation. No abdominal pain. No hematochezia  Genitourinary: see HPI  Musculoskeletal: No pain or swelling of joints, normal range of motion  Neurologic: No weakness or tremors  Psychiatric: No recent changes in memory or mood  Hematologic/Lymphatic/Immunologic: No easy bruising or enlarged lymph nodes  Endocrine: No weight gain or loss      PHYSICAL EXAM:    /88   Pulse 84   Ht 1.727 m (5' 8\")   Wt 77.1 kg (170 lb)   BMI 25.85 kg/m      Constitutional: Well developed. Conversant and in no acute distress  Eyes: Anicteric sclera, conjunctiva clear, normal extraocular movements  ENT: Normocephalic and atraumatic,   Skin: Warm and dry. No rashes or lesions  Cardiac: No peripheral edema  Back/Flank: Not done  CNS/PNS: Normal musculature and movements, moves all extremities normally  Respiratory: Normal non-labored breathing  Abdomen: Soft nontender and nondistended  Peripheral Vascular: No peripheral edema  Mental Status/Psych: Alert and " Oriented x 3. Normal mood and affect    Penis: Not done  Scrotal Skin: Not done  Testicles: Not done  Epididymis: Not done  Digital Rectal Exam:     Cystoscopy: I performed flexible cystoscopy and the stent was removed without difficulty    Imaging: None    Urinalysis: UA RESULTS:  Recent Labs   Lab Test 07/19/21  1449 12/10/19  1047   COLOR Yellow Yellow   APPEARANCE Clear Clear   URINEGLC Negative Negative   URINEBILI Negative Negative   URINEKETONE Negative Negative   SG <=1.005 1.015   UBLD Trace* Negative   URINEPH 5.5 6.0   PROTEIN Negative Negative   UROBILINOGEN 0.2 0.2   NITRITE Negative Negative   LEUKEST Negative Negative   RBCU  --  O - 2   WBCU  --  0 - 5       PSA:     Post Void Residual:     Other labs: None today      IMPRESSION:  Doing well, stent removed    PLAN:  We discussed prevention methods for future stones.  He has had many stones through the years and was asking about more specific recommendations and wondering if any further work-up can be done.  I recommended that he seek consultation at the kidney stone prevention clinic at the [facility].  I recommended he come back and see me again in 1 year with a KUB.    Total time spent today in review of outside records and test results, discussion with the patient, and documentation: 15 minutes      Devan Bergeron M.D.

## 2021-10-14 NOTE — PATIENT INSTRUCTIONS
"                                           AFTER YOUR CYSTOSCOPY  ?  ?  You have just completed a cystoscopy, or \"cysto\", which allowed your physician to learn more about your bladder (or to remove a stent placed after surgery). We suggest that you continue to avoid caffeine, fruit juice, and alcohol for the next 24 hours, however, you are encouraged to return to your normal activities.  ?  ?  A few things that are considered normal after your cystoscopy:  ?  * small amount of bleeding (or spotting) that clears within the next 24 hours  ?  * slight burning sensation with urination  ?  * sensation of needing to void (urinate) more frequently  ?  * the feeling of \"air\" in your urine  ?  * mild discomfort that is relieved with Tylenol    * bladder spasms  ?  ?  ?  Please contact our office promptly if you:  ?  * develop a fever above 101 degrees  ?  * are unable to urinate  ?  * develop bright red blood that does not stop  ?  * experience severe pain or swelling  ?  ?  ?  And of course, please contact our office with any concerns or questions [phone]  ?    "

## 2021-12-21 ENCOUNTER — OFFICE VISIT (OUTPATIENT)
Dept: INTERNAL MEDICINE | Facility: CLINIC | Age: 76
End: 2021-12-21
Payer: COMMERCIAL

## 2021-12-21 VITALS
HEART RATE: 85 BPM | DIASTOLIC BLOOD PRESSURE: 82 MMHG | WEIGHT: 173.3 LBS | SYSTOLIC BLOOD PRESSURE: 125 MMHG | TEMPERATURE: 98 F | BODY MASS INDEX: 26.27 KG/M2 | OXYGEN SATURATION: 95 % | HEIGHT: 68 IN

## 2021-12-21 DIAGNOSIS — E16.4 ZOLLINGER-ELLISON SYNDROME: ICD-10-CM

## 2021-12-21 DIAGNOSIS — R35.1 BPH ASSOCIATED WITH NOCTURIA: ICD-10-CM

## 2021-12-21 DIAGNOSIS — Z01.818 PREOP GENERAL PHYSICAL EXAM: Primary | ICD-10-CM

## 2021-12-21 DIAGNOSIS — N40.1 BPH ASSOCIATED WITH NOCTURIA: ICD-10-CM

## 2021-12-21 DIAGNOSIS — I10 ESSENTIAL HYPERTENSION: Chronic | ICD-10-CM

## 2021-12-21 DIAGNOSIS — H26.9 CATARACT OF BOTH EYES, UNSPECIFIED CATARACT TYPE: ICD-10-CM

## 2021-12-21 PROCEDURE — 99214 OFFICE O/P EST MOD 30 MIN: CPT | Performed by: INTERNAL MEDICINE

## 2021-12-21 RX ORDER — TAMSULOSIN HYDROCHLORIDE 0.4 MG/1
0.4 CAPSULE ORAL EVERY EVENING
Qty: 90 CAPSULE | Refills: 3 | Status: SHIPPED | OUTPATIENT
Start: 2021-12-21 | End: 2023-01-05

## 2021-12-21 RX ORDER — FINASTERIDE 5 MG/1
5 TABLET, FILM COATED ORAL DAILY
Qty: 90 TABLET | Refills: 3 | Status: SHIPPED | OUTPATIENT
Start: 2021-12-21 | End: 2023-01-05

## 2021-12-21 RX ORDER — PANTOPRAZOLE SODIUM 40 MG/1
40 TABLET, DELAYED RELEASE ORAL 2 TIMES DAILY
Qty: 180 TABLET | Refills: 3 | Status: SHIPPED | OUTPATIENT
Start: 2021-12-21 | End: 2023-01-05

## 2021-12-21 RX ORDER — AMLODIPINE BESYLATE 2.5 MG/1
2.5 TABLET ORAL DAILY
Qty: 90 TABLET | Refills: 3 | Status: SHIPPED | OUTPATIENT
Start: 2021-12-21 | End: 2023-01-05

## 2021-12-21 ASSESSMENT — MIFFLIN-ST. JEOR: SCORE: 1495.58

## 2021-12-21 NOTE — PROGRESS NOTES
[address] [address]  Phone: [phone]  Primary Provider: Arjun Burns  Pre-op Performing Provider: CHARLIE LAWRENCE      PREOPERATIVE EVALUATION:  Today's date: 12/21/2021    Stanford Andersen is a 76 year old male who presents for a preoperative evaluation.    Surgical Information:  Surgery/Procedure: Cataract Surgery  Surgery Location: [facility]  Surgeon: Dr. Padilla   Surgery Date: Dec 27th Right Eye Jan 10th Left Eye   Time of Surgery: 9:05am   Where patient plans to recover: At home with family  Fax number for surgical facility: [phone]    Type of Anesthesia Anticipated: to be determined    Assessment & Plan     The proposed surgical procedure is considered LOW risk.    1. Preop general physical exam    2. Cataract of both eyes, unspecified cataract type    3. Essential hypertension    4. BPH associated with nocturia    5. Zollinger-Patricio syndrome              Risks and Recommendations:  The patient has the following additional risks and recommendations for perioperative complications:   - No identified additional risk factors other than previously addressed    Medication Instructions:  Patient is to take all scheduled medications on the day of surgery    RECOMMENDATION:  APPROVAL GIVEN to proceed with proposed procedure, without further diagnostic evaluation.      **If the second procedure is postponed past January 21, 2022, no need to return for a separate preop exam, some he needs to contact me and I can provide an addendum to this preop.         Charlie Lawrence M.D.  Dept. of Internal Medicine  [facility]           Subjective     HPI related to upcoming procedure: bilateral cataracts causing decreased vision    Preop Questions 12/21/2021   1. Have you ever had a heart attack or stroke? No   2. Have you ever had surgery on your heart or blood vessels, such as a stent  placement, a coronary artery bypass, or surgery on an artery in your head, neck, heart, or legs? No   3. Do you have chest pain with activity? No   4. Do you have a history of  heart failure? No   5. Do you currently have a cold, bronchitis or symptoms of other infection? No   6. Do you have a cough, shortness of breath, or wheezing? No   7. Do you or anyone in your family have previous history of blood clots? No   8. Do you or does anyone in your family have a serious bleeding problem such as prolonged bleeding following surgeries or cuts? No   9. Have you ever had problems with anemia or been told to take iron pills? No   10. Have you had any abnormal blood loss such as black, tarry or bloody stools? YES - upper GI bleed in 1980's, history of Zollinger Patricio syndrome, no recurrances in past 30 years   11. Have you ever had a blood transfusion? No   12. Are you willing to have a blood transfusion if it is medically needed before, during, or after your surgery? Yes   13. Have you or any of your relatives ever had problems with anesthesia? YES - excessive sedation with general anesthesia   14. Do you have sleep apnea, excessive snoring or daytime drowsiness? No   15. Do you have any artifical heart valves or other implanted medical devices like a pacemaker, defibrillator, or continuous glucose monitor? No   16. Do you have artificial joints? No   17. Are you allergic to latex? No       Health Care Directive:  Patient does not have a Health Care Directive or Living Will: Discussed advance care planning with patient; however, patient declined at this time.    Preoperative Review of :   reviewed - no record of controlled substances prescribed.      *  No recent infectious illnesses.    *  No recent cardiac or pulmonary issues or symptoms.    *  No problems performing vigorous physical activity, no changes in exercise tolerance.    *  No personal or family history of anesthesia complications.    *  No personal or  family history of bleeding or clotting disorders.       Zollinger Patricio Syndrome:  History of acute upper GI bleed from Zollinger-Patricio syndrome and the mid 1990s.  Was placed on higher dose PPI therapy.  No further recurrences since that time.    Hypertension:   History of mild hypertension, blood pressures well controlled on current regimen.  Denies side effects.    BPH with nocturia:  History of benign prostatic hypertrophy with mild nocturia.  Well controlled with tamsulosin and finasteride.  Denies side effects.    Review of Systems  Constitutional, neuro, ENT, endocrine, pulmonary, cardiac, gastrointestinal, genitourinary, musculoskeletal, integument and psychiatric systems are negative, except as otherwise noted.    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     Priority: Medium     Left       Recurrent kidney stones 05/03/2019     Priority: Medium     BPH associated with nocturia 04/16/2019     Priority: Medium     Erectile dysfunction, unspecified erectile dysfunction type 10/08/2018     Priority: Medium     Ventral hernia without obstruction or gangrene 10/08/2018     Priority: Medium     Numbness and tingling of both feet 10/08/2018     Priority: Medium     Stress 08/02/2017     Priority: Medium     ACP (advance care planning) 12/21/2015     Priority: Medium     Advance Care Planning 12/21/2015: ACP Review of Chart / Resources Provided:  Reviewed chart for advance care plan.  Stanford Andersen has no plan or code status on file. Discussed available resources and provided with information. . Added by Zee Stewart             Essential hypertension 02/05/2014     Priority: Medium     Hyperlipidemia 09/04/2013     Priority: Medium     Family history of coronary artery disease 09/04/2013     Priority: Medium     Change in bowel function 09/04/2013     Priority: Medium     Zollinger-Patricio syndrome      Priority: Medium     Health Care Home 03/02/2012     Priority:  Medium     [facility] for .  Clara Daniels RN-BC    [phone]   DX V65.8 REPLACED WITH [facility] HEALTH CARE HOME (04/08/2013)       Chest pain 03/02/2012     Priority: Medium      Past Medical History:   Diagnosis Date     Arthritis     hand     Chronic back pain      Complication of anesthesia     URINARY RETENTION, Sleepy after surgery     Gastro-oesophageal reflux disease      Hypertension      Renal disease     kidney stone     Zollinger-Patricio syndrome 1995     Past Surgical History:   Procedure Laterality Date     ABDOMEN SURGERY  2003    Exploratory laparoscopy     COMBINED CYSTOSCOPY, INSERT STENT URETER(S) Left 8/31/2021    Procedure: Cystoscopy with left ureteral stent placement;  Surgeon: Devan Bergeron MD;  Location:  OR     COMBINED CYSTOSCOPY, RETROGRADES, URETEROSCOPY, LASER HOLMIUM LITHOTRIPSY URETER(S), INSERT STENT Right 5/23/2019    Procedure: Video cystoscopy, exam under anesthesia, right ureteroscopy with holmium laser lithotripsy and stone extractions, right double-J stent (5-Polish x 24 cm).;  Surgeon: Satinder Almanza MD;  Location:  OR     COMBINED CYSTOSCOPY, RETROGRADES, URETEROSCOPY, LASER HOLMIUM LITHOTRIPSY URETER(S), INSERT STENT Left 9/30/2021    Procedure: Cystoscopy, left ureteral stent exchange, left ureteroscopy with holmium laser lithotripsy and stone basketing. left retrogrades;  Surgeon: Devan Bergeron MD;  Location:  OR     EXTRACORPOREAL SHOCK WAVE LITHOTRIPSY (ESWL) Left 8/31/2021    Procedure: Bilateral extracorporeal shockwave lithotripsy;  Surgeon: Devan Bergeron MD;  Location:  OR     HC EXCISE VARICOCELE  age 21     varicocele repair     HERNIORRHAPHY UMBILICAL N/A 11/30/2018    Procedure: OPEN UMBILLICAL HERNIA REPAIR ;  Surgeon: Ike Catalan MD;  Location:  OR     KIDNEY SURGERY      lithotripsy x 3     LAPAROSCOPIC HERNIORRHAPHY INGUINAL Left 9/26/2016    Procedure: LAPAROSCOPIC HERNIORRHAPHY  "INGUINAL;  Surgeon: Ike Catalan MD;  Location:  [facility]     LITHOTRIPSY      three times     STOMACH SURGERY  2003    exploratory scoping looking for tumors related to ZE syndrome     Current Outpatient Medications   Medication Sig Dispense Refill     amLODIPine (NORVASC) 2.5 MG tablet Take 1 tablet (2.5 mg) by mouth daily 90 tablet 3     finasteride (PROSCAR) 5 MG tablet Take 1 tablet (5 mg) by mouth daily 90 tablet 3     pantoprazole (PROTONIX) 40 MG EC tablet Take 1 tablet (40 mg) by mouth 2 times daily 180 tablet 3     tamsulosin (FLOMAX) 0.4 MG capsule Take 1 capsule (0.4 mg) by mouth every evening 90 capsule 3       Allergies   Allergen Reactions     Dye [Contrast Dye] Anaphylaxis and Hives     Penicillins Anaphylaxis     Swelling, arm turned blue     Tramadol Itching     Oxycodone-Acetaminophen Rash        Social History     Tobacco Use     Smoking status: Never Smoker     Smokeless tobacco: Never Used   Substance Use Topics     Alcohol use: Yes     Comment: seldom     Family History   Problem Relation Age of Onset     Heart Disease Mother         ablation     C.A.D. Mother      Heart Disease Father      Respiratory Father         COPD     C.A.D. Father      Spina bifida Son      History   Drug Use No         Objective     /82   Pulse 85   Temp 98  F (36.7  C) (Tympanic)   Ht 1.727 m (5' 8\")   Wt 78.6 kg (173 lb 4.8 oz)   SpO2 95%   BMI 26.35 kg/m      Physical Exam  GENERAL alert and no distress  EYES:  Normal sclera,conjunctiva, EOMI  HENT: oral and posterior pharynx without lesions or erythema, facies symmetric  NECK: Neck supple. No LAD, without thyroidmegaly.  RESP: Clear to ausculation bilaterally without wheezes or crackles. Normal BS in all fields.  CV: RRR normal S1S2 without murmurs, rubs or gallops.  LYMPH: no cervical lymph adenopathy appreciated  MS: extremities- no gross deformities of the visible extremities noted,   EXT:  no lower extremity edema  PSYCH: Alert and oriented " times 3; speech- coherent  SKIN:  No obvious significant skin lesions on visible portions of face     Recent Labs   Lab Test 08/18/21  1610 07/21/21  1020 04/30/21  0847   HGB 15.1  --  14.7     --  223   NA  --  137 139   POTASSIUM 3.9 3.8 3.7   CR  --  0.93 0.95        Diagnostics:  No labs were ordered during this visit.   No EKG required for low risk surgery (cataract, skin procedure, breast biopsy, etc).    Revised Cardiac Risk Index (RCRI):  The patient has the following serious cardiovascular risks for perioperative complications:   - No serious cardiac risks = 0 points     RCRI Interpretation: 0 points: Class I (very low risk - 0.4% complication rate)           Signed Electronically by: Charlie Lawrence MD  Copy of this evaluation report is provided to requesting physician.

## 2021-12-21 NOTE — PATIENT INSTRUCTIONS
"*  Change the over the counter omeprazole to prescription Pantoprazole 40 mg twice per day    *  Continue all other medications at the same doses.  Contact your usual pharmacy if you need refills.     *  Check out the web site \"wwwCerelink\" to check out the estimated cash prices for medications.  Just type in the medication name, and the zip code and it will list the usual prices.  Most times there are coupons that she can download to reduce the price further.  Through this web site, you will find that [facility] and [facility] are usually the most expensive pharmacies for most medications, and [facility], [facility], and [facility] among the best prices.  [facility] and [facility] are more receptive to using the discount coupons, [facility] usually not very cooperative for discounts.  In general, avoid using [facility] for this reason.   It is most important that you take the medication rather than where it comes from.  If you despite a prescription sent to a different pharmacy for cost purposes, just let us know.      *  Return to see one of us at the clinic in 1 year, sooner if needed.  Use Contractually or Call [phone] to schedule the appointment with me.         PRE-OPERATIVE INSTRUCTIONS:     *  Contact your surgeon if there is any change in your health. This includes signs of new infection, such as cold or flu (such as a sore throat, runny nose, cough, rash or fever)      *  Complete any Covid testing within 48-72 hours of the surgery.  The surgeon's office is responsible for arranging and completing this testing before surgery.  Contact the surgery office for questions about this.       --[facility] Covid Scheduling number: [phone]   (to arrange Covid testing and/or Covid vaccine appointments within the [facility] system)      *  No need to stop any medications    *  No need to stop any aspirin or NSAIDS (Advil, Motrin, Aleve, Ibuprofen, or prescription versions of these medications) before surgery.       *  " Tylenol (acetaminophen) OK to take if needed for pain or headache.  Follow instructions on the bottle    *  Prepare your body as instructed by the surgery clinic.  If instructed, Take a shower or bath the night before surgery. Use any special soaps or cleaning instructions according to instructions from your surgeon.  If you do not have soap from your surgeon, use your regular soap. Do not shave or scrub the surgery site.  Wear clean pajamas and have clean sheets on your bed     *  ON THE MORNING OF SURGERY:     --OK to take all usual medications with small sip of water.       *  Resume all medications at the same doses after surgery, unless instructed otherwise by the medical staff.     *  Attend all follow up appointments with the surgeons (and/or therapists if applicable) as instructed.     *  Contact the surgeon's office for any specific questions about after-surgery cares and follow up instructions.     *  You do not need to necessarily return to see us after your surgery unless instructed to do so.

## 2022-06-11 ENCOUNTER — HEALTH MAINTENANCE LETTER (OUTPATIENT)
Age: 77
End: 2022-06-11

## 2022-07-18 DIAGNOSIS — N20.0 CALCULUS OF KIDNEY: Primary | ICD-10-CM

## 2022-10-16 ENCOUNTER — HEALTH MAINTENANCE LETTER (OUTPATIENT)
Age: 77
End: 2022-10-16

## 2023-01-05 DIAGNOSIS — E16.4 ZOLLINGER-ELLISON SYNDROME: ICD-10-CM

## 2023-01-05 DIAGNOSIS — R35.1 BPH ASSOCIATED WITH NOCTURIA: ICD-10-CM

## 2023-01-05 DIAGNOSIS — N40.1 BPH ASSOCIATED WITH NOCTURIA: ICD-10-CM

## 2023-01-05 DIAGNOSIS — I10 ESSENTIAL HYPERTENSION: Chronic | ICD-10-CM

## 2023-01-06 RX ORDER — TAMSULOSIN HYDROCHLORIDE 0.4 MG/1
0.4 CAPSULE ORAL EVERY EVENING
Qty: 90 CAPSULE | Refills: 0 | Status: SHIPPED | OUTPATIENT
Start: 2023-01-06 | End: 2023-01-13

## 2023-01-06 RX ORDER — FINASTERIDE 5 MG/1
5 TABLET, FILM COATED ORAL DAILY
Qty: 90 TABLET | Refills: 0 | Status: SHIPPED | OUTPATIENT
Start: 2023-01-06 | End: 2023-01-13

## 2023-01-06 RX ORDER — AMLODIPINE BESYLATE 2.5 MG/1
2.5 TABLET ORAL DAILY
Qty: 90 TABLET | Refills: 0 | Status: SHIPPED | OUTPATIENT
Start: 2023-01-06 | End: 2023-01-13

## 2023-01-06 RX ORDER — PANTOPRAZOLE SODIUM 40 MG/1
40 TABLET, DELAYED RELEASE ORAL 2 TIMES DAILY
Qty: 180 TABLET | Refills: 0 | Status: SHIPPED | OUTPATIENT
Start: 2023-01-06 | End: 2023-01-13

## 2023-01-13 DIAGNOSIS — R35.1 BPH ASSOCIATED WITH NOCTURIA: ICD-10-CM

## 2023-01-13 DIAGNOSIS — I10 ESSENTIAL HYPERTENSION: Chronic | ICD-10-CM

## 2023-01-13 DIAGNOSIS — E16.4 ZOLLINGER-ELLISON SYNDROME: ICD-10-CM

## 2023-01-13 DIAGNOSIS — N40.1 BPH ASSOCIATED WITH NOCTURIA: ICD-10-CM

## 2023-01-13 NOTE — TELEPHONE ENCOUNTER
CC: Patient calling regarding the following medications: Finasteride, pantoprazole. States he gets his medications at [facility] in [place] on [address]    Per chart review, all script were sent to [facility] in [place] on 1/6/23 by Dr. Lawrence. Patient states he has been getting medications from [facility] for a while and they are attempting to transfer the prescriptions over.    Writer called and spoke with pharmacy staff from [facility] who states they need all four of patient's rx's to be sent to pharmacy.    Pended all medications with original start date to preferred pharmacy     Once able to send rx to [facility], patient would appreciate a callback [phone] - no voicemail set up     Rayna Morrissey RN  [facility]

## 2023-01-16 RX ORDER — FINASTERIDE 5 MG/1
5 TABLET, FILM COATED ORAL DAILY
Qty: 90 TABLET | Refills: 0 | Status: SHIPPED | OUTPATIENT
Start: 2023-01-16 | End: 2023-03-01

## 2023-01-16 RX ORDER — TAMSULOSIN HYDROCHLORIDE 0.4 MG/1
0.4 CAPSULE ORAL EVERY EVENING
Qty: 90 CAPSULE | Refills: 0 | Status: SHIPPED | OUTPATIENT
Start: 2023-01-16 | End: 2023-03-01

## 2023-01-16 RX ORDER — AMLODIPINE BESYLATE 2.5 MG/1
2.5 TABLET ORAL DAILY
Qty: 90 TABLET | Refills: 0 | Status: SHIPPED | OUTPATIENT
Start: 2023-01-16 | End: 2023-03-01

## 2023-01-16 RX ORDER — PANTOPRAZOLE SODIUM 40 MG/1
40 TABLET, DELAYED RELEASE ORAL 2 TIMES DAILY
Qty: 180 TABLET | Refills: 0 | Status: SHIPPED | OUTPATIENT
Start: 2023-01-16 | End: 2023-03-01

## 2023-03-01 ENCOUNTER — OFFICE VISIT (OUTPATIENT)
Dept: INTERNAL MEDICINE | Facility: CLINIC | Age: 78
End: 2023-03-01
Payer: COMMERCIAL

## 2023-03-01 VITALS
BODY MASS INDEX: 24.58 KG/M2 | WEIGHT: 162.2 LBS | OXYGEN SATURATION: 95 % | HEART RATE: 92 BPM | DIASTOLIC BLOOD PRESSURE: 82 MMHG | SYSTOLIC BLOOD PRESSURE: 118 MMHG | TEMPERATURE: 98.4 F | HEIGHT: 68 IN

## 2023-03-01 DIAGNOSIS — L57.0 ACTINIC KERATOSIS: ICD-10-CM

## 2023-03-01 DIAGNOSIS — Z13.6 CARDIOVASCULAR SCREENING; LDL GOAL LESS THAN 130: ICD-10-CM

## 2023-03-01 DIAGNOSIS — Z00.00 ENCOUNTER FOR MEDICARE ANNUAL WELLNESS EXAM: Primary | ICD-10-CM

## 2023-03-01 DIAGNOSIS — M79.604 LOW BACK PAIN RADIATING TO BOTH LEGS: ICD-10-CM

## 2023-03-01 DIAGNOSIS — I10 ESSENTIAL HYPERTENSION: Chronic | ICD-10-CM

## 2023-03-01 DIAGNOSIS — B07.9 FILIFORM WARTS: ICD-10-CM

## 2023-03-01 DIAGNOSIS — N40.1 BPH ASSOCIATED WITH NOCTURIA: ICD-10-CM

## 2023-03-01 DIAGNOSIS — M79.605 LOW BACK PAIN RADIATING TO BOTH LEGS: ICD-10-CM

## 2023-03-01 DIAGNOSIS — E16.4 ZOLLINGER-ELLISON SYNDROME: ICD-10-CM

## 2023-03-01 DIAGNOSIS — M54.50 LOW BACK PAIN RADIATING TO BOTH LEGS: ICD-10-CM

## 2023-03-01 DIAGNOSIS — R35.1 BPH ASSOCIATED WITH NOCTURIA: ICD-10-CM

## 2023-03-01 LAB
ALBUMIN SERPL BCG-MCNC: 4.2 G/DL (ref 3.5–5.2)
ALP SERPL-CCNC: 73 U/L (ref 40–129)
ALT SERPL W P-5'-P-CCNC: 15 U/L (ref 10–50)
ANION GAP SERPL CALCULATED.3IONS-SCNC: 13 MMOL/L (ref 7–15)
AST SERPL W P-5'-P-CCNC: 22 U/L (ref 10–50)
BILIRUB SERPL-MCNC: 0.7 MG/DL
BUN SERPL-MCNC: 18 MG/DL (ref 8–23)
CALCIUM SERPL-MCNC: 9.3 MG/DL (ref 8.8–10.2)
CHLORIDE SERPL-SCNC: 104 MMOL/L (ref 98–107)
CHOLEST SERPL-MCNC: 207 MG/DL
CREAT SERPL-MCNC: 1.04 MG/DL (ref 0.67–1.17)
DEPRECATED HCO3 PLAS-SCNC: 24 MMOL/L (ref 22–29)
ERYTHROCYTE [DISTWIDTH] IN BLOOD BY AUTOMATED COUNT: 15.5 % (ref 10–15)
GFR SERPL CREATININE-BSD FRML MDRD: 74 ML/MIN/1.73M2
GLUCOSE SERPL-MCNC: 95 MG/DL (ref 70–99)
HCT VFR BLD AUTO: 47.4 % (ref 40–53)
HDLC SERPL-MCNC: 42 MG/DL
HGB BLD-MCNC: 15.1 G/DL (ref 13.3–17.7)
LDLC SERPL CALC-MCNC: 135 MG/DL
MCH RBC QN AUTO: 27.1 PG (ref 26.5–33)
MCHC RBC AUTO-ENTMCNC: 31.9 G/DL (ref 31.5–36.5)
MCV RBC AUTO: 85 FL (ref 78–100)
NONHDLC SERPL-MCNC: 165 MG/DL
PLATELET # BLD AUTO: 273 10E3/UL (ref 150–450)
POTASSIUM SERPL-SCNC: 4.1 MMOL/L (ref 3.4–5.3)
PROT SERPL-MCNC: 6.7 G/DL (ref 6.4–8.3)
RBC # BLD AUTO: 5.58 10E6/UL (ref 4.4–5.9)
SODIUM SERPL-SCNC: 141 MMOL/L (ref 136–145)
TRIGL SERPL-MCNC: 148 MG/DL
WBC # BLD AUTO: 6.8 10E3/UL (ref 4–11)

## 2023-03-01 PROCEDURE — 85027 COMPLETE CBC AUTOMATED: CPT | Performed by: INTERNAL MEDICINE

## 2023-03-01 PROCEDURE — 36415 COLL VENOUS BLD VENIPUNCTURE: CPT | Performed by: INTERNAL MEDICINE

## 2023-03-01 PROCEDURE — G0439 PPPS, SUBSEQ VISIT: HCPCS | Performed by: INTERNAL MEDICINE

## 2023-03-01 PROCEDURE — 99214 OFFICE O/P EST MOD 30 MIN: CPT | Mod: 25 | Performed by: INTERNAL MEDICINE

## 2023-03-01 PROCEDURE — 80053 COMPREHEN METABOLIC PANEL: CPT | Performed by: INTERNAL MEDICINE

## 2023-03-01 PROCEDURE — 80061 LIPID PANEL: CPT | Performed by: INTERNAL MEDICINE

## 2023-03-01 RX ORDER — TAMSULOSIN HYDROCHLORIDE 0.4 MG/1
0.4 CAPSULE ORAL EVERY EVENING
Qty: 90 CAPSULE | Refills: 3 | Status: SHIPPED | OUTPATIENT
Start: 2023-03-01 | End: 2024-03-17

## 2023-03-01 RX ORDER — AMLODIPINE BESYLATE 2.5 MG/1
2.5 TABLET ORAL DAILY
Qty: 90 TABLET | Refills: 3 | Status: ON HOLD | OUTPATIENT
Start: 2023-03-01 | End: 2024-04-12

## 2023-03-01 RX ORDER — FINASTERIDE 5 MG/1
5 TABLET, FILM COATED ORAL DAILY
Qty: 90 TABLET | Refills: 3 | Status: SHIPPED | OUTPATIENT
Start: 2023-03-01 | End: 2024-04-22

## 2023-03-01 RX ORDER — PANTOPRAZOLE SODIUM 40 MG/1
40 TABLET, DELAYED RELEASE ORAL 2 TIMES DAILY
Qty: 180 TABLET | Refills: 3 | Status: SHIPPED | OUTPATIENT
Start: 2023-03-01 | End: 2023-12-20

## 2023-03-01 ASSESSMENT — ENCOUNTER SYMPTOMS
NERVOUS/ANXIOUS: 1
WEAKNESS: 1

## 2023-03-01 ASSESSMENT — PAIN SCALES - GENERAL: PAINLEVEL: MODERATE PAIN (4)

## 2023-03-01 ASSESSMENT — ACTIVITIES OF DAILY LIVING (ADL): CURRENT_FUNCTION: NO ASSISTANCE NEEDED

## 2023-03-01 NOTE — PATIENT INSTRUCTIONS
We are rechecking your labs.  I will let you know if the results indicate any changes in your therapy.  Unless you hear otherwise, continue all medications at the same doses.  Contact your usual pharmacy if you need refills.     Assuming your labs look good, then continue all medications at the same doses.  Contact your usual pharmacy if you need refills.      Get the updated flu shot and Prevnar 20 vaccine from the pharmacy today as planned.      Ask the pharmacist about a Shingrix shingles vaccine.  The pharmacist will run the vaccines like a prescription and give you a cost for the vaccine.  These two shots can prevent shingles.  (See below)       Referral to [facility] to re-evaluate your chronic spine problem.  This is where you went several years ago for evaluation.     [facility]   [address]   [place] [address]   Phone: [phone]   Fax: [phone]        Referral to Dermatology Clinic you saw in 2015 to discuss removal of the large wart on your scalp.     [facility]   [address]   [place] [address]   Phone: [phone]         Shingles Vaccine (SHINGRIX):      I would recommend that you consider getting the Shingrix shingles vaccine.  The shingles vaccine is recommended for anyone over age 50.     The Shingrix vaccine is a series of 2 vaccines given 2-6 months apart.     The shingles vaccine does not stop you from getting shingles, but it decreases the intensity of the event, the duration of the event, and decreases the painful nerve condition that results     Based on the available data, the Shingrix vaccine has superior benefit and should be considered even if you have had the old Zostavax shinglesvaccine before.      Contact your insurance provider and ask them if either shingles vaccine is covered and is so, how much it will cost you.  Usually this will be cheaper to get in a pharmacy given by the  "pharmacist.    Regardless of the coverage, I would recommend that you consider the vaccine since shingles is very painful, (just ask anyone who has ever had it)    For Medicare insurance, the vaccine is cheaper to receive from a pharmacist in a pharmacy than for us to give you in the clinic.        5 GOALS TO PREVENT VASCULAR DISEASE:     1.  Aggressive blood pressure control, under 130/80 ideally.  Using medications if needed.    Your blood pressure is under good control    BP Readings from Last 4 Encounters:   03/01/23 118/82   12/21/21 125/82   10/14/21 132/88   09/30/21 (!) 145/90       2.  Aggressive LDL cholesterol (\"bad cholesterol\") lowering as indicated.    Your goal is an LDL under 130 for sure, preferably under 100.  (If you have diabetes or previous vascular disease, the the LDL goals would be under 100 for sure, preferably under 70.)    New guidelines identify four high-risk groups who could benefit from statins:   *people with pre-existing heart disease, such as those who have had a heart attack;   *people ages 40 to 75 who have diabetes of any type  *patients ages 40 to 75 with at least a 7.5% risk of developing cardiovascular disease over the next decade, according to a formula described in the guidelines  *patients with the sort of super-high cholesterol that sometimes runs in families, as evidenced by an LDL of 190 milligrams per deciliter or higher    Your cholesterol levels are well controlled.    Recent Labs   Lab Test 07/21/21  1020 04/30/21  0847 03/25/16  0853 09/14/15  1402   CHOL 205* 190   < > 226*   HDL 42 45   < > 43   * 112*   < > 137*   TRIG 162* 165*   < > 232*   CHOLHDLRATIO  --   --   --  5.3*    < > = values in this interval not displayed.       3.  Aggressive diabetic prevention, screening and/or management.      You do not have diabetes as of the most recent blood tests.     4.  No smoking    5.  Consider daily preventative aspirin over age 50 if you have enough cardiac " risk factors to place you at higher risk for the presence of vascular disease.    If you have any reason not to take aspirin such easy bruising or bleeding, stomach problems, other anticoagulant medications, or any other side effects, then you should not take Aspirin.     --Based on your current cardiac disease risk profile and/or age over 75, you do NOT need to take daily preventative aspirin.        Preventive Health Recommendations:   Male Ages 65 and over    Yearly exam:             See your health care provider every year in order to  o   Review health changes.   o   Discuss preventive care.    o   Review your medicines if your doctor has prescribed any.  Talk with your health care provider about whether you should have a test to screen for prostate cancer (PSA).  Every 3 years, have a diabetes test (fasting glucose). If you are at risk for diabetes, you should have this test more often.  Every 5 years, have a cholesterol test. Have this test more often if you are at risk for high cholesterol or heart disease.   Every 10 years, have a colonoscopy. Or, have a yearly FIT test (stool test). These exams will check for colon cancer.  Talk to with your health care provider about screening for Abdominal Aortic Aneurysm if you have a family history of AAA or have a history of smoking.    Shots:   Get a flu shot each year.   Get a tetanus shot every 10 years.   Talk to your doctor about your pneumonia vaccines. There are now two you should receive - Pneumovax (PPSV 23) and Prevnar (PCV 13).   Talk to your doctor about a shingles vaccine.   Talk to your doctor about the hepatitis B vaccine.  Nutrition:   Eat at least 5 servings of fruits and vegetables each day.   Eat whole-grain bread, whole-wheat pasta and brown rice instead of white grains and rice.   Talk to your provider about Calcium and Vitamin D.      --Good Grains:  Oats, brown rice, Quinoa (these do not raise the blood sugar as much)     --Bad grains:  Anything  "made from wheat or white rice     (because these raise the blood sugars significantly, and the possible gluten issue from wheat for some people).      --Proteins:  Aim for \"lean proteins\" including chicken, fish, seafood, pork, turkey, and eggs (in moderation); Eat red meat only occasionally    Lifestyle  Exercise for at least 150 minutes a week (30 minutes a day, 5 days a week). This will help you control your weight and prevent disease.   Limit alcohol to one drink per day.   No smoking.   Wear sunscreen to prevent skin cancer.   See your dentist every six months for an exam and cleaning.   See your eye doctor every 1 to 2 years to screen for conditions such as glaucoma, macular degeneration, cataracts, etc         Patient Education   Personalized Prevention Plan  You are due for the preventive services outlined below.  Your care team is available to assist you in scheduling these services.  If you have already completed any of these items, please share that information with your care team to update in your medical record.  Health Maintenance Due   Topic Date Due    Zoster (Shingles) Vaccine (1 of 2) Never done    Pneumococcal Vaccine (2 - PCV) 01/04/2013    COVID-19 Vaccine (3 - Booster for Pfizer series) 06/08/2021    Flu Vaccine (1) 09/01/2022     Your Health Risk Assessment indicates you feel you are not in good health    A healthy lifestyle helps keep the body fit and the mind alert. It helps protect you from disease, helps you fight disease, and helps prevent chronic disease (disease that doesn't go away) from getting worse. This is important as you get older and begin to notice twinges in muscles and joints and a decline in the strength and stamina you once took for granted. A healthy lifestyle includes good healthcare, good nutrition, weight control, recreation, and regular exercise. Avoid harmful substances and do what you can to keep safe. Another part of a healthy lifestyle is stay mentally active and " socially involved.    Good healthcare   Have a wellness visit every year.   If you have new symptoms, let us know right away. Don't wait until the next checkup.   Take medicines exactly as prescribed and keep your medicines in a safe place. Tell us if your medicine causes problems.   Healthy diet and weight control   Eat 3 or 4 small, nutritious, low-fat, high-fiber meals a day. Include a variety of fruits, vegetables, and whole-grain foods.   Make sure you get enough calcium in your diet. Calcium, vitamin D, and exercise help prevent osteoporosis (bone thinning).   If you live alone, try eating with others when you can. That way you get a good meal and have company while you eat it.   Try to keep a healthy weight. If you eat more calories than your body uses for energy, it will be stored as fat and you will gain weight.     Recreation   Recreation is not limited to sports and team events. It includes any activity that provides relaxation, interest, enjoyment, and exercise. Recreation provides an outlet for physical, mental, and social energy. It can give a sense of worth and achievement. It can help you stay healthy.    Mental Exercise and Social Involvement  Mental and emotional health is as important as physical health. Keep in touch with friends and family. Stay as active as possible. Continue to learn and challenge yourself.   Things you can do to stay mentally active are:  Learn something new, like a foreign language or musical instrument.   Play SCRABBLE or do crossword puzzles. If you cannot find people to play these games with you at home, you can play them with others on your computer through the Internet.   Join a games club--anything from card games to chess or checkers or lawn bowling.   Start a new hobby.   Go back to school.   Volunteer.   Read.   Keep up with world events.    Exercise for a Healthier Heart  You may wonder how you can improve the health of your heart. If you re thinking about  exercise, you re on the right track. You don t need to become an athlete. But you do need a certain amount of brisk exercise to help strengthen your heart. If you have been diagnosed with a heart condition, your healthcare provider may advise exercise to help stabilize your condition. To help make exercise a habit, choose safe, fun activities.      Exercise with a friend. When activity is fun, you're more likely to stick with it.   Before you start  Check with your healthcare provider before starting an exercise program. This is especially important if you have not been active for a while. It's also important if you have a long-term (chronic) health problem such as heart disease, diabetes, or obesity. Or if you are at high risk for having these problems.   Why exercise?  Exercising regularly offers many healthy rewards. It can help you do all of the following:   Improve your blood cholesterol level to help prevent further heart trouble  Lower your blood pressure to help prevent a stroke or heart attack  Control diabetes, or reduce your risk of getting this disease  Improve your heart and lung function  Reach and stay at a healthy weight  Make your muscles stronger so you can stay active  Prevent falls and fractures by slowing the loss of bone mass (osteoporosis)  Manage stress better  Reduce your blood pressure  Improve your sense of self and your body image  Exercise tips    Ease into your routine. Set small goals. Then build on them. If you are not sure what your activity level should be, talk with your healthcare provider first before starting an exercise routine.  Exercise on most days. Aim for a total of 150 minutes (2 hours and 30 minutes) or more of moderate-intensity aerobic activity each week. Or 75 minutes (1 hour and 15 minutes) or more of vigorous-intensity aerobic activity each week. Or try for a combination of both. Moderate activity means that you breathe heavier and your heart rate increases but you  can still talk. Think about doing 40 minutes of moderate exercise, 3 to 4 times a week. For best results, activity should last for about 40 minutes to lower blood pressure and cholesterol. It's OK to work up to the 40-minute period over time. Examples of moderate-intensity activity are walking 1 mile in 15 minutes. Or doing 30 to 45 minutes of yard work.  Step up your daily activity level.  Along with your exercise program, try being more active the whole day. Walk instead of drive. Or park further away so that you take more steps each day. Do more household tasks or yard work. You may not be able to meet the advised mount of physical activity. But doing some moderate- or vigorous-intensity aerobic activity can help reduce your risk for heart disease. Your healthcare provider can help you figure out what is best for you.  Choose 1 or more activities you enjoy.  Walking is one of the easiest things you can do. You can also try swimming, riding a bike, dancing, or taking an exercise class.    When to call your healthcare provider  Call your healthcare provider if you have any of these:   Chest pain or feel dizzy or lightheaded  Burning, tightness, pressure, or heaviness in your chest, neck, shoulders, back, or arms  Abnormal shortness of breath  More joint or muscle pain  A very fast or irregular heartbeat (palpitations)  The [employer] last reviewed this educational content on 7/1/2019 2000-2021 [employer]. All rights reserved. This information is not intended as a substitute for professional medical care. Always follow your healthcare professional's instructions.          Understanding USDA MyPlate  The USDA has guidelines to help you make healthy food choices. These are called MyPlate. MyPlate shows the food groups that make up healthy meals using the image of a place setting. Before you eat, think about the healthiest choices for what to put on your plate or in your cup or bowl. To learn more about  building a healthy plate, visit www.choosemyplate.gov.    The food groups  Fruits. Any fruit or 100% fruit juice counts as part of the Fruit Group. Fruits may be fresh, canned, frozen, or dried, and may be whole, cut-up, or pureed. Make 1/2 of your plate fruits and vegetables.  Vegetables. Any vegetable or 100% vegetable juice counts as a member of the Vegetable Group. Vegetables may be fresh, frozen, canned, or dried. They can be served raw or cooked and may be whole, cut-up, or mashed. Make 1/2 of your plate fruits and vegetables.  Grains. All foods made from grains are part of the Grains Group. These include wheat, rice, oats, cornmeal, and barley. Grains are often used to make foods such as bread, pasta, oatmeal, cereal, tortillas, and grits. Grains should be no more than 1/4 of your plate. At least half of your grains should be whole grains.  Protein. This group includes meat, poultry, seafood, beans and peas, eggs, processed soy products (such as tofu), nuts (including nut butters), and seeds. Make protein choices no more than 1/4 of your plate. Meat and poultry choices should be lean or low fat.  Dairy. The Dairy Group includes all fluid milk products and foods made from milk that contain calcium, such as yogurt and cheese. (Foods that have little calcium, such as cream, butter, and cream cheese, are not part of this group.) Most dairy choices should be low-fat or fat-free.  Oils. Oils aren't a food group, but they do contain essential nutrients. However it's important to watch your intake of oils. These are fats that are liquid at room temperature. They include canola, corn, olive, soybean, vegetable, and sunflower oil. Foods that are mainly oil include mayonnaise, certain salad dressings, and soft margarines. You likely already get your daily oil allowance from the foods you eat.  Things to limit  Eating healthy also means limiting these things in your diet:     Salt (sodium). Many processed foods have a  lot of sodium. To keep sodium intake down, eat fresh vegetables, meats, poultry, and seafood when possible. Purchase low-sodium, reduced-sodium, or no-salt-added food products at the store. And don't add salt to your meals at home. Instead, season them with herbs and spices such as dill, oregano, cumin, and paprika. Or try adding flavor with lemon or lime zest and juice.  Saturated fat. Saturated fats are most often found in animal products such as beef, pork, and chicken. They are often solid at room temperature, such as butter. To reduce your saturated fat intake, choose leaner cuts of meat and poultry. And try healthier cooking methods such as grilling, broiling, roasting, or baking. For a simple lower-fat swap, use plain nonfat yogurt instead of mayonnaise when making potato salad or macaroni salad.  Added sugars. These are sugars added to foods. They are in foods such as ice cream, candy, soda, fruit drinks, sports drinks, energy drinks, cookies, pastries, jams, and syrups. Cut down on added sugars by sharing sweet treats with a family member or friend. You can also choose fruit for dessert, and drink water or other unsweetened beverages.     [employer] last reviewed this educational content on 6/1/2020 2000-2021 [employer]. All rights reserved. This information is not intended as a substitute for professional medical care. Always follow your healthcare professional's instructions.          Signs of Hearing Loss      Hearing much better with one ear can be a sign of hearing loss.   Hearing loss is a problem shared by many people. In fact, it is one of the most common health problems, particularly as people age. Most people age 65 and older have some hearing loss. By age 80, almost everyone does. Hearing loss often occurs slowly over the years. So you may not realize your hearing has gotten worse.  Have your hearing checked  Call your healthcare provider if you:  Have to strain to hear normal  conversation  Have to watch other people s faces very carefully to follow what they re saying  Need to ask people to repeat what they ve said  Often misunderstand what people are saying  Turn the volume of the television or radio up so high that others complain  Feel that people are mumbling when they re talking to you  Find that the effort to hear leaves you feeling tired and irritated  Notice, when using the phone, that you hear better with one ear than the other  [employer] last reviewed this educational content on 1/1/2020 2000-2021 [employer]. All rights reserved. This information is not intended as a substitute for professional medical care. Always follow your healthcare professional's instructions.          Preventing Falls at Home  A person can fall for many reasons. Older adults may fall because reaction time slows down as we age. Your muscles and joints may get stiff, weak, or less flexible because of illness, medicines, or a physical condition.   Other health problems that make falls more likely include:   Arthritis  Dizziness or lightheadedness when you stand up (orthostatic hypotension)  History of a stroke  Dizziness  Anemia  Certain medicines taken for mental illness or to control blood pressure.  Problems with balance or gait  Bladder or urinary problems  History of falling  Changes in vision (vision impairment)  Changes in thinking skills and memory (cognitive impairment)  Injuries from a fall can include serious injuries such as broken bones, dislocated joints, internal bleeding and cuts. Injuries like these can limit your independence.   Prevention tips  To help prevent falls and fall-related injuries, follow the tips below.    Floors  To make floors safer:   Put nonskid pads under area rugs.  Remove small rugs.  Replace worn floor coverings.  Tack carpets firmly to each step on carpeted stairs. Put nonskid strips on the edges of uncarpeted stairs.  Keep floors and stairs free of  clutter and cords.  Arrange furniture so there are clear pathways.  Clean up any spills right away.  Bathrooms    To make bathrooms safer:   Install grab bars in the tub or shower.  Apply nonskid strips or put a nonskid rubber mat in the tub or shower.  Sit on a bath chair to bathe.  Use bathmats with nonskid backing.  Lighting  To improve visibility in your home:    Keep a flashlight in each room. Or put a lamp next to the bed within easy reach.  Put nightlights in the bedrooms, hallways, kitchen, and bathrooms.  Make sure all stairways have good lighting.  Take your time when going up and down stairs.  Put handrails on both sides of stairs and in walkways for more support. To prevent injury to your wrist or arm, don t use handrails to pull yourself up.  Install grab bars to pull yourself up.  Move or rearrange items that you use often. This will make them easier to find or reach.  Look at your home to find any safety hazards. Especially look at doorways, walkways, and the driveway. Remove or repair any safety problems that you find.  Other changes to make  Look around to find any safety hazards. Look closely at doorways, walkways, and the driveway. Remove or repair any safety problems that you find.  Wear shoes that fit well.  Take your time when going up and down stairs.  Put handrails on both sides of stairs and in walkways for more support. To prevent injury to your wrist or arm, don t use handrails to pull yourself up.  Install grab bars wherever needed to pull yourself up.  Arrange items that you use often. This will make them easier to find or reach.    [employer] last reviewed this educational content on 3/1/2020    [id number] [employer]. All rights reserved. This information is not intended as a substitute for professional medical care. Always follow your healthcare professional's instructions.

## 2023-03-01 NOTE — PROGRESS NOTES
"SUBJECTIVE:   Stanford is a 77 year old who presents for Preventive Visit.      Patient has been advised of split billing requirements and indicates understanding: Yes  Are you in the first 12 months of your Medicare coverage?  No    Healthy Habits:     In general, how would you rate your overall health?  Fair    Frequency of exercise:  1 day/week    Duration of exercise:  Less than 15 minutes    Do you usually eat at least 4 servings of fruit and vegetables a day, include whole grains    & fiber and avoid regularly eating high fat or \"junk\" foods?  No    Taking medications regularly:  Yes    Barriers to taking medications:  None    Medication side effects:  None    Ability to successfully perform activities of daily living:  No assistance needed    Home Safety:  No safety concerns identified    Hearing Impairment:  Need to ask people to speak up or repeat themselves and difficulty understanding soft or whispered speech    In the past 6 months, have you been bothered by leaking of urine?  No    In general, how would you rate your overall mental or emotional health?  Good      PHQ-2 Total Score: 0    Additional concerns today:  No    Have you ever done Advance Care Planning? (For example, a Health Directive, POLST, or a discussion with a medical provider or your loved ones about your wishes): No, advance care planning information given to patient to review.  Patient plans to discuss their wishes with loved ones or provider.      Fall risk  Fallen 2 or more times in the past year?: Yes  Any fall with injury in the past year?: No    Cognitive Screening   1) Repeat 3 items (Leader, Season, Table)    2) Clock draw: NORMAL  3) 3 item recall: Recalls 3 objects  Results: 3 items recalled: COGNITIVE IMPAIRMENT LESS LIKELY    Mini-CogTM Copyright JYOTHI Borja. Licensed by the author for use in [facility]; reprinted with permission (noam@.[facility]). All rights reserved.      Do you have sleep apnea, excessive snoring or " daytime drowsiness?: no    Reviewed and updated as needed this visit by clinical staff   Tobacco  Allergies  Meds  Problems             Reviewed and updated as needed this visit by Provider    Allergies  Meds  Problems            Social History     Tobacco Use     Smoking status: Never     Smokeless tobacco: Never   Substance Use Topics     Alcohol use: Yes     Comment: seldom     Alcohol Use 3/1/2023   Prescreen: >3 drinks/day or >7 drinks/week? No       1.    Hypertension:  History of hypertension, on medication.  No reported side effects from medications.    Reviewed last 6 BP readings in chart:  BP Readings from Last 6 Encounters:   03/01/23 118/82   12/21/21 125/82   10/14/21 132/88   09/30/21 (!) 145/90   09/27/21 124/76   09/09/21 130/80     No active cardiac complaints or symptoms with exercise.     2.  history of BPH on meds, symptoms controled.   Nocturia x 1     3.  history of ZOllinger Patricio syndrome, taking protoinix BID. GERD stable.  Taking meds as ordered, no reported side effects from medicines.  Eating properly to avoid sx.   No melena, no hematochezia, no diarrhea.  No unintended weigth loss.  No dysphagia.  Reports no significant regular difficulties swallowing foods or medications.         Current providers sharing in care for this patient include:   Patient Care Team:  Wanda Owens PA-C as Physician Assistant (Urology)  Devan Bergeron MD as Assigned Surgical Provider  Charlie Lawrence MD as Assigned PCP    The following health maintenance items are reviewed in Epic and correct as of today:  Health Maintenance   Topic Date Due     ZOSTER IMMUNIZATION (1 of 2) Never done     Pneumococcal Vaccine: 65+ Years (2 - PCV) 01/04/2013     COVID-19 Vaccine (3 - Booster for Pfizer series) 06/08/2021     INFLUENZA VACCINE (1) 09/01/2022     DTAP/TDAP/TD IMMUNIZATION (3 - Td or Tdap) 09/04/2023     MEDICARE ANNUAL WELLNESS VISIT  03/01/2024     ANNUAL REVIEW OF HM ORDERS   "03/01/2024     FALL RISK ASSESSMENT  03/01/2024     LIPID  03/01/2028     ADVANCE CARE PLANNING  03/01/2028     HEPATITIS C SCREENING  Completed     PHQ-2 (once per calendar year)  Completed     IPV IMMUNIZATION  Aged Out     MENINGITIS IMMUNIZATION  Aged Out     COLORECTAL CANCER SCREENING  Discontinued       **I reviewed the information recorded in the patient's EPIC chart (including but not limited to medical history, surgical history, family history, problem list, medication list, and allergy list) and updated the information as indicated based on the patients reported information.             Review of Systems   Genitourinary: Positive for impotence.   Neurological: Positive for weakness.   Psychiatric/Behavioral: The patient is nervous/anxious.      Constitutional, HEENT, cardiovascular, pulmonary, gi and gu systems are negative, except as otherwise noted.    OBJECTIVE:   /82   Pulse 92   Temp 98.4  F (36.9  C) (Oral)   Ht 1.721 m (5' 7.75\")   Wt 73.6 kg (162 lb 3.2 oz)   SpO2 95%   BMI 24.84 kg/m   Estimated body mass index is 24.84 kg/m  as calculated from the following:    Height as of this encounter: 1.721 m (5' 7.75\").    Weight as of this encounter: 73.6 kg (162 lb 3.2 oz).  Physical Exam  GENERAL alert and no distress  EYES:  Normal sclera,conjunctiva, EOMI  HENT: oral and posterior pharynx without lesions or erythema, facies symmetric  NECK: Neck supple. No LAD, without thyroidmegaly.  RESP: Clear to ausculation bilaterally without wheezes or crackles. Normal BS in all fields.  CV: RRR normal S1S2 without murmurs, rubs or gallops.  LYMPH: no cervical lymph adenopathy appreciated  MS: extremities- no gross deformities of the visible extremities noted,   EXT:  no lower extremity edema  PSYCH: Alert and oriented times 3; speech- coherent  SKIN:  No obvious significant skin lesions on visible portions of face  Huge filioform raised warts on his scalp, approx 5 cm diameter    Diagnostic Test " Results:  Labs reviewed in Epic    ASSESSMENT / PLAN:     (Z00.00) Encounter for Medicare annual wellness exam  (primary encounter diagnosis)  Comment: Discussed cardiac disease risk factors and cardiac disease risk factor modification, including diabetes screening, blood pressure screening (and management if indicated), and cholesterol screening.   Reviewed immunzation guidelines, including pneumococcal vaccines, annual influenza, and shingles vaccines.   Discussed routine cancer screenings, including skin cancer, colon cancer screening for everyone until age 80, prostate cancer screening in men until age 75, mammogram and PAP/pelvic for women until age 75.   Recommended regular dentist visits to care for remaining teeth.   Recommended regular screening for vision and glaucoma.   Recommended safe driving and accident avoidance.   Plan: REVIEW OF HEALTH MAINTENANCE PROTOCOL ORDERS            (I10) Essential hypertension  Comment: This condition is currently controlled on the current medical regimen.  Continue current therapy.   Plan: REVIEW OF HEALTH MAINTENANCE PROTOCOL ORDERS,         amLODIPine (NORVASC) 2.5 MG tablet            (N40.1,  R35.1) BPH associated with nocturia  Comment: This condition is currently controlled on the current medical regimen.  Continue current therapy.   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  Continue current therapy.   PPI indefiniteily   Plan: REVIEW OF HEALTH MAINTENANCE PROTOCOL ORDERS,         pantoprazole (PROTONIX) 40 MG EC tablet            (M54.50,  M79.604,  M79.605) Low back pain radiating to both legs  Comment: ongogin problem, waxing and waning course.   Wants to be referred back to [facility] where he was seen years ago.   PT worked somewhat, was told  he may need further evlauation and injections,etc.   Referral ordered, they will evlauate and treat as indiacted   Plan: Spine  Referral            (B07.9) Filiform warts  Comment: referral back to the dermatology clinc he saw last (in 2015).   He will need more aggressive treatment for these large warts   Plan: REVIEW OF HEALTH MAINTENANCE PROTOCOL ORDERS,         Adult Dermatology Referral            (L57.0) Actinic keratosis  Comment:   Plan: REVIEW OF HEALTH MAINTENANCE PROTOCOL ORDERS,         Adult Dermatology Referral            (Z13.6) CARDIOVASCULAR SCREENING; LDL GOAL LESS THAN 130  Comment: Discussed cardiac disease risk factors and cardiac disease risk factor modification.   Plan:        Patient has been advised of split billing requirements and indicates understanding: Yes      COUNSELING:  Reviewed preventive health counseling, as reflected in patient instructions       Regular exercise       Healthy diet/nutrition       Vision screening       Hearing screening       Dental care       Bladder control       Fall risk prevention       Immunizations    Due for covid booster , but had severe reaction to 2nd booster with hedaaches and fatigue for 3 days, would prefer to not get    Ask pharmacist about shingrix vaccine    Get flku shot and prevnar at pharmacy as planned (heading there this week with his son to get his shots)           Colon cancer screening        He reports that he has never smoked. He has never used smokeless tobacco.      Appropriate preventive services were discussed with this patient, including applicable screening as appropriate for cardiovascular disease, diabetes, osteopenia/osteoporosis, and glaucoma.  As appropriate for age/gender, discussed screening for colorectal cancer, prostate cancer, breast cancer, and cervical cancer. Checklist reviewing preventive services available has been given to the patient.    Reviewed patients plan of care and provided an AVS. The  for  Stanford meets the Care Plan requirement. This Care Plan has been established and reviewed with the .          Charlie Lawrence MD  [facility]    Identified Health Risks:

## 2023-03-01 NOTE — PROGRESS NOTES
"    The patient was provided with suggestions to help him develop a healthy physical lifestyle.  He is at risk for lack of exercise and has been provided with information to increase physical activity for the benefit of his well-being.  The patient was counseled and encouraged to consider modifying their diet and eating habits. He was provided with information on recommended healthy diet options.  The patient was provided with written information regarding signs of hearing loss.  He is at risk for falling and has been provided with information to reduce the risk of falling at home.        Answers for HPI/ROS submitted by the patient on 3/1/2023  In general, how would you rate your overall physical health?: fair  Frequency of exercise:: 1 day/week  Do you usually eat at least 4 servings of fruit and vegetables a day, include whole grains & fiber, and avoid regularly eating high fat or \"junk\" foods? : No  Taking medications regularly:: Yes  Medication side effects:: None  Activities of Daily Living: no assistance needed  Home safety: no safety concerns identified  Hearing Impairment:: need to ask people to speak up or repeat themselves, difficulty understanding soft or whispered speech  In the past 6 months, have you been bothered by leaking of urine?: No  nervous/anxious: Yes  weakness: Yes  impotence: Yes  In general, how would you rate your overall mental or emotional health?: good  Additional concerns today:: No  Duration of exercise:: Less than 15 minutes      "

## 2023-04-21 ENCOUNTER — ANCILLARY PROCEDURE (OUTPATIENT)
Dept: GENERAL RADIOLOGY | Facility: CLINIC | Age: 78
End: 2023-04-21
Attending: UROLOGY
Payer: COMMERCIAL

## 2023-04-21 DIAGNOSIS — N20.0 CALCULUS OF KIDNEY: ICD-10-CM

## 2023-04-21 PROCEDURE — 74018 RADEX ABDOMEN 1 VIEW: CPT | Mod: TC | Performed by: RADIOLOGY

## 2023-04-25 ENCOUNTER — OFFICE VISIT (OUTPATIENT)
Dept: UROLOGY | Facility: CLINIC | Age: 78
End: 2023-04-25
Payer: COMMERCIAL

## 2023-04-25 VITALS
SYSTOLIC BLOOD PRESSURE: 127 MMHG | DIASTOLIC BLOOD PRESSURE: 75 MMHG | BODY MASS INDEX: 25.76 KG/M2 | HEIGHT: 68 IN | WEIGHT: 170 LBS | HEART RATE: 98 BPM

## 2023-04-25 DIAGNOSIS — N20.0 NEPHROLITHIASIS: ICD-10-CM

## 2023-04-25 DIAGNOSIS — N52.9 ERECTILE DYSFUNCTION, UNSPECIFIED ERECTILE DYSFUNCTION TYPE: ICD-10-CM

## 2023-04-25 DIAGNOSIS — N20.0 KIDNEY STONE: Primary | ICD-10-CM

## 2023-04-25 LAB
ALBUMIN UR-MCNC: NEGATIVE MG/DL
APPEARANCE UR: CLEAR
BILIRUB UR QL STRIP: NEGATIVE
COLOR UR AUTO: YELLOW
GLUCOSE UR STRIP-MCNC: NEGATIVE MG/DL
HGB UR QL STRIP: NEGATIVE
KETONES UR STRIP-MCNC: NEGATIVE MG/DL
LEUKOCYTE ESTERASE UR QL STRIP: NEGATIVE
NITRATE UR QL: NEGATIVE
PH UR STRIP: 5.5 [PH] (ref 5–7)
SP GR UR STRIP: 1.02 (ref 1–1.03)
UROBILINOGEN UR STRIP-ACNC: 0.2 E.U./DL

## 2023-04-25 PROCEDURE — 81003 URINALYSIS AUTO W/O SCOPE: CPT | Mod: QW | Performed by: UROLOGY

## 2023-04-25 PROCEDURE — 99214 OFFICE O/P EST MOD 30 MIN: CPT | Performed by: UROLOGY

## 2023-04-25 RX ORDER — SILDENAFIL 100 MG/1
100 TABLET, FILM COATED ORAL DAILY PRN
Qty: 10 TABLET | Refills: 4 | Status: ON HOLD | OUTPATIENT
Start: 2023-04-25 | End: 2024-04-12

## 2023-04-25 ASSESSMENT — PAIN SCALES - GENERAL: PAINLEVEL: NO PAIN (0)

## 2023-04-25 NOTE — NURSING NOTE
Chief Complaint   Patient presents with     Kidney Stone(s) Followup     Pt here for 1 year follow up     Mikayla Gabriel CMA

## 2023-04-25 NOTE — LETTER
4/25/2023       RE: Stanford Andersen  [address] [facility] [address]     Dear Colleague,    Thank you for referring your patient, Stanford Andersen, to the [facility] at [facility]. Please see a copy of my visit note below.    Office Visit Note  [facility]  [phone]    UROLOGIC DIAGNOSES:   History of stones  Enlarged prostate    CURRENT INTERVENTIONS:   Prior ESWL and ureteroscopy  Flomax and finasteride    HISTORY:   Stanford returns to clinic today for urologic follow-up.  He reports no pain or problems from kidney stones over the past year and a half.  He reports voiding adequately with his Flomax and finasteride.    He also has a question about a new issue today: Erectile dysfunction.  He says he is actually had erectile dysfunction for many years and has not had an erection for years, but he is interested in discussing treatment options for this.      PAST MEDICAL HISTORY:   Past Medical History:   Diagnosis Date    Arthritis     hand    Chronic back pain     Complication of anesthesia     URINARY RETENTION, Sleepy after surgery    Gastro-oesophageal reflux disease     Hypertension     Renal disease     kidney stone    Zollinger-Patricio syndrome 1995       PAST SURGICAL HISTORY:   Past Surgical History:   Procedure Laterality Date    ABDOMEN SURGERY  2003    Exploratory laparoscopy    COMBINED CYSTOSCOPY, INSERT STENT URETER(S) Left 8/31/2021    Procedure: Cystoscopy with left ureteral stent placement;  Surgeon: Devan Bergeron MD;  Location:  OR    COMBINED CYSTOSCOPY, RETROGRADES, URETEROSCOPY, LASER HOLMIUM LITHOTRIPSY URETER(S), INSERT STENT Right 5/23/2019    Procedure: Video cystoscopy, exam under anesthesia, right ureteroscopy with holmium laser lithotripsy and stone extractions, right double-J stent (5-Ukrainian x 24 cm).;  Surgeon: Satinder Almanza MD;  Location: [facility] OR    COMBINED CYSTOSCOPY,  RETROGRADES, URETEROSCOPY, LASER HOLMIUM LITHOTRIPSY URETER(S), INSERT STENT Left 9/30/2021    Procedure: Cystoscopy, left ureteral stent exchange, left ureteroscopy with holmium laser lithotripsy and stone basketing. left retrogrades;  Surgeon: Devan Bergeorn MD;  Location:  OR    EXTRACORPOREAL SHOCK WAVE LITHOTRIPSY (ESWL) Left 8/31/2021    Procedure: Bilateral extracorporeal shockwave lithotripsy;  Surgeon: Devan Bergeron MD;  Location:  OR    HC EXCISE VARICOCELE  age 21     varicocele repair    HERNIORRHAPHY UMBILICAL N/A 11/30/2018    Procedure: OPEN UMBILLICAL HERNIA REPAIR ;  Surgeon: Ike Catalan MD;  Location:  OR    KIDNEY SURGERY      lithotripsy x 3    LAPAROSCOPIC HERNIORRHAPHY INGUINAL Left 9/26/2016    Procedure: LAPAROSCOPIC HERNIORRHAPHY INGUINAL;  Surgeon: Ike Catalan MD;  Location:  [facility]    LITHOTRIPSY      three times    STOMACH SURGERY  2003    exploratory scoping looking for tumors related to ZE syndrome       FAMILY HISTORY:   Family History   Problem Relation Age of Onset    Heart Disease Mother         ablation    C.A.D. Mother     Heart Disease Father     Respiratory Father         COPD    C.A.D. Father     Spina bifida Son        SOCIAL HISTORY:   Social History     Socioeconomic History    Marital status:      Spouse name: None    Number of children: None    Years of education: None    Highest education level: None   Tobacco Use    Smoking status: Never    Smokeless tobacco: Never   Substance and Sexual Activity    Alcohol use: Yes     Comment: seldom    Drug use: No    Sexual activity: Not Currently   Other Topics Concern    Parent/sibling w/ CABG, MI or angioplasty before 65F 55M? No       Review Of Systems:  Skin: No rash, pruritis, or skin pigmentation  Eyes: No changes in vision  Ears/Nose/Throat: No changes in hearing, no nosebleeds  Respiratory: No shortness of breath, dyspnea on exertion, cough, or hemoptysis  Cardiovascular:  "No chest pain or palpitations  Gastrointestinal: No diarrhea or constipation. No abdominal pain. No hematochezia  Genitourinary: see HPI  Musculoskeletal: No pain or swelling of joints, normal range of motion  Neurologic: No weakness or tremors  Psychiatric: No recent changes in memory or mood  Hematologic/Lymphatic/Immunologic: No easy bruising or enlarged lymph nodes  Endocrine: No weight gain or loss      PHYSICAL EXAM:    /75   Pulse 98   Ht 1.727 m (5' 8\")   Wt 77.1 kg (170 lb)   BMI 25.85 kg/m      Constitutional: Well developed. Conversant and in no acute distress  Eyes: Anicteric sclera, conjunctiva clear, normal extraocular movements  ENT: Normocephalic and atraumatic,   Skin: Warm and dry. No rashes or lesions  Cardiac: No peripheral edema  Back/Flank: Not done  CNS/PNS: Normal musculature and movements, moves all extremities normally  Respiratory: Normal non-labored breathing  Abdomen: Soft nontender and nondistended  Peripheral Vascular: No peripheral edema  Mental Status/Psych: Alert and Oriented x 3. Normal mood and affect    Penis: Not done  Scrotal Skin: Not done  Testicles: Not done  Epididymis: Not done  Digital Rectal Exam:     Cystoscopy: Not done    Imaging: None    Urinalysis: UA RESULTS:  Recent Labs   Lab Test 07/19/21  1449 12/10/19  1047   COLOR Yellow Yellow   APPEARANCE Clear Clear   URINEGLC Negative Negative   URINEBILI Negative Negative   URINEKETONE Negative Negative   SG <=1.005 1.015   UBLD Trace* Negative   URINEPH 5.5 6.0   PROTEIN Negative Negative   UROBILINOGEN 0.2 0.2   NITRITE Negative Negative   LEUKEST Negative Negative   RBCU  --  O - 2   WBCU  --  0 - 5       PSA:     Post Void Residual:     Other labs: None today      IMPRESSION:  Kidney stones  Enlarged prostate  Erectile dysfunction    PLAN:  We first discussed his kidney stones.  He has developed 2 new kidney stones in each kidney.  We discussed the findings and we discussed options.    We discussed the " option of observation.  We discussed the risks of observation.  If we observe the stones I would recommend another KUB in 6 months.    We discussed the option of bilateral extracorporeal shockwave lithotripsy.  He has had this procedure done previously.  He has had good success with this procedure previously but on his most recent ESWL in 2021 his stones on the right side broke up perfectly but the stones on the left side were inadequately treated by ESWL.  Therefore we discussed the pros and cons of proceeding with ESWL.    We also discussed proceeding with bilateral ureteroscopy in order to remove his asymptomatic kidney stones.  He has had this procedure performed previously as well.  After this procedure he would require stent placement on each side.    At the end of our discussion he wishes to undergo bilateral extracorporeal shockwave lithotripsy in hopes of breaking up his stones on each side and preventing future stone passage.  He understands the risks of the procedure.    We discussed his enlarged prostate.  He is doing well on the Flomax and the finasteride at this time and he will resume those medications.    We discussed his new questions about erectile dysfunction.  It has been years since he has had an erection and I discussed realistic expectations.  There is a good chance that medical therapies such as oral PDE5 inhibitors or intracavernosal injections may not have success in his case and his only option may be to proceed with an inflatable penile prosthesis.  We discussed all of these options.  He would like to try an oral medication, knowing that he may not have success.  I gave him a prescription for Viagra 100 mg tablets and gave him instructions.    Total time spent today in review of outside records and test results, discussion with the patient, and documentation: 30 minutes      Devan Bergeron M.D.

## 2023-04-25 NOTE — PROGRESS NOTES
Office Visit Note  [facility]  [phone]    UROLOGIC DIAGNOSES:   History of stones  Enlarged prostate    CURRENT INTERVENTIONS:   Prior ESWL and ureteroscopy  Flomax and finasteride    HISTORY:   Stanford returns to clinic today for urologic follow-up.  He reports no pain or problems from kidney stones over the past year and a half.  He reports voiding adequately with his Flomax and finasteride.    He also has a question about a new issue today: Erectile dysfunction.  He says he is actually had erectile dysfunction for many years and has not had an erection for years, but he is interested in discussing treatment options for this.      PAST MEDICAL HISTORY:   Past Medical History:   Diagnosis Date     Arthritis     hand     Chronic back pain      Complication of anesthesia     URINARY RETENTION, Sleepy after surgery     Gastro-oesophageal reflux disease      Hypertension      Renal disease     kidney stone     Zollinger-Patricio syndrome 1995       PAST SURGICAL HISTORY:   Past Surgical History:   Procedure Laterality Date     ABDOMEN SURGERY  2003    Exploratory laparoscopy     COMBINED CYSTOSCOPY, INSERT STENT URETER(S) Left 8/31/2021    Procedure: Cystoscopy with left ureteral stent placement;  Surgeon: Devan Bergeron MD;  Location:  OR     COMBINED CYSTOSCOPY, RETROGRADES, URETEROSCOPY, LASER HOLMIUM LITHOTRIPSY URETER(S), INSERT STENT Right 5/23/2019    Procedure: Video cystoscopy, exam under anesthesia, right ureteroscopy with holmium laser lithotripsy and stone extractions, right double-J stent (5-Senegalese x 24 cm).;  Surgeon: Satinder Almanza MD;  Location:  OR     COMBINED CYSTOSCOPY, RETROGRADES, URETEROSCOPY, LASER HOLMIUM LITHOTRIPSY URETER(S), INSERT STENT Left 9/30/2021    Procedure: Cystoscopy, left ureteral stent exchange, left ureteroscopy with holmium laser lithotripsy and stone basketing. left retrogrades;  Surgeon: Devan Bergeron MD;  Location:  OR      EXTRACORPOREAL SHOCK WAVE LITHOTRIPSY (ESWL) Left 8/31/2021    Procedure: Bilateral extracorporeal shockwave lithotripsy;  Surgeon: Devan Bergeron MD;  Location:  OR     HC EXCISE VARICOCELE  age 21     varicocele repair     HERNIORRHAPHY UMBILICAL N/A 11/30/2018    Procedure: OPEN UMBILLICAL HERNIA REPAIR ;  Surgeon: Ike Catalan MD;  Location:  OR     KIDNEY SURGERY      lithotripsy x 3     LAPAROSCOPIC HERNIORRHAPHY INGUINAL Left 9/26/2016    Procedure: LAPAROSCOPIC HERNIORRHAPHY INGUINAL;  Surgeon: Ike Catalan MD;  Location:  [facility]     LITHOTRIPSY      three times     STOMACH SURGERY  2003    exploratory scoping looking for tumors related to ZE syndrome       FAMILY HISTORY:   Family History   Problem Relation Age of Onset     Heart Disease Mother         ablation     C.A.D. Mother      Heart Disease Father      Respiratory Father         COPD     C.A.D. Father      Spina bifida Son        SOCIAL HISTORY:   Social History     Socioeconomic History     Marital status:      Spouse name: None     Number of children: None     Years of education: None     Highest education level: None   Tobacco Use     Smoking status: Never     Smokeless tobacco: Never   Substance and Sexual Activity     Alcohol use: Yes     Comment: seldom     Drug use: No     Sexual activity: Not Currently   Other Topics Concern     Parent/sibling w/ CABG, MI or angioplasty before 65F 55M? No       Review Of Systems:  Skin: No rash, pruritis, or skin pigmentation  Eyes: No changes in vision  Ears/Nose/Throat: No changes in hearing, no nosebleeds  Respiratory: No shortness of breath, dyspnea on exertion, cough, or hemoptysis  Cardiovascular: No chest pain or palpitations  Gastrointestinal: No diarrhea or constipation. No abdominal pain. No hematochezia  Genitourinary: see HPI  Musculoskeletal: No pain or swelling of joints, normal range of motion  Neurologic: No weakness or tremors  Psychiatric: No recent  "changes in memory or mood  Hematologic/Lymphatic/Immunologic: No easy bruising or enlarged lymph nodes  Endocrine: No weight gain or loss      PHYSICAL EXAM:    /75   Pulse 98   Ht 1.727 m (5' 8\")   Wt 77.1 kg (170 lb)   BMI 25.85 kg/m      Constitutional: Well developed. Conversant and in no acute distress  Eyes: Anicteric sclera, conjunctiva clear, normal extraocular movements  ENT: Normocephalic and atraumatic,   Skin: Warm and dry. No rashes or lesions  Cardiac: No peripheral edema  Back/Flank: Not done  CNS/PNS: Normal musculature and movements, moves all extremities normally  Respiratory: Normal non-labored breathing  Abdomen: Soft nontender and nondistended  Peripheral Vascular: No peripheral edema  Mental Status/Psych: Alert and Oriented x 3. Normal mood and affect    Penis: Not done  Scrotal Skin: Not done  Testicles: Not done  Epididymis: Not done  Digital Rectal Exam:     Cystoscopy: Not done    Imaging: None    Urinalysis: UA RESULTS:  Recent Labs   Lab Test 07/19/21  1449 12/10/19  1047   COLOR Yellow Yellow   APPEARANCE Clear Clear   URINEGLC Negative Negative   URINEBILI Negative Negative   URINEKETONE Negative Negative   SG <=1.005 1.015   UBLD Trace* Negative   URINEPH 5.5 6.0   PROTEIN Negative Negative   UROBILINOGEN 0.2 0.2   NITRITE Negative Negative   LEUKEST Negative Negative   RBCU  --  O - 2   WBCU  --  0 - 5       PSA:     Post Void Residual:     Other labs: None today      IMPRESSION:  Kidney stones  Enlarged prostate  Erectile dysfunction    PLAN:  We first discussed his kidney stones.  He has developed 2 new kidney stones in each kidney.  We discussed the findings and we discussed options.    We discussed the option of observation.  We discussed the risks of observation.  If we observe the stones I would recommend another KUB in 6 months.    We discussed the option of bilateral extracorporeal shockwave lithotripsy.  He has had this procedure done previously.  He has had good " success with this procedure previously but on his most recent ESWL in 2021 his stones on the right side broke up perfectly but the stones on the left side were inadequately treated by ESWL.  Therefore we discussed the pros and cons of proceeding with ESWL.    We also discussed proceeding with bilateral ureteroscopy in order to remove his asymptomatic kidney stones.  He has had this procedure performed previously as well.  After this procedure he would require stent placement on each side.    At the end of our discussion he wishes to undergo bilateral extracorporeal shockwave lithotripsy in hopes of breaking up his stones on each side and preventing future stone passage.  He understands the risks of the procedure.    We discussed his enlarged prostate.  He is doing well on the Flomax and the finasteride at this time and he will resume those medications.    We discussed his new questions about erectile dysfunction.  It has been years since he has had an erection and I discussed realistic expectations.  There is a good chance that medical therapies such as oral PDE5 inhibitors or intracavernosal injections may not have success in his case and his only option may be to proceed with an inflatable penile prosthesis.  We discussed all of these options.  He would like to try an oral medication, knowing that he may not have success.  I gave him a prescription for Viagra 100 mg tablets and gave him instructions.    Total time spent today in review of outside records and test results, discussion with the patient, and documentation: 30 minutes      Devan Bergeron M.D.

## 2023-04-26 ENCOUNTER — TELEPHONE (OUTPATIENT)
Dept: UROLOGY | Facility: CLINIC | Age: 78
End: 2023-04-26
Payer: COMMERCIAL

## 2023-05-18 ENCOUNTER — OFFICE VISIT (OUTPATIENT)
Dept: FAMILY MEDICINE | Facility: CLINIC | Age: 78
End: 2023-05-18
Payer: COMMERCIAL

## 2023-05-18 ENCOUNTER — ANCILLARY PROCEDURE (OUTPATIENT)
Dept: GENERAL RADIOLOGY | Facility: CLINIC | Age: 78
End: 2023-05-18
Attending: INTERNAL MEDICINE
Payer: COMMERCIAL

## 2023-05-18 VITALS
TEMPERATURE: 97.9 F | OXYGEN SATURATION: 95 % | HEIGHT: 68 IN | RESPIRATION RATE: 16 BRPM | SYSTOLIC BLOOD PRESSURE: 124 MMHG | BODY MASS INDEX: 25.16 KG/M2 | HEART RATE: 105 BPM | DIASTOLIC BLOOD PRESSURE: 83 MMHG | WEIGHT: 166 LBS

## 2023-05-18 DIAGNOSIS — J20.9 ACUTE BRONCHITIS WITH SYMPTOMS > 10 DAYS: ICD-10-CM

## 2023-05-18 DIAGNOSIS — Z01.818 PREOPERATIVE CLEARANCE: Primary | ICD-10-CM

## 2023-05-18 DIAGNOSIS — N20.0 NEPHROLITHIASIS: ICD-10-CM

## 2023-05-18 DIAGNOSIS — E16.4 ZOLLINGER-ELLISON SYNDROME: ICD-10-CM

## 2023-05-18 DIAGNOSIS — N20.0 RECURRENT KIDNEY STONES: ICD-10-CM

## 2023-05-18 DIAGNOSIS — Z01.818 PREOPERATIVE CLEARANCE: ICD-10-CM

## 2023-05-18 LAB
ALBUMIN SERPL BCG-MCNC: 4 G/DL (ref 3.5–5.2)
ALP SERPL-CCNC: 73 U/L (ref 40–129)
ALT SERPL W P-5'-P-CCNC: 19 U/L (ref 10–50)
ANION GAP SERPL CALCULATED.3IONS-SCNC: 13 MMOL/L (ref 7–15)
AST SERPL W P-5'-P-CCNC: 24 U/L (ref 10–50)
BILIRUB SERPL-MCNC: 0.3 MG/DL
BUN SERPL-MCNC: 15.7 MG/DL (ref 8–23)
CALCIUM SERPL-MCNC: 9.7 MG/DL (ref 8.8–10.2)
CHLORIDE SERPL-SCNC: 104 MMOL/L (ref 98–107)
CREAT SERPL-MCNC: 1.01 MG/DL (ref 0.67–1.17)
DEPRECATED HCO3 PLAS-SCNC: 23 MMOL/L (ref 22–29)
ERYTHROCYTE [DISTWIDTH] IN BLOOD BY AUTOMATED COUNT: 15.1 % (ref 10–15)
GFR SERPL CREATININE-BSD FRML MDRD: 77 ML/MIN/1.73M2
GLUCOSE SERPL-MCNC: 96 MG/DL (ref 70–99)
HCT VFR BLD AUTO: 46.3 % (ref 40–53)
HGB BLD-MCNC: 14.4 G/DL (ref 13.3–17.7)
MCH RBC QN AUTO: 26.7 PG (ref 26.5–33)
MCHC RBC AUTO-ENTMCNC: 31.1 G/DL (ref 31.5–36.5)
MCV RBC AUTO: 86 FL (ref 78–100)
PLATELET # BLD AUTO: 288 10E3/UL (ref 150–450)
POTASSIUM SERPL-SCNC: 4.9 MMOL/L (ref 3.4–5.3)
PROT SERPL-MCNC: 6.7 G/DL (ref 6.4–8.3)
RBC # BLD AUTO: 5.39 10E6/UL (ref 4.4–5.9)
SODIUM SERPL-SCNC: 140 MMOL/L (ref 136–145)
WBC # BLD AUTO: 10.5 10E3/UL (ref 4–11)

## 2023-05-18 PROCEDURE — 93000 ELECTROCARDIOGRAM COMPLETE: CPT | Performed by: INTERNAL MEDICINE

## 2023-05-18 PROCEDURE — 36415 COLL VENOUS BLD VENIPUNCTURE: CPT | Performed by: INTERNAL MEDICINE

## 2023-05-18 PROCEDURE — 99215 OFFICE O/P EST HI 40 MIN: CPT | Performed by: INTERNAL MEDICINE

## 2023-05-18 PROCEDURE — 80053 COMPREHEN METABOLIC PANEL: CPT | Performed by: INTERNAL MEDICINE

## 2023-05-18 PROCEDURE — 71046 X-RAY EXAM CHEST 2 VIEWS: CPT | Mod: TC | Performed by: RADIOLOGY

## 2023-05-18 PROCEDURE — 85027 COMPLETE CBC AUTOMATED: CPT | Performed by: INTERNAL MEDICINE

## 2023-05-18 RX ORDER — GUAIFENESIN 200 MG/10ML
10 LIQUID ORAL EVERY 4 HOURS PRN
Qty: 236 ML | Refills: 0 | Status: ON HOLD | OUTPATIENT
Start: 2023-05-18 | End: 2024-04-11

## 2023-05-18 RX ORDER — PREDNISONE 20 MG/1
20 TABLET ORAL DAILY
Qty: 10 TABLET | Refills: 0 | Status: SHIPPED | OUTPATIENT
Start: 2023-05-18 | End: 2023-05-28

## 2023-05-18 RX ORDER — DOXYCYCLINE HYCLATE 100 MG
100 TABLET ORAL 2 TIMES DAILY
Qty: 14 TABLET | Refills: 0 | Status: ON HOLD | OUTPATIENT
Start: 2023-05-18 | End: 2024-04-11

## 2023-05-18 ASSESSMENT — PAIN SCALES - GENERAL: PAINLEVEL: NO PAIN (0)

## 2023-05-18 NOTE — PROGRESS NOTES
[address] [address]  Phone: [phone]  Primary Provider: Charlie Lawrence  Pre-op Performing Provider: CLEMENT PRICE      PREOPERATIVE EVALUATION:  Today's date: 5/18/2023    Stanford Andersen is a 78 year old male who presents for a preoperative evaluation.      5/18/2023     1:16 PM   Additional Questions   Roomed by Antonia Amezcua     Surgical Information:  Surgery/Procedure: Bilateral extracorporeal shock wave lithotripsy   Surgery Location: [facility]  Surgeon: Devan Bergeron  Surgery Date: 05/25/2023  Time of Surgery: 12:00 PM  Where patient plans to recover: At home with family  Fax number for surgical facility: Note does not need to be faxed, will be available electronically in Epic.    Assessment & Plan     The proposed surgical procedure is considered INTERMEDIATE risk.    Assessment and Plan  1. Preoperative clearance  2. Nephrolithiasis  3. Recurrent kidney stones  Patient is here for preoperative clearance of BILATERAL EXTRACORPOREAL SHOCK WAVE LITHOTRIPSY for Nephrolithiasis under general anaesthesia. Patient does have risk factors of Zollinger-Patricio syndrome, hypertension on amlodipine. Last lab work in March 2023 with normal CMP, CBC. We will recheck at this time before surgery.  - XR Chest 2 Views; Future  - EKG 12-lead complete w/read - Clinics  - Comprehensive metabolic panel (BMP + Alb, Alk Phos, ALT, AST, Total. Bili, TP); Future  - CBC with platelets; Future      4. 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  Continue current pantoprazole.    5. Acute bronchitis with symptoms > 10 days  New problem, given the severity of symptoms with possible COPD/allergic bronchitis exacerbation cannot be excluded.  Given the respiratory precautions below and the risks for the clearance, will check an x-ray at this time.  Given  empiric antibiotic and a prednisone course along with the cough syrup for improvement.  UPDATE -called and spoken to the patient to confirm that his symptoms have all resolved which he endorses that he was doing much better soon after he started his antibiotic course as prescribed below.  Clearance given to the surgery.  - XR Chest 2 Views; Future  - doxycycline hyclate (VIBRA-TABS) 100 MG tablet; Take 1 tablet (100 mg) by mouth 2 times daily Do not take within 2 hours of antacids or calcium.  Dispense: 14 tablet; Refill: 0  - guaiFENesin (ROBITUSSIN) 20 mg/mL liquid; Take 10 mLs by mouth every 4 hours as needed for cough  Dispense: 236 mL; Refill: 0  - predniSONE (DELTASONE) 20 MG tablet; Take 1 tablet (20 mg) by mouth daily With breakfast  Dispense: 10 tablet; Refill: 0         Please note that this note consists of symbols derived from keyboarding, dictation and/or voice recognition software. As a result, there may be errors in the script that have gone undetected. Please consider this when interpreting information found in this chart.    Patient Instructions     As discussed , please do pertinent work up for your preop clearance .    Sent in medications for your Acute bronchitis .     =======================================================  For informational purposes only. Not to replace the advice of your health care provider. Copyright   2003, 2019 [facility]. All rights reserved. Clinically reviewed by Marie Chavarria MD. [employer] [id number] - REV 12/22.  Preparing for Your Surgery  Getting started  A nurse will call you to review your health history and instructions. They will give you an arrival time based on your scheduled surgery time. Please be ready to share:    Your doctor's clinic name and phone number    Your medical, surgical, and anesthesia history    A list of allergies and sensitivities    A list of medicines, including herbal treatments and over-the-counter drugs    Whether the patient  has a legal guardian (ask how to send us the papers in advance)  Please tell us if you're pregnant--or if there's any chance you might be pregnant. Some surgeries may injure a fetus (unborn baby), so they require a pregnancy test. Surgeries that are safe for a fetus don't always need a test, and you can choose whether to have one.   If you have a child who's having surgery, please ask for a copy of Preparing for Your Child's Surgery.    Preparing for surgery    Within 10 to 30 days of surgery: Have a pre-op exam (sometimes called an H&P, or History and Physical). This can be done at a clinic or pre-operative center.  ? If you're having a , you may not need this exam. Talk to your care team.    At your pre-op exam, talk to your care team about all medicines you take. If you need to stop any medicines before surgery, ask when to start taking them again.  ? We do this for your safety. Many medicines can make you bleed too much during surgery. Some change how well surgery (anesthesia) drugs work.    Call your insurance company to let them know you're having surgery. (If you don't have insurance, call [phone].)    Call your clinic if there's any change in your health. This includes signs of a cold or flu (sore throat, runny nose, cough, rash, fever). It also includes a scrape or scratch near the surgery site.    If you have questions on the day of surgery, call your hospital or surgery center.  Eating and drinking guidelines  For your safety: Unless your surgeon tells you otherwise, follow the guidelines below.    Eat and drink as usual until 8 hours before you arrive for surgery. After that, no food or milk.    Drink clear liquids until 2 hours before you arrive. These are liquids you can see through, like water, Gatorade, and Propel Water. They also include plain black coffee and tea (no cream or milk), candy, and breath mints. You can spit out gum when you arrive.    If you drink alcohol: Stop drinking  it the night before surgery.    If your care team tells you to take medicine on the morning of surgery, it's okay to take it with a sip of water.  Preventing infection    Shower or bathe the night before and morning of your surgery. Follow the instructions your clinic gave you. (If no instructions, use regular soap.)    Don't shave or clip hair near your surgery site. We'll remove the hair if needed.    Don't smoke or vape the morning of surgery. You may chew nicotine gum up to 2 hours before surgery. A nicotine patch is okay.  ? Note: Some surgeries require you to completely quit smoking and nicotine. Check with your surgeon.    Your care team will make every effort to keep you safe from infection. We will:  ? Clean our hands often with soap and water (or an alcohol-based hand rub).  ? Clean the skin at your surgery site with a special soap that kills germs.  ? Give you a special gown to keep you warm. (Cold raises the risk of infection.)  ? Wear special hair covers, masks, gowns and gloves during surgery.  ? Give antibiotic medicine, if prescribed. Not all surgeries need antibiotics.  What to bring on the day of surgery    Photo ID and insurance card    Copy of your health care directive, if you have one    Glasses and hearing aids (bring cases)  ? You can't wear contacts during surgery    Inhaler and eye drops, if you use them (tell us about these when you arrive)    CPAP machine or breathing device, if you use them    A few personal items, if spending the night    If you have . . .  ? A pacemaker, ICD (cardiac defibrillator) or other implant: Bring the ID card.  ? An implanted stimulator: Bring the remote control.  ? A legal guardian: Bring a copy of the certified (court-stamped) guardianship papers.  Please remove any jewelry, including body piercings. Leave jewelry and other valuables at home.  If you're going home the day of surgery    You must have a responsible adult drive you home. They should stay with  you overnight as well.    If you don't have someone to stay with you, and you aren't safe to go home alone, we may keep you overnight. Insurance often won't pay for this.  After surgery  If it's hard to control your pain or you need more pain medicine, please call your surgeon's office.  Questions?   If you have any questions for your care team, list them here: _________________________________________________________________________________________________________________________________________________________________________ ____________________________________ ____________________________________ ____________________________________      Return in about 3 months (around 8/18/2023), or if symptoms worsen or fail to improve, for Follow up of last visit, If symptoms persist.    Teodora Ledesma MD  [facility] [place]         Risks and Recommendations:  The patient has the following additional risks and recommendations for perioperative complications:   - Consult Hospitalist / IM to assist with post-op medical management  Cardiovascular:   - Patient is currently on amlodipine for his hypertension.  Pulmonary:    - Incentive spirometry post-op   - Consider Respiratory Therapy (Respiratory Care IP Consult) post-op   - Patient did have acute bronchitis at the time of his preoperative clearance for which she was given antibiotic and a prednisone course appears as possible allergic bronchitis exacerbation.   -     Antiplatelet or Anticoagulation Medication Instructions:   - Patient is on no antiplatelet or anticoagulation medications.    Additional Medication Instructions:  Patient is to take all scheduled medications on the day of surgery    RECOMMENDATION:  APPROVAL GIVEN to proceed with proposed procedure, without further diagnostic evaluation.    Review of external notes as documented elsewhere in note  42 minutes spent by me on the date of the encounter doing chart review, review of outside  records, review of test results, interpretation of tests, patient visit and documentation       Subjective       HPI related to upcoming procedure:     Patient is new to me, follows Rusty Negron as PCP.         5/18/2023     1:21 PM   Preop Questions   1. Have you ever had a heart attack or stroke? No   2. Have you ever had surgery on your heart or blood vessels, such as a stent placement, a coronary artery bypass, or surgery on an artery in your head, neck, heart, or legs? No   3. Do you have chest pain with activity? No   4. Do you have a history of  heart failure? No   5. Do you currently have a cold, bronchitis or symptoms of other infection? YES    6. Do you have a cough, shortness of breath, or wheezing? YES    7. Do you or anyone in your family have previous history of blood clots? No   8. Do you or does anyone in your family have a serious bleeding problem such as prolonged bleeding following surgeries or cuts? No   9. Have you ever had problems with anemia or been told to take iron pills? UNKNOWN    10. Have you had any abnormal blood loss such as black, tarry or bloody stools? UNKNOWN    11. Have you ever had a blood transfusion? No   12. Are you willing to have a blood transfusion if it is medically needed before, during, or after your surgery? Yes   13. Have you or any of your relatives ever had problems with anesthesia? YES    14. Do you have sleep apnea, excessive snoring or daytime drowsiness? No   15. Do you have any artifical heart valves or other implanted medical devices like a pacemaker, defibrillator, or continuous glucose monitor? No   16. Do you have artificial joints? No   17. Are you allergic to latex? No     Health Care Directive:  Patient does not have a Health Care Directive or Living Will: N/A    Preoperative Review of :   reviewed - no record of controlled substances prescribed.      Status of Chronic Conditions:  See problem list for active medical problems.  Problems all  longstanding and stable, except as noted/documented.  See ROS for pertinent symptoms related to these conditions.      Review of Systems  CONSTITUTIONAL: NEGATIVE for fever, chills, change in weight  INTEGUMENTARY/SKIN: NEGATIVE for worrisome rashes, moles or lesions  EYES: NEGATIVE for vision changes or irritation  ENT/MOUTH: NEGATIVE for ear, mouth and throat problems  RESP: NEGATIVE for significant cough or SOB  CV: NEGATIVE for chest pain, palpitations or peripheral edema  GI: NEGATIVE for nausea, abdominal pain, heartburn, or change in bowel habits  : NEGATIVE for frequency, dysuria, or hematuria  MUSCULOSKELETAL: NEGATIVE for significant arthralgias or myalgia  NEURO: NEGATIVE for weakness, dizziness or paresthesias  ENDOCRINE: NEGATIVE for temperature intolerance, skin/hair changes  HEME: NEGATIVE for bleeding problems  PSYCHIATRIC: NEGATIVE for changes in mood or affect    Patient Active Problem List    Diagnosis Date Noted     Nephrolithiasis 05/18/2023     Priority: Medium     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     Priority: Medium     Left       Recurrent kidney stones 05/03/2019     Priority: Medium     BPH associated with nocturia 04/16/2019     Priority: Medium     Erectile dysfunction, unspecified erectile dysfunction type 10/08/2018     Priority: Medium     Ventral hernia without obstruction or gangrene 10/08/2018     Priority: Medium     Numbness and tingling of both feet 10/08/2018     Priority: Medium     Stress 08/02/2017     Priority: Medium     ACP (advance care planning) 12/21/2015     Priority: Medium     Advance Care Planning 12/21/2015: ACP Review of Chart / Resources Provided:  Reviewed chart for advance care plan.  Stanford Andersen has no plan or code status on file. Discussed available resources and provided with information. . Added by Zee Stewart             Essential hypertension 02/05/2014     Priority: Medium     Hyperlipidemia 09/04/2013     Priority: Medium     Family history of  coronary artery disease 09/04/2013     Priority: Medium     Change in bowel function 09/04/2013     Priority: Medium     Zollinger-Patricio syndrome      Priority: Medium     Health Care Home 03/02/2012     Priority: Medium     [facility] for .  Clara Daniels RN-BC    [phone]   DX V65.8 REPLACED WITH 63578 HEALTH CARE HOME (04/08/2013)       Chest pain 03/02/2012     Priority: Medium      Past Medical History:   Diagnosis Date     Arthritis     hand     Chronic back pain      Complication of anesthesia     URINARY RETENTION, Sleepy after surgery     Gastro-oesophageal reflux disease      Hypertension      Renal disease     kidney stone     Zollinger-Patricio syndrome 1995     Past Surgical History:   Procedure Laterality Date     ABDOMEN SURGERY  2003    Exploratory laparoscopy     COMBINED CYSTOSCOPY, INSERT STENT URETER(S) Left 8/31/2021    Procedure: Cystoscopy with left ureteral stent placement;  Surgeon: Devan Bergeron MD;  Location:  OR     COMBINED CYSTOSCOPY, RETROGRADES, URETEROSCOPY, LASER HOLMIUM LITHOTRIPSY URETER(S), INSERT STENT Right 5/23/2019    Procedure: Video cystoscopy, exam under anesthesia, right ureteroscopy with holmium laser lithotripsy and stone extractions, right double-J stent (5-Malawian x 24 cm).;  Surgeon: Satinder Almanza MD;  Location:  OR     COMBINED CYSTOSCOPY, RETROGRADES, URETEROSCOPY, LASER HOLMIUM LITHOTRIPSY URETER(S), INSERT STENT Left 9/30/2021    Procedure: Cystoscopy, left ureteral stent exchange, left ureteroscopy with holmium laser lithotripsy and stone basketing. left retrogrades;  Surgeon: Devan Bergeron MD;  Location:  OR     EXTRACORPOREAL SHOCK WAVE LITHOTRIPSY (ESWL) Left 8/31/2021    Procedure: Bilateral extracorporeal shockwave lithotripsy;  Surgeon: Devan Bergeron MD;  Location:  OR     HC EXCISE VARICOCELE  age 21     varicocele repair     HERNIORRHAPHY UMBILICAL N/A 11/30/2018    Procedure: OPEN  UMBILLICAL HERNIA REPAIR ;  Surgeon: Ike Catalan MD;  Location: [facility] OR     KIDNEY SURGERY      lithotripsy x 3     LAPAROSCOPIC HERNIORRHAPHY INGUINAL Left 9/26/2016    Procedure: LAPAROSCOPIC HERNIORRHAPHY INGUINAL;  Surgeon: Ike Catalan MD;  Location:  [facility]     LITHOTRIPSY      three times     STOMACH SURGERY  2003    exploratory scoping looking for tumors related to ZE syndrome     Current Outpatient Medications   Medication Sig Dispense Refill     amLODIPine (NORVASC) 2.5 MG tablet Take 1 tablet (2.5 mg) by mouth daily 90 tablet 3     doxycycline hyclate (VIBRA-TABS) 100 MG tablet Take 1 tablet (100 mg) by mouth 2 times daily Do not take within 2 hours of antacids or calcium. 14 tablet 0     finasteride (PROSCAR) 5 MG tablet Take 1 tablet (5 mg) by mouth daily 90 tablet 3     guaiFENesin (ROBITUSSIN) 20 mg/mL liquid Take 10 mLs by mouth every 4 hours as needed for cough 236 mL 0     pantoprazole (PROTONIX) 40 MG EC tablet Take 1 tablet (40 mg) by mouth 2 times daily 180 tablet 3     predniSONE (DELTASONE) 20 MG tablet Take 1 tablet (20 mg) by mouth daily With breakfast 10 tablet 0     sildenafil (VIAGRA) 100 MG tablet Take 1 tablet (100 mg) by mouth daily as needed (erectile dysfunction) 10 tablet 4     tamsulosin (FLOMAX) 0.4 MG capsule Take 1 capsule (0.4 mg) by mouth every evening 90 capsule 3       Allergies   Allergen Reactions     Dye [Contrast Dye] Anaphylaxis and Hives     Penicillins Anaphylaxis     Swelling, arm turned blue     Tramadol Itching     Oxycodone-Acetaminophen Rash        Social History     Tobacco Use     Smoking status: Never     Smokeless tobacco: Never   Vaping Use     Vaping status: Not on file   Substance Use Topics     Alcohol use: Yes     Comment: seldom     Family History   Problem Relation Age of Onset     Heart Disease Mother         ablation     C.A.D. Mother      Heart Disease Father      Respiratory Father         COPD     C.A.D. Father      Spina bifida  "Son      History   Drug Use No         Objective     /83   Pulse 105   Temp 97.9  F (36.6  C) (Temporal)   Resp 16   Ht 1.727 m (5' 8\")   Wt 75.3 kg (166 lb)   SpO2 95%   BMI 25.24 kg/m      Physical Exam    GENERAL APPEARANCE: healthy, alert and no distress     EYES: EOMI,  PERRL     HENT: ear canals and TM's normal and nose and mouth without ulcers or lesions     NECK: no adenopathy, no asymmetry, masses, or scars and thyroid normal to palpation     RESP: lungs clear to auscultation - no rales, rhonchi or wheezes     CV: regular rates and rhythm, normal S1 S2, no S3 or S4 and no murmur, click or rub     ABDOMEN:  soft, nontender, no HSM or masses and bowel sounds normal     MS: extremities normal- no gross deformities noted, no evidence of inflammation in joints, FROM in all extremities.     SKIN: no suspicious lesions or rashes     NEURO: Normal strength and tone, sensory exam grossly normal, mentation intact and speech normal     PSYCH: mentation appears normal. and affect normal/bright     LYMPHATICS: No cervical adenopathy    Recent Labs   Lab Test 03/01/23  1632 08/18/21  1610 07/21/21  1020   HGB 15.1 15.1  --     260  --      --  137   POTASSIUM 4.1 3.9 3.8   CR 1.04  --  0.93        Diagnostics:  Labs pending at this time.  Results will be reviewed when available.  Recent Results (from the past 720 hour(s))   UA without Microscopic [[id number]]    Collection Time: 04/25/23  9:31 AM   Result Value Ref Range    Color Urine Yellow Colorless, Straw, Light Yellow, Yellow    Appearance Urine Clear Clear    Glucose Urine Negative Negative mg/dL    Bilirubin Urine Negative Negative    Ketones Urine Negative Negative mg/dL    Specific Gravity Urine 1.025 1.003 - 1.035    Blood Urine Negative Negative    pH Urine 5.5 5.0 - 7.0    Protein Albumin Urine Negative Negative mg/dL    Urobilinogen Urine 0.2 0.2, 1.0 E.U./dL    Nitrite Urine Negative Negative    Leukocyte Esterase Urine Negative " Negative      EKG required for Pt age of 78 yrs , general anaesthesia , intermediate risk surgery  and not completed in the last 90 days.     Revised Cardiac Risk Index (RCRI):  The patient has the following serious cardiovascular risks for perioperative complications:   - No serious cardiac risks = 0 points     RCRI Interpretation: 0 points: Class I (very low risk - 0.4% complication rate)           Signed Electronically by: Teodora Ledesma MD  Copy of this evaluation report is provided to requesting physician.

## 2023-05-18 NOTE — H&P (VIEW-ONLY)
[address] [address]  Phone: [phone]  Primary Provider: Charlie Lawrence  Pre-op Performing Provider: CLEMENT PRICE      PREOPERATIVE EVALUATION:  Today's date: 5/18/2023    Stanford Andersen is a 78 year old male who presents for a preoperative evaluation.      5/18/2023     1:16 PM   Additional Questions   Roomed by Antonia Amezcua     Surgical Information:  Surgery/Procedure: Bilateral extracorporeal shock wave lithotripsy   Surgery Location: [facility]  Surgeon: Devan Bergeron  Surgery Date: 05/25/2023  Time of Surgery: 12:00 PM  Where patient plans to recover: At home with family  Fax number for surgical facility: Note does not need to be faxed, will be available electronically in Epic.    Assessment & Plan     The proposed surgical procedure is considered INTERMEDIATE risk.    Assessment and Plan  1. Preoperative clearance  2. Nephrolithiasis  3. Recurrent kidney stones  Patient is here for preoperative clearance of BILATERAL EXTRACORPOREAL SHOCK WAVE LITHOTRIPSY for Nephrolithiasis under general anaesthesia. Patient does have risk factors of Zollinger-Patricio syndrome, hypertension on amlodipine. Last lab work in March 2023 with normal CMP, CBC. We will recheck at this time before surgery.  - XR Chest 2 Views; Future  - EKG 12-lead complete w/read - Clinics  - Comprehensive metabolic panel (BMP + Alb, Alk Phos, ALT, AST, Total. Bili, TP); Future  - CBC with platelets; Future      4. 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  Continue current pantoprazole.    5. Acute bronchitis with symptoms > 10 days  New problem, given the severity of symptoms with possible COPD/allergic bronchitis exacerbation cannot be excluded.  Given the respiratory precautions below and the risks for the clearance, will check an x-ray at this time.  Given  empiric antibiotic and a prednisone course along with the cough syrup for improvement.  UPDATE -called and spoken to the patient to confirm that his symptoms have all resolved which he endorses that he was doing much better soon after he started his antibiotic course as prescribed below.  Clearance given to the surgery.  - XR Chest 2 Views; Future  - doxycycline hyclate (VIBRA-TABS) 100 MG tablet; Take 1 tablet (100 mg) by mouth 2 times daily Do not take within 2 hours of antacids or calcium.  Dispense: 14 tablet; Refill: 0  - guaiFENesin (ROBITUSSIN) 20 mg/mL liquid; Take 10 mLs by mouth every 4 hours as needed for cough  Dispense: 236 mL; Refill: 0  - predniSONE (DELTASONE) 20 MG tablet; Take 1 tablet (20 mg) by mouth daily With breakfast  Dispense: 10 tablet; Refill: 0         Please note that this note consists of symbols derived from keyboarding, dictation and/or voice recognition software. As a result, there may be errors in the script that have gone undetected. Please consider this when interpreting information found in this chart.    Patient Instructions     As discussed , please do pertinent work up for your preop clearance .    Sent in medications for your Acute bronchitis .     =======================================================  For informational purposes only. Not to replace the advice of your health care provider. Copyright   2003, 2019 [facility]. All rights reserved. Clinically reviewed by Marie Chavarria MD. ZAP [id number] - REV 12/22.  Preparing for Your Surgery  Getting started  A nurse will call you to review your health history and instructions. They will give you an arrival time based on your scheduled surgery time. Please be ready to share:    Your doctor's clinic name and phone number    Your medical, surgical, and anesthesia history    A list of allergies and sensitivities    A list of medicines, including herbal treatments and over-the-counter drugs    Whether the patient  has a legal guardian (ask how to send us the papers in advance)  Please tell us if you're pregnant--or if there's any chance you might be pregnant. Some surgeries may injure a fetus (unborn baby), so they require a pregnancy test. Surgeries that are safe for a fetus don't always need a test, and you can choose whether to have one.   If you have a child who's having surgery, please ask for a copy of Preparing for Your Child's Surgery.    Preparing for surgery    Within 10 to 30 days of surgery: Have a pre-op exam (sometimes called an H&P, or History and Physical). This can be done at a clinic or pre-operative center.  ? If you're having a , you may not need this exam. Talk to your care team.    At your pre-op exam, talk to your care team about all medicines you take. If you need to stop any medicines before surgery, ask when to start taking them again.  ? We do this for your safety. Many medicines can make you bleed too much during surgery. Some change how well surgery (anesthesia) drugs work.    Call your insurance company to let them know you're having surgery. (If you don't have insurance, call [phone].)    Call your clinic if there's any change in your health. This includes signs of a cold or flu (sore throat, runny nose, cough, rash, fever). It also includes a scrape or scratch near the surgery site.    If you have questions on the day of surgery, call your hospital or surgery center.  Eating and drinking guidelines  For your safety: Unless your surgeon tells you otherwise, follow the guidelines below.    Eat and drink as usual until 8 hours before you arrive for surgery. After that, no food or milk.    Drink clear liquids until 2 hours before you arrive. These are liquids you can see through, like water, Gatorade, and Propel Water. They also include plain black coffee and tea (no cream or milk), candy, and breath mints. You can spit out gum when you arrive.    If you drink alcohol: Stop drinking  it the night before surgery.    If your care team tells you to take medicine on the morning of surgery, it's okay to take it with a sip of water.  Preventing infection    Shower or bathe the night before and morning of your surgery. Follow the instructions your clinic gave you. (If no instructions, use regular soap.)    Don't shave or clip hair near your surgery site. We'll remove the hair if needed.    Don't smoke or vape the morning of surgery. You may chew nicotine gum up to 2 hours before surgery. A nicotine patch is okay.  ? Note: Some surgeries require you to completely quit smoking and nicotine. Check with your surgeon.    Your care team will make every effort to keep you safe from infection. We will:  ? Clean our hands often with soap and water (or an alcohol-based hand rub).  ? Clean the skin at your surgery site with a special soap that kills germs.  ? Give you a special gown to keep you warm. (Cold raises the risk of infection.)  ? Wear special hair covers, masks, gowns and gloves during surgery.  ? Give antibiotic medicine, if prescribed. Not all surgeries need antibiotics.  What to bring on the day of surgery    Photo ID and insurance card    Copy of your health care directive, if you have one    Glasses and hearing aids (bring cases)  ? You can't wear contacts during surgery    Inhaler and eye drops, if you use them (tell us about these when you arrive)    CPAP machine or breathing device, if you use them    A few personal items, if spending the night    If you have . . .  ? A pacemaker, ICD (cardiac defibrillator) or other implant: Bring the ID card.  ? An implanted stimulator: Bring the remote control.  ? A legal guardian: Bring a copy of the certified (court-stamped) guardianship papers.  Please remove any jewelry, including body piercings. Leave jewelry and other valuables at home.  If you're going home the day of surgery    You must have a responsible adult drive you home. They should stay with  you overnight as well.    If you don't have someone to stay with you, and you aren't safe to go home alone, we may keep you overnight. Insurance often won't pay for this.  After surgery  If it's hard to control your pain or you need more pain medicine, please call your surgeon's office.  Questions?   If you have any questions for your care team, list them here: _________________________________________________________________________________________________________________________________________________________________________ ____________________________________ ____________________________________ ____________________________________      Return in about 3 months (around 8/18/2023), or if symptoms worsen or fail to improve, for Follow up of last visit, If symptoms persist.    Teodora Ledesma MD  [facility] [place]         Risks and Recommendations:  The patient has the following additional risks and recommendations for perioperative complications:   - Consult Hospitalist / IM to assist with post-op medical management  Cardiovascular:   - Patient is currently on amlodipine for his hypertension.  Pulmonary:    - Incentive spirometry post-op   - Consider Respiratory Therapy (Respiratory Care IP Consult) post-op   - Patient did have acute bronchitis at the time of his preoperative clearance for which she was given antibiotic and a prednisone course appears as possible allergic bronchitis exacerbation.   -     Antiplatelet or Anticoagulation Medication Instructions:   - Patient is on no antiplatelet or anticoagulation medications.    Additional Medication Instructions:  Patient is to take all scheduled medications on the day of surgery    RECOMMENDATION:  APPROVAL GIVEN to proceed with proposed procedure, without further diagnostic evaluation.    Review of external notes as documented elsewhere in note  42 minutes spent by me on the date of the encounter doing chart review, review of outside  records, review of test results, interpretation of tests, patient visit and documentation       Subjective       HPI related to upcoming procedure:     Patient is new to me, follows Rusty Negron as PCP.         5/18/2023     1:21 PM   Preop Questions   1. Have you ever had a heart attack or stroke? No   2. Have you ever had surgery on your heart or blood vessels, such as a stent placement, a coronary artery bypass, or surgery on an artery in your head, neck, heart, or legs? No   3. Do you have chest pain with activity? No   4. Do you have a history of  heart failure? No   5. Do you currently have a cold, bronchitis or symptoms of other infection? YES    6. Do you have a cough, shortness of breath, or wheezing? YES    7. Do you or anyone in your family have previous history of blood clots? No   8. Do you or does anyone in your family have a serious bleeding problem such as prolonged bleeding following surgeries or cuts? No   9. Have you ever had problems with anemia or been told to take iron pills? UNKNOWN    10. Have you had any abnormal blood loss such as black, tarry or bloody stools? UNKNOWN    11. Have you ever had a blood transfusion? No   12. Are you willing to have a blood transfusion if it is medically needed before, during, or after your surgery? Yes   13. Have you or any of your relatives ever had problems with anesthesia? YES    14. Do you have sleep apnea, excessive snoring or daytime drowsiness? No   15. Do you have any artifical heart valves or other implanted medical devices like a pacemaker, defibrillator, or continuous glucose monitor? No   16. Do you have artificial joints? No   17. Are you allergic to latex? No     Health Care Directive:  Patient does not have a Health Care Directive or Living Will: N/A    Preoperative Review of :   reviewed - no record of controlled substances prescribed.      Status of Chronic Conditions:  See problem list for active medical problems.  Problems all  longstanding and stable, except as noted/documented.  See ROS for pertinent symptoms related to these conditions.      Review of Systems  CONSTITUTIONAL: NEGATIVE for fever, chills, change in weight  INTEGUMENTARY/SKIN: NEGATIVE for worrisome rashes, moles or lesions  EYES: NEGATIVE for vision changes or irritation  ENT/MOUTH: NEGATIVE for ear, mouth and throat problems  RESP: NEGATIVE for significant cough or SOB  CV: NEGATIVE for chest pain, palpitations or peripheral edema  GI: NEGATIVE for nausea, abdominal pain, heartburn, or change in bowel habits  : NEGATIVE for frequency, dysuria, or hematuria  MUSCULOSKELETAL: NEGATIVE for significant arthralgias or myalgia  NEURO: NEGATIVE for weakness, dizziness or paresthesias  ENDOCRINE: NEGATIVE for temperature intolerance, skin/hair changes  HEME: NEGATIVE for bleeding problems  PSYCHIATRIC: NEGATIVE for changes in mood or affect    Patient Active Problem List    Diagnosis Date Noted     Nephrolithiasis 05/18/2023     Priority: Medium     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     Priority: Medium     Left       Recurrent kidney stones 05/03/2019     Priority: Medium     BPH associated with nocturia 04/16/2019     Priority: Medium     Erectile dysfunction, unspecified erectile dysfunction type 10/08/2018     Priority: Medium     Ventral hernia without obstruction or gangrene 10/08/2018     Priority: Medium     Numbness and tingling of both feet 10/08/2018     Priority: Medium     Stress 08/02/2017     Priority: Medium     ACP (advance care planning) 12/21/2015     Priority: Medium     Advance Care Planning 12/21/2015: ACP Review of Chart / Resources Provided:  Reviewed chart for advance care plan.  Stanford Andersen has no plan or code status on file. Discussed available resources and provided with information. . Added by Zee Stweart             Essential hypertension 02/05/2014     Priority: Medium     Hyperlipidemia 09/04/2013     Priority: Medium     Family history of  coronary artery disease 09/04/2013     Priority: Medium     Change in bowel function 09/04/2013     Priority: Medium     Zollinger-Patricio syndrome      Priority: Medium     Health Care Home 03/02/2012     Priority: Medium     [facility] for .  Clara Daniels RN-BC    [phone]   DX V65.8 REPLACED WITH [facility] HEALTH CARE HOME (04/08/2013)       Chest pain 03/02/2012     Priority: Medium      Past Medical History:   Diagnosis Date     Arthritis     hand     Chronic back pain      Complication of anesthesia     URINARY RETENTION, Sleepy after surgery     Gastro-oesophageal reflux disease      Hypertension      Renal disease     kidney stone     Zollinger-Patricio syndrome 1995     Past Surgical History:   Procedure Laterality Date     ABDOMEN SURGERY  2003    Exploratory laparoscopy     COMBINED CYSTOSCOPY, INSERT STENT URETER(S) Left 8/31/2021    Procedure: Cystoscopy with left ureteral stent placement;  Surgeon: Devan Bergeron MD;  Location:  OR     COMBINED CYSTOSCOPY, RETROGRADES, URETEROSCOPY, LASER HOLMIUM LITHOTRIPSY URETER(S), INSERT STENT Right 5/23/2019    Procedure: Video cystoscopy, exam under anesthesia, right ureteroscopy with holmium laser lithotripsy and stone extractions, right double-J stent (5-German x 24 cm).;  Surgeon: Satinder Almanza MD;  Location:  OR     COMBINED CYSTOSCOPY, RETROGRADES, URETEROSCOPY, LASER HOLMIUM LITHOTRIPSY URETER(S), INSERT STENT Left 9/30/2021    Procedure: Cystoscopy, left ureteral stent exchange, left ureteroscopy with holmium laser lithotripsy and stone basketing. left retrogrades;  Surgeon: Devan Bergeron MD;  Location:  OR     EXTRACORPOREAL SHOCK WAVE LITHOTRIPSY (ESWL) Left 8/31/2021    Procedure: Bilateral extracorporeal shockwave lithotripsy;  Surgeon: Devan Bergeron MD;  Location:  OR     HC EXCISE VARICOCELE  age 21     varicocele repair     HERNIORRHAPHY UMBILICAL N/A 11/30/2018    Procedure: OPEN  UMBILLICAL HERNIA REPAIR ;  Surgeon: Ike Catalan MD;  Location: [facility] OR     KIDNEY SURGERY      lithotripsy x 3     LAPAROSCOPIC HERNIORRHAPHY INGUINAL Left 9/26/2016    Procedure: LAPAROSCOPIC HERNIORRHAPHY INGUINAL;  Surgeon: Ike Catalan MD;  Location:  [facility]     LITHOTRIPSY      three times     STOMACH SURGERY  2003    exploratory scoping looking for tumors related to ZE syndrome     Current Outpatient Medications   Medication Sig Dispense Refill     amLODIPine (NORVASC) 2.5 MG tablet Take 1 tablet (2.5 mg) by mouth daily 90 tablet 3     doxycycline hyclate (VIBRA-TABS) 100 MG tablet Take 1 tablet (100 mg) by mouth 2 times daily Do not take within 2 hours of antacids or calcium. 14 tablet 0     finasteride (PROSCAR) 5 MG tablet Take 1 tablet (5 mg) by mouth daily 90 tablet 3     guaiFENesin (ROBITUSSIN) 20 mg/mL liquid Take 10 mLs by mouth every 4 hours as needed for cough 236 mL 0     pantoprazole (PROTONIX) 40 MG EC tablet Take 1 tablet (40 mg) by mouth 2 times daily 180 tablet 3     predniSONE (DELTASONE) 20 MG tablet Take 1 tablet (20 mg) by mouth daily With breakfast 10 tablet 0     sildenafil (VIAGRA) 100 MG tablet Take 1 tablet (100 mg) by mouth daily as needed (erectile dysfunction) 10 tablet 4     tamsulosin (FLOMAX) 0.4 MG capsule Take 1 capsule (0.4 mg) by mouth every evening 90 capsule 3       Allergies   Allergen Reactions     Dye [Contrast Dye] Anaphylaxis and Hives     Penicillins Anaphylaxis     Swelling, arm turned blue     Tramadol Itching     Oxycodone-Acetaminophen Rash        Social History     Tobacco Use     Smoking status: Never     Smokeless tobacco: Never   Vaping Use     Vaping status: Not on file   Substance Use Topics     Alcohol use: Yes     Comment: seldom     Family History   Problem Relation Age of Onset     Heart Disease Mother         ablation     C.A.D. Mother      Heart Disease Father      Respiratory Father         COPD     C.A.D. Father      Spina bifida  "Son      History   Drug Use No         Objective     /83   Pulse 105   Temp 97.9  F (36.6  C) (Temporal)   Resp 16   Ht 1.727 m (5' 8\")   Wt 75.3 kg (166 lb)   SpO2 95%   BMI 25.24 kg/m      Physical Exam    GENERAL APPEARANCE: healthy, alert and no distress     EYES: EOMI,  PERRL     HENT: ear canals and TM's normal and nose and mouth without ulcers or lesions     NECK: no adenopathy, no asymmetry, masses, or scars and thyroid normal to palpation     RESP: lungs clear to auscultation - no rales, rhonchi or wheezes     CV: regular rates and rhythm, normal S1 S2, no S3 or S4 and no murmur, click or rub     ABDOMEN:  soft, nontender, no HSM or masses and bowel sounds normal     MS: extremities normal- no gross deformities noted, no evidence of inflammation in joints, FROM in all extremities.     SKIN: no suspicious lesions or rashes     NEURO: Normal strength and tone, sensory exam grossly normal, mentation intact and speech normal     PSYCH: mentation appears normal. and affect normal/bright     LYMPHATICS: No cervical adenopathy    Recent Labs   Lab Test 03/01/23  1632 08/18/21  1610 07/21/21  1020   HGB 15.1 15.1  --     260  --      --  137   POTASSIUM 4.1 3.9 3.8   CR 1.04  --  0.93        Diagnostics:  Labs pending at this time.  Results will be reviewed when available.  Recent Results (from the past 720 hour(s))   UA without Microscopic [[id number]]    Collection Time: 04/25/23  9:31 AM   Result Value Ref Range    Color Urine Yellow Colorless, Straw, Light Yellow, Yellow    Appearance Urine Clear Clear    Glucose Urine Negative Negative mg/dL    Bilirubin Urine Negative Negative    Ketones Urine Negative Negative mg/dL    Specific Gravity Urine 1.025 1.003 - 1.035    Blood Urine Negative Negative    pH Urine 5.5 5.0 - 7.0    Protein Albumin Urine Negative Negative mg/dL    Urobilinogen Urine 0.2 0.2, 1.0 E.U./dL    Nitrite Urine Negative Negative    Leukocyte Esterase Urine Negative " Negative      EKG required for Pt age of 78 yrs , general anaesthesia , intermediate risk surgery  and not completed in the last 90 days.     Revised Cardiac Risk Index (RCRI):  The patient has the following serious cardiovascular risks for perioperative complications:   - No serious cardiac risks = 0 points     RCRI Interpretation: 0 points: Class I (very low risk - 0.4% complication rate)           Signed Electronically by: Teodora Ledesma MD  Copy of this evaluation report is provided to requesting physician.

## 2023-05-18 NOTE — LETTER
May 19, 2023      Stanford Andersen  [id number] [address] [place] [id number]        Dear ,    We are writing to inform you of your test results.    All your labs are normal, there might be some highlighted which doesn't have any clinical significance.     Resulted Orders   Comprehensive metabolic panel (BMP + Alb, Alk Phos, ALT, AST, Total. Bili, TP)   Result Value Ref Range    Sodium 140 136 - 145 mmol/L    Potassium 4.9 3.4 - 5.3 mmol/L    Chloride 104 98 - 107 mmol/L    Carbon Dioxide (CO2) 23 22 - 29 mmol/L    Anion Gap 13 7 - 15 mmol/L    Urea Nitrogen 15.7 8.0 - 23.0 mg/dL    Creatinine 1.01 0.67 - 1.17 mg/dL    Calcium 9.7 8.8 - 10.2 mg/dL    Glucose 96 70 - 99 mg/dL    Alkaline Phosphatase 73 40 - 129 U/L    AST 24 10 - 50 U/L    ALT 19 10 - 50 U/L    Protein Total 6.7 6.4 - 8.3 g/dL    Albumin 4.0 3.5 - 5.2 g/dL    Bilirubin Total 0.3 <=1.2 mg/dL    GFR Estimate 77 >60 mL/min/1.73m2      Comment:      eGFR calculated using 2021 CKD-EPI equation.   CBC with platelets   Result Value Ref Range    WBC Count 10.5 4.0 - 11.0 10e3/uL    RBC Count 5.39 4.40 - 5.90 10e6/uL    Hemoglobin 14.4 13.3 - 17.7 g/dL    Hematocrit 46.3 40.0 - 53.0 %    MCV 86 78 - 100 fL    MCH 26.7 26.5 - 33.0 pg    MCHC 31.1 (L) 31.5 - 36.5 g/dL    RDW 15.1 (H) 10.0 - 15.0 %    Platelet Count 288 150 - 450 10e3/uL       If you have any questions or concerns, please call the clinic at the number listed above.       Sincerely,      Teodora Ledesma MD

## 2023-05-18 NOTE — PATIENT INSTRUCTIONS
As discussed , please do pertinent work up for your preop clearance .    Sent in medications for your Acute bronchitis .     =======================================================  For informational purposes only. Not to replace the advice of your health care provider. Copyright   ,  [facility]. All rights reserved. Clinically reviewed by Marie Chavarria MD. Flaviar [id number] - REV .  Preparing for Your Surgery  Getting started  A nurse will call you to review your health history and instructions. They will give you an arrival time based on your scheduled surgery time. Please be ready to share:  Your doctor's clinic name and phone number  Your medical, surgical, and anesthesia history  A list of allergies and sensitivities  A list of medicines, including herbal treatments and over-the-counter drugs  Whether the patient has a legal guardian (ask how to send us the papers in advance)  Please tell us if you're pregnant--or if there's any chance you might be pregnant. Some surgeries may injure a fetus (unborn baby), so they require a pregnancy test. Surgeries that are safe for a fetus don't always need a test, and you can choose whether to have one.   If you have a child who's having surgery, please ask for a copy of Preparing for Your Child's Surgery.    Preparing for surgery  Within 10 to 30 days of surgery: Have a pre-op exam (sometimes called an H&P, or History and Physical). This can be done at a clinic or pre-operative center.  If you're having a , you may not need this exam. Talk to your care team.  At your pre-op exam, talk to your care team about all medicines you take. If you need to stop any medicines before surgery, ask when to start taking them again.  We do this for your safety. Many medicines can make you bleed too much during surgery. Some change how well surgery (anesthesia) drugs work.  Call your insurance company to let them know you're having surgery. (If you don't  have insurance, call [phone].)  Call your clinic if there's any change in your health. This includes signs of a cold or flu (sore throat, runny nose, cough, rash, fever). It also includes a scrape or scratch near the surgery site.  If you have questions on the day of surgery, call your hospital or surgery center.  Eating and drinking guidelines  For your safety: Unless your surgeon tells you otherwise, follow the guidelines below.  Eat and drink as usual until 8 hours before you arrive for surgery. After that, no food or milk.  Drink clear liquids until 2 hours before you arrive. These are liquids you can see through, like water, Gatorade, and Propel Water. They also include plain black coffee and tea (no cream or milk), candy, and breath mints. You can spit out gum when you arrive.  If you drink alcohol: Stop drinking it the night before surgery.  If your care team tells you to take medicine on the morning of surgery, it's okay to take it with a sip of water.  Preventing infection  Shower or bathe the night before and morning of your surgery. Follow the instructions your clinic gave you. (If no instructions, use regular soap.)  Don't shave or clip hair near your surgery site. We'll remove the hair if needed.  Don't smoke or vape the morning of surgery. You may chew nicotine gum up to 2 hours before surgery. A nicotine patch is okay.  Note: Some surgeries require you to completely quit smoking and nicotine. Check with your surgeon.  Your care team will make every effort to keep you safe from infection. We will:  Clean our hands often with soap and water (or an alcohol-based hand rub).  Clean the skin at your surgery site with a special soap that kills germs.  Give you a special gown to keep you warm. (Cold raises the risk of infection.)  Wear special hair covers, masks, gowns and gloves during surgery.  Give antibiotic medicine, if prescribed. Not all surgeries need antibiotics.  What to bring on the day of  surgery  Photo ID and insurance card  Copy of your health care directive, if you have one  Glasses and hearing aids (bring cases)  You can't wear contacts during surgery  Inhaler and eye drops, if you use them (tell us about these when you arrive)  CPAP machine or breathing device, if you use them  A few personal items, if spending the night  If you have . . .  A pacemaker, ICD (cardiac defibrillator) or other implant: Bring the ID card.  An implanted stimulator: Bring the remote control.  A legal guardian: Bring a copy of the certified (court-stamped) guardianship papers.  Please remove any jewelry, including body piercings. Leave jewelry and other valuables at home.  If you're going home the day of surgery  You must have a responsible adult drive you home. They should stay with you overnight as well.  If you don't have someone to stay with you, and you aren't safe to go home alone, we may keep you overnight. Insurance often won't pay for this.  After surgery  If it's hard to control your pain or you need more pain medicine, please call your surgeon's office.  Questions?   If you have any questions for your care team, list them here: _________________________________________________________________________________________________________________________________________________________________________ ____________________________________ ____________________________________ ____________________________________

## 2023-05-19 NOTE — RESULT ENCOUNTER NOTE
Your X ray is normal, no acute concerns per radiology review.    Please let me know if you have any questions.  Teodora Ledesma MD on 5/18/2023

## 2023-05-24 ENCOUNTER — TELEPHONE (OUTPATIENT)
Dept: INTERNAL MEDICINE | Facility: CLINIC | Age: 78
End: 2023-05-24
Payer: COMMERCIAL

## 2023-05-24 NOTE — OR NURSING
Pt had recent diagnosis of possible COPD/bronchitis at preop physical.  Pt given antibiotic and predisone.  Asked pt to call back to let us know if cough has cleared up before surgery.  Message left for pt.   55

## 2023-05-24 NOTE — PROGRESS NOTES
Patient returned phone call and said his cough is resolved and he is feeling well. Pre-op H&P stated no further diagnostic was needed for clearance as long as cough was improving. Pt given pre-op arrival time and instructions.

## 2023-05-24 NOTE — TELEPHONE ENCOUNTER
"Called and spoken to the patient, he does confirm that \" all his symptoms resolved after he started taking the antibiotic prescribed by us \"    Preoperative clearance given, please fax the addended note.    Thank you,  Teodora Ledesma MD on 5/24/2023 at 5:48 PM  "

## 2023-05-24 NOTE — TELEPHONE ENCOUNTER
----- Message from Teodora Ledesma MD sent at 5/18/2023 11:53 PM CDT -----  Your X ray is normal, no acute concerns per radiology review.    Please let me know if you have any questions.  Teodora Ledesma MD on 5/18/2023

## 2023-05-24 NOTE — TELEPHONE ENCOUNTER
Yaneth calling from [facility] urology stating the H&P from recent pre op has been pended d/t cough. Kartik Wolfe stated she spoke with pt who confirms that cough has improved. Requesting confirmation if he is cleared for procedure tomorrow. Addendum needed to 5/18/23 pre op visit for clearance.     Any questions ok to [facility] u[facility] in [facility] [phone]    Stephania ROJAS RN  [facility] Triage Team

## 2023-05-25 ENCOUNTER — HOSPITAL ENCOUNTER (OUTPATIENT)
Facility: CLINIC | Age: 78
Discharge: HOME | End: 2023-05-25
Attending: UROLOGY | Admitting: UROLOGY
Payer: COMMERCIAL

## 2023-05-25 ENCOUNTER — ANESTHESIA EVENT (OUTPATIENT)
Dept: SURGERY | Facility: CLINIC | Age: 78
End: 2023-05-25
Payer: COMMERCIAL

## 2023-05-25 ENCOUNTER — ANESTHESIA (OUTPATIENT)
Dept: SURGERY | Facility: CLINIC | Age: 78
End: 2023-05-25
Payer: COMMERCIAL

## 2023-05-25 VITALS
WEIGHT: 165.1 LBS | OXYGEN SATURATION: 92 % | HEIGHT: 66 IN | HEART RATE: 86 BPM | DIASTOLIC BLOOD PRESSURE: 85 MMHG | RESPIRATION RATE: 16 BRPM | BODY MASS INDEX: 26.53 KG/M2 | TEMPERATURE: 98.1 F | SYSTOLIC BLOOD PRESSURE: 134 MMHG

## 2023-05-25 DIAGNOSIS — N20.0 NEPHROLITHIASIS: Primary | ICD-10-CM

## 2023-05-25 PROCEDURE — 360N000077 HC SURGERY LEVEL 4, PER MIN: Performed by: UROLOGY

## 2023-05-25 PROCEDURE — 250N000011 HC RX IP 250 OP 636: Performed by: UROLOGY

## 2023-05-25 PROCEDURE — 710N000012 HC RECOVERY PHASE 2, PER MINUTE: Performed by: UROLOGY

## 2023-05-25 PROCEDURE — 710N000009 HC RECOVERY PHASE 1, LEVEL 1, PER MIN: Performed by: UROLOGY

## 2023-05-25 PROCEDURE — 50590 FRAGMENTING OF KIDNEY STONE: CPT | Mod: 50 | Performed by: UROLOGY

## 2023-05-25 PROCEDURE — 250N000011 HC RX IP 250 OP 636: Performed by: NURSE ANESTHETIST, CERTIFIED REGISTERED

## 2023-05-25 PROCEDURE — 250N000009 HC RX 250: Performed by: NURSE ANESTHETIST, CERTIFIED REGISTERED

## 2023-05-25 PROCEDURE — 370N000017 HC ANESTHESIA TECHNICAL FEE, PER MIN: Performed by: UROLOGY

## 2023-05-25 PROCEDURE — 258N000003 HC RX IP 258 OP 636: Performed by: NURSE ANESTHETIST, CERTIFIED REGISTERED

## 2023-05-25 PROCEDURE — 999N000094 HC STATISTIC LITHOTRIPSY IDENTIFIER: Performed by: UROLOGY

## 2023-05-25 PROCEDURE — 999N000141 HC STATISTIC PRE-PROCEDURE NURSING ASSESSMENT: Performed by: UROLOGY

## 2023-05-25 PROCEDURE — 250N000013 HC RX MED GY IP 250 OP 250 PS 637: Performed by: ANESTHESIOLOGY

## 2023-05-25 PROCEDURE — 250N000025 HC SEVOFLURANE, PER MIN: Performed by: UROLOGY

## 2023-05-25 RX ORDER — DEXMEDETOMIDINE HYDROCHLORIDE 4 UG/ML
INJECTION, SOLUTION INTRAVENOUS PRN
Status: DISCONTINUED | OUTPATIENT
Start: 2023-05-25 | End: 2023-05-25

## 2023-05-25 RX ORDER — HYDROMORPHONE HCL IN WATER/PF 6 MG/30 ML
0.4 PATIENT CONTROLLED ANALGESIA SYRINGE INTRAVENOUS EVERY 5 MIN PRN
Status: DISCONTINUED | OUTPATIENT
Start: 2023-05-25 | End: 2023-05-25 | Stop reason: HOSPADM

## 2023-05-25 RX ORDER — FENTANYL CITRATE 0.05 MG/ML
25 INJECTION, SOLUTION INTRAMUSCULAR; INTRAVENOUS
Status: DISCONTINUED | OUTPATIENT
Start: 2023-05-25 | End: 2023-05-25 | Stop reason: HOSPADM

## 2023-05-25 RX ORDER — SODIUM CHLORIDE, SODIUM LACTATE, POTASSIUM CHLORIDE, CALCIUM CHLORIDE 600; 310; 30; 20 MG/100ML; MG/100ML; MG/100ML; MG/100ML
INJECTION, SOLUTION INTRAVENOUS CONTINUOUS
Status: DISCONTINUED | OUTPATIENT
Start: 2023-05-25 | End: 2023-05-25 | Stop reason: HOSPADM

## 2023-05-25 RX ORDER — HYDROMORPHONE HCL IN WATER/PF 6 MG/30 ML
0.2 PATIENT CONTROLLED ANALGESIA SYRINGE INTRAVENOUS EVERY 5 MIN PRN
Status: DISCONTINUED | OUTPATIENT
Start: 2023-05-25 | End: 2023-05-25 | Stop reason: HOSPADM

## 2023-05-25 RX ORDER — LIDOCAINE HYDROCHLORIDE 20 MG/ML
INJECTION, SOLUTION INFILTRATION; PERINEURAL PRN
Status: DISCONTINUED | OUTPATIENT
Start: 2023-05-25 | End: 2023-05-25

## 2023-05-25 RX ORDER — MEPERIDINE HYDROCHLORIDE 25 MG/ML
12.5 INJECTION INTRAMUSCULAR; INTRAVENOUS; SUBCUTANEOUS EVERY 5 MIN PRN
Status: DISCONTINUED | OUTPATIENT
Start: 2023-05-25 | End: 2023-05-25 | Stop reason: HOSPADM

## 2023-05-25 RX ORDER — SODIUM CHLORIDE, SODIUM LACTATE, POTASSIUM CHLORIDE, CALCIUM CHLORIDE 600; 310; 30; 20 MG/100ML; MG/100ML; MG/100ML; MG/100ML
INJECTION, SOLUTION INTRAVENOUS CONTINUOUS PRN
Status: DISCONTINUED | OUTPATIENT
Start: 2023-05-25 | End: 2023-05-25

## 2023-05-25 RX ORDER — FENTANYL CITRATE 50 UG/ML
INJECTION, SOLUTION INTRAMUSCULAR; INTRAVENOUS PRN
Status: DISCONTINUED | OUTPATIENT
Start: 2023-05-25 | End: 2023-05-25

## 2023-05-25 RX ORDER — ONDANSETRON 2 MG/ML
4 INJECTION INTRAMUSCULAR; INTRAVENOUS EVERY 30 MIN PRN
Status: DISCONTINUED | OUTPATIENT
Start: 2023-05-25 | End: 2023-05-25 | Stop reason: HOSPADM

## 2023-05-25 RX ORDER — KETOROLAC TROMETHAMINE 30 MG/ML
INJECTION, SOLUTION INTRAMUSCULAR; INTRAVENOUS PRN
Status: DISCONTINUED | OUTPATIENT
Start: 2023-05-25 | End: 2023-05-25

## 2023-05-25 RX ORDER — ONDANSETRON 2 MG/ML
INJECTION INTRAMUSCULAR; INTRAVENOUS PRN
Status: DISCONTINUED | OUTPATIENT
Start: 2023-05-25 | End: 2023-05-25

## 2023-05-25 RX ORDER — PROPOFOL 10 MG/ML
INJECTION, EMULSION INTRAVENOUS PRN
Status: DISCONTINUED | OUTPATIENT
Start: 2023-05-25 | End: 2023-05-25

## 2023-05-25 RX ORDER — DEXAMETHASONE SODIUM PHOSPHATE 4 MG/ML
INJECTION, SOLUTION INTRA-ARTICULAR; INTRALESIONAL; INTRAMUSCULAR; INTRAVENOUS; SOFT TISSUE PRN
Status: DISCONTINUED | OUTPATIENT
Start: 2023-05-25 | End: 2023-05-25

## 2023-05-25 RX ORDER — ONDANSETRON 4 MG/1
4 TABLET, ORALLY DISINTEGRATING ORAL EVERY 30 MIN PRN
Status: DISCONTINUED | OUTPATIENT
Start: 2023-05-25 | End: 2023-05-25 | Stop reason: HOSPADM

## 2023-05-25 RX ORDER — CIPROFLOXACIN 2 MG/ML
400 INJECTION, SOLUTION INTRAVENOUS ONCE
Status: COMPLETED | OUTPATIENT
Start: 2023-05-25 | End: 2023-05-25

## 2023-05-25 RX ORDER — LABETALOL HYDROCHLORIDE 5 MG/ML
10 INJECTION, SOLUTION INTRAVENOUS
Status: DISCONTINUED | OUTPATIENT
Start: 2023-05-25 | End: 2023-05-25 | Stop reason: HOSPADM

## 2023-05-25 RX ORDER — EPHEDRINE SULFATE 50 MG/ML
INJECTION, SOLUTION INTRAMUSCULAR; INTRAVENOUS; SUBCUTANEOUS PRN
Status: DISCONTINUED | OUTPATIENT
Start: 2023-05-25 | End: 2023-05-25

## 2023-05-25 RX ORDER — OXYCODONE HYDROCHLORIDE 5 MG/1
5 TABLET ORAL
Status: DISCONTINUED | OUTPATIENT
Start: 2023-05-25 | End: 2023-05-25 | Stop reason: HOSPADM

## 2023-05-25 RX ORDER — FENTANYL CITRATE 0.05 MG/ML
25 INJECTION, SOLUTION INTRAMUSCULAR; INTRAVENOUS EVERY 5 MIN PRN
Status: DISCONTINUED | OUTPATIENT
Start: 2023-05-25 | End: 2023-05-25 | Stop reason: HOSPADM

## 2023-05-25 RX ORDER — TAMSULOSIN HYDROCHLORIDE 0.4 MG/1
0.4 CAPSULE ORAL DAILY
Qty: 30 CAPSULE | Refills: 11 | Status: ON HOLD | OUTPATIENT
Start: 2023-05-25 | End: 2024-04-11

## 2023-05-25 RX ORDER — ALBUTEROL SULFATE 0.83 MG/ML
2.5 SOLUTION RESPIRATORY (INHALATION) EVERY 4 HOURS PRN
Status: DISCONTINUED | OUTPATIENT
Start: 2023-05-25 | End: 2023-05-25 | Stop reason: HOSPADM

## 2023-05-25 RX ORDER — FENTANYL CITRATE 0.05 MG/ML
50 INJECTION, SOLUTION INTRAMUSCULAR; INTRAVENOUS EVERY 5 MIN PRN
Status: DISCONTINUED | OUTPATIENT
Start: 2023-05-25 | End: 2023-05-25 | Stop reason: HOSPADM

## 2023-05-25 RX ORDER — HYDRALAZINE HYDROCHLORIDE 20 MG/ML
2.5-5 INJECTION INTRAMUSCULAR; INTRAVENOUS EVERY 10 MIN PRN
Status: DISCONTINUED | OUTPATIENT
Start: 2023-05-25 | End: 2023-05-25 | Stop reason: HOSPADM

## 2023-05-25 RX ORDER — OXYCODONE HYDROCHLORIDE 5 MG/1
10 TABLET ORAL
Status: DISCONTINUED | OUTPATIENT
Start: 2023-05-25 | End: 2023-05-25 | Stop reason: HOSPADM

## 2023-05-25 RX ADMIN — PHENYLEPHRINE HYDROCHLORIDE 100 MCG: 10 INJECTION INTRAVENOUS at 12:58

## 2023-05-25 RX ADMIN — PROPOFOL 50 MG: 10 INJECTION, EMULSION INTRAVENOUS at 12:49

## 2023-05-25 RX ADMIN — PROPOFOL 30 MG: 10 INJECTION, EMULSION INTRAVENOUS at 13:46

## 2023-05-25 RX ADMIN — PHENYLEPHRINE HYDROCHLORIDE 200 MCG: 10 INJECTION INTRAVENOUS at 12:50

## 2023-05-25 RX ADMIN — SUCCINYLCHOLINE CHLORIDE 100 MG: 20 INJECTION, SOLUTION INTRAMUSCULAR; INTRAVENOUS; PARENTERAL at 12:18

## 2023-05-25 RX ADMIN — Medication 5 MG: at 13:00

## 2023-05-25 RX ADMIN — Medication 5 MG: at 13:04

## 2023-05-25 RX ADMIN — PHENYLEPHRINE HYDROCHLORIDE 100 MCG: 10 INJECTION INTRAVENOUS at 12:32

## 2023-05-25 RX ADMIN — SODIUM CHLORIDE, POTASSIUM CHLORIDE, SODIUM LACTATE AND CALCIUM CHLORIDE: 600; 310; 30; 20 INJECTION, SOLUTION INTRAVENOUS at 12:15

## 2023-05-25 RX ADMIN — Medication 5 MG: at 12:58

## 2023-05-25 RX ADMIN — PHENYLEPHRINE HYDROCHLORIDE 200 MCG: 10 INJECTION INTRAVENOUS at 12:36

## 2023-05-25 RX ADMIN — DEXMEDETOMIDINE HYDROCHLORIDE 8 MCG: 200 INJECTION INTRAVENOUS at 12:50

## 2023-05-25 RX ADMIN — FENTANYL CITRATE 25 MCG: 50 INJECTION, SOLUTION INTRAMUSCULAR; INTRAVENOUS at 12:29

## 2023-05-25 RX ADMIN — LIDOCAINE HYDROCHLORIDE 100 MG: 20 INJECTION, SOLUTION INFILTRATION; PERINEURAL at 12:18

## 2023-05-25 RX ADMIN — Medication 5 MG: at 12:50

## 2023-05-25 RX ADMIN — PHENYLEPHRINE HYDROCHLORIDE 200 MCG: 10 INJECTION INTRAVENOUS at 13:07

## 2023-05-25 RX ADMIN — PHENYLEPHRINE HYDROCHLORIDE 200 MCG: 10 INJECTION INTRAVENOUS at 12:39

## 2023-05-25 RX ADMIN — PROPOFOL 50 MG: 10 INJECTION, EMULSION INTRAVENOUS at 12:48

## 2023-05-25 RX ADMIN — CIPROFLOXACIN 400 MG: 2 INJECTION, SOLUTION INTRAVENOUS at 11:03

## 2023-05-25 RX ADMIN — DEXAMETHASONE SODIUM PHOSPHATE 4 MG: 4 INJECTION, SOLUTION INTRA-ARTICULAR; INTRALESIONAL; INTRAMUSCULAR; INTRAVENOUS; SOFT TISSUE at 12:30

## 2023-05-25 RX ADMIN — MINERAL OIL AND PETROLATUM 1 INCH: 150; 830 OINTMENT OPHTHALMIC at 12:25

## 2023-05-25 RX ADMIN — PROPOFOL 50 MG: 10 INJECTION, EMULSION INTRAVENOUS at 12:29

## 2023-05-25 RX ADMIN — FENTANYL CITRATE 75 MCG: 50 INJECTION, SOLUTION INTRAMUSCULAR; INTRAVENOUS at 12:18

## 2023-05-25 RX ADMIN — DEXMEDETOMIDINE HYDROCHLORIDE 4 MCG: 200 INJECTION INTRAVENOUS at 13:11

## 2023-05-25 RX ADMIN — PHENYLEPHRINE HYDROCHLORIDE 100 MCG: 10 INJECTION INTRAVENOUS at 13:31

## 2023-05-25 RX ADMIN — Medication 1 LOZENGE: at 16:20

## 2023-05-25 RX ADMIN — ONDANSETRON 4 MG: 2 INJECTION INTRAMUSCULAR; INTRAVENOUS at 12:30

## 2023-05-25 RX ADMIN — PHENYLEPHRINE HYDROCHLORIDE 200 MCG: 10 INJECTION INTRAVENOUS at 13:22

## 2023-05-25 RX ADMIN — Medication 5 MG: at 12:46

## 2023-05-25 RX ADMIN — KETOROLAC TROMETHAMINE 30 MG: 30 INJECTION, SOLUTION INTRAMUSCULAR at 13:40

## 2023-05-25 RX ADMIN — PHENYLEPHRINE HYDROCHLORIDE 200 MCG: 10 INJECTION INTRAVENOUS at 12:43

## 2023-05-25 RX ADMIN — PROPOFOL 150 MG: 10 INJECTION, EMULSION INTRAVENOUS at 12:18

## 2023-05-25 ASSESSMENT — ACTIVITIES OF DAILY LIVING (ADL)
ADLS_ACUITY_SCORE: 39

## 2023-05-25 NOTE — ANESTHESIA PREPROCEDURE EVALUATION
Anesthesia Pre-Procedure Evaluation    Patient: Stanford Andersen   MRN: [id number] : 1945        Procedure : Procedure(s):  BILATERAL EXTRACORPOREAL SHOCK WAVE LITHOTRIPSY          Past Medical History:   Diagnosis Date     Arthritis     hand     Chronic back pain      Complication of anesthesia     URINARY RETENTION, Sleepy after surgery     Gastro-oesophageal reflux disease      Hypertension      Renal disease     kidney stone     Zollinger-Patricio syndrome       Past Surgical History:   Procedure Laterality Date     ABDOMEN SURGERY      Exploratory laparoscopy     COMBINED CYSTOSCOPY, INSERT STENT URETER(S) Left 2021    Procedure: Cystoscopy with left ureteral stent placement;  Surgeon: Devan Bergeron MD;  Location:  OR     COMBINED CYSTOSCOPY, RETROGRADES, URETEROSCOPY, LASER HOLMIUM LITHOTRIPSY URETER(S), INSERT STENT Right 2019    Procedure: Video cystoscopy, exam under anesthesia, right ureteroscopy with holmium laser lithotripsy and stone extractions, right double-J stent (5-Italian x 24 cm).;  Surgeon: Satinder Almanza MD;  Location:  OR     COMBINED CYSTOSCOPY, RETROGRADES, URETEROSCOPY, LASER HOLMIUM LITHOTRIPSY URETER(S), INSERT STENT Left 2021    Procedure: Cystoscopy, left ureteral stent exchange, left ureteroscopy with holmium laser lithotripsy and stone basketing. left retrogrades;  Surgeon: Devan Bergeron MD;  Location:  OR     EXTRACORPOREAL SHOCK WAVE LITHOTRIPSY (ESWL) Left 2021    Procedure: Bilateral extracorporeal shockwave lithotripsy;  Surgeon: Devan Bergeron MD;  Location:  OR     HC EXCISE VARICOCELE  age 21     varicocele repair     HERNIORRHAPHY UMBILICAL N/A 2018    Procedure: OPEN UMBILLICAL HERNIA REPAIR ;  Surgeon: Ike Catalan MD;  Location:  OR     KIDNEY SURGERY      lithotripsy x 3     LAPAROSCOPIC HERNIORRHAPHY INGUINAL Left 2016    Procedure: LAPAROSCOPIC HERNIORRHAPHY INGUINAL;   Surgeon: Ike Catalan MD;  Location: [facility]     LITHOTRIPSY      three times     STOMACH SURGERY  2003    exploratory scoping looking for tumors related to ZE syndrome      Allergies   Allergen Reactions     Dye [Contrast Dye] Anaphylaxis and Hives     Penicillins Anaphylaxis     Swelling, arm turned blue     Tramadol Itching     Oxycodone-Acetaminophen Rash      Social History     Tobacco Use     Smoking status: Never     Smokeless tobacco: Never   Vaping Use     Vaping status: Not on file   Substance Use Topics     Alcohol use: Yes     Comment: seldom      Wt Readings from Last 1 Encounters:   05/25/23 74.9 kg (165 lb 1.6 oz)        Anesthesia Evaluation   Pt has had prior anesthetic.     No history of anesthetic complications       ROS/MED HX  ENT/Pulmonary:    (-) sleep apnea   Neurologic:    (-) no CVA   Cardiovascular:     (+) hypertension----- (-) CAD   METS/Exercise Tolerance:     Hematologic:       Musculoskeletal:       GI/Hepatic: Comment: Zollinger-Patricio Syndrome    (+) GERD,     Renal/Genitourinary:     (+) Nephrolithiasis ,     Endo:    (-) Type II DM   Psychiatric/Substance Use:       Infectious Disease:       Malignancy:       Other:            Physical Exam    Airway        Mallampati: II   TM distance: > 3 FB   Neck ROM: full   Mouth opening: > 3 cm    Respiratory Devices and Support         Dental       (+) Minor Abnormalities - some fillings, tiny chips      Cardiovascular   cardiovascular exam normal          Pulmonary   pulmonary exam normal                OUTSIDE LABS:  CBC:   Lab Results   Component Value Date    WBC 10.5 05/18/2023    WBC 6.8 03/01/2023    HGB 14.4 05/18/2023    HGB 15.1 03/01/2023    HCT 46.3 05/18/2023    HCT 47.4 03/01/2023     05/18/2023     03/01/2023     BMP:   Lab Results   Component Value Date     05/18/2023     03/01/2023    POTASSIUM 4.9 05/18/2023    POTASSIUM 4.1 03/01/2023    CHLORIDE 104 05/18/2023    CHLORIDE 104 03/01/2023     CO2 23 05/18/2023    CO2 24 03/01/2023    BUN 15.7 05/18/2023    BUN 18.0 03/01/2023    CR 1.01 05/18/2023    CR 1.04 03/01/2023    GLC 96 05/18/2023    GLC 95 03/01/2023     COAGS: No results found for: PTT, INR, FIBR  POC: No results found for: BGM, HCG, HCGS  HEPATIC:   Lab Results   Component Value Date    ALBUMIN 4.0 05/18/2023    PROTTOTAL 6.7 05/18/2023    ALT 19 05/18/2023    AST 24 05/18/2023    ALKPHOS 73 05/18/2023    BILITOTAL 0.3 05/18/2023     OTHER:   Lab Results   Component Value Date    CLIFTON 9.7 05/18/2023       Anesthesia Plan    ASA Status:  2   NPO Status:  NPO Appropriate    Anesthesia Type: General.     - Airway: ETT   Induction: Propofol, Intravenous, RSI.   Maintenance: Balanced.        Consents    Anesthesia Plan(s) and associated risks, benefits, and realistic alternatives discussed. Questions answered and patient/representative(s) expressed understanding.    - Discussed:     - Discussed with:  Patient         Postoperative Care    Pain management: Multi-modal analgesia.   PONV prophylaxis: Ondansetron (or other 5HT-3), Dexamethasone or Solumedrol     Comments:                Marion Marroquin MD, MD

## 2023-05-25 NOTE — ANESTHESIA POSTPROCEDURE EVALUATION
Patient: Stanford Andersen    Procedure: Procedure(s):  BILATERAL EXTRACORPOREAL SHOCK WAVE LITHOTRIPSY       Anesthesia Type:  General    Note:     Postop Pain Control: Uneventful            Sign Out: Well controlled pain   PONV: No   Neuro/Psych: Uneventful            Sign Out: Acceptable/Baseline neuro status   Airway/Respiratory: Uneventful            Sign Out: Acceptable/Baseline resp. status   CV/Hemodynamics: Uneventful            Sign Out: Acceptable CV status; No obvious hypovolemia; No obvious fluid overload   Other NRE:    DID A NON-ROUTINE EVENT OCCUR? No           Last vitals:  Vitals Value Taken Time   /80 05/25/23 1500   Temp 36.4  C (97.6  F) 05/25/23 1500   Pulse 82 05/25/23 1508   Resp 25 05/25/23 1508   SpO2 90 % 05/25/23 1508   Vitals shown include unvalidated device data.    Electronically Signed By: Marion Marroquin MD, MD  May 25, 2023  3:59 PM

## 2023-05-25 NOTE — ANESTHESIA PROCEDURE NOTES
Airway       Patient location during procedure: OR       Procedure Start/Stop Times: 5/25/2023 12:20 PM  Staff -        Anesthesiologist:  Marion Marroquin MD       CRNA: Macario Diego APRN CRNA       Performed By: CRNA  Consent for Airway        Urgency: elective  Indications and Patient Condition       Indications for airway management: mark-procedural and airway protection       Induction type:RSI       Mask difficulty assessment: 0 - not attempted    Final Airway Details       Final airway type: endotracheal airway       Successful airway: ETT - single and Oral  Endotracheal Airway Details        ETT size (mm): 8.0       Cuffed: yes       Successful intubation technique: direct laryngoscopy       DL Blade Type: Handy 2       Grade View of Cords: 1       Adjucts: stylet       Position: Right       Measured from: lips       Secured at (cm): 22       Bite block used: None    Post intubation assessment        Placement verified by: capnometry, equal breath sounds and chest rise        Number of attempts at approach: 1       Number of other approaches attempted: 0       Secured with: pink tape       Ease of procedure: easy       Dentition: Unchanged    Medication(s) Administered   Medication Administration Time: 5/25/2023 12:20 PM

## 2023-05-25 NOTE — ANESTHESIA POSTPROCEDURE EVALUATION
Patient: Stanford Andersen    Procedure: Procedure(s):  BILATERAL EXTRACORPOREAL SHOCK WAVE LITHOTRIPSY       Anesthesia Type:  General    Note:     Postop Pain Control: Uneventful            Sign Out: Well controlled pain   PONV: No   Neuro/Psych: Uneventful            Sign Out: Acceptable/Baseline neuro status   Airway/Respiratory: Uneventful            Sign Out: Acceptable/Baseline resp. status   CV/Hemodynamics: Uneventful            Sign Out: Acceptable CV status; No obvious hypovolemia; No obvious fluid overload   Other NRE:    DID A NON-ROUTINE EVENT OCCUR? No           Last vitals:  Vitals Value Taken Time   /83 05/25/23 1430   Temp     Pulse 87 05/25/23 1435   Resp 26 05/25/23 1435   SpO2 89 % 05/25/23 1435   Vitals shown include unvalidated device data.    Electronically Signed By: Marion Marroquin MD, MD  May 25, 2023  2:36 PM

## 2023-05-25 NOTE — ANESTHESIA CARE TRANSFER NOTE
Patient: Stanford Andersen    Procedure: Procedure(s):  BILATERAL EXTRACORPOREAL SHOCK WAVE LITHOTRIPSY       Diagnosis: Kidney stone [N20.0]  Diagnosis Additional Information: No value filed.    Anesthesia Type:   General     Note:    Oropharynx: oropharynx clear of all foreign objects and spontaneously breathing  Level of Consciousness: drowsy  Oxygen Supplementation: face mask  Level of Supplemental Oxygen (L/min / FiO2): 6  Independent Airway: airway patency satisfactory and stable  Dentition: dentition unchanged  Vital Signs Stable: post-procedure vital signs reviewed and stable    Patient transferred to: PACU    Handoff Report: Identifed the Patient, Identified the Reponsible Provider, Reviewed the pertinent medical history, Discussed the surgical course, Reviewed Intra-OP anesthesia mangement and issues during anesthesia, Set expectations for post-procedure period and Allowed opportunity for questions and acknowledgement of understanding      Vitals:  Vitals Value Taken Time   /83 05/25/23 1430   Temp     Pulse 83 05/25/23 1444   Resp 25 05/25/23 1444   SpO2 88 % 05/25/23 1444       Electronically Signed By: VARINDER Barnhart CRNA  May 25, 2023  2:46 PM

## 2023-05-25 NOTE — DISCHARGE INSTRUCTIONS
Same Day Surgery Discharge Instructions for  Sedation and General Anesthesia     It's not unusual to feel dizzy, light-headed or faint for up to 24 hours after surgery or while taking pain medication.  If you have these symptoms: sit for a few minutes before standing and have someone assist you when you get up to walk or use the bathroom.    You should rest and relax for the next 24 hours. We recommend you make arrangements to have an adult stay with you for at least 24 hours after your discharge.  Avoid hazardous and strenuous activity.    DO NOT DRIVE any vehicle or operate mechanical equipment for 24 hours following the end of your surgery.  Even though you may feel normal, your reactions may be affected by the medication you have received.    Do not drink alcoholic beverages for 24 hours following surgery.     Slowly progress to your regular diet as you feel able. It's not unusual to feel nauseated and/or vomit after receiving anesthesia.  If you develop these symptoms, drink clear liquids (apple juice, ginger ale, broth, 7-up, etc. ) until you feel better.  If your nausea and vomiting persists for 24 hours, please notify your surgeon.      All narcotic pain medications, along with inactivity and anesthesia, can cause constipation. Drinking plenty of liquids and increasing fiber intake will help.    For any questions of a medical nature, call your surgeon.    Do not make important decisions for 24 hours.    If you had general anesthesia, you may have a sore throat for a couple of days related to the breathing tube used during surgery.  You may use Cepacol lozenges to help with this discomfort.  If it worsens or if you develop a fever, contact your surgeon.     If you feel your pain is not well managed with the pain medications prescribed by your surgeon, please contact your surgeon's office to let them know so they can address your concerns.      DISCHARGE INSTRUCTIONS FOLLOWING EXTRACORPOREAL SHOCK LITHOTRIPSY  (ESWL)      Your stone(s) has been fragmented into many tiny pieces, which must now pass in your urine.  Usually this process is uneventful.  Most fragments pass in the first one or two week, but some may continue to pass for three months or more.  Some pain or discomfort may accompany the passage of these fragments.    To aid in the passage of fragments, drink lots of fluid.  Aim for 1 glass an hour for the next 1 to 2 weeks (2 quarts or more a day).  Most stone patients will benefit from a continued high fluid intake and urine output indefinitely, even after the fragments are gone.  This helps prevent new stone formation.    Strain your urine.  Take the stone fragments to your urologist.  They will have them analyzed to help determine the cause of your stone(s).    You should walk around and resume every day activities.  Activity may help the stone fragments pass.  You should, however, avoid sports or really strenuous exercise for about a week, or at least until there is no more blood in your urine.    You may resume regular diet.    Call your urologist if you have:  a. Persistent severe pain not relieved by oral medications.  b. Fever (over 101 ).  c. Persistent vomiting.    See your urologist as directed.  They will need to take an x-ray to check your progress.  Take your stone fragments to your follow-up appointment.     **If you have questions or concerns about your procedure,   call Dr. Bergeron at [phone]**    Post-Care Instructions: I reviewed with the patient in detail post-care instructions. Patient is to wear sunprotection, and avoid picking at any of the treated lesions. Pt may apply Vaseline to crusted or scabbing areas. Detail Level: Detailed Render Note In Bullet Format When Appropriate: No Consent: The patient's consent was obtained including but not limited to risks of crusting, scabbing, blistering, scarring, darker or lighter pigmentary change, recurrence, incomplete removal and infection. Duration Of Freeze Thaw-Cycle (Seconds): 0

## 2023-05-25 NOTE — OP NOTE
Procedure Date: 2023    SURGEON:  Devan Bergeron MD    PREOPERATIVE DIAGNOSIS:  Bilateral kidney stones.    POSTOPERATIVE DIAGNOSIS:  Bilateral kidney stones.    PROCEDURE PERFORMED:  Bilateral extracorporeal shock wave lithotripsy.    ANESTHESIA:  General endotracheal.    COMPLICATIONS:  None.    INDICATIONS FOR PROCEDURE:  The patient is a 78-year-old gentleman with asymptomatic stones in each kidney.  He now presents for bilateral shockwave lithotripsy.    DETAILS OF PROCEDURE:  Risks and benefits of the procedure were explained to the patient and informed consent was obtained.  The patient was brought to the operating room and placed supine on the table.  He underwent general endotracheal anesthetic.  He was then moved down over the lithotripter, and fluoroscopy was used to identify the stones.  We began on the left side.  I identified 2 stones in the left kidney, the largest of which was in the lower pole.  I delivered a total of 2400 shocks to the 2 stones at a maximum power level 7, and they appeared to fragment well.  I then repositioned the patient and visualized the right side.  On the right side, I saw 3 small stones.  I delivered a total of 2400 shocks at a maximum power of 7 to the stones, and they appeared to fragment well.  The patient was woken up, and the procedure was concluded.    The patient tolerated the procedure well without complications.  He went to post-anesthetic care unit in good condition.  He will go home from there.  I will see him back in the fall with a KUB to make certain he passes all the stone fragments.    Devan Bergeron MD        D: 2023   T: 2023   MT: [place]    Name:     JOAQUIN CRUZ  MRN:      [id number]        Account:        [id number]   :      1945           Procedure Date: 2023     Document: [id number]

## 2023-05-30 ENCOUNTER — TELEPHONE (OUTPATIENT)
Dept: UROLOGY | Facility: CLINIC | Age: 78
End: 2023-05-30
Payer: COMMERCIAL

## 2023-05-30 DIAGNOSIS — N20.0 KIDNEY STONE: Primary | ICD-10-CM

## 2023-05-30 NOTE — TELEPHONE ENCOUNTER
----- Message from Yuni Ha sent at 5/30/2023 10:40 AM CDT -----  Can you please order KUB? Thanks!  ----- Message -----  From: Devan Bergeron MD  Sent: 5/25/2023   1:48 PM CDT  To: [facility] - Scheduling Pool    See me in September with a KUB  Video or in person is fine

## 2023-06-01 ENCOUNTER — TELEPHONE (OUTPATIENT)
Dept: UROLOGY | Facility: CLINIC | Age: 78
End: 2023-06-01
Payer: COMMERCIAL

## 2023-06-01 NOTE — TELEPHONE ENCOUNTER
----- Message from Devan Bergeron MD sent at 5/25/2023  1:47 PM CDT -----  See me in September with a KUB  Video or in person is fine

## 2023-06-02 NOTE — TELEPHONE ENCOUNTER
Spoke to patient and he was given x-ray results.     Nelida Madrigal RN  [facility]

## 2023-06-16 ENCOUNTER — TELEPHONE (OUTPATIENT)
Dept: UROLOGY | Facility: CLINIC | Age: 78
End: 2023-06-16
Payer: COMMERCIAL

## 2023-09-12 ENCOUNTER — ANCILLARY PROCEDURE (OUTPATIENT)
Dept: GENERAL RADIOLOGY | Facility: CLINIC | Age: 78
End: 2023-09-12
Attending: UROLOGY
Payer: COMMERCIAL

## 2023-09-12 ENCOUNTER — OFFICE VISIT (OUTPATIENT)
Dept: UROLOGY | Facility: CLINIC | Age: 78
End: 2023-09-12
Payer: COMMERCIAL

## 2023-09-12 VITALS
WEIGHT: 170 LBS | HEART RATE: 98 BPM | SYSTOLIC BLOOD PRESSURE: 128 MMHG | OXYGEN SATURATION: 94 % | BODY MASS INDEX: 25.76 KG/M2 | DIASTOLIC BLOOD PRESSURE: 83 MMHG | HEIGHT: 68 IN

## 2023-09-12 DIAGNOSIS — N20.0 KIDNEY STONE: ICD-10-CM

## 2023-09-12 DIAGNOSIS — N20.0 KIDNEY STONE: Primary | ICD-10-CM

## 2023-09-12 LAB
ALBUMIN UR-MCNC: NEGATIVE MG/DL
APPEARANCE UR: CLEAR
BILIRUB UR QL STRIP: NEGATIVE
COLOR UR AUTO: YELLOW
GLUCOSE UR STRIP-MCNC: NEGATIVE MG/DL
HGB UR QL STRIP: ABNORMAL
KETONES UR STRIP-MCNC: NEGATIVE MG/DL
LEUKOCYTE ESTERASE UR QL STRIP: NEGATIVE
NITRATE UR QL: NEGATIVE
PH UR STRIP: 5.5 [PH] (ref 5–7)
SP GR UR STRIP: 1.02 (ref 1–1.03)
UROBILINOGEN UR STRIP-ACNC: 0.2 E.U./DL

## 2023-09-12 PROCEDURE — 99214 OFFICE O/P EST MOD 30 MIN: CPT | Performed by: UROLOGY

## 2023-09-12 PROCEDURE — 81003 URINALYSIS AUTO W/O SCOPE: CPT | Mod: QW | Performed by: UROLOGY

## 2023-09-12 PROCEDURE — 74018 RADEX ABDOMEN 1 VIEW: CPT

## 2023-09-12 ASSESSMENT — PAIN SCALES - GENERAL: PAINLEVEL: NO PAIN (0)

## 2023-09-12 NOTE — PROGRESS NOTES
Office Visit Note  [facility]  [phone]    UROLOGIC DIAGNOSES:   Bilateral kidney stones  Enlarged prostate  Erectile dysfunction    CURRENT INTERVENTIONS:   S/P bilateral ESWL in May  Flomax and finasteride  Viagra    HISTORY:   Stanford underwent bilateral extracorporeal shockwave lithotripsy in May.  He has felt well since the procedure.      PAST MEDICAL HISTORY:   Past Medical History:   Diagnosis Date    Arthritis     hand    Chronic back pain     Complication of anesthesia     URINARY RETENTION, Sleepy after surgery    Gastro-oesophageal reflux disease     Hypertension     Renal disease     kidney stone    Zollinger-Patricio syndrome 1995       PAST SURGICAL HISTORY:   Past Surgical History:   Procedure Laterality Date    ABDOMEN SURGERY  2003    Exploratory laparoscopy    COMBINED CYSTOSCOPY, INSERT STENT URETER(S) Left 8/31/2021    Procedure: Cystoscopy with left ureteral stent placement;  Surgeon: Devan Bergeron MD;  Location: [facility]    COMBINED CYSTOSCOPY, RETROGRADES, URETEROSCOPY, LASER HOLMIUM LITHOTRIPSY URETER(S), INSERT STENT Right 5/23/2019    Procedure: Video cystoscopy, exam under anesthesia, right ureteroscopy with holmium laser lithotripsy and stone extractions, right double-J stent (5-Turkish x 24 cm).;  Surgeon: Satinder Almanza MD;  Location:  OR    COMBINED CYSTOSCOPY, RETROGRADES, URETEROSCOPY, LASER HOLMIUM LITHOTRIPSY URETER(S), INSERT STENT Left 9/30/2021    Procedure: Cystoscopy, left ureteral stent exchange, left ureteroscopy with holmium laser lithotripsy and stone basketing. left retrogrades;  Surgeon: Devan Bergeron MD;  [facility]    EXTRACORPOREAL SHOCK WAVE LITHOTRIPSY (ESWL) Left 8/31/2021    Procedure: Bilateral extracorporeal shockwave lithotripsy;  Surgeon: Devan Bergeron MD;  [facility]    EXTRACORPOREAL SHOCK WAVE LITHOTRIPSY (ESWL) Bilateral 5/25/2023    Procedure: BILATERAL EXTRACORPOREAL SHOCK WAVE LITHOTRIPSY;   Surgeon: Devan Bergeron MD;  Location: [facility]    HC EXCISE VARICOCELE  age 21     varicocele repair    HERNIORRHAPHY UMBILICAL N/A 11/30/2018    Procedure: OPEN UMBILLICAL HERNIA REPAIR ;  Surgeon: Ike Catalan MD;  Location: [facility]    KIDNEY SURGERY      lithotripsy x 3    LAPAROSCOPIC HERNIORRHAPHY INGUINAL Left 9/26/2016    Procedure: LAPAROSCOPIC HERNIORRHAPHY INGUINAL;  Surgeon: Ike Catalan MD;  [facility]    LITHOTRIPSY      three times    STOMACH SURGERY  2003    exploratory scoping looking for tumors related to ZE syndrome       FAMILY HISTORY:   Family History   Problem Relation Age of Onset    Heart Disease Mother         ablation    C.A.D. Mother     Heart Disease Father     Respiratory Father         COPD    C.A.D. Father     Spina bifida Son        SOCIAL HISTORY:   Social History     Socioeconomic History    Marital status:    Tobacco Use    Smoking status: Never    Smokeless tobacco: Never   Substance and Sexual Activity    Alcohol use: Yes     Comment: seldom    Drug use: No    Sexual activity: Not Currently   Other Topics Concern    Parent/sibling w/ CABG, MI or angioplasty before 65F 55M? No       Review Of Systems:  Skin: No rash, pruritis, or skin pigmentation  Eyes: No changes in vision  Ears/Nose/Throat: No changes in hearing, no nosebleeds  Respiratory: No shortness of breath, dyspnea on exertion, cough, or hemoptysis  Cardiovascular: No chest pain or palpitations  Gastrointestinal: No diarrhea or constipation. No abdominal pain. No hematochezia  Genitourinary: see HPI  Musculoskeletal: No pain or swelling of joints, normal range of motion  Neurologic: No weakness or tremors  Psychiatric: No recent changes in memory or mood  Hematologic/Lymphatic/Immunologic: No easy bruising or enlarged lymph nodes  Endocrine: No weight gain or loss      PHYSICAL EXAM:    There were no vitals taken for this visit.    Constitutional: Well developed. Conversant and in no  acute distress  Eyes: Anicteric sclera, conjunctiva clear, normal extraocular movements  ENT: Normocephalic and atraumatic,   Skin: Warm and dry. No rashes or lesions  Cardiac: No peripheral edema  Back/Flank: Not done  CNS/PNS: Normal musculature and movements, moves all extremities normally  Respiratory: Normal non-labored breathing  Abdomen: Soft nontender and nondistended  Peripheral Vascular: No peripheral edema  Mental Status/Psych: Alert and Oriented x 3. Normal mood and affect    Penis: Not done  Scrotal Skin: Not done  Testicles: Not done  Epididymis: Not done  Digital Rectal Exam:     Cystoscopy: Not done    Imaging: I personally reviewed his KUB images.  He has 2 remaining stones on each side.    Urinalysis: UA RESULTS:  Recent Labs   Lab Test 04/25/23  0931 07/19/21  1449 12/10/19  1047   COLOR Yellow   < > Yellow   APPEARANCE Clear   < > Clear   URINEGLC Negative   < > Negative   URINEBILI Negative   < > Negative   URINEKETONE Negative   < > Negative   SG 1.025   < > 1.015   UBLD Negative   < > Negative   URINEPH 5.5   < > 6.0   PROTEIN Negative   < > Negative   UROBILINOGEN 0.2   < > 0.2   NITRITE Negative   < > Negative   LEUKEST Negative   < > Negative   RBCU  --   --  O - 2   WBCU  --   --  0 - 5    < > = values in this interval not displayed.       PSA    Post Void Residual:     Other labs: None today      IMPRESSION:  Kidney stones    PLAN:  Unfortunately he has remaining kidney stones on each side after extracorporeal shockwave lithotripsy.  We discussed his options at this time.  We discussed options of observation versus treating the stones with bilateral ureteroscopy and bilateral stent placements.  He has passed stones previously and he has had prior ureteroscopy previously as well.  At this time he would like to observe.  He understands that he is at risk to develop flank pain and renal colic from his existing stones but he would like to observe.  I recommended he see me again in 6 months  for another KUB in order to monitor the stones.        Devan Bergeron M.D.

## 2023-09-12 NOTE — LETTER
9/12/2023       RE: Stanford Andersen  [address]  [facility] [address]     Dear Colleague,    Thank you for referring your patient, Stanford Andersen, to the [facility] at [facility]. Please see a copy of my visit note below.    Office Visit Note  [facility]  [phone]    UROLOGIC DIAGNOSES:   Bilateral kidney stones  Enlarged prostate  Erectile dysfunction    CURRENT INTERVENTIONS:   S/P bilateral ESWL in May  Flomax and finasteride  Viagra    HISTORY:   Stanford underwent bilateral extracorporeal shockwave lithotripsy in May.  He has felt well since the procedure.      PAST MEDICAL HISTORY:   Past Medical History:   Diagnosis Date    Arthritis     hand    Chronic back pain     Complication of anesthesia     URINARY RETENTION, Sleepy after surgery    Gastro-oesophageal reflux disease     Hypertension     Renal disease     kidney stone    Zollinger-Patricio syndrome 1995       PAST SURGICAL HISTORY:   Past Surgical History:   Procedure Laterality Date    ABDOMEN SURGERY  2003    Exploratory laparoscopy    COMBINED CYSTOSCOPY, INSERT STENT URETER(S) Left 8/31/2021    Procedure: Cystoscopy with left ureteral stent placement;  Surgeon: Devan Bergeron MD;  Location:  OR    COMBINED CYSTOSCOPY, RETROGRADES, URETEROSCOPY, LASER HOLMIUM LITHOTRIPSY URETER(S), INSERT STENT Right 5/23/2019    Procedure: Video cystoscopy, exam under anesthesia, right ureteroscopy with holmium laser lithotripsy and stone extractions, right double-J stent (5-German x 24 cm).;  Surgeon: Satinder Almanza MD;  Location:  OR    COMBINED CYSTOSCOPY, RETROGRADES, URETEROSCOPY, LASER HOLMIUM LITHOTRIPSY URETER(S), INSERT STENT Left 9/30/2021    Procedure: Cystoscopy, left ureteral stent exchange, left ureteroscopy with holmium laser lithotripsy and stone basketing. left retrogrades;  Surgeon: Devan Bergeron MD;  Location:  OR     EXTRACORPOREAL SHOCK WAVE LITHOTRIPSY (ESWL) Left 8/31/2021    Procedure: Bilateral extracorporeal shockwave lithotripsy;  Surgeon: Devan Bergeron MD;  Location:  OR    EXTRACORPOREAL SHOCK WAVE LITHOTRIPSY (ESWL) Bilateral 5/25/2023    Procedure: BILATERAL EXTRACORPOREAL SHOCK WAVE LITHOTRIPSY;  Surgeon: Devan Bergeron MD;  Location:  OR    HC EXCISE VARICOCELE  age 21     varicocele repair    HERNIORRHAPHY UMBILICAL N/A 11/30/2018    Procedure: OPEN UMBILLICAL HERNIA REPAIR ;  Surgeon: Ike Catalan MD;  Location:  OR    KIDNEY SURGERY      lithotripsy x 3    LAPAROSCOPIC HERNIORRHAPHY INGUINAL Left 9/26/2016    Procedure: LAPAROSCOPIC HERNIORRHAPHY INGUINAL;  Surgeon: Ike Catalan MD;  [facility]    LITHOTRIPSY      three times    STOMACH SURGERY  2003    exploratory scoping looking for tumors related to ZE syndrome       FAMILY HISTORY:   Family History   Problem Relation Age of Onset    Heart Disease Mother         ablation    C.A.D. Mother     Heart Disease Father     Respiratory Father         COPD    C.A.D. Father     Spina bifida Son        SOCIAL HISTORY:   Social History     Socioeconomic History    Marital status:    Tobacco Use    Smoking status: Never    Smokeless tobacco: Never   Substance and Sexual Activity    Alcohol use: Yes     Comment: seldom    Drug use: No    Sexual activity: Not Currently   Other Topics Concern    Parent/sibling w/ CABG, MI or angioplasty before 65F 55M? No       Review Of Systems:  Skin: No rash, pruritis, or skin pigmentation  Eyes: No changes in vision  Ears/Nose/Throat: No changes in hearing, no nosebleeds  Respiratory: No shortness of breath, dyspnea on exertion, cough, or hemoptysis  Cardiovascular: No chest pain or palpitations  Gastrointestinal: No diarrhea or constipation. No abdominal pain. No hematochezia  Genitourinary: see HPI  Musculoskeletal: No pain or swelling of joints, normal range of  motion  Neurologic: No weakness or tremors  Psychiatric: No recent changes in memory or mood  Hematologic/Lymphatic/Immunologic: No easy bruising or enlarged lymph nodes  Endocrine: No weight gain or loss      PHYSICAL EXAM:    There were no vitals taken for this visit.    Constitutional: Well developed. Conversant and in no acute distress  Eyes: Anicteric sclera, conjunctiva clear, normal extraocular movements  ENT: Normocephalic and atraumatic,   Skin: Warm and dry. No rashes or lesions  Cardiac: No peripheral edema  Back/Flank: Not done  CNS/PNS: Normal musculature and movements, moves all extremities normally  Respiratory: Normal non-labored breathing  Abdomen: Soft nontender and nondistended  Peripheral Vascular: No peripheral edema  Mental Status/Psych: Alert and Oriented x 3. Normal mood and affect    Penis: Not done  Scrotal Skin: Not done  Testicles: Not done  Epididymis: Not done  Digital Rectal Exam:     Cystoscopy: Not done    Imaging: I personally reviewed his KUB images.  He has 2 remaining stones on each side.    Urinalysis: UA RESULTS:  Recent Labs   Lab Test 04/25/23  0931 07/19/21  1449 12/10/19  1047   COLOR Yellow   < > Yellow   APPEARANCE Clear   < > Clear   URINEGLC Negative   < > Negative   URINEBILI Negative   < > Negative   URINEKETONE Negative   < > Negative   SG 1.025   < > 1.015   UBLD Negative   < > Negative   URINEPH 5.5   < > 6.0   PROTEIN Negative   < > Negative   UROBILINOGEN 0.2   < > 0.2   NITRITE Negative   < > Negative   LEUKEST Negative   < > Negative   RBCU  --   --  O - 2   WBCU  --   --  0 - 5    < > = values in this interval not displayed.       PSA    Post Void Residual:     Other labs: None today    IMPRESSION:  Kidney stones    PLAN:  Unfortunately he has remaining kidney stones on each side after extracorporeal shockwave lithotripsy.  We discussed his options at this time.  We discussed options of observation versus treating the stones with bilateral ureteroscopy and  bilateral stent placements.  He has passed stones previously and he has had prior ureteroscopy previously as well.  At this time he would like to observe.  He understands that he is at risk to develop flank pain and renal colic from his existing stones but he would like to observe.  I recommended he see me again in 6 months for another KUB in order to monitor the stones.      Devan Bergeron M.D.

## 2023-09-12 NOTE — NURSING NOTE
Chief Complaint   Patient presents with    Follow Up     X.R of K.U.B done on 09-    Results from xr today   Patient says pain mybee rt kidney   Urinating fine pr patient   No blood in the urine  Ariel Rojas, clinic assistant

## 2023-12-20 ENCOUNTER — OFFICE VISIT (OUTPATIENT)
Dept: INTERNAL MEDICINE | Facility: CLINIC | Age: 78
End: 2023-12-20
Payer: COMMERCIAL

## 2023-12-20 VITALS
HEART RATE: 84 BPM | HEIGHT: 68 IN | WEIGHT: 176.5 LBS | SYSTOLIC BLOOD PRESSURE: 132 MMHG | OXYGEN SATURATION: 95 % | BODY MASS INDEX: 26.75 KG/M2 | DIASTOLIC BLOOD PRESSURE: 82 MMHG | TEMPERATURE: 98 F

## 2023-12-20 DIAGNOSIS — Z23 NEED FOR TDAP VACCINATION: ICD-10-CM

## 2023-12-20 DIAGNOSIS — R35.1 BPH ASSOCIATED WITH NOCTURIA: ICD-10-CM

## 2023-12-20 DIAGNOSIS — Z13.6 CARDIOVASCULAR SCREENING; LDL GOAL LESS THAN 130: ICD-10-CM

## 2023-12-20 DIAGNOSIS — E16.4 ZOLLINGER-ELLISON SYNDROME: ICD-10-CM

## 2023-12-20 DIAGNOSIS — N20.0 NEPHROLITHIASIS: ICD-10-CM

## 2023-12-20 DIAGNOSIS — N40.1 BPH ASSOCIATED WITH NOCTURIA: ICD-10-CM

## 2023-12-20 DIAGNOSIS — L98.9 SCALP LESION: ICD-10-CM

## 2023-12-20 DIAGNOSIS — N28.1 RENAL CYST: Primary | ICD-10-CM

## 2023-12-20 DIAGNOSIS — Z23 NEED FOR INFLUENZA VACCINATION: ICD-10-CM

## 2023-12-20 DIAGNOSIS — I10 ESSENTIAL HYPERTENSION: Chronic | ICD-10-CM

## 2023-12-20 PROCEDURE — G0008 ADMIN INFLUENZA VIRUS VAC: HCPCS | Mod: 59 | Performed by: INTERNAL MEDICINE

## 2023-12-20 PROCEDURE — 90662 IIV NO PRSV INCREASED AG IM: CPT | Performed by: INTERNAL MEDICINE

## 2023-12-20 PROCEDURE — 99214 OFFICE O/P EST MOD 30 MIN: CPT | Mod: 25 | Performed by: INTERNAL MEDICINE

## 2023-12-20 PROCEDURE — 90472 IMMUNIZATION ADMIN EACH ADD: CPT | Performed by: INTERNAL MEDICINE

## 2023-12-20 PROCEDURE — 90715 TDAP VACCINE 7 YRS/> IM: CPT | Performed by: INTERNAL MEDICINE

## 2023-12-20 RX ORDER — PANTOPRAZOLE SODIUM 40 MG/1
40 TABLET, DELAYED RELEASE ORAL 3 TIMES DAILY
Qty: 270 TABLET | Refills: 3 | Status: ON HOLD | OUTPATIENT
Start: 2023-12-20 | End: 2024-04-11

## 2023-12-20 ASSESSMENT — PAIN SCALES - GENERAL: PAINLEVEL: MODERATE PAIN (5)

## 2023-12-20 NOTE — PROGRESS NOTES
Assessment & Plan     (N28.1) Renal cyst  (primary encounter diagnosis)  Comment: Large cysts in both kidneys, previously noticed.  They are not causing problems at this time either with drainage or kidney performance.  Reviewed the basics of renal cysts, management or interventions only indicated when they are causing some dysfunction.  I recommend he review this further with the urologist at their next appointment.  Plan: REVIEW OF HEALTH MAINTENANCE PROTOCOL ORDERS,         PRIMARY CARE FOLLOW-UP SCHEDULING            (N40.1,  R35.1) BPH associated with nocturia  Comment: This condition is currently controlled on the current medical regimen.  Continue current therapy.   Plan: REVIEW OF HEALTH MAINTENANCE PROTOCOL ORDERS,         PRIMARY CARE FOLLOW-UP SCHEDULING            (N20.0) Nephrolithiasis  Comment: Known issue that I take into account for their medical decisions, managed by specialist.    Continue current therapy as directed by their specialist(s).     Urologist recommended follow-up in spring 2024 at their last visit.  Plan: REVIEW OF HEALTH MAINTENANCE PROTOCOL ORDERS,         Lipid panel reflex to direct LDL Fasting, CBC         with platelets, Basic metabolic panel, PRIMARY         CARE FOLLOW-UP SCHEDULING            (E16.4) Zollinger-Patricio syndrome  Comment: Potentially worsening of ZE symptoms, increase pantoprazole to 40 mg 3 times daily.  If symptoms are still present, then refer to gastroenterology for reevaluation.  Plan: pantoprazole (PROTONIX) 40 MG EC tablet, REVIEW        OF HEALTH MAINTENANCE PROTOCOL ORDERS, Lipid         panel reflex to direct LDL Fasting, CBC with         platelets, Basic metabolic panel, PRIMARY CARE         FOLLOW-UP SCHEDULING            (I10) Essential hypertension  Comment: This condition is currently controlled on the current medical regimen.  Continue current therapy.   Plan: REVIEW OF HEALTH MAINTENANCE PROTOCOL ORDERS,         Lipid panel reflex to direct  "LDL Fasting, CBC         with platelets, Basic metabolic panel, PRIMARY         CARE FOLLOW-UP SCHEDULING            (Z23) Need for Tdap vaccination  Comment:   Plan: TDAP 7+ (ADACEL,BOOSTRIX), REVIEW OF HEALTH         MAINTENANCE PROTOCOL ORDERS, PRIMARY CARE         FOLLOW-UP SCHEDULING            (Z23) Need for influenza vaccination  Comment:   Plan: INFLUENZA VACCINE 65+ (FLUZONE HD), REVIEW OF         HEALTH MAINTENANCE PROTOCOL ORDERS, PRIMARY         CARE FOLLOW-UP SCHEDULING            (Z13.6) CARDIOVASCULAR SCREENING; LDL GOAL LESS THAN 130  Comment: Discussed cardiac disease risk factors and cardiac disease risk factor modification.   Plan: REVIEW OF HEALTH MAINTENANCE PROTOCOL ORDERS,         Lipid panel reflex to direct LDL Fasting, CBC         with platelets, Basic metabolic panel, PRIMARY         CARE FOLLOW-UP SCHEDULING            (L98.9) Scalp lesion  Comment: Large scalp lesion still present, hopefully benign, appears more keratotic.   Referral to dermatology for evaluation and hopeful removal, the patient promised to make the appointment this time.  Plan:                 BMI:   Estimated body mass index is 26.84 kg/m  as calculated from the following:    Height as of this encounter: 1.727 m (5' 8\").    Weight as of this encounter: 80.1 kg (176 lb 8 oz).           Charlie Lawrence MD  [facility]    Celsa Coyne is a 78 year old, presenting for the following health issues:  Kidney Problem (\"Cysts\" per patient)        12/20/2023     9:35 AM   Additional Questions   Roomed by Lacy ROME CMA     History of Present Illness       Reason for visit:  Kidneys  Symptom onset:  More than a month  Symptom intensity:  Moderate  Symptom progression:  Staying the same  Had these symptoms before:  No    He eats 0-1 servings of fruits and vegetables daily.He consumes 2 sweetened beverage(s) daily.He exercises with enough effort to increase his heart rate 10 to 19 " "minutes per day.  He exercises with enough effort to increase his heart rate 3 or less days per week.   He is taking medications regularly.    1.)  Underwent MRI scanning for his lower back ordered by the orthopedist.  The radiologist told him that he had \"renal cysts\" and should follow-up with his primary doctor.  In review of the patient's chart, patient has had multiple renal cysts in both kidneys noted on prior CT imaging.  He has been followed actively by urology regarding recurrent kidney stones.  She has had no hydronephrosis, has had normal renal function throughout.  Does not have a family history of polycystic kidney disease that he is aware of.    2.)  Hypertension:  History of hypertension, on medication.  No reported side effects from medications.    Reviewed last 6 BP readings in chart:  BP Readings from Last 6 Encounters:   12/20/23 132/82   09/12/23 128/83   05/25/23 134/85   05/18/23 124/83   04/25/23 127/75   03/01/23 118/82     No active cardiac complaints or symptoms with exercise.       3.  On finasteride and tamsulosin through the urology clinic for BPH symptoms.    4.  Large growth on the vertex of his scalp, was referred to dermatology clinic in March but has not been there yet.      5.  History of Zollinger-Patricio syndrome diagnosed 30 years ago.  Has been taking higher dose PPI since that time.  2 years ago he was changed from omeprazole 20 mg 3 times a day to pantoprazole 40 mg twice daily.  Over the past several months, patient feels that his bowel movements are becoming more erratic and looser in general, similar to when his Zollinger-Patricio syndrome was more active.  Denies acid reflux, denied hematemesis denies ulcer pain, denies melena or hematochezia.  Denies dysphagia or swallowing difficulties.  No side effects with pantoprazole.      **I reviewed the information recorded in the patient's EPIC chart (including but not limited to medical history, surgical history, family history, " "problem list, medication list, and allergy list) and updated the information as indicated based on the patients reported information.     Review of Systems   Constitutional, HEENT, cardiovascular, pulmonary, gi and gu systems are negative, except as otherwise noted.      Objective    /82   Pulse 84   Temp 98  F (36.7  C) (Tympanic)   Ht 1.727 m (5' 8\")   Wt 80.1 kg (176 lb 8 oz)   SpO2 95%   BMI 26.84 kg/m    Body mass index is 26.84 kg/m .  Physical Exam   GENERAL alert and no distress  EYES:  Normal sclera,conjunctiva, EOMI  HENT: facies symmetric  MS: extremities- no gross deformities of the visible extremities noted,   PSYCH: Alert and oriented times 3; speech- coherent  SKIN:  No obvious significant skin lesions on visible portions of face   Large 3 cm keratotic growth on the top of his scalp  NEURO:  Normal facial movements.  Appears to move normally                       "

## 2023-12-20 NOTE — PATIENT INSTRUCTIONS
" The renal cysts have been present for many years and are not causing any problems for you.       Renal cysts are very common findings and do not require any specific management unless the cysts are causing impairment in kidney function, either the drainage from the kidney or else kidney filtering, neither is the case here.      Ask the urologist about these kidney cysts the next time you meet with them.      Return to see the Urologist in Spring 2024 as the requested at your last visit in September 2023.      Increase the dose of pantoprazole 40 mg three times per day for possible better control of the Zollinger Patricio syndrome.      If you continue to have similar symptoms despite the higher dose of pantoprazole, then return to see the gastroenterology clinic.      Continue all other medications at the same doses.  Contact your usual pharmacy if you need refills.      Return to see me in a few months (April/May 2024 (after you see the urologist) for you Annual Medicare Wellness Exam, sooner if needed.  Please get fasting labs done at the [facility] or any other [facility] lab 1-2 days before this appointment (schedule a \"lab appointment\").  If you get the labs done at another clinic, make arrangements with them directly.  The orders will be in place.  Eat nothing for at least 8 hours prior to having these labs drawn.  Use [employer] or Call [phone] to schedule the appointment with me and lab appointment.         Investigate a Shingrix shingles vaccine with your pharmacist .  They can tell you the coverage and cost and then give it to you if the price is acceptable.    Medicare sometimes does not cover these Shingrix shingles vaccines, and with Medicare insurance it is usually cheaper to receive this shingles vaccine from a pharmacist in a pharmacy rather than in our clinic.    At this time, you only need the 2 Shingrix vaccines and then you are done.             RSV vaccines are now " available.  The will help provide protection against respiratory syncytial virus (RSV), a common upper respiratory virus that is known to cause severe inflammation in the airways.  This vaccine is recommended for adults over age 60, especially those with high risk conditions and/or chronic respiratory illnesses such as asthma or COPD.  This vaccine is available at most pharmacies and clinics.    If you are on Medicare insurance, get this vaccine from a pharmacist in a pharmacy for the best cost and to confirm coverage, it will be less expensive to get this there rather than from our clinic.             Shingles Vaccine (SHINGRIX):      I would recommend that you consider getting the Shingrix shingles vaccine.  The shingles vaccine is recommended for anyone over age 50.     The Shingrix vaccine is a series of 2 vaccines given 2-6 months apart.     The shingles vaccine does not stop you from getting shingles, but it decreases the intensity of the event, the duration of the event, and decreases the painful nerve condition that results     Based on the available data, the Shingrix vaccine has superior benefit and should be considered even if you have had the old Zostavax shinglesvaccine before.      Contact your insurance provider and ask them if either shingles vaccine is covered and is so, how much it will cost you.  Usually this will be cheaper to get in a pharmacy given by the pharmacist.    Regardless of the coverage, I would recommend that you consider the vaccine since shingles is very painful, (just ask anyone who has ever had it)    For Medicare insurance, the vaccine is cheaper to receive from a pharmacist in a pharmacy than for us to give you in the clinic.

## 2024-01-31 ENCOUNTER — PATIENT OUTREACH (OUTPATIENT)
Dept: CARE COORDINATION | Facility: CLINIC | Age: 79
End: 2024-01-31
Payer: COMMERCIAL

## 2024-02-14 ENCOUNTER — PATIENT OUTREACH (OUTPATIENT)
Dept: CARE COORDINATION | Facility: CLINIC | Age: 79
End: 2024-02-14
Payer: COMMERCIAL

## 2024-03-14 DIAGNOSIS — N40.1 BPH ASSOCIATED WITH NOCTURIA: ICD-10-CM

## 2024-03-14 DIAGNOSIS — R35.1 BPH ASSOCIATED WITH NOCTURIA: ICD-10-CM

## 2024-03-17 RX ORDER — TAMSULOSIN HYDROCHLORIDE 0.4 MG/1
0.4 CAPSULE ORAL EVERY EVENING
Qty: 90 CAPSULE | Refills: 3 | Status: SHIPPED | OUTPATIENT
Start: 2024-03-17

## 2024-03-19 ENCOUNTER — TELEPHONE (OUTPATIENT)
Dept: INTERNAL MEDICINE | Facility: CLINIC | Age: 79
End: 2024-03-19
Payer: COMMERCIAL

## 2024-04-11 ENCOUNTER — HOSPITAL ENCOUNTER (INPATIENT)
Facility: CLINIC | Age: 79
LOS: 2 days | Discharge: HOME | DRG: 163 | End: 2024-04-13
Attending: EMERGENCY MEDICINE | Admitting: INTERNAL MEDICINE
Payer: COMMERCIAL

## 2024-04-11 ENCOUNTER — APPOINTMENT (OUTPATIENT)
Dept: CT IMAGING | Facility: CLINIC | Age: 79
DRG: 163 | End: 2024-04-11
Attending: EMERGENCY MEDICINE
Payer: COMMERCIAL

## 2024-04-11 ENCOUNTER — APPOINTMENT (OUTPATIENT)
Dept: ULTRASOUND IMAGING | Facility: CLINIC | Age: 79
DRG: 163 | End: 2024-04-11
Attending: EMERGENCY MEDICINE
Payer: COMMERCIAL

## 2024-04-11 ENCOUNTER — APPOINTMENT (OUTPATIENT)
Dept: INTERVENTIONAL RADIOLOGY/VASCULAR | Facility: CLINIC | Age: 79
DRG: 163 | End: 2024-04-11
Attending: EMERGENCY MEDICINE
Payer: COMMERCIAL

## 2024-04-11 ENCOUNTER — APPOINTMENT (OUTPATIENT)
Dept: GENERAL RADIOLOGY | Facility: CLINIC | Age: 79
DRG: 163 | End: 2024-04-11
Attending: EMERGENCY MEDICINE
Payer: COMMERCIAL

## 2024-04-11 ENCOUNTER — APPOINTMENT (OUTPATIENT)
Dept: CARDIOLOGY | Facility: CLINIC | Age: 79
DRG: 163 | End: 2024-04-11
Attending: INTERNAL MEDICINE
Payer: COMMERCIAL

## 2024-04-11 DIAGNOSIS — R79.89 ELEVATED TROPONIN: ICD-10-CM

## 2024-04-11 DIAGNOSIS — I26.99 BILATERAL PULMONARY EMBOLISM (H): ICD-10-CM

## 2024-04-11 DIAGNOSIS — J96.01 ACUTE RESPIRATORY FAILURE WITH HYPOXIA (H): ICD-10-CM

## 2024-04-11 LAB
ACT BLD: 255 SECONDS (ref 74–150)
ALBUMIN SERPL BCG-MCNC: 3.6 G/DL (ref 3.5–5.2)
ALP SERPL-CCNC: 92 U/L (ref 40–150)
ALT SERPL W P-5'-P-CCNC: 32 U/L (ref 0–70)
ANION GAP SERPL CALCULATED.3IONS-SCNC: 15 MMOL/L (ref 7–15)
AST SERPL W P-5'-P-CCNC: 27 U/L (ref 0–45)
ATRIAL RATE - MUSE: 105 BPM
BASOPHILS # BLD AUTO: 0 10E3/UL (ref 0–0.2)
BASOPHILS NFR BLD AUTO: 0 %
BILIRUB SERPL-MCNC: 0.3 MG/DL
BUN SERPL-MCNC: 20 MG/DL (ref 8–23)
CA-I BLD-MCNC: 4.6 MG/DL (ref 4.4–5.2)
CALCIUM SERPL-MCNC: 9.3 MG/DL (ref 8.8–10.2)
CHLORIDE SERPL-SCNC: 103 MMOL/L (ref 98–107)
CREAT SERPL-MCNC: 1.01 MG/DL (ref 0.67–1.17)
DEPRECATED HCO3 PLAS-SCNC: 22 MMOL/L (ref 22–29)
DIASTOLIC BLOOD PRESSURE - MUSE: NORMAL MMHG
EGFRCR SERPLBLD CKD-EPI 2021: 76 ML/MIN/1.73M2
EOSINOPHIL # BLD AUTO: 0.1 10E3/UL (ref 0–0.7)
EOSINOPHIL NFR BLD AUTO: 1 %
ERYTHROCYTE [DISTWIDTH] IN BLOOD BY AUTOMATED COUNT: 16 % (ref 10–15)
FLUAV RNA SPEC QL NAA+PROBE: NEGATIVE
FLUBV RNA RESP QL NAA+PROBE: NEGATIVE
GLUCOSE SERPL-MCNC: 164 MG/DL (ref 70–99)
HCT VFR BLD AUTO: 38.6 % (ref 40–53)
HGB BLD-MCNC: 11.8 G/DL (ref 13.3–17.7)
HOLD SPECIMEN: NORMAL
IMM GRANULOCYTES # BLD: 0 10E3/UL
IMM GRANULOCYTES NFR BLD: 0 %
INTERPRETATION ECG - MUSE: NORMAL
LVEF ECHO: NORMAL
LYMPHOCYTES # BLD AUTO: 1.3 10E3/UL (ref 0.8–5.3)
LYMPHOCYTES NFR BLD AUTO: 11 %
MAGNESIUM SERPL-MCNC: 1.7 MG/DL (ref 1.7–2.3)
MCH RBC QN AUTO: 24 PG (ref 26.5–33)
MCHC RBC AUTO-ENTMCNC: 30.6 G/DL (ref 31.5–36.5)
MCV RBC AUTO: 79 FL (ref 78–100)
MONOCYTES # BLD AUTO: 1 10E3/UL (ref 0–1.3)
MONOCYTES NFR BLD AUTO: 8 %
NEUTROPHILS # BLD AUTO: 9.8 10E3/UL (ref 1.6–8.3)
NEUTROPHILS NFR BLD AUTO: 80 %
NRBC # BLD AUTO: 0 10E3/UL
NRBC BLD AUTO-RTO: 0 /100
P AXIS - MUSE: 50 DEGREES
PHOSPHATE SERPL-MCNC: 2.5 MG/DL (ref 2.5–4.5)
PLATELET # BLD AUTO: 293 10E3/UL (ref 150–450)
POTASSIUM SERPL-SCNC: 4.1 MMOL/L (ref 3.4–5.3)
POTASSIUM SERPL-SCNC: 4.2 MMOL/L (ref 3.4–5.3)
PR INTERVAL - MUSE: 162 MS
PROT SERPL-MCNC: 6.5 G/DL (ref 6.4–8.3)
QRS DURATION - MUSE: 84 MS
QT - MUSE: 342 MS
QTC - MUSE: 452 MS
R AXIS - MUSE: 8 DEGREES
RADIOLOGIST FLAGS: ABNORMAL
RBC # BLD AUTO: 4.92 10E6/UL (ref 4.4–5.9)
RSV RNA SPEC NAA+PROBE: NEGATIVE
SARS-COV-2 RNA RESP QL NAA+PROBE: NEGATIVE
SODIUM SERPL-SCNC: 140 MMOL/L (ref 135–145)
SYSTOLIC BLOOD PRESSURE - MUSE: NORMAL MMHG
T AXIS - MUSE: -20 DEGREES
TROPONIN T SERPL HS-MCNC: 59 NG/L
TROPONIN T SERPL HS-MCNC: 73 NG/L
UFH PPP CHRO-ACNC: 1.01 IU/ML
UFH PPP CHRO-ACNC: >1.1 IU/ML
VENTRICULAR RATE- MUSE: 105 BPM
WBC # BLD AUTO: 12.3 10E3/UL (ref 4–11)

## 2024-04-11 PROCEDURE — 94640 AIRWAY INHALATION TREATMENT: CPT

## 2024-04-11 PROCEDURE — C1769 GUIDE WIRE: HCPCS

## 2024-04-11 PROCEDURE — 93005 ELECTROCARDIOGRAM TRACING: CPT

## 2024-04-11 PROCEDURE — 76937 US GUIDE VASCULAR ACCESS: CPT

## 2024-04-11 PROCEDURE — 70450 CT HEAD/BRAIN W/O DYE: CPT

## 2024-04-11 PROCEDURE — 250N000013 HC RX MED GY IP 250 OP 250 PS 637

## 2024-04-11 PROCEDURE — 250N000011 HC RX IP 250 OP 636: Performed by: NURSE PRACTITIONER

## 2024-04-11 PROCEDURE — 99223 1ST HOSP IP/OBS HIGH 75: CPT | Mod: 25 | Performed by: INTERNAL MEDICINE

## 2024-04-11 PROCEDURE — C1760 CLOSURE DEV, VASC: HCPCS

## 2024-04-11 PROCEDURE — 250N000009 HC RX 250: Performed by: EMERGENCY MEDICINE

## 2024-04-11 PROCEDURE — 272N000571 HC SHEATH CR8

## 2024-04-11 PROCEDURE — 87637 SARSCOV2&INF A&B&RSV AMP PRB: CPT | Performed by: EMERGENCY MEDICINE

## 2024-04-11 PROCEDURE — 80053 COMPREHEN METABOLIC PANEL: CPT | Performed by: EMERGENCY MEDICINE

## 2024-04-11 PROCEDURE — 84100 ASSAY OF PHOSPHORUS: CPT

## 2024-04-11 PROCEDURE — 210N000001 HC R&B IMCU HEART CARE

## 2024-04-11 PROCEDURE — 250N000011 HC RX IP 250 OP 636: Performed by: EMERGENCY MEDICINE

## 2024-04-11 PROCEDURE — 250N000013 HC RX MED GY IP 250 OP 250 PS 637: Performed by: HOSPITALIST

## 2024-04-11 PROCEDURE — 85347 COAGULATION TIME ACTIVATED: CPT

## 2024-04-11 PROCEDURE — 71275 CT ANGIOGRAPHY CHEST: CPT

## 2024-04-11 PROCEDURE — 93306 TTE W/DOPPLER COMPLETE: CPT | Mod: 26 | Performed by: INTERNAL MEDICINE

## 2024-04-11 PROCEDURE — 250N000011 HC RX IP 250 OP 636

## 2024-04-11 PROCEDURE — 999N000157 HC STATISTIC RCP TIME EA 10 MIN

## 2024-04-11 PROCEDURE — 36015 PLACE CATHETER IN ARTERY: CPT | Mod: XS

## 2024-04-11 PROCEDURE — 85025 COMPLETE CBC W/AUTO DIFF WBC: CPT | Performed by: EMERGENCY MEDICINE

## 2024-04-11 PROCEDURE — 84132 ASSAY OF SERUM POTASSIUM: CPT

## 2024-04-11 PROCEDURE — 93970 EXTREMITY STUDY: CPT

## 2024-04-11 PROCEDURE — 272N000196 HC ACCESSORY CR5

## 2024-04-11 PROCEDURE — 258N000003 HC RX IP 258 OP 636: Performed by: EMERGENCY MEDICINE

## 2024-04-11 PROCEDURE — 250N000013 HC RX MED GY IP 250 OP 250 PS 637: Performed by: INTERNAL MEDICINE

## 2024-04-11 PROCEDURE — 37185 PRIM ART M-THRMBC SBSQ VSL: CPT

## 2024-04-11 PROCEDURE — 83735 ASSAY OF MAGNESIUM: CPT

## 2024-04-11 PROCEDURE — 99291 CRITICAL CARE FIRST HOUR: CPT | Mod: 25

## 2024-04-11 PROCEDURE — 272N000566 HC SHEATH CR3

## 2024-04-11 PROCEDURE — 71046 X-RAY EXAM CHEST 2 VIEWS: CPT

## 2024-04-11 PROCEDURE — 250N000011 HC RX IP 250 OP 636: Performed by: HOSPITALIST

## 2024-04-11 PROCEDURE — 99153 MOD SED SAME PHYS/QHP EA: CPT

## 2024-04-11 PROCEDURE — 36415 COLL VENOUS BLD VENIPUNCTURE: CPT | Performed by: EMERGENCY MEDICINE

## 2024-04-11 PROCEDURE — 36415 COLL VENOUS BLD VENIPUNCTURE: CPT

## 2024-04-11 PROCEDURE — 250N000011 HC RX IP 250 OP 636: Performed by: RADIOLOGY

## 2024-04-11 PROCEDURE — 272N000569 HC SHEATH CR6

## 2024-04-11 PROCEDURE — 99291 CRITICAL CARE FIRST HOUR: CPT

## 2024-04-11 PROCEDURE — 272N000194 HC ACCESSORY CR3

## 2024-04-11 PROCEDURE — 250N000009 HC RX 250: Performed by: NURSE PRACTITIONER

## 2024-04-11 PROCEDURE — 85520 HEPARIN ASSAY: CPT | Performed by: HOSPITALIST

## 2024-04-11 PROCEDURE — 84484 ASSAY OF TROPONIN QUANT: CPT | Mod: 91 | Performed by: EMERGENCY MEDICINE

## 2024-04-11 PROCEDURE — 255N000002 HC RX 255 OP 636: Performed by: RADIOLOGY

## 2024-04-11 PROCEDURE — 999N000208 ECHOCARDIOGRAM COMPLETE

## 2024-04-11 PROCEDURE — 255N000002 HC RX 255 OP 636: Performed by: INTERNAL MEDICINE

## 2024-04-11 PROCEDURE — 272N000116 HC CATH CR1

## 2024-04-11 PROCEDURE — 36415 COLL VENOUS BLD VENIPUNCTURE: CPT | Performed by: HOSPITALIST

## 2024-04-11 PROCEDURE — 82330 ASSAY OF CALCIUM: CPT

## 2024-04-11 PROCEDURE — 84484 ASSAY OF TROPONIN QUANT: CPT | Performed by: EMERGENCY MEDICINE

## 2024-04-11 RX ORDER — NALOXONE HYDROCHLORIDE 0.4 MG/ML
0.2 INJECTION, SOLUTION INTRAMUSCULAR; INTRAVENOUS; SUBCUTANEOUS
Status: DISCONTINUED | OUTPATIENT
Start: 2024-04-11 | End: 2024-04-11 | Stop reason: HOSPADM

## 2024-04-11 RX ORDER — NALOXONE HYDROCHLORIDE 0.4 MG/ML
0.2 INJECTION, SOLUTION INTRAMUSCULAR; INTRAVENOUS; SUBCUTANEOUS
Status: DISCONTINUED | OUTPATIENT
Start: 2024-04-11 | End: 2024-04-13 | Stop reason: HOSPADM

## 2024-04-11 RX ORDER — ACETAMINOPHEN 650 MG/1
650 SUPPOSITORY RECTAL EVERY 4 HOURS PRN
Status: DISCONTINUED | OUTPATIENT
Start: 2024-04-11 | End: 2024-04-13 | Stop reason: HOSPADM

## 2024-04-11 RX ORDER — BISACODYL 10 MG
10 SUPPOSITORY, RECTAL RECTAL DAILY PRN
Status: DISCONTINUED | OUTPATIENT
Start: 2024-04-11 | End: 2024-04-13 | Stop reason: HOSPADM

## 2024-04-11 RX ORDER — PANTOPRAZOLE SODIUM 40 MG/1
40 TABLET, DELAYED RELEASE ORAL 3 TIMES DAILY
Status: DISCONTINUED | OUTPATIENT
Start: 2024-04-11 | End: 2024-04-11

## 2024-04-11 RX ORDER — DIPHENHYDRAMINE HYDROCHLORIDE 50 MG/ML
INJECTION INTRAMUSCULAR; INTRAVENOUS
Status: COMPLETED
Start: 2024-04-11 | End: 2024-04-11

## 2024-04-11 RX ORDER — ALBUTEROL SULFATE 90 UG/1
1-2 AEROSOL, METERED RESPIRATORY (INHALATION)
Status: DISCONTINUED | OUTPATIENT
Start: 2024-04-11 | End: 2024-04-13 | Stop reason: HOSPADM

## 2024-04-11 RX ORDER — POLYETHYLENE GLYCOL 3350 17 G/17G
17 POWDER, FOR SOLUTION ORAL 2 TIMES DAILY PRN
Status: DISCONTINUED | OUTPATIENT
Start: 2024-04-11 | End: 2024-04-13 | Stop reason: HOSPADM

## 2024-04-11 RX ORDER — ALBUTEROL SULFATE 0.83 MG/ML
2.5 SOLUTION RESPIRATORY (INHALATION)
Status: DISCONTINUED | OUTPATIENT
Start: 2024-04-11 | End: 2024-04-13 | Stop reason: HOSPADM

## 2024-04-11 RX ORDER — DIPHENHYDRAMINE HYDROCHLORIDE 50 MG/ML
50 INJECTION INTRAMUSCULAR; INTRAVENOUS
Status: DISCONTINUED | OUTPATIENT
Start: 2024-04-11 | End: 2024-04-11

## 2024-04-11 RX ORDER — AMLODIPINE BESYLATE 2.5 MG/1
2.5 TABLET ORAL DAILY
Status: DISCONTINUED | OUTPATIENT
Start: 2024-04-11 | End: 2024-04-13 | Stop reason: HOSPADM

## 2024-04-11 RX ORDER — FENTANYL CITRATE 50 UG/ML
25-50 INJECTION, SOLUTION INTRAMUSCULAR; INTRAVENOUS EVERY 5 MIN PRN
Status: DISCONTINUED | OUTPATIENT
Start: 2024-04-11 | End: 2024-04-11 | Stop reason: HOSPADM

## 2024-04-11 RX ORDER — HEPARIN SODIUM 1000 [USP'U]/ML
5000 INJECTION, SOLUTION INTRAVENOUS; SUBCUTANEOUS ONCE
Status: COMPLETED | OUTPATIENT
Start: 2024-04-11 | End: 2024-04-11

## 2024-04-11 RX ORDER — HEPARIN SODIUM 200 [USP'U]/100ML
1 INJECTION, SOLUTION INTRAVENOUS EVERY 5 MIN PRN
Status: DISCONTINUED | OUTPATIENT
Start: 2024-04-11 | End: 2024-04-11 | Stop reason: HOSPADM

## 2024-04-11 RX ORDER — ONDANSETRON 2 MG/ML
INJECTION INTRAMUSCULAR; INTRAVENOUS
Status: COMPLETED
Start: 2024-04-11 | End: 2024-04-11

## 2024-04-11 RX ORDER — NALOXONE HYDROCHLORIDE 0.4 MG/ML
0.4 INJECTION, SOLUTION INTRAMUSCULAR; INTRAVENOUS; SUBCUTANEOUS
Status: DISCONTINUED | OUTPATIENT
Start: 2024-04-11 | End: 2024-04-13 | Stop reason: HOSPADM

## 2024-04-11 RX ORDER — OMEPRAZOLE 20 MG/1
20 TABLET, DELAYED RELEASE ORAL 3 TIMES DAILY
COMMUNITY
End: 2024-06-05

## 2024-04-11 RX ORDER — HYDROMORPHONE HCL IN WATER/PF 6 MG/30 ML
0.4 PATIENT CONTROLLED ANALGESIA SYRINGE INTRAVENOUS
Status: DISCONTINUED | OUTPATIENT
Start: 2024-04-11 | End: 2024-04-13 | Stop reason: HOSPADM

## 2024-04-11 RX ORDER — AMOXICILLIN 250 MG
1 CAPSULE ORAL 2 TIMES DAILY PRN
Status: DISCONTINUED | OUTPATIENT
Start: 2024-04-11 | End: 2024-04-13 | Stop reason: HOSPADM

## 2024-04-11 RX ORDER — DIPHENHYDRAMINE HCL 25 MG
25 CAPSULE ORAL EVERY 6 HOURS PRN
Status: DISCONTINUED | OUTPATIENT
Start: 2024-04-11 | End: 2024-04-13 | Stop reason: HOSPADM

## 2024-04-11 RX ORDER — BENZONATATE 100 MG/1
100 CAPSULE ORAL 3 TIMES DAILY PRN
Status: DISCONTINUED | OUTPATIENT
Start: 2024-04-11 | End: 2024-04-13 | Stop reason: HOSPADM

## 2024-04-11 RX ORDER — IOPAMIDOL 612 MG/ML
100 INJECTION, SOLUTION INTRAVASCULAR ONCE
Status: COMPLETED | OUTPATIENT
Start: 2024-04-11 | End: 2024-04-11

## 2024-04-11 RX ORDER — AMOXICILLIN 250 MG
2 CAPSULE ORAL 2 TIMES DAILY PRN
Status: DISCONTINUED | OUTPATIENT
Start: 2024-04-11 | End: 2024-04-13 | Stop reason: HOSPADM

## 2024-04-11 RX ORDER — METHYLPREDNISOLONE SODIUM SUCCINATE 125 MG/2ML
INJECTION, POWDER, LYOPHILIZED, FOR SOLUTION INTRAMUSCULAR; INTRAVENOUS
Status: COMPLETED
Start: 2024-04-11 | End: 2024-04-11

## 2024-04-11 RX ORDER — NALOXONE HYDROCHLORIDE 0.4 MG/ML
0.4 INJECTION, SOLUTION INTRAMUSCULAR; INTRAVENOUS; SUBCUTANEOUS
Status: DISCONTINUED | OUTPATIENT
Start: 2024-04-11 | End: 2024-04-11 | Stop reason: HOSPADM

## 2024-04-11 RX ORDER — GUAIFENESIN 200 MG/10ML
10 LIQUID ORAL EVERY 4 HOURS PRN
Status: DISCONTINUED | OUTPATIENT
Start: 2024-04-11 | End: 2024-04-11

## 2024-04-11 RX ORDER — CYCLOBENZAPRINE HCL 5 MG
5 TABLET ORAL 3 TIMES DAILY PRN
Status: DISCONTINUED | OUTPATIENT
Start: 2024-04-11 | End: 2024-04-13 | Stop reason: HOSPADM

## 2024-04-11 RX ORDER — CALCIUM CARBONATE 500 MG/1
1000 TABLET, CHEWABLE ORAL 4 TIMES DAILY PRN
Status: DISCONTINUED | OUTPATIENT
Start: 2024-04-11 | End: 2024-04-13 | Stop reason: HOSPADM

## 2024-04-11 RX ORDER — LIDOCAINE 40 MG/G
CREAM TOPICAL
Status: DISCONTINUED | OUTPATIENT
Start: 2024-04-11 | End: 2024-04-11

## 2024-04-11 RX ORDER — TAMSULOSIN HYDROCHLORIDE 0.4 MG/1
0.4 CAPSULE ORAL EVERY EVENING
Status: DISCONTINUED | OUTPATIENT
Start: 2024-04-11 | End: 2024-04-11

## 2024-04-11 RX ORDER — FINASTERIDE 5 MG/1
5 TABLET, FILM COATED ORAL DAILY
Status: DISCONTINUED | OUTPATIENT
Start: 2024-04-11 | End: 2024-04-13 | Stop reason: HOSPADM

## 2024-04-11 RX ORDER — TAMSULOSIN HYDROCHLORIDE 0.4 MG/1
0.4 CAPSULE ORAL EVERY EVENING
Status: DISCONTINUED | OUTPATIENT
Start: 2024-04-11 | End: 2024-04-13 | Stop reason: HOSPADM

## 2024-04-11 RX ORDER — LIDOCAINE 40 MG/G
CREAM TOPICAL
Status: DISCONTINUED | OUTPATIENT
Start: 2024-04-11 | End: 2024-04-13 | Stop reason: HOSPADM

## 2024-04-11 RX ORDER — DIPHENHYDRAMINE HYDROCHLORIDE 50 MG/ML
25 INJECTION INTRAMUSCULAR; INTRAVENOUS EVERY 6 HOURS PRN
Status: DISCONTINUED | OUTPATIENT
Start: 2024-04-11 | End: 2024-04-13 | Stop reason: HOSPADM

## 2024-04-11 RX ORDER — DIPHENHYDRAMINE HYDROCHLORIDE 50 MG/ML
50 INJECTION INTRAMUSCULAR; INTRAVENOUS ONCE
Status: COMPLETED | OUTPATIENT
Start: 2024-04-11 | End: 2024-04-11

## 2024-04-11 RX ORDER — SILDENAFIL 100 MG/1
100 TABLET, FILM COATED ORAL DAILY PRN
Status: DISCONTINUED | OUTPATIENT
Start: 2024-04-11 | End: 2024-04-11

## 2024-04-11 RX ORDER — ACETAMINOPHEN 325 MG/1
650 TABLET ORAL EVERY 4 HOURS PRN
Status: DISCONTINUED | OUTPATIENT
Start: 2024-04-11 | End: 2024-04-13 | Stop reason: HOSPADM

## 2024-04-11 RX ORDER — DOXYCYCLINE HYCLATE 20 MG
100 TABLET ORAL 2 TIMES DAILY
Status: DISCONTINUED | OUTPATIENT
Start: 2024-04-11 | End: 2024-04-11

## 2024-04-11 RX ORDER — IOPAMIDOL 755 MG/ML
64 INJECTION, SOLUTION INTRAVASCULAR ONCE
Status: COMPLETED | OUTPATIENT
Start: 2024-04-11 | End: 2024-04-11

## 2024-04-11 RX ORDER — HEPARIN SODIUM 10000 [USP'U]/100ML
0-5000 INJECTION, SOLUTION INTRAVENOUS CONTINUOUS
Status: DISCONTINUED | OUTPATIENT
Start: 2024-04-11 | End: 2024-04-12

## 2024-04-11 RX ORDER — FLUMAZENIL 0.1 MG/ML
0.2 INJECTION, SOLUTION INTRAVENOUS
Status: DISCONTINUED | OUTPATIENT
Start: 2024-04-11 | End: 2024-04-11 | Stop reason: HOSPADM

## 2024-04-11 RX ORDER — METHYLPREDNISOLONE SODIUM SUCCINATE 125 MG/2ML
125 INJECTION, POWDER, LYOPHILIZED, FOR SOLUTION INTRAMUSCULAR; INTRAVENOUS
Status: DISCONTINUED | OUTPATIENT
Start: 2024-04-11 | End: 2024-04-13 | Stop reason: HOSPADM

## 2024-04-11 RX ORDER — HYDROMORPHONE HCL IN WATER/PF 6 MG/30 ML
0.2 PATIENT CONTROLLED ANALGESIA SYRINGE INTRAVENOUS
Status: DISCONTINUED | OUTPATIENT
Start: 2024-04-11 | End: 2024-04-13 | Stop reason: HOSPADM

## 2024-04-11 RX ADMIN — IOPAMIDOL 64 ML: 755 INJECTION, SOLUTION INTRAVENOUS at 04:49

## 2024-04-11 RX ADMIN — OMEPRAZOLE 20 MG: 20 CAPSULE, DELAYED RELEASE ORAL at 20:33

## 2024-04-11 RX ADMIN — OMEPRAZOLE 20 MG: 20 CAPSULE, DELAYED RELEASE ORAL at 11:05

## 2024-04-11 RX ADMIN — FINASTERIDE 5 MG: 5 TABLET, FILM COATED ORAL at 11:06

## 2024-04-11 RX ADMIN — OMEPRAZOLE 20 MG: 20 CAPSULE, DELAYED RELEASE ORAL at 17:35

## 2024-04-11 RX ADMIN — BENZONATATE 100 MG: 100 CAPSULE ORAL at 07:07

## 2024-04-11 RX ADMIN — FENTANYL CITRATE 25 MCG: 50 INJECTION, SOLUTION INTRAMUSCULAR; INTRAVENOUS at 13:28

## 2024-04-11 RX ADMIN — METHYLPREDNISOLONE SODIUM SUCCINATE 125 MG: 125 INJECTION, POWDER, FOR SOLUTION INTRAMUSCULAR; INTRAVENOUS at 16:53

## 2024-04-11 RX ADMIN — FENTANYL CITRATE 25 MCG: 50 INJECTION, SOLUTION INTRAMUSCULAR; INTRAVENOUS at 13:40

## 2024-04-11 RX ADMIN — BENZONATATE 100 MG: 100 CAPSULE ORAL at 15:47

## 2024-04-11 RX ADMIN — FAMOTIDINE 20 MG: 10 INJECTION, SOLUTION INTRAVENOUS at 16:55

## 2024-04-11 RX ADMIN — SODIUM CHLORIDE 89 ML: 9 INJECTION, SOLUTION INTRAVENOUS at 04:49

## 2024-04-11 RX ADMIN — CYCLOBENZAPRINE HYDROCHLORIDE 5 MG: 5 TABLET, FILM COATED ORAL at 17:35

## 2024-04-11 RX ADMIN — LIDOCAINE HYDROCHLORIDE 8 ML: 10 INJECTION, SOLUTION INFILTRATION; PERINEURAL at 13:19

## 2024-04-11 RX ADMIN — DIPHENHYDRAMINE HYDROCHLORIDE 50 MG: 50 INJECTION, SOLUTION INTRAMUSCULAR; INTRAVENOUS at 13:03

## 2024-04-11 RX ADMIN — SODIUM CHLORIDE 1000 ML: 9 INJECTION, SOLUTION INTRAVENOUS at 06:16

## 2024-04-11 RX ADMIN — MIDAZOLAM 0.5 MG: 1 INJECTION INTRAMUSCULAR; INTRAVENOUS at 13:27

## 2024-04-11 RX ADMIN — ONDANSETRON 4 MG: 2 INJECTION INTRAMUSCULAR; INTRAVENOUS at 16:56

## 2024-04-11 RX ADMIN — TAMSULOSIN HYDROCHLORIDE 0.4 MG: 0.4 CAPSULE ORAL at 20:33

## 2024-04-11 RX ADMIN — HEPARIN SODIUM 5000 UNITS: 1000 INJECTION INTRAVENOUS; SUBCUTANEOUS at 13:25

## 2024-04-11 RX ADMIN — RACEPINEPHRINE HYDROCHLORIDE: 11.25 SOLUTION RESPIRATORY (INHALATION) at 16:53

## 2024-04-11 RX ADMIN — DIPHENHYDRAMINE HYDROCHLORIDE 50 MG: 50 INJECTION, SOLUTION INTRAMUSCULAR; INTRAVENOUS at 16:52

## 2024-04-11 RX ADMIN — MIDAZOLAM 1 MG: 1 INJECTION INTRAMUSCULAR; INTRAVENOUS at 13:09

## 2024-04-11 RX ADMIN — HUMAN ALBUMIN MICROSPHERES AND PERFLUTREN 9 ML: 10; .22 INJECTION, SOLUTION INTRAVENOUS at 08:59

## 2024-04-11 RX ADMIN — IOPAMIDOL 90 ML: 612 INJECTION, SOLUTION INTRAVENOUS at 14:20

## 2024-04-11 RX ADMIN — HEPARIN SODIUM 850 UNITS/HR: 10000 INJECTION, SOLUTION INTRAVENOUS at 22:38

## 2024-04-11 RX ADMIN — HEPARIN SODIUM 6 BAG: 200 INJECTION, SOLUTION INTRAVENOUS at 13:46

## 2024-04-11 RX ADMIN — MIDAZOLAM 0.5 MG: 1 INJECTION INTRAMUSCULAR; INTRAVENOUS at 13:40

## 2024-04-11 RX ADMIN — FENTANYL CITRATE 50 MCG: 50 INJECTION, SOLUTION INTRAMUSCULAR; INTRAVENOUS at 13:19

## 2024-04-11 RX ADMIN — HEPARIN SODIUM 1350 UNITS/HR: 10000 INJECTION, SOLUTION INTRAVENOUS at 06:17

## 2024-04-11 ASSESSMENT — ACTIVITIES OF DAILY LIVING (ADL)
ADLS_ACUITY_SCORE: 25
ADLS_ACUITY_SCORE: 38
ADLS_ACUITY_SCORE: 29
ADLS_ACUITY_SCORE: 38
ADLS_ACUITY_SCORE: 29
ADLS_ACUITY_SCORE: 25
ADLS_ACUITY_SCORE: 38
ADLS_ACUITY_SCORE: 25
ADLS_ACUITY_SCORE: 29
ADLS_ACUITY_SCORE: 38
ADLS_ACUITY_SCORE: 29
ADLS_ACUITY_SCORE: 38
ADLS_ACUITY_SCORE: 25
ADLS_ACUITY_SCORE: 25
ADLS_ACUITY_SCORE: 38

## 2024-04-11 NOTE — PLAN OF CARE
"Goal Outcome Evaluation:      Plan of Care Reviewed With: patient, child      Patient alert, Al1 gait belt when up, BR until 7pm s/p thrombectomy via right groin, Site wnl, pedal pulse wnl. Pressure held to site during coughing episodes. O2 at 4L post procedure, started having increased forceful coughing, stated he felt his \"throat closing or something stuck in throat\" RRT called -see note/. Meds given with relief. VSS. O2 up to 6L oxymask but now at 5L n/c so pt can eat. Tolerated diet well-soft food eaten. Heparin drip continues.            "

## 2024-04-11 NOTE — PROGRESS NOTES
Patient will transfer to [place] [address], report called to Sakshi [place]. Patient will transfer with nurse and tech on monitor with heparin drip running at 1530 see MAR.

## 2024-04-11 NOTE — ED NOTES
Bed: ED15  Expected date: 4/11/24  Expected time: 2:00 AM  Means of arrival: Ambulance  Comments:  Fabby [address] 79M increased weakness; falls

## 2024-04-11 NOTE — IR NOTE
Interventional Radiology Intra-procedural Nursing Note    Patient Name: Stanford Andersen  Medical Record Number: [id number]  Today's Date: April 11, 2024    Start Time: 1319  End of procedure time: 1425  Procedure: Consented for Bilateral pulmonary angiogram with thrombectomy, possible temporary inferior vena cava filter removal with moderate sedation   Report given to: [facility] RN  Time pt departs:  1433    Other Notes: Pt into IR suite 1 via cart. Pt awake and alert. To table in supine position. Monitoring equipment applied. VSS. Tele SR. Dr. Elder in room. Time out and procedure started. Pt tolerated procedure well. Debrief with Dr. Elder. Perclose x2  placed and pressure held until hemostasis achieved. Dressing CDI. No complications. Pt transferred back to [facility].    PA pressures  Pre: 49/18 mean 33  Post: 40/6 mean 22    Medications:    Versed 2 mg  Fentanyl 100 mcg  Lidocaine 1% 8 ml  Heparin 5,000 units    Vance Bates RN

## 2024-04-11 NOTE — CODE/RAPID RESPONSE
[facility]    RRT Note  4/11/2024   Time Called: 4:43 PM    Code Status: Full Code    I was called to evaluate Stanford Andersen, who is a 79 year old male who was admitted on 4/11/2024 for shortness of breath, found to have bilateral PEs and DVT.  Patient is status post thrombectomy this afternoon.  He arrived back to [facility] from his procedure around 1 PM.    Assessment & Plan     Hypersensitivity reaction secondary to contrast dye  Suspected aspiration event  Upon arrival patient is toxic appearing, he is actively dry heaving, and has incessant cough.  Patient endorses shortness of breath, throat swelling, and nausea.  Patient had thrombectomy earlier today.  He was premedicated with Benadryl before hand due to contrast allergy.  Patient has had had cough since admission, however he developed this coughing spell after attempting to swallow some water.  Endorses throat swelling, which he believes contributed to his coughing spell.  On exam skin is warm and dry, pallor.  Patient has slight expiratory wheezing over the trachea, but does not sound stridorous.  Oropharynx is pink, moist.  Tongue is not swollen.  Uvula is midline.      Patient developed LLE involuntary left lower extremity spastic movements. He was alert and oriented during this time. There was some concern for tonic clonic movements when found down prior to admission today. Considered seizures, however have higher suspicion that this is spasticity related to muscle spasms. These movements are in the same leg as DVT.     INTERVENTIONS:  -50 mg IV Benadryl  -20 mg IV famotidine  -125 mg solu-medrol  -Racemic epinephrine neb  -PRN flexeril ordered for suspected muscle spasms  -Second PIV placed  -Considered IM epi however patient improved after the interventions listed above    At end of evaluation patient is resting comfortably.  He reports throat swelling has improved after racemic epi neb. Patient will remain in CCU.  Defer further cares to primary hospitalist.      Enrique Vu NP  [facility]  Securely message with the [employer] Web Console (learn more here)  Text page via [employer] Paging/Directory          Physical Exam   Vital Signs with Ranges:  Temp:  [97.7  F (36.5  C)-98.6  F (37  C)] 98.6  F (37  C)  Pulse:  [] 81  Resp:  [15-28] 15  BP: (121-161)/() 141/101  SpO2:  [85 %-100 %] 92 %  I/O last 3 completed shifts:  In: -   Out: 250 [Urine:250]      Physical Exam   EXAM:   General: Ill-appearing, in acute distress, dry heaving and dyspneic.  He is alert and oriented x 3.  Skin:  Warm, dry, pallor.  Right groin site intact.  No hematoma.  HEENT:  Normocephalic, atraumatic.  Oropharynx pink, moist.  Tongue midline without swelling.  Uvula midline.  Neck:  Supple.  Trachea midline.  Chest:  Breath sounds CTA and no increased work of breathing on room air.  Cardiovascular: HR tachycardic, regular. No rub or murmur. No peripheral edema.  Abdomen:  Soft, non-tender, non-distended.  Musculoskeletal:  Moves all four extremities.   Neurological: No new focal neurological deficits.  Psychiatric: Anxious.    Data   Recent Results (from the past 24 hour(s))   XR Chest 2 Views    Narrative    EXAM: XR CHEST 2 VIEWS  LOCATION: [facility]  DATE: 4/11/2024    INDICATION: Shortness of breath  COMPARISON: 05/18/2023      Impression    IMPRESSION: Negative chest. Moderate elevation of the right hemidiaphragm is unchanged.   Head CT w/o contrast    Narrative    EXAM: CT HEAD W/O CONTRAST  LOCATION: [facility]  DATE: 4/11/2024    INDICATION: Fall, head injury.  COMPARISON: Brain MRI 09/21/2018.  TECHNIQUE: Routine CT Head without IV contrast. Multiplanar reformats. Dose reduction techniques were used.    FINDINGS:  INTRACRANIAL CONTENTS: No intracranial hemorrhage, extraaxial collection, or mass effect.  No CT evidence of acute infarct. Mild presumed chronic  small vessel ischemic changes. Mild generalized volume loss. No hydrocephalus.     VISUALIZED ORBITS/SINUSES/MASTOIDS: No intraorbital abnormality. No paranasal sinus mucosal disease. No middle ear or mastoid effusion.    BONES/SOFT TISSUES: No acute fracture. Cutaneous lesion at the frontal scalp vertex measuring up to 2.5 x 2.9 x 0.7 cm.      Impression    IMPRESSION:  1.  No acute intracranial process.  2.  Cutaneous lesion at the scalp vertex anteriorly, direct visualization recommended with tissue sampling as needed.                 CT Chest Pulmonary Embolism w Contrast   Result Value    Radiologist flags New diagnosis of pulmonary embolism (AA)    Narrative    EXAM: CT CHEST PULMONARY EMBOLISM W CONTRAST  LOCATION: [facility]  DATE: 4/11/2024    INDICATION: shortness of breath, hypoxia; Male sex; No prior imaging in the last 24 hours; Pulmonary Embolism Rule Out Criteria (PERC) score > 0; Revised Kennard Score (RGS) not >= 11; No D dimer result available; D dimer not ordered  COMPARISON: Same day 2 view chest radiograph  TECHNIQUE: CT chest pulmonary angiogram during arterial phase injection of IV contrast. Multiplanar reformats and MIP reconstructions were performed. Dose reduction techniques were used.   CONTRAST: 64mL isovue 370    FINDINGS:  ANGIOGRAM CHEST: Extensive bilateral pulmonary artery emboli involving the distal main pulmonary arteries and extending into numerous lobar, segmental, and subsegmental branches of both lungs. There is asymmetric enlargement of the right heart compatible   with a degree of right heart strain. Thoracic aorta is negative for dissection. Mild atherosclerotic calcifications of the aortic arch.    LUNGS AND PLEURA: No focal pulmonary consolidation or pleural effusion. No pneumothorax. Scattered subsegmental atelectasis.    MEDIASTINUM/AXILLAE: Prominent mediastinal lymph nodes which may be reactive.    CORONARY ARTERY CALCIFICATION: No  significant.    UPPER ABDOMEN: Hepatic cysts which require no dedicated follow-up. Partially visualized prominent left renal collecting system and small nonobstructing left renal calyceal calculi.    MUSCULOSKELETAL: Mild degenerative changes of the cervicothoracic spine. No acute osseous abnormality or suspicious bony lesion.      Impression    IMPRESSION:  1.  Extensive bilateral pulmonary artery emboli involving the distal main pulmonary arteries and extending into numerous lobar, segmental, and subsegmental branches of both lungs. There is asymmetric enlargement of the right heart compatible with a   degree of right heart strain.       [Critical Result: New diagnosis of pulmonary embolism]    Finding was identified on 4/11/2024 5:20 AM CDT.     Dr. Castillo was contacted by me on 4/11/2024 5:26 AM CDT and verbalized understanding of the critical result.    US Lower Extremity Venous Duplex Bilateral    Narrative    EXAM: US LOWER EXTREMITY VENOUS DUPLEX BILATERAL  LOCATION: [facility]  DATE: 4/11/2024    INDICATION: Left leg swelling.  COMPARISON: None.  TECHNIQUE: Venous Duplex ultrasound of bilateral lower extremities with and without compression, augmentation and duplex. Color flow and spectral Doppler with waveform analysis performed.    FINDINGS: Exam includes the common femoral, femoral, popliteal veins as well as segmentally visualized deep calf veins and greater saphenous vein.     RIGHT: No deep vein thrombosis. No superficial thrombophlebitis. No popliteal cyst.    LEFT: Nearly occlusive DVT popliteal and posterior tibial veins. No superficial thrombophlebitis. No popliteal cyst.      Impression    IMPRESSION:  1.  Nearly occlusive DVT within the left popliteal and posterior tibial veins.  2.  Right leg negative for DVT.        3.  Results of DVT given to Dr. Castillo at 0624             Echocardiogram Complete   Result Value    LVEF  55-60%    Narrative     [id number]  [id number]  [id number]  [id number]^MCFARLAND^SALEEM^MARKY     [facility]  Echocardiography Laboratory  [address] [place] [address]     Name: JOAQUIN CRUZ  MRN: [id number]  : 1945  Study Date: 2024 08:40 AM  Age: 79 yrs  Gender: Male  Patient Location: [facility]  Reason For Study: Pulmonary Emboli  Ordering Physician: SALEEM MCFARLAND  Referring Physician: Charlie Lawrence  Performed By: Catarina Ayala     BSA: 1.9 m2  Height: 67 in  Weight: 169 lb  HR: 103  BP: 159/103 mmHg  ______________________________________________________________________________  Procedure  Complete Portable Echo Adult. Optison (NDC #[id number]) given intravenously.  ______________________________________________________________________________  Interpretation Summary     Technically challenging study due to patient body habitus.     Left ventricular systolic function is normal. The visual ejection fraction is  55-60%. Flattened septum is consistent with RV pressure overload.  Right ventricle is moderately dilated. The right ventricular systolic function  is moderately reduced. Relatively preserved contractility at the base and  apex, with severe hypokinesis of the lateral free wall, this pattern of wall  motion can be seen in the setting of acute pulmonary emoblism. Clinical  correlation recommended.  Pulmonary hypertension present. The right ventricular systolic pressure is  approximated at 41mmHg plus the right atrial pressure.  Dilation of the inferior vena cava is present with abnormal respiratory  variation in diameter.     Findings communicated to RN who will interface with staff.  ______________________________________________________________________________  Left Ventricle  The left ventricle is normal in size. There is normal left ventricular wall  thickness. Left ventricular systolic function is normal. The visual ejection  fraction is 55-60%. Left ventricular diastolic function is  indeterminate.  Flattened septum is consistent with RV pressure overload.     Right Ventricle  The right ventricle is moderately dilated. The right ventricular systolic  function is moderately reduced. Relatively preserved contractility at the base  and apex, with severe hypokinesis of the lateral free wall, this pattern of  wall motion can be seen in the setting of acute pulmonary emoblism. Clinical  correlation recommended.     Atria  Normal left atrial size. Right atrial size is normal. There is no color  Doppler evidence of an atrial shunt.     Mitral Valve  There is trace mitral regurgitation.     Tricuspid Valve  There is mild (1+) tricuspid regurgitation. The right ventricular systolic  pressure is approximated at 41mmHg plus the right atrial pressure. Pulmonary  hypertension.     Aortic Valve  The aortic valve is normal in structure and function.     Pulmonic Valve  The pulmonic valve is not well seen, but is grossly normal.     Vessels  The aortic root is normal size. Normal size ascending aorta. Dilation of the  inferior vena cava is present with abnormal respiratory variation in diameter.     ______________________________________________________________________________  MMode/2D Measurements & Calculations  IVSd: 1.0 cm  LVIDd: 4.3 cm  LVIDs: 2.8 cm  LVPWd: 0.88 cm  FS: 35.4 %  LV mass(C)d: 132.8 grams  LV mass(C)dI: 70.5 grams/m2  Ao root diam: 3.9 cm  LA dimension: 2.9 cm  asc Aorta Diam: 3.5 cm  LA/Ao: 0.75  LVOT diam: 2.1 cm  LVOT area: 3.5 cm2     Ao root diam index Ht(cm/m): 2.3  Ao root diam index BSA (cm/m2): 2.1  Asc Ao diam index BSA (cm/m2): 1.8  Asc Ao diam index Ht(cm/m): 2.0  EF Biplane: 60.2 %  RV Base: 5.3 cm  RWT: 0.41  TAPSE: 1.9 cm     Doppler Measurements & Calculations  MV E max hernan: 52.7 cm/sec  MV A max hernan: 80.1 cm/sec  MV E/A: 0.66  MV dec time: 0.18 sec  Ao V2 max: 92.6 cm/sec  Ao max PG: 3.0 mmHg  Ao V2 mean: 62.5 cm/sec  Ao mean P.0 mmHg  Ao V2 VTI: 14.4 cm  DEVAUGHN(I,D): 2.8  "cm2  DEVAUGHN(V,D): 3.0 cm2  LV V1 max P.5 mmHg  LV V1 max: 78.7 cm/sec  LV V1 VTI: 11.7 cm  SV(LVOT): 40.6 ml  SI(LVOT): 21.6 ml/m2  TR max bobby: 319.0 cm/sec  TR max P.7 mmHg     AV Bobby Ratio (DI): 0.85  DEVAUGHN Index (cm2/m2): 1.5  E/E' av.5  Lateral E/e': 11.3  Medial E/e': 7.7  RV S Bobby: 11.6 cm/sec     ______________________________________________________________________________  Report approved by: Silverio Mcdonald 2024 09:26 AM             CBC with Diff:  Recent Labs   Lab Test 24   WBC 12.3*   HGB 11.8*   MCV 79         No results found for: \"RETICABSCT\"  No results found for: \"RETP\"    Comprehensive Metabolic Panel:  Recent Labs   Lab 24      POTASSIUM 4.1   CHLORIDE 103   CO2 22   ANIONGAP 15   *   BUN 20.0   CR 1.01   GFRESTIMATED 76   CLIFTON 9.3   PROTTOTAL 6.5   ALBUMIN 3.6   BILITOTAL 0.3   ALKPHOS 92   AST 27   ALT 32         Time Spent on this Encounter       CRITICAL CARE TIME  I spent 45 minutes of critical care time on the unit/floor managing the care of Stanford Andersen. Upon evaluation, this patient had a high probability of imminent or life-threatening deterioration due to hypersensitivity reaction which required my direct attention, intervention, and personal management. 100% of my time was spent at the bedside counseling the patient and/or coordinating care regarding services listed in this note.    "

## 2024-04-11 NOTE — CONSULTS
Interventional Radiology - Pre-Procedure Note:  Inpatient - [facility]  04/11/2024     Procedure Requested: bilateral PE, DVT on left leg  Requested by: Dr Rosas Hughes    Brief HPI: Stanford Andersen is a 79 year old male admitted on 4/11/2024. He presents to the emergency department with his a near syncopal episode and a subsequent syncopal episode at around 4 PM and then 10 PM 4/10/24. Found to have Bilateral pulmonary emboli, Left lower extremity DVT, and right heart strain w/ hypoxia.     A PE thrombectomy consult was requested.     Stanford was seen in his room. He is wearing an oxygen mask. Saturations are in the low to mid 90s with it on but drops to the 80 % level when off. He has a NPC, sometimes sharp when he takes a deep breath or talks to much. He feels like he can't get enough air in these circumstances and feels the oxygen helps. He has no chest pain or right ness but feels like he can't get enough air and needs to cough more. He has COPD but doesn't use home oxygen or inhalers. His left foot and ankle are mildly puffy. He is interested in the procedure.      IMAGING:  EXAM: CT CHEST PULMONARY EMBOLISM W CONTRAST  LOCATION: [facility]  DATE: 4/11/2024     INDICATION: shortness of breath, hypoxia; Male sex; No prior imaging in the last 24 hours; Pulmonary Embolism Rule Out Criteria (PERC) score > 0; Revised [place] Score (RGS) not >= 11; No D dimer result available; D dimer not ordered  COMPARISON: Same day 2 view chest radiograph  TECHNIQUE: CT chest pulmonary angiogram during arterial phase injection of IV contrast. Multiplanar reformats and MIP reconstructions were performed. Dose reduction techniques were used.   CONTRAST: 64mL isovue 370     FINDINGS:  ANGIOGRAM CHEST: Extensive bilateral pulmonary artery emboli involving the distal main pulmonary arteries and extending into numerous lobar, segmental, and subsegmental branches of both lungs. There is asymmetric  enlargement of the right heart compatible   with a degree of right heart strain. Thoracic aorta is negative for dissection. Mild atherosclerotic calcifications of the aortic arch.     LUNGS AND PLEURA: No focal pulmonary consolidation or pleural effusion. No pneumothorax. Scattered subsegmental atelectasis.     MEDIASTINUM/AXILLAE: Prominent mediastinal lymph nodes which may be reactive.     CORONARY ARTERY CALCIFICATION: No significant.     UPPER ABDOMEN: Hepatic cysts which require no dedicated follow-up. Partially visualized prominent left renal collecting system and small nonobstructing left renal calyceal calculi.     MUSCULOSKELETAL: Mild degenerative changes of the cervicothoracic spine. No acute osseous abnormality or suspicious bony lesion.                                                                 IMPRESSION:  1.  Extensive bilateral pulmonary artery emboli involving the distal main pulmonary arteries and extending into numerous lobar, segmental, and subsegmental branches of both lungs. There is asymmetric enlargement of the right heart compatible with a   degree of right heart strain.     EXAM: US LOWER EXTREMITY VENOUS DUPLEX BILATERAL  LOCATION: [facility]  DATE: 4/11/2024     INDICATION: Left leg swelling.  COMPARISON: None.  TECHNIQUE: Venous Duplex ultrasound of bilateral lower extremities with and without compression, augmentation and duplex. Color flow and spectral Doppler with waveform analysis performed.     FINDINGS: Exam includes the common femoral, femoral, popliteal veins as well as segmentally visualized deep calf veins and greater saphenous vein.      RIGHT: No deep vein thrombosis. No superficial thrombophlebitis. No popliteal cyst.     LEFT: Nearly occlusive DVT popliteal and posterior tibial veins. No superficial thrombophlebitis. No popliteal cyst.    NPO: Yes  ANTICOAGULANTS: IV Heparin    Medications:    Norvasc  Proscar  Protonix  Viagra  Flomax      Past Medical History:    Calculus of kidney  Hypertension  Prostatic hyperplasia  Zollinger-Elision syndrome  Ventral hernia without obstruction or gangrene  GERD  Arthritis     Past Surgical History:    Kidney surgery  Stomach surgery  Abdomen surgery  Laparoscopic herniorrhaphy inguinal  Excise varicocele  Lithotripsy     ALLERGIES  Allergies   Allergen Reactions    Dye [Contrast Dye] Anaphylaxis and Hives     Patient tolerated CT dye on 4/11/24 with no pre-medication. Unclear what dye patient reacted to in past ?MRI    Penicillins Anaphylaxis     Swelling, arm turned blue    Tramadol Itching    Oxycodone-Acetaminophen Rash     LABS:  ROUTINE ICU LABS (Last four results)  CMP  Recent Labs   Lab 04/11/24  0219      POTASSIUM 4.1   CHLORIDE 103   CO2 22   ANIONGAP 15   *   BUN 20.0   CR 1.01   GFRESTIMATED 76   CLIFTON 9.3   PROTTOTAL 6.5   ALBUMIN 3.6   BILITOTAL 0.3   ALKPHOS 92   AST 27   ALT 32     CBC  Recent Labs   Lab 04/11/24  0219   WBC 12.3*   RBC 4.92   HGB 11.8*   HCT 38.6*   MCV 79   MCH 24.0*   MCHC 30.6*   RDW 16.0*        EXAM:  Temp:  [97.7  F (36.5  C)] 97.7  F (36.5  C)  Pulse:  [] 104  Resp:  [15-28] 18  BP: (121-147)/() 147/95  SpO2:  [87 %-92 %] 92 %    General: Pleasant, well nourished male in no acute distress, except with coughing.    Neuro:  A&O x 4. Moves all extremities equally.  Resp:  Lungs clear to auscultation bilaterally, decreased  Cardio:  S1S2 and reg, without murmur, clicks or rubs  Vascular:+2/4 bilateral dorsalis pedis pulses  -Mild edema left foot and ankle   Skin:  Without excoriations, ecchymosis, erythema, or open sores.He does have a lesion on top of his head    Pre-Sedation Code Status Assessment:  Code Status: Full Code intra procedure, per chart review.     Reviewed chart notes, labs and imaging with IR Dr Elder who feels the patient is appropriate for the procedure.     History and Physical Reviewed: H&P documented within 30 days.  I  have personally reviewed the patient's medical history and have updated the medical record as necessary.    ASSESSMENT/PLAN: Stanford Andersen is a 79 year old male admitted on 4/11/2024. He presents to the emergency department with his a near syncopal episode and a subsequent syncopal episode at around 4 PM and then 10 PM 4/10/24. Found to have Bilateral pulmonary emboli, Left lower extremity DVT, and right heart strain w/ hypoxia.     A PE thrombectomy consult was requested which has been approved and will be done today.     -Keep NPO   -On call to IR on cart  -Will follow up with patient tomorrow     Procedural education reviewed with patient in detail including, but not limited to risks, benefits and alternatives with understanding verbalized by him.    Total time spent on the date of the encounter: 45 minutes.    Thanks [facility] Interventional Radiology CNP ([phone]) (phone [phone])

## 2024-04-11 NOTE — PROCEDURES
[facility]    Procedure: Pulmonary angiogram.    Date/Time: 4/11/2024 3:47 PM    Performed by: Jennifer Elder DO  Authorized by: Jennifer Elder DO      UNIVERSAL PROTOCOL   Site Marked: Yes  Prior Images Obtained and Reviewed:  Yes  Required items: Required blood products, implants, devices and special equipment available    Patient identity confirmed:  Verbally with patient, arm band, provided demographic data and hospital-assigned identification number  Patient was reevaluated immediately before administering moderate or deep sedation or anesthesia  Confirmation Checklist:  Patient's identity using two indicators, relevant allergies, procedure was appropriate and matched the consent or emergent situation and correct equipment/implants were available  Time out: Immediately prior to the procedure a time out was called    Universal Protocol: the Joint Commission Universal Protocol was followed    Preparation: Patient was prepped and draped in usual sterile fashion       ANESTHESIA    Anesthesia:  Local infiltration  Local Anesthetic:  Lidocaine 1% without epinephrine      SEDATION  Patient Sedated: Yes    Sedation Type:  Moderate (conscious) sedation  Vital signs: Vital signs monitored during sedation    See dictated procedure note for full details.  Findings: Pulmonary angiogram with mechanical thrombectomy with near resolution of bilateral PE.     PA pressures  PRE: 49/18 (33) mmHg  POST: 40/6 (22) mmHg    Specimens: none    Complications: None    Condition: Stable    Plan: Bedrest with the right leg straight for 4 hours.     Follow up in IR clinic in 1-2 months with Dr. Elder. IR will schedule this, no need for additional orders.       PROCEDURE    Patient Tolerance:  Patient tolerated the procedure well with no immediate complications  Length of time physician/provider present for 1:1 monitoring during sedation: 60

## 2024-04-11 NOTE — H&P
"[facility]    History and Physical - Hospitalist Service       Date of Admission:  4/11/2024    Assessment & Plan      Stanford Andersen is a 79 year old male admitted on 4/11/2024. He presents to the emergency department with his a near syncopal episode and a subsequent syncopal episode at around 4 PM and then 10 PM 4/10/24. Found to have Bilateral pulmonary emboli, Left lower extremity DVT, and right heart strain w/ hypoxia.     Acute hypoxic respiratory failure: secondary to pulmonary emboli as below.  Oxygen saturations in the mid 80s on room air at rest.  Acute bilateral pulmonary emboli, left lower extremity DVT: appears unprovoked.  Has had left lower extremity swelling and \"cold\" sensation for the past 1 to 2 weeks by his estimation.  No recent travel, immobilization or injury.  -High intensity heparin infusion  -Pharmacy liaison consultation for DOAC versus warfarin coverage  -Oximetry, oxygen as needed.  Patient may require home oxygen at discharge pending improvement in hypoxia following embolectomy.  -As needed albuterol nebulizer treatment; rattling nonproductive cough  -Tessalon 3 times daily as needed for cough  -Interventional radiology consulted for consideration of embolectomy.  Aware of patient from emergency department.  Remains n.p.o. 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  Associated issues with recurrent nephrolithiasis, last lithotripsy in May 2023.  -Omeprazole 20 mg 3 times daily; he tells me that he has been trialed on Protonix in the past, but this caused him issues with watery stools     Hypertension:  -Await pharmacy reconciliation prior to admission medications, resume amlodipine pending clinical course (PE w/ heart strain)    Keratotic scalp lesion:  -Note outpatient referral to dermatology.  Patient is aware he is due for follow-up    BPH:  -Resume " "prior to admission medications and reconciled by pharmacy.          Diet: NPO for Medical/Clinical Reasons Except for: No Exceptions    DVT Prophylaxis: heparin infusion  Fish Catheter: Not present  Lines: None     Cardiac Monitoring: None  Code Status:  Full code.  Confirmed with patient on admission.  He has plans to downsize his home and move to [place] in order to set his son up with a job.  Son has spina bifida, and patient is concerned that he will have a hard time managing in the [place]ott in [place] when he (the patient) has passed.    Clinically Significant Risk Factors Present on Admission                  # Hypertension: Noted on problem list      # Overweight: Estimated body mass index is 25.84 kg/m  as calculated from the following:    Height as of this encounter: 1.702 m (5' 7\").    Weight as of this encounter: 74.8 kg (165 lb).              Disposition Plan     Medically Ready for Discharge: Anticipated in 2-4 Days potentially 4/12 versus 4/13/2490           Duane Hughes MD  Hospitalist Service  [facility]  Securely message with i[employer] (more info)  Text page via [employer] Paging/Directory     ______________________________________________________________________    Chief Complaint   Shortness of breath, syncope    History is obtained from the patient, chart review, discussion with Dr. Castillo in the emergency department.    History of Present Illness   Stanford Andersen is a 79 year old male who presents to the emergency department after an episode of near syncope and subsequently an episode of syncope witnessed by family this evening.  He is found to have hypoxia on room air as well as bilateral pulmonary emboli and a left lower extremity DVT.    Patient was aware that his left lower extremity felt cooler and he now notes that it is more swollen than his right.  Believes he has had some symptoms of his left leg for the past 2 weeks or so.  Over the past 2 days he has " experienced increased shortness of breath as well as dyspnea on exertion which he attributed to anxiety. 4/10/24 at approximately 4 PM, he tells me that he was getting up to take a bath, and while he was standing he felt lightheaded, developed tunnel vision, shortness of breath, and felt as though he was going to pass out.  He laid down, and his symptoms improved/resolved.  Tells me that he took a nap and felt better.  Around 10 PM, when he got up again to take a bath he had some recurrence of his symptoms.  He vaguely remembers making his way to the kitchen and was leaning over a counter when he called to a family member to let him know that his symptoms were recurring.  His son subsequently watched him fall to the ground and have some tonic-clonic jerking.  EMS arrived and patient recalls feeling lightheaded and nauseous when they sat him up.    Patient was brought to the emergency department where DVT and pulmonary emboli were diagnosed.  His blood pressure has been sustained in the 130s range, heart rate has improved from the 130's now to the 110 range.  He does have a intermittent cough which is nonproductive, present for the past 2 days or so.  Comes in jags and has increased shortness of breath with this.  Even at rest without coughing his oxygen saturation is in the mid 80s range on room air, he feels much improved on nasal cannula oxygen.    No fevers, no hemoptysis, no sputum production.  Patient has been eating and drinking normally.  He has not had any significant change up or down in his weight.    Patient is on omeprazole 20 mg 3 times daily for Zollinger-Patricio syndrome.  He tells me that he tried Protonix as prescribed by his primary care provider, but this resulted in halitosis and GI upset for him, so he switched back to over-the-counter omeprazole.     He does not smoke, has never had a pulmonary embolism in the past or clot in the past that he is aware of.      Past Medical History    Past  Medical History:   Diagnosis Date    Arthritis     hand    Chronic back pain     Complication of anesthesia     URINARY RETENTION, Sleepy after surgery    Gastro-oesophageal reflux disease     Hypertension     Renal disease     kidney stone    Ventral hernia without obstruction or gangrene 10/08/2018    Zollinger-Patricio syndrome 1995       Past Surgical History   Past Surgical History:   Procedure Laterality Date    ABDOMEN SURGERY  2003    Exploratory laparoscopy    COMBINED CYSTOSCOPY, INSERT STENT URETER(S) Left 8/31/2021    Procedure: Cystoscopy with left ureteral stent placement;  Surgeon: Devan Bergeron MD;  Location: [facility] OR    COMBINED CYSTOSCOPY, RETROGRADES, URETEROSCOPY, LASER HOLMIUM LITHOTRIPSY URETER(S), INSERT STENT Right 5/23/2019    Procedure: Video cystoscopy, exam under anesthesia, right ureteroscopy with holmium laser lithotripsy and stone extractions, right double-J stent (5-Turkmen x 24 cm).;  Surgeon: Satinder Almanza MD;  Location: [facility] OR    COMBINED CYSTOSCOPY, RETROGRADES, URETEROSCOPY, LASER HOLMIUM LITHOTRIPSY URETER(S), INSERT STENT Left 9/30/2021    Procedure: Cystoscopy, left ureteral stent exchange, left ureteroscopy with holmium laser lithotripsy and stone basketing. left retrogrades;  Surgeon: Devan Bergeron MD;  Location: [facility] OR    EXTRACORPOREAL SHOCK WAVE LITHOTRIPSY (ESWL) Left 8/31/2021    Procedure: Bilateral extracorporeal shockwave lithotripsy;  Surgeon: Devan Bergeron MD;  Location:  OR    EXTRACORPOREAL SHOCK WAVE LITHOTRIPSY (ESWL) Bilateral 5/25/2023    Procedure: BILATERAL EXTRACORPOREAL SHOCK WAVE LITHOTRIPSY;  Surgeon: Devan Bergeron MD;  Location:  OR    HC EXCISE VARICOCELE  age 21     varicocele repair    HERNIORRHAPHY UMBILICAL N/A 11/30/2018    Procedure: OPEN UMBILLICAL HERNIA REPAIR ;  Surgeon: Ike Catalan MD;  Location:  OR    KIDNEY SURGERY      lithotripsy x 3    LAPAROSCOPIC HERNIORRHAPHY INGUINAL Left  9/26/2016    Procedure: LAPAROSCOPIC HERNIORRHAPHY INGUINAL;  Surgeon: Ike Catalan MD;  [facility]: [facility]    LITHOTRIPSY      three times    STOMACH SURGERY  2003    exploratory scoping looking for tumors related to ZE syndrome       Prior to Admission Medications   Prior to Admission Medications   Prescriptions Last Dose Informant Patient Reported? Taking?   amLODIPine (NORVASC) 2.5 MG tablet   No No   Sig: Take 1 tablet (2.5 mg) by mouth daily   doxycycline hyclate (VIBRA-TABS) 100 MG tablet   No No   Sig: Take 1 tablet (100 mg) by mouth 2 times daily Do not take within 2 hours of antacids or calcium.   finasteride (PROSCAR) 5 MG tablet   No No   Sig: Take 1 tablet (5 mg) by mouth daily   guaiFENesin (ROBITUSSIN) 20 mg/mL liquid   No No   Sig: Take 10 mLs by mouth every 4 hours as needed for cough   Patient not taking: Reported on 12/20/2023   pantoprazole (PROTONIX) 40 MG EC tablet   No No   Sig: Take 1 tablet (40 mg) by mouth 3 times daily   sildenafil (VIAGRA) 100 MG tablet   No No   Sig: Take 1 tablet (100 mg) by mouth daily as needed (erectile dysfunction)   tamsulosin (FLOMAX) 0.4 MG capsule   No No   Sig: Take 1 capsule (0.4 mg) by mouth daily   tamsulosin (FLOMAX) 0.4 MG capsule   No No   Sig: TAKE 1 CAPSULE BY MOUTH EVERY EVENING      Facility-Administered Medications: None           Physical Exam   Vital Signs: Temp: 97.7  F (36.5  C) Temp src: Oral BP: (!) 139/93 Pulse: 98   Resp: 18 SpO2: 91 % O2 Device: Nasal cannula Oxygen Delivery: 2 LPM  Weight: 165 lbs 0 oz    General Appearance: Generally comfortable appearing 79-year-old male resting in bed.  He does not appear toxic.  Does have tachypnea  Eyes: No scleral icterus or injection  HEENT: Large keratotic scalp lesion superior to forehead measuring approximately 2-1/2 inches diameter, three-quarter inch height  Respiratory: Breath sounds are clear bilaterally to auscultation.  Mild tachypnea.  Able to speak in full sentences.  Does have some  intermittent coughing jags which are somewhat reminiscent of bronchitis.  Dry cough.  Not rattling.  Cardiovascular: Tachycardia in the 110 range.  No appreciable murmur  GI: Abdomen soft, nontender to palpation.  No palpable mass.  Lymph/Hematologic: Left foot with pitting edema, asymmetric as compared to right.  Skin: Cherry angiomas scattered across chest as well as scattered seborrheic keratoses  Neurologic: Alert, conversant, appropriate in conversation.  Mental status grossly intact.  Psychiatric: Very pleasant, normal affect    Medical Decision Making       75 MINUTES SPENT BY ME on the date of service doing chart review, history, exam, documentation & further activities per the note.      Data     I have personally reviewed the following data over the past 24 hrs:    12.3 (H)  \   11.8 (L)   / 293     140 103 20.0 /  164 (H)   4.1 22 1.01 \     ALT: 32 AST: 27 AP: 92 TBILI: 0.3   ALB: 3.6 TOT PROTEIN: 6.5 LIPASE: N/A     Trop: 73 (H) BNP: N/A       Imaging results reviewed over the past 24 hrs:   Recent Results (from the past 24 hour(s))   XR Chest 2 Views    Narrative    EXAM: XR CHEST 2 VIEWS  LOCATION: [facility]  DATE: 4/11/2024    INDICATION: Shortness of breath  COMPARISON: 05/18/2023      Impression    IMPRESSION: Negative chest. Moderate elevation of the right hemidiaphragm is unchanged.   Head CT w/o contrast    Narrative    EXAM: CT HEAD W/O CONTRAST  LOCATION: [facility]  DATE: 4/11/2024    INDICATION: Fall, head injury.  COMPARISON: Brain MRI 09/21/2018.  TECHNIQUE: Routine CT Head without IV contrast. Multiplanar reformats. Dose reduction techniques were used.    FINDINGS:  INTRACRANIAL CONTENTS: No intracranial hemorrhage, extraaxial collection, or mass effect.  No CT evidence of acute infarct. Mild presumed chronic small vessel ischemic changes. Mild generalized volume loss. No hydrocephalus.     VISUALIZED ORBITS/SINUSES/MASTOIDS: No  intraorbital abnormality. No paranasal sinus mucosal disease. No middle ear or mastoid effusion.    BONES/SOFT TISSUES: No acute fracture. Cutaneous lesion at the frontal scalp vertex measuring up to 2.5 x 2.9 x 0.7 cm.      Impression    IMPRESSION:  1.  No acute intracranial process.  2.  Cutaneous lesion at the scalp vertex anteriorly, direct visualization recommended with tissue sampling as needed.                 CT Chest Pulmonary Embolism w Contrast   Result Value    Radiologist flags New diagnosis of pulmonary embolism (AA)    Narrative    EXAM: CT CHEST PULMONARY EMBOLISM W CONTRAST  LOCATION: [facility]  DATE: 4/11/2024    INDICATION: shortness of breath, hypoxia; Male sex; No prior imaging in the last 24 hours; Pulmonary Embolism Rule Out Criteria (PERC) score > 0; Revised [place] Score (RGS) not >= 11; No D dimer result available; D dimer not ordered  COMPARISON: Same day 2 view chest radiograph  TECHNIQUE: CT chest pulmonary angiogram during arterial phase injection of IV contrast. Multiplanar reformats and MIP reconstructions were performed. Dose reduction techniques were used.   CONTRAST: 64mL isovue 370    FINDINGS:  ANGIOGRAM CHEST: Extensive bilateral pulmonary artery emboli involving the distal main pulmonary arteries and extending into numerous lobar, segmental, and subsegmental branches of both lungs. There is asymmetric enlargement of the right heart compatible   with a degree of right heart strain. Thoracic aorta is negative for dissection. Mild atherosclerotic calcifications of the aortic arch.    LUNGS AND PLEURA: No focal pulmonary consolidation or pleural effusion. No pneumothorax. Scattered subsegmental atelectasis.    MEDIASTINUM/AXILLAE: Prominent mediastinal lymph nodes which may be reactive.    CORONARY ARTERY CALCIFICATION: No significant.    UPPER ABDOMEN: Hepatic cysts which require no dedicated follow-up. Partially visualized prominent left renal  collecting system and small nonobstructing left renal calyceal calculi.    MUSCULOSKELETAL: Mild degenerative changes of the cervicothoracic spine. No acute osseous abnormality or suspicious bony lesion.      Impression    IMPRESSION:  1.  Extensive bilateral pulmonary artery emboli involving the distal main pulmonary arteries and extending into numerous lobar, segmental, and subsegmental branches of both lungs. There is asymmetric enlargement of the right heart compatible with a   degree of right heart strain.       [Critical Result: New diagnosis of pulmonary embolism]    Finding was identified on 4/11/2024 5:20 AM CDT.     Dr. Castillo was contacted by me on 4/11/2024 5:26 AM CDT and verbalized understanding of the critical result.

## 2024-04-11 NOTE — ED PROVIDER NOTES
History     Chief Complaint:  Fall and Shortness of Breath       HPI   Stanford Andersen is a 79 year old male who presents via EMS for evaluation of fall and shortness of breath. The patient states around 1400 yesterday he was walking towards the bathroom but couldn't make it due to onset of shortness of breath. He then lied down for a short amount of time which seemed to relieve his shortness of breath. The patient notes that he lives with his son. Later when his son got home from work around 2200, the patient and him watched a movie. He was standing up and felt something come over him, then next thing he knew he was on the ground. He woke up in the kitchen with EMS surrounding him. He reports his son had called EMS. Upon EMS arrival to the patient's home, they gave the patient oxygen which he reports relieving his shortness of breath again. He states when he was helped up from the ground he experienced some dizziness. During evaluation the patient adds that he has been experiencing shortness of breath for few days and cough and that his left leg has swollen. Denies vomiting, diarrhea, chest pain, sore throat, rhinorrhea, and abdominal pain, denies taking blood thinners and history of smoking. Of note the patient had childhood asthma.    Independent Historian:   None - Patient Only    Review of External Notes:   Reviewed prior imaging, has a history of Zollinger-Patricio.  It appears that he has received CT IV contrast with no reaction    Medications:    Norvasc  Proscar  Protonix  Viagra  Flomax    Past Medical History:    Calculus of kidney  Hypertension  Prostatic hyperplasia  Zollinger-Elision syndrome  Ventral hernia without obstruction or gangrene  GERD  Arthritis    Past Surgical History:    Kidney surgery  Stomach surgery  Abdomen surgery  Laparoscopic herniorrhaphy inguinal  Excise varicocele  Lithotripsy     Physical Exam   Patient Vitals for the past 24 hrs:   BP Temp Temp src Pulse Resp SpO2 Height  "Weight   04/11/24 0622 (!) 147/95 -- -- 107 -- 91 % -- --   04/11/24 0508 -- -- -- 98 18 91 % -- --   04/11/24 0441 -- -- -- -- -- 91 % -- --   04/11/24 0428 (!) 139/93 -- -- 104 15 91 % -- --   04/11/24 0413 -- -- -- -- -- (!) 87 % -- --   04/11/24 0355 (!) 141/92 -- -- 97 22 91 % -- --   04/11/24 0341 -- -- -- 98 25 91 % -- --   04/11/24 0335 -- -- -- -- -- 91 % -- --   04/11/24 0326 (!) 132/96 -- -- -- -- -- -- --   04/11/24 0309 -- -- -- -- -- 92 % -- --   04/11/24 0254 121/80 -- -- 100 21 -- -- --   04/11/24 0239 -- -- -- 101 23 90 % -- --   04/11/24 0224 (!) 133/93 -- -- 105 28 91 % -- --   04/11/24 0217 (!) 129/100 97.7  F (36.5  C) Oral 105 20 91 % 1.702 m (5' 7\") 74.8 kg (165 lb)        Physical Exam    General: Sitting up in bed  Eyes:  The pupils are equal and round    Conjunctivae and sclerae are normal  ENT:    Atraumatic face  Neck:  Normal range of motion  CV:  Tachycardic rate, regular rhythm    Skin warm and well perfused     DP/PT pulses 2+ bilaterally  Resp:  Non labored breathing on room air    No tachypnea    No cough heard    Lungs clear bilaterally    On oxygen via nasal cannula  GI:  Abdomen is soft, there is no rigidity    No distension    No rebound tenderness     No abdominal tenderness  MS:  Left leg swelling  Skin:  No rash or acute skin lesions noted  Neuro:   Awake, alert.      Speech is normal and fluent.    Face is symmetric.     Moves all extremities equally  Psych: Normal affect.  Appropriate interactions.    Emergency Department Course   ECG  ECG taken at 0210, ECG read at 0216  Sinus tachycardia  Nonspecific T wave abnormality   Rate 105 bpm. ME interval 162 ms. QRS duration 884 ms. QT/QTc 342/452 ms. P-R-T axes 50 8 -20.     Imaging:  US Lower Extremity Venous Duplex Bilateral   Final Result   IMPRESSION:   1.  Nearly occlusive DVT within the left popliteal and posterior tibial veins.   2.  Right leg negative for DVT.          3.  Results of DVT given to Dr. Castillo at 0624       "               CT Chest Pulmonary Embolism w Contrast   Final Result   Abnormal   IMPRESSION:   1.  Extensive bilateral pulmonary artery emboli involving the distal main pulmonary arteries and extending into numerous lobar, segmental, and subsegmental branches of both lungs. There is asymmetric enlargement of the right heart compatible with a    degree of right heart strain.          [Critical Result: New diagnosis of pulmonary embolism]      Finding was identified on 4/11/2024 5:20 AM CDT.       Dr. Castillo was contacted by me on 4/11/2024 5:26 AM CDT and verbalized understanding of the critical result.       Head CT w/o contrast   Final Result   IMPRESSION:   1.  No acute intracranial process.   2.  Cutaneous lesion at the scalp vertex anteriorly, direct visualization recommended with tissue sampling as needed.                         XR Chest 2 Views   Final Result   IMPRESSION: Negative chest. Moderate elevation of the right hemidiaphragm is unchanged.         Laboratory:  Labs Ordered and Resulted from Time of ED Arrival to Time of ED Departure   COMPREHENSIVE METABOLIC PANEL - Abnormal       Result Value    Sodium 140      Potassium 4.1      Carbon Dioxide (CO2) 22      Anion Gap 15      Urea Nitrogen 20.0      Creatinine 1.01      GFR Estimate 76      Calcium 9.3      Chloride 103      Glucose 164 (*)     Alkaline Phosphatase 92      AST 27      ALT 32      Protein Total 6.5      Albumin 3.6      Bilirubin Total 0.3     CBC WITH PLATELETS AND DIFFERENTIAL - Abnormal    WBC Count 12.3 (*)     RBC Count 4.92      Hemoglobin 11.8 (*)     Hematocrit 38.6 (*)     MCV 79      MCH 24.0 (*)     MCHC 30.6 (*)     RDW 16.0 (*)     Platelet Count 293      % Neutrophils 80      % Lymphocytes 11      % Monocytes 8      % Eosinophils 1      % Basophils 0      % Immature Granulocytes 0      NRBCs per 100 WBC 0      Absolute Neutrophils 9.8 (*)     Absolute Lymphocytes 1.3      Absolute Monocytes 1.0      Absolute Eosinophils  0.1      Absolute Basophils 0.0      Absolute Immature Granulocytes 0.0      Absolute NRBCs 0.0     TROPONIN T, HIGH SENSITIVITY - Abnormal    Troponin T, High Sensitivity 59 (*)    TROPONIN T, HIGH SENSITIVITY - Abnormal    Troponin T, High Sensitivity 73 (*)    INFLUENZA A/B, RSV, & SARS-COV2 PCR - Normal    Influenza A PCR Negative      Influenza B PCR Negative      RSV PCR Negative      SARS CoV2 PCR Negative         Emergency Department Course & Assessments:    Interventions:  Medications   heparin infusion 25,000 units in D5W 250 mL ANTICOAGULANT (1,350 Units/hr Intravenous $New Bag 4/11/24 0617)   sodium chloride 0.9% BOLUS 1,000 mL (1,000 mLs Intravenous $New Bag 4/11/24 0616)   iopamidol (ISOVUE-370) solution 64 mL (64 mLs Intravenous $Given 4/11/24 0449)   Saline Flush (89 mLs Intravenous $Given 4/11/24 0449)   heparin ANTICOAGULANT Loading dose for HIGH INTENSITY TREATMENT * Give BEFORE starting heparin infusion (6,000 Units Intravenous $Given 4/11/24 0617)      Assessments:  0409 I obtained history and performed physical exam as noted above.   0534 Patient updated  0625  Patient updated    Independent Interpretation (X-rays, CTs, rhythm strip):  I independently reviewed chest xray - no pneumonia seen    Consultations/Discussion of Management or Tests:  0525 I spoke with [place] radiology regarding patient's pulmonary embolism.   0610 Discussed patient with Dr. Hughes  0630 I spoke with a Dr. Elder from        Social Determinants of Health affecting care:   None    Disposition:  The patient was admitted to the hospital under the care of Dr. Hughes.     Impression & Plan        MIPS (If applicable):  CT for PE was ordered because the patient is high risk for pulmonary embolism.     Medical Decision Making:  Stanford Andersen is a 79-year-old male presented to the emergency department with shortness of breath.  Patient is mildly hypoxic.  Sounds like he had at least 1 syncopal episode at home.   Hemodynamically stable here in the emergency department.  I was concern for PE and DVT given his left leg swelling as well as complaint of shortness of breath over the last few days.  CT chest done that shows bilateral PE with right heart strain.  Left leg ultrasound does show DVT.  Patient was started on heparin.  Patient did have a fall and so I did obtain CT head to make sure that heparin was safe to start.  No acute findings.  Patient has had no recent bleeding and discussed the risk of heparin with the patient.  I discussed patient with hospitalist for admission.  I discussed patient with IR for possible procedure and recommended keeping patient n.p.o. for likely procedure    Critical Care time was 30 minutes for this patient excluding procedures.    Diagnosis:    ICD-10-CM    1. Bilateral pulmonary embolism (H)  I26.99       2. Acute respiratory failure with hypoxia (H)  J96.01       3. Elevated troponin  R79.89          Scribe Disclosure:  Seth ASKEW, am serving as a scribe at 4:52 AM on 4/11/2024 to document services personally performed by Symone Castillo MD based on my observations and the provider's statements to me.     4/11/2024   Symone Castillo MD Goertz, Maria Kristine, MD  04/11/24 0659

## 2024-04-11 NOTE — ED TRIAGE NOTES
EMS arrival:    Called today as he was walking to bathroom to take a bath.  Fell.  Tonic clonic jerking.  Loss of control of bladder.  No seizure hx.  Momentarily change of consciousness.  Recalls events prior & after.  Tried walking after.  Nausea, dizzy, difficulty breathing.  Anxious.  SOB seems to be related to anxiety.   Coughing.  Felt like going to vomit.  Able to walk.  No pain.    2-3 Lt nasal cannula.  Felt fine today.           General

## 2024-04-11 NOTE — PLAN OF CARE
Goal Outcome Evaluation:         RECEIVING UNIT ED HANDOFF REVIEW    ED Nurse Handoff Report was reviewed by: Sakshi Dc RN on April 11, 2024 at 7:44 AM

## 2024-04-11 NOTE — PROVIDER NOTIFICATION
Reported to IR staff that pt has contrast dye allergy. Tolerated CT dye this am. Heparin drip at 1200 on hold for 1 hour due elevated Hep x A.

## 2024-04-11 NOTE — PROGRESS NOTES
Patient was seen at bedside this morning.  Currently on oxy mask.  Was admitted earlier in the day.  Discussed with interventional radiology today.  Plan on proceeding with thrombectomy later today.  Continue on heparin drip in the meantime.  Denies significant chest discomfort shortness of breath.  Denies any triggering etiology for his PE including recent travel, family history of PE, smoking, recent weight loss or findings suggestive of malignancy.  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  Might benefit from CT chest abdomen pelvis as an outpatient.  Will keep him n.p.o. until IR completes thrombectomy.

## 2024-04-11 NOTE — CONSULTS
Patient has Medicare Advantage through [facility].    Xarelto/Eliquis  --$35/mo ($70 for 3 mo at [facility])  --lf/when total drug costs exceed $5,030, price will increase to a 25% coinsurance, equivalent to $146/mo.    Mayotoven (warfarin): $0/mo.    Nicole Cole  Pharmacy Technician/Liaison, Discharge Pharmacy   [phone] (voice or text)  [email or web address]  Available on Vocera and Teams

## 2024-04-11 NOTE — ED NOTES
Pt reports he feels as though he can breathe much better with the oxygen on. He states he felt as though he couldn't take in a deep breath, but now feels as though he can breathe normally with oxygen on. He denies COPD, does not wear oxygen at home.     Pt denies any dizziness, HA, or neck pain, however when asked, he is unsure if he hit his head when he lost consciousness at home.

## 2024-04-11 NOTE — ED NOTES
[facility]    ED Nurse Handoff Report    ED Chief complaint: Fall and Shortness of Breath      ED Diagnosis:   Final diagnoses:   None       Code Status: Full Code    Allergies:   Allergies   Allergen Reactions    Dye [Contrast Dye] Anaphylaxis and Hives     Patient tolerated CT dye on 4/11/24 with no pre-medication. Unclear what dye patient reacted to in past ?MRI    Penicillins Anaphylaxis     Swelling, arm turned blue    Tramadol Itching    Oxycodone-Acetaminophen Rash       Patient Story:  Pt comes to ER after son witnessed pt have syncopal episode and possible tonic/clonic movements. Per EMS report, the pt had lost control of his bladder, unsure if he had hit his head or not. Pt states he had been feeling dizzy leading up to his syncopal episode, and dyspnea. On arrival, pt complained of SOB, coughing, dizziness and anxiousness. Pt also required 2-3lpm oxygen via nasal canula to maintain O2 above 90%, pt does not wear oxygen at home.     Focused Assessment:    Pt is A&Ox4, normally independent, have not road tested while in ER. Pt denies dizziness at this time, also states with the oxygen in place, denies dyspnea. Pt trialed off oxygen and de-sated to 86%. Pt denies COPD/CHF history. CT shows extensive bilateral pulmonary emboli. Pt without chest pain, increased repeat troponin levels.     Labs Ordered and Resulted from Time of ED Arrival to Time of ED Departure   COMPREHENSIVE METABOLIC PANEL - Abnormal       Result Value    Sodium 140      Potassium 4.1      Carbon Dioxide (CO2) 22      Anion Gap 15      Urea Nitrogen 20.0      Creatinine 1.01      GFR Estimate 76      Calcium 9.3      Chloride 103      Glucose 164 (*)     Alkaline Phosphatase 92      AST 27      ALT 32      Protein Total 6.5      Albumin 3.6      Bilirubin Total 0.3     CBC WITH PLATELETS AND DIFFERENTIAL - Abnormal    WBC Count 12.3 (*)     RBC Count 4.92      Hemoglobin 11.8 (*)     Hematocrit 38.6 (*)     MCV 79      MCH  24.0 (*)     MCHC 30.6 (*)     RDW 16.0 (*)     Platelet Count 293      % Neutrophils 80      % Lymphocytes 11      % Monocytes 8      % Eosinophils 1      % Basophils 0      % Immature Granulocytes 0      NRBCs per 100 WBC 0      Absolute Neutrophils 9.8 (*)     Absolute Lymphocytes 1.3      Absolute Monocytes 1.0      Absolute Eosinophils 0.1      Absolute Basophils 0.0      Absolute Immature Granulocytes 0.0      Absolute NRBCs 0.0     TROPONIN T, HIGH SENSITIVITY - Abnormal    Troponin T, High Sensitivity 59 (*)    TROPONIN T, HIGH SENSITIVITY - Abnormal    Troponin T, High Sensitivity 73 (*)    INFLUENZA A/B, RSV, & SARS-COV2 PCR - Normal    Influenza A PCR Negative      Influenza B PCR Negative      RSV PCR Negative      SARS CoV2 PCR Negative         XR Chest 2 Views   Final Result   IMPRESSION: Negative chest. Moderate elevation of the right hemidiaphragm is unchanged.      CT Chest Pulmonary Embolism w Contrast    (Results Pending)   Head CT w/o contrast    (Results Pending)   US Lower Extremity Venous Duplex Left    (Results Pending)         Treatments and/or interventions provided:      Medications   iopamidol (ISOVUE-370) solution 64 mL (64 mLs Intravenous $Given 4/11/24 0449)   Saline Flush (89 mLs Intravenous $Given 4/11/24 0449)       Patient's response to treatments and/or interventions:    Pt resting in bed.     To be done/followed up on inpatient unit:   See any in-patient orders    Does this patient have any cognitive concerns?: none    Activity level - Baseline/Home:    Independent    Activity Level - Current:    Unknown    Patient's Preferred language: English     Needed?: No    Isolation: None  Infection: Not Applicable  Patient tested for COVID 19 prior to admission: YES - negative    Bariatric?: No    Vital Signs:   Vitals:    04/11/24 0413 04/11/24 0428 04/11/24 0441 04/11/24 0508   BP:  (!) 139/93     Pulse:  104  98   Resp:  15  18   Temp:       TempSrc:       SpO2: (!) 87% 91%  91% 91%   Weight:       Height:           Cardiac Rhythm:Cardiac Rhythm: Normal sinus rhythm    Was the PSS-3 completed:   Yes  What interventions are required if any?               Family Comments: none at bedside  OBS brochure/video discussed/provided to patient/family: N/A              Name of person given brochure if not patient:               Relationship to patient:     For the majority of the shift this patient's behavior was Green.  Behavioral interventions performed were .    ED NURSE PHONE NUMBER: *[phone]

## 2024-04-11 NOTE — PRE-PROCEDURE
GENERAL PRE-PROCEDURE:   Procedure:  Bilateral pulmonary angiogram with thrombectomy, possible temporary inferior vena cava filter removal with moderate sedation  Date/Time:  4/11/2024 11:09 AM    Written consent obtained?: Yes    Risks and benefits: Risks, benefits and alternatives were discussed    Consent given by:  Patient  Patient states understanding of procedure being performed: Yes    Patient's understanding of procedure matches consent: Yes    Procedure consent matches procedure scheduled: Yes    Expected level of sedation:  Moderate  Appropriately NPO:  Yes  ASA Class:  3  Mallampati  :  Grade 2- soft palate, base of uvula, tonsillar pillars, and portion of posterior pharyngeal wall visible  Lungs:  Lungs clear with good breath sounds bilaterally and other (comment)  Lung exam comment:  Decreased bilaterally, NPC with deep inspiration  Heart:  Normal heart sounds and rate  History & Physical reviewed:  History and physical reviewed and no updates needed  Statement of review:  I have reviewed the lab findings, diagnostic data, medications, and the plan for sedation

## 2024-04-12 ENCOUNTER — TELEPHONE (OUTPATIENT)
Dept: OTHER | Facility: CLINIC | Age: 79
End: 2024-04-12
Payer: COMMERCIAL

## 2024-04-12 LAB
ANION GAP SERPL CALCULATED.3IONS-SCNC: 9 MMOL/L (ref 7–15)
BUN SERPL-MCNC: 15.8 MG/DL (ref 8–23)
CALCIUM SERPL-MCNC: 8.5 MG/DL (ref 8.8–10.2)
CHLORIDE SERPL-SCNC: 106 MMOL/L (ref 98–107)
CREAT SERPL-MCNC: 0.95 MG/DL (ref 0.67–1.17)
DEPRECATED HCO3 PLAS-SCNC: 26 MMOL/L (ref 22–29)
EGFRCR SERPLBLD CKD-EPI 2021: 82 ML/MIN/1.73M2
ERYTHROCYTE [DISTWIDTH] IN BLOOD BY AUTOMATED COUNT: 15.9 % (ref 10–15)
GLUCOSE SERPL-MCNC: 159 MG/DL (ref 70–99)
HCT VFR BLD AUTO: 33.9 % (ref 40–53)
HGB BLD-MCNC: 10.5 G/DL (ref 13.3–17.7)
MCH RBC QN AUTO: 24.4 PG (ref 26.5–33)
MCHC RBC AUTO-ENTMCNC: 31 G/DL (ref 31.5–36.5)
MCV RBC AUTO: 79 FL (ref 78–100)
PLATELET # BLD AUTO: 233 10E3/UL (ref 150–450)
POTASSIUM SERPL-SCNC: 4.5 MMOL/L (ref 3.4–5.3)
RBC # BLD AUTO: 4.3 10E6/UL (ref 4.4–5.9)
SODIUM SERPL-SCNC: 141 MMOL/L (ref 135–145)
UFH PPP CHRO-ACNC: 0.75 IU/ML
WBC # BLD AUTO: 6.1 10E3/UL (ref 4–11)

## 2024-04-12 PROCEDURE — 02CR3ZZ EXTIRPATION OF MATTER FROM LEFT PULMONARY ARTERY, PERCUTANEOUS APPROACH: ICD-10-PCS | Performed by: RADIOLOGY

## 2024-04-12 PROCEDURE — 99233 SBSQ HOSP IP/OBS HIGH 50: CPT | Performed by: HOSPITALIST

## 2024-04-12 PROCEDURE — 02CP3ZZ EXTIRPATION OF MATTER FROM PULMONARY TRUNK, PERCUTANEOUS APPROACH: ICD-10-PCS | Performed by: RADIOLOGY

## 2024-04-12 PROCEDURE — 210N000002 HC R&B HEART CARE

## 2024-04-12 PROCEDURE — 250N000013 HC RX MED GY IP 250 OP 250 PS 637: Performed by: HOSPITALIST

## 2024-04-12 PROCEDURE — 85520 HEPARIN ASSAY: CPT | Performed by: HOSPITALIST

## 2024-04-12 PROCEDURE — 36415 COLL VENOUS BLD VENIPUNCTURE: CPT | Performed by: INTERNAL MEDICINE

## 2024-04-12 PROCEDURE — 80048 BASIC METABOLIC PNL TOTAL CA: CPT | Performed by: INTERNAL MEDICINE

## 2024-04-12 PROCEDURE — 85027 COMPLETE CBC AUTOMATED: CPT | Performed by: INTERNAL MEDICINE

## 2024-04-12 PROCEDURE — 02CQ3ZZ EXTIRPATION OF MATTER FROM RIGHT PULMONARY ARTERY, PERCUTANEOUS APPROACH: ICD-10-PCS | Performed by: RADIOLOGY

## 2024-04-12 RX ADMIN — BENZONATATE 100 MG: 100 CAPSULE ORAL at 23:54

## 2024-04-12 RX ADMIN — FINASTERIDE 5 MG: 5 TABLET, FILM COATED ORAL at 10:04

## 2024-04-12 RX ADMIN — RIVAROXABAN 15 MG: 15 TABLET, FILM COATED ORAL at 12:27

## 2024-04-12 RX ADMIN — OMEPRAZOLE 20 MG: 20 CAPSULE, DELAYED RELEASE ORAL at 14:59

## 2024-04-12 RX ADMIN — OMEPRAZOLE 20 MG: 20 CAPSULE, DELAYED RELEASE ORAL at 19:59

## 2024-04-12 RX ADMIN — OMEPRAZOLE 20 MG: 20 CAPSULE, DELAYED RELEASE ORAL at 10:04

## 2024-04-12 RX ADMIN — TAMSULOSIN HYDROCHLORIDE 0.4 MG: 0.4 CAPSULE ORAL at 19:59

## 2024-04-12 RX ADMIN — RIVAROXABAN 15 MG: 15 TABLET, FILM COATED ORAL at 19:59

## 2024-04-12 ASSESSMENT — ACTIVITIES OF DAILY LIVING (ADL)
ADLS_ACUITY_SCORE: 29
ADLS_ACUITY_SCORE: 31
ADLS_ACUITY_SCORE: 31
ADLS_ACUITY_SCORE: 29
ADLS_ACUITY_SCORE: 31
ADLS_ACUITY_SCORE: 29
ADLS_ACUITY_SCORE: 31
ADLS_ACUITY_SCORE: 29
ADLS_ACUITY_SCORE: 31
ADLS_ACUITY_SCORE: 29
ADLS_ACUITY_SCORE: 31
ADLS_ACUITY_SCORE: 29
ADLS_ACUITY_SCORE: 31
ADLS_ACUITY_SCORE: 29
ADLS_ACUITY_SCORE: 29

## 2024-04-12 NOTE — PROGRESS NOTES
[facility]    Hospitalist Progress Note    Interval History   Patient awake and alert.  No acute events overnight.  Has been having hypoxic episodes when he sleeps that resolve during the daytime.  Denies any significant chest pain.  Shortness of breath is improved.    -Data reviewed today: I reviewed all new labs and imaging results over the last 24 hours. I personally reviewed the chest CT image(s) showing extensive bilateral pulmonary emboli involving distal main arteries and extending into the numerous subsegmental branches on both lungs with right heart enlargement .    Lower extremity DVT ultrasound shows near occlusive DVT within the left popliteal and posterior tibial veins.  Right leg negative for DVT.    Physical Exam   Temp: 98  F (36.7  C) Temp src: Axillary BP: (!) 125/95 Pulse: 105   Resp: 27 SpO2: 93 % O2 Device: Nasal cannula Oxygen Delivery: 2 LPM  Vitals:    04/11/24 0217 04/11/24 0805 04/12/24 0429   Weight: 74.8 kg (165 lb) 76.7 kg (169 lb) 75.8 kg (167 lb 3.2 oz)     Vital Signs with Ranges  Temp:  [97  F (36.1  C)-98.3  F (36.8  C)] 98  F (36.7  C)  Pulse:  [] 105  Resp:  [9-27] 27  BP: (108-169)/() 125/95  SpO2:  [84 %-100 %] 93 %  I/O last 3 completed shifts:  In: -   Out: 1555 [Urine:1555]    Physical Exam  Constitutional:       Appearance: Normal appearance.   Cardiovascular:      Rate and Rhythm: Regular rhythm. Tachycardia present.      Pulses: Normal pulses.      Heart sounds: Normal heart sounds.   Pulmonary:      Effort: Pulmonary effort is normal. No respiratory distress.      Breath sounds: Normal breath sounds.   Abdominal:      General: Abdomen is flat. Bowel sounds are normal. There is no distension.      Tenderness: There is no abdominal tenderness. There is no guarding.   Skin:     General: Skin is warm and dry.   Neurological:      General: No focal deficit present.           Medications   Current Facility-Administered Medications    Medication Dose Route Frequency Provider Last Rate Last Admin    Patient is already receiving anticoagulation with heparin, enoxaparin (LOVENOX), warfarin (COUMADIN)  or other anticoagulant medication   Does not apply Continuous PRN Duane Hughes MD         Current Facility-Administered Medications   Medication Dose Route Frequency Provider Last Rate Last Admin    [Held by provider] amLODIPine (NORVASC) tablet 2.5 mg  2.5 mg Oral Daily Belgica Quiroz MD        finasteride (PROSCAR) tablet 5 mg  5 mg Oral Daily Belgica Quiroz MD   5 mg at 04/12/24 1004    omeprazole (PriLOSEC) CR capsule 20 mg  20 mg Oral TID Belgica Quiroz MD   20 mg at 04/12/24 1004    rivaroxaban ANTICOAGULANT (XARELTO) tablet 15 mg  15 mg Oral BID w/meals Belgica Quiroz MD   15 mg at 04/12/24 1227    Followed by    [START ON 5/3/2024] rivaroxaban ANTICOAGULANT (XARELTO) tablet 20 mg  20 mg Oral Daily with supper Belgica Quiroz MD        sodium chloride (PF) 0.9% PF flush 3 mL  3 mL Intracatheter Q8H Duane Hughes MD   3 mL at 04/11/24 1654    tamsulosin (FLOMAX) capsule 0.4 mg  0.4 mg Oral QPM Belgica Quiroz MD   0.4 mg at 04/11/24 2033       Data   Recent Labs   Lab 04/12/24  0419 04/11/24  1915 04/11/24  0219   WBC 6.1  --  12.3*   HGB 10.5*  --  11.8*   MCV 79  --  79     --  293     --  140   POTASSIUM 4.5 4.2 4.1   CHLORIDE 106  --  103   CO2 26  --  22   BUN 15.8  --  20.0   CR 0.95  --  1.01   ANIONGAP 9  --  15   CLIFTON 8.5*  --  9.3   *  --  164*   ALBUMIN  --   --  3.6   PROTTOTAL  --   --  6.5   BILITOTAL  --   --  0.3   ALKPHOS  --   --  92   ALT  --   --  32   AST  --   --  27       No results found for this or any previous visit (from the past 24 hour(s)).      Assessment & Plan      Stanford Andersen is a 79 year old male admitted on 4/11/2024. He presents to the emergency department with his a near syncopal episode and a subsequent syncopal episode at around 4 PM and then 10 PM  "4/10/24. Found to have Bilateral pulmonary emboli, Left lower extremity DVT, and right heart strain w/ hypoxia.      Acute hypoxic respiratory failure: secondary to pulmonary emboli as below.  Oxygen saturations in the mid 80s on room air at rest.  Acute bilateral pulmonary emboli, left lower extremity DVT: appears unprovoked.  Has had left lower extremity swelling and \"cold\" sensation for the past 1 to 2 weeks by his estimation.  No recent travel, immobilization or injury.  -High intensity heparin infusion  -Pharmacy liaison consultation for DOAC versus warfarin coverage.  Xarelto at 35/month.  -Oximetry, oxygen as needed.    -As needed albuterol nebulizer treatment; rattling nonproductive cough  -Tessalon 3 times daily as needed for cough  -Interventional radiology consulted for consideration of embolectomy.  Underwent IR mechanical thrombectomy on 4/11/2024 with near resolution of bilateral PE.  -Discontinued heparin drip and started on Xarelto 15 mg twice daily for 21 days followed by 20 mg daily on 4/12/2024.    Nocturnal hypoxia  -Likely chronic.   -Will monitor overnight with with overnight oximetry.  If he qualifies will arrange for nocturnal oxygen upon discharge and sleep study as outpatient.     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  Associated issues with recurrent nephrolithiasis, last lithotripsy in May 2023.  -Omeprazole 20 mg 3 times daily; he tells me that he has been trialed on Protonix in the past, but this caused him issues with watery stools      Hypertension:  -On amlodipine 2.5 mg daily.  Restarted post thrombectomy.     Keratotic scalp lesion:  -Note outpatient referral to dermatology.  Patient is aware he is due for follow-up     BPH:  -On tamsulosin 0.4 mg daily.  On finasteride 5 mg daily.           Diet: Regular diet  DVT Prophylaxis: Xarelto  Cardiac Monitoring: On telemetry  Code Status:  Full " "code.  Confirmed with patient on admission.         Clinically Significant Risk Factors               # Hypertension: Noted on problem list        # Overweight: Estimated body mass index is 26.19 kg/m  as calculated from the following:    Height as of this encounter: 1.702 m (5' 7\").    Weight as of this encounter: 75.8 kg (167 lb 3.2 oz)., PRESENT ON ADMISSION            Medically Ready for Discharge: Anticipated Tomorrow    Greater than 50 minutes spent on documentation, evaluation of the patient, discussing care with bedside nurse and patient.  And social work regarding home oxygen    Belgica Quiroz MD, MD  [phone](p)   "

## 2024-04-12 NOTE — DISCHARGE INSTRUCTIONS
Routine, Mechanical Thrombectomy Procedure:     This procedure is used to help when people have a bloodclot(s) blocking one or more of the large blood vessels within the body. It involves inserting a catheter (thin flexible tube) with a device into the blood stream so that clot within blood vessel can be removed. Thistreatment can reduce the long-term effects of clot burden. Please follow the below instructions as you recover:     Care instructions after angiogram procedure:   - Do not lift objects greater than 10 pounds for 2 days following the procedure.   - Avoid excessive exercise and straining for 2 days. Avoid tub baths, pools, hot tubs and Jacuzzis for 3 days or until procedure site is well healed.   - You may shower beginning tomorrow. Do not scrub procedure site until well healed; pat dry.   - Return to yournormal activities as you tolerate after the 2 day restriction.   - You can expect to return to work 1-2 days after your procedure depending on the nature of your profession.   - It is normal to have sometenderness and minimal swelling at procedure puncture site. A small area of discoloration may be present. Tenderness typically subsides in 1-2 days. A small knot may also be present at puncture site for 6-8 weeks. This can be a normal part of the healing process.     Please seek medical evaluation for:   - If you develop fevers (greater than 101 F (38.3C)).   - If you develop increasing pain, redness, purulent drainage,tenderness, or swelling at procedure site.   - If you experience any bleeding from procedure/puncture site: lie down, firmly apply pressure to puncture site and call 911. - Seek emergent evaluation if you experience any new leg/arm pain, discoloration or numbness.   - Increasing shortness of breath.   - Increasing swelling or pain within your arms/legs.     You will receive a call from the [facility] to help coordinate a clinic visit and US in one month with the IR provider who  did your procedure. If you had an IVC filter placed removal will be discussed at the time of your clinic visit.     Please call Marissa RAMON RN clinician at  or Guerline RAMON NP at  for questions or concerns about the procedure or post-procedure care. You can leave a voicemail. We work Monday through Friday [phone] or [phone]. An alternative number to call for questions is Interventional Radiology at

## 2024-04-12 NOTE — PROGRESS NOTES
Interventional Radiology Progress Note:  Inpatient at [facility]  Date: April 12, 2024     HPI: Stanford Andersen is a 79 year old male admitted on 4/11/2024. He presents to the emergency department with his a near syncopal episode and a subsequent syncopal episode at around 4 PM and then 10 PM 4/10/24. Found to have Bilateral pulmonary emboli, Left lower extremity DVT, and right heart strain w/ hypoxia.      A PE thrombectomy was done on 4/11/24 with the following results:     Findings: Pulmonary angiogram with mechanical thrombectomy with near resolution of bilateral PE.      PA pressures:   PRE: 49/18 (33) mmHg      POST: 40/6 (22) mmHg    Interval History: Feel better, breathing better, Hardly coughing today, feeling of being smothered has improved, hungry eating breakfast.     Physical Exam:   Vitals: Temp:  [97  F (36.1  C)-98.3  F (36.8  C)] 98  F (36.7  C)  Pulse:  [] 105  Resp:  [9-27] 27  BP: (108-169)/() 125/95  SpO2:  [84 %-100 %] 93 %    General: Pleasant, well nourished male in no acute distress.    Neuro:  A&O x 4. Moves all extremities equally.  Resp:  Normal respirations on room air. Non labored breathing. Equal air entry B/L   Abdomen:  Soft, non-distended, non-tender.  Vascular: right groin procedure site with dressing CDI. Site soft without tenderness.    Labs:  ROUTINE ICU LABS (Last four results)  CMP  Recent Labs   Lab 04/12/24  0419 04/11/24  1915 04/11/24  0219     --  140   POTASSIUM 4.5 4.2 4.1   CHLORIDE 106  --  103   CO2 26  --  22   ANIONGAP 9  --  15   *  --  164*   BUN 15.8  --  20.0   CR 0.95  --  1.01   GFRESTIMATED 82  --  76   CLIFTON 8.5*  --  9.3   MAG  --  1.7  --    PHOS  --  2.5  --    PROTTOTAL  --   --  6.5   ALBUMIN  --   --  3.6   BILITOTAL  --   --  0.3   ALKPHOS  --   --  92   AST  --   --  27   ALT  --   --  32     CBC  Recent Labs   Lab 04/12/24  0419 04/11/24  0219   WBC 6.1 12.3*   RBC 4.30* 4.92   HGB 10.5* 11.8*   HCT 33.9*  38.6*   MCV 79 79   MCH 24.4* 24.0*   MCHC 31.0* 30.6*   RDW 15.9* 16.0*    293     Assessment: Stanford Andersen is a 79 year old male admitted on 4/11/2024. He presents to the emergency department with his a near syncopal episode and a subsequent syncopal episode at around 4 PM and then 10 PM 4/10/24. Found to have Bilateral pulmonary emboli, Left lower extremity DVT, and right heart strain w/ hypoxia.      A PE thrombectomy was done on 4/11/24 with good results. Patient appears more comfortable, not SOB as compared to yesterday.     Plan:   -Hematology to determine oral blood thinner   -Increase activity   -Wean off of oxygen as able    IR will sign off. Patient will be contacted by the [facility] to be seen in one month for a clinic visit.   -Discharge instructions in chart    Total time spent on the date of the encounter is  minutes, including time spent counseling the patient, performing a medically appropriate evaluation, reviewing prior medical history, ordering medications and tests, documenting clinical information in the medical record, and communication of results.    Thanks [facility] Interventional Radiology CNP ([phone]) (phone [phone])

## 2024-04-12 NOTE — PLAN OF CARE
VSS, RA sats ok while awake, dip to 83% with sleep. 2 L keep sats >90. Discharge delayed until tomorrow to get overnight oximetry completed this evening. R groin site CDI. Ambulated in halls with SBA, GB/W. Minimal dyspnea. Voids with urinal or walks to restroom.

## 2024-04-12 NOTE — PROGRESS NOTES
Pleasant and cooperative. At initial assessment patient half-asleep with illogical responses to orientation questions but woke up within minutes and has been A&O x4. R groin thrombectomy site stable, unchanged. Pulses marked, palpable. Moving independently in bed. Up asst 1 walker. Heparin gtt, down-titrated per protocol twice this shift, Xa recheck at noon today. Urinal voiding. Nonproductive cough. Denies further exacerbations of tight throat or difficulty breathing overnight. Tele SR.

## 2024-04-12 NOTE — TELEPHONE ENCOUNTER
Referral received via Workqueue on 4/12/24.    Pt referred to [facility] by Belgica Quiroz MD for PE.    As of note, patient is inpatient at [facility].    Routing to scheduling to coordinate the following:  NEW VASCULAR PATIENT consult with Vascular Medicine  Please schedule this at next available    Appt note:  Ref by Dr. Belgica Quiroz for PE; recently hospitalized; notes and labs in Epic.    TALHA PepeN, RN, [facility]

## 2024-04-13 ENCOUNTER — DOCUMENTATION ONLY (OUTPATIENT)
Dept: HOME HEALTH SERVICES | Facility: CLINIC | Age: 79
End: 2024-04-13
Payer: COMMERCIAL

## 2024-04-13 VITALS
RESPIRATION RATE: 18 BRPM | WEIGHT: 168.2 LBS | SYSTOLIC BLOOD PRESSURE: 150 MMHG | TEMPERATURE: 98.4 F | BODY MASS INDEX: 26.4 KG/M2 | HEIGHT: 67 IN | HEART RATE: 91 BPM | DIASTOLIC BLOOD PRESSURE: 80 MMHG | OXYGEN SATURATION: 93 %

## 2024-04-13 PROCEDURE — 94762 N-INVAS EAR/PLS OXIMTRY CONT: CPT

## 2024-04-13 PROCEDURE — 250N000013 HC RX MED GY IP 250 OP 250 PS 637: Performed by: HOSPITALIST

## 2024-04-13 PROCEDURE — 99239 HOSP IP/OBS DSCHRG MGMT >30: CPT | Performed by: HOSPITALIST

## 2024-04-13 RX ADMIN — FINASTERIDE 5 MG: 5 TABLET, FILM COATED ORAL at 08:58

## 2024-04-13 RX ADMIN — OMEPRAZOLE 20 MG: 20 CAPSULE, DELAYED RELEASE ORAL at 08:58

## 2024-04-13 RX ADMIN — RIVAROXABAN 15 MG: 15 TABLET, FILM COATED ORAL at 08:58

## 2024-04-13 RX ADMIN — AMLODIPINE BESYLATE 2.5 MG: 2.5 TABLET ORAL at 08:58

## 2024-04-13 ASSESSMENT — ACTIVITIES OF DAILY LIVING (ADL)
ADLS_ACUITY_SCORE: 31

## 2024-04-13 NOTE — PROGRESS NOTES
Overnight Oximtery     Overnight Oximtery Test was set-up on Stanford Andersen  1945      Date and Time: 04/13/24 6:14 AM       Patient was on RA    Patient's SpO2 was 21    Stanford Andersen overnight oximetry test was completed.    Patient was on RA    Patient's SpO2 was 21        Goyo Hutchins, RT

## 2024-04-13 NOTE — PROGRESS NOTES
Received intake call for home oxygen at 9:41AM. Reviewed patient's chart; Patient qualifies under insurance guidelines and all documentation is in the chart including a good order.     9:55AM- Spoke with care coordinator, , confirmed we received the order, however, visit notes reference possible untreated sleep apnea.  When dispensing nocturnal oxygen, based on an ELA, patient cannot qualify with reference of sleep apnea, even possible untreated sleep apnea.  I also needed the HomeO2 Smartnote.      11:15AM- Spoke with , again.  The Home O2 Smartnote was completed.  Diagnosis is Chronic Respiratory Failure with Hypoxia.  That will qualify patient for home oxygen.     11:40AM - Called to offer choice and patient is okay with [facility] setting them up. Discussed equipment with patient.  With the oxygen being for nocturnal use, we can deliver to his home.  There is no need to wait for us for discharge.  He anticipates discharge in about an hour and requested home delivery later this afternoon.  The  has been informed.

## 2024-04-13 NOTE — PLAN OF CARE
Goal Outcome Evaluation:      Plan of Care Reviewed With: patient, child               A+Ox4. VSS, RA sats ok while awake. R groin site CDI. Ambulated in halls with SBA, GB/W. Minimal dyspnea. Voids with urinal or walks to restroom. Educated on post procedure precautions and new med use and potential side effects and follow up appts, Rx given to pt. Home O2 being delivered for bedtime. Discharging home with son.

## 2024-04-13 NOTE — DISCHARGE SUMMARY
"[facility]    Discharge Summary  Hospitalist    Date of Admission:  4/11/2024  Date of Discharge:  4/13/2024    Discharge Diagnoses      Bilateral pulmonary embolism (H)  Acute respiratory failure with hypoxia (H)  Elevated troponin    History of Present Illness   Stanford Andersen is an 79 year old male who presented with acute pe     Hospital Course   Stanford Andersen was admitted on 4/11/2024.  The following problems were addressed during his hospitalization:    Stanford Andersen is a 79 year old male admitted on 4/11/2024. He presents to the emergency department with his a near syncopal episode and a subsequent syncopal episode at around 4 PM and then 10 PM 4/10/24. Found to have Bilateral pulmonary emboli, Left lower extremity DVT, and right heart strain w/ hypoxia.      Acute hypoxic respiratory failure: secondary to pulmonary emboli as below.  Oxygen saturations in the mid 80s on room air at rest.    Acute bilateral pulmonary emboli, left lower extremity DVT: appears unprovoked.  Has had left lower extremity swelling and \"cold\" sensation for the past 1 to 2 weeks by his estimation.  No recent travel, immobilization or injury.  -High intensity heparin infusion  -Pharmacy liaison consultation for DOAC versus warfarin coverage.  Xarelto at 35/month.  -Oximetry, oxygen as needed.    -As needed albuterol nebulizer treatment; rattling nonproductive cough  -Tessalon 3 times daily as needed for cough  -Interventional radiology consulted for consideration of embolectomy.  Underwent IR mechanical thrombectomy on 4/11/2024 with near resolution of bilateral PE.  -Discontinued heparin drip and started on Xarelto 15 mg twice daily for 21 days followed by 20 mg daily on 4/12/2024.  - follow up with vascular medicine for further workup of the unexplained PE.  Patient will need a minimum of 6 months of anticoagulation.    Nocturnal hypoxia  -Likely chronic.   -overnight oxygen study shows hypoxia. Pe " "contributing. 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  Associated issues with recurrent nephrolithiasis, last lithotripsy in May 2023.  -Omeprazole 20 mg 3 times daily; he tells me that he has been trialed on Protonix in the past, but this caused him issues with watery stools      Hypertension:  -On amlodipine 2.5 mg daily.  Restarted post thrombectomy.     Keratotic scalp lesion:  -Note outpatient referral to dermatology.  Patient is aware he is due for follow-up     BPH:  -On tamsulosin 0.4 mg daily.  On finasteride 5 mg daily.       Diet: Regular diet  DVT Prophylaxis: Xarelto  Cardiac Monitoring: On telemetry  Code Status:  Full code.  Confirmed with patient on admission.          Clinically Significant Risk Factors               # Hypertension: Noted on problem list        # Overweight: Estimated body mass index is 26.34 kg/m  as calculated from the following:    Height as of this encounter: 1.702 m (5' 7\").    Weight as of this encounter: 76.3 kg (168 lb 3.2 oz)., PRESENT ON ADMISSION             Belgica Quiroz MD, MD        Code Status   Full Code       Primary Care Physician   Charlie Lawrence    Physical Exam   Temp: 98.4  F (36.9  C) Temp src: Oral BP: 128/86 Pulse: 82   Resp: 18 SpO2: 94 % O2 Device: None (Room air) Oxygen Delivery: 1 LPM  Vitals:    04/11/24 0805 04/12/24 0429 04/13/24 0622   Weight: 76.7 kg (169 lb) 75.8 kg (167 lb 3.2 oz) 76.3 kg (168 lb 3.2 oz)     Vital Signs with Ranges  Temp:  [97.7  F (36.5  C)-98.4  F (36.9  C)] 98.4  F (36.9  C)  Pulse:  [82-90] 82  Resp:  [18] 18  BP: (122-133)/(86-88) 128/86  SpO2:  [87 %-94 %] 94 %  I/O last 3 completed shifts:  In: -   Out: 1025 [Urine:1025]    Physical Exam  Constitutional:       Appearance: Normal appearance.   Cardiovascular:      Rate and Rhythm: Normal rate and regular rhythm.      Pulses: Normal pulses.      " Heart sounds: Normal heart sounds.   Pulmonary:      Effort: Pulmonary effort is normal. No respiratory distress.      Breath sounds: Normal breath sounds.   Abdominal:      General: Abdomen is flat. Bowel sounds are normal. There is no distension.      Tenderness: There is no abdominal tenderness. There is no guarding.   Skin:     General: Skin is warm and dry.   Neurological:      General: No focal deficit present.           Discharge Disposition   Discharged to home  Condition at discharge: Stable    Consultations This Hospital Stay   PHARMACY IP CONSULT  INTERVENTIONAL RADIOLOGY ADULT/PEDS IP CONSULT  INTERVENTIONAL RADIOLOGY ADULT/PEDS IP CONSULT  PHARMACY LIAISON FOR MEDICATION COVERAGE CONSULT    Time Spent on this Encounter   I, Belgica Quiroz MD, personally saw the patient today and spent greater than 30 minutes discharging this patient.    Discharge Orders      Vascular Medicine Referral      Sleep Study Referral      Reason for your hospital stay    Bilateral pe     Follow-up and recommended labs and tests     Follow up with primary care provider, Charlie Lawrence, within 7 days for hospital follow- up.  The following labs/tests are recommended: cbc, bmp in 1 week .     Activity    Your activity upon discharge: activity as tolerated     Oxygen Adult/Peds    Oxygen Documentation  I certify that this patient, Stanford Andersen has been under my care (or a nurse practitioner or physican's assistant working with me). This is the face-to-face encounter for oxygen medical necessity.      At the time of this encounter, I have reviewed the qualifying testing and have determined that supplemental oxygen is reasonable and necessary and is expected to improve the patient's condition in a home setting.         Patient has continued oxygen desaturation due to Chronic Respiratory Failure with Hypoxia J96.11.    If portability is ordered, is the patient mobile within the home? yes    Was this visit performed as a  telehealth visit: No     Diet    Follow this diet upon discharge: Orders Placed This Encounter      Regular Diet Adult     Discharge Medications   Current Discharge Medication List        START taking these medications    Details   rivaroxaban ANTICOAGULANT (XARELTO) 2.5 MG TABS tablet Take 6 tablets (15 mg) by mouth 2 times daily (with meals) for 21 days, THEN 8 tablets (20 mg) daily (with dinner) for 90 days.  Qty: 972 tablet, Refills: 0    Associated Diagnoses: Bilateral pulmonary embolism (H); Acute respiratory failure with hypoxia (H)           CONTINUE these medications which have NOT CHANGED    Details   finasteride (PROSCAR) 5 MG tablet Take 1 tablet (5 mg) by mouth daily  Qty: 90 tablet, Refills: 3    Comments: PROFILE FOR FUTURE REFILLS UNTIL PATIENT CALLS FOR REFILLS  Associated Diagnoses: BPH associated with nocturia      omeprazole (PRILOSEC OTC) 20 MG EC tablet Take 20 mg by mouth 3 times daily AM, Dinner & HS      tamsulosin (FLOMAX) 0.4 MG capsule TAKE 1 CAPSULE BY MOUTH EVERY EVENING  Qty: 90 capsule, Refills: 3    Associated Diagnoses: BPH associated with nocturia           STOP taking these medications       amLODIPine (NORVASC) 2.5 MG tablet Comments:   Reason for Stopping:         sildenafil (VIAGRA) 100 MG tablet Comments:   Reason for Stopping:             Allergies   Allergies   Allergen Reactions    Dye [Contrast Dye] Anaphylaxis and Hives     Patient developed throat swelling, nausea, vomiting and respiratory distress 4/11.    Penicillins Anaphylaxis     Swelling, arm turned blue    Tramadol Itching    Oxycodone-Acetaminophen Rash     Data   Recent Labs   Lab Test 04/12/24  0419 04/11/24  0219 05/18/23  1411   WBC 6.1 12.3* 10.5   HGB 10.5* 11.8* 14.4   MCV 79 79 86    293 288      Recent Labs   Lab Test 04/12/24  0419 04/11/24  1915 04/11/24  0219 05/18/23  1411     --  140 140   POTASSIUM 4.5 4.2 4.1 4.9   CHLORIDE 106  --  103 104   CO2 26  --  22 23   BUN 15.8  --  20.0  15.7   CR 0.95  --  1.01 1.01   ANIONGAP 9  --  15 13   CLIFTON 8.5*  --  9.3 9.7   *  --  164* 96         Results for orders placed or performed during the hospital encounter of 04/11/24   XR Chest 2 Views    Narrative    EXAM: XR CHEST 2 VIEWS  LOCATION: [facility]  DATE: 4/11/2024    INDICATION: Shortness of breath  COMPARISON: 05/18/2023      Impression    IMPRESSION: Negative chest. Moderate elevation of the right hemidiaphragm is unchanged.   CT Chest Pulmonary Embolism w Contrast     Value    Radiologist flags New diagnosis of pulmonary embolism (AA)    Narrative    EXAM: CT CHEST PULMONARY EMBOLISM W CONTRAST  LOCATION: [facility]  DATE: 4/11/2024    INDICATION: shortness of breath, hypoxia; Male sex; No prior imaging in the last 24 hours; Pulmonary Embolism Rule Out Criteria (PERC) score > 0; Revised [place] Score (RGS) not >= 11; No D dimer result available; D dimer not ordered  COMPARISON: Same day 2 view chest radiograph  TECHNIQUE: CT chest pulmonary angiogram during arterial phase injection of IV contrast. Multiplanar reformats and MIP reconstructions were performed. Dose reduction techniques were used.   CONTRAST: 64mL isovue 370    FINDINGS:  ANGIOGRAM CHEST: Extensive bilateral pulmonary artery emboli involving the distal main pulmonary arteries and extending into numerous lobar, segmental, and subsegmental branches of both lungs. There is asymmetric enlargement of the right heart compatible   with a degree of right heart strain. Thoracic aorta is negative for dissection. Mild atherosclerotic calcifications of the aortic arch.    LUNGS AND PLEURA: No focal pulmonary consolidation or pleural effusion. No pneumothorax. Scattered subsegmental atelectasis.    MEDIASTINUM/AXILLAE: Prominent mediastinal lymph nodes which may be reactive.    CORONARY ARTERY CALCIFICATION: No significant.    UPPER ABDOMEN: Hepatic cysts which require no dedicated  follow-up. Partially visualized prominent left renal collecting system and small nonobstructing left renal calyceal calculi.    MUSCULOSKELETAL: Mild degenerative changes of the cervicothoracic spine. No acute osseous abnormality or suspicious bony lesion.      Impression    IMPRESSION:  1.  Extensive bilateral pulmonary artery emboli involving the distal main pulmonary arteries and extending into numerous lobar, segmental, and subsegmental branches of both lungs. There is asymmetric enlargement of the right heart compatible with a   degree of right heart strain.       [Critical Result: New diagnosis of pulmonary embolism]    Finding was identified on 4/11/2024 5:20 AM CDT.     Dr. Castillo was contacted by me on 4/11/2024 5:26 AM CDT and verbalized understanding of the critical result.    Head CT w/o contrast    Narrative    EXAM: CT HEAD W/O CONTRAST  LOCATION: [facility]  DATE: 4/11/2024    INDICATION: Fall, head injury.  COMPARISON: Brain MRI 09/21/2018.  TECHNIQUE: Routine CT Head without IV contrast. Multiplanar reformats. Dose reduction techniques were used.    FINDINGS:  INTRACRANIAL CONTENTS: No intracranial hemorrhage, extraaxial collection, or mass effect.  No CT evidence of acute infarct. Mild presumed chronic small vessel ischemic changes. Mild generalized volume loss. No hydrocephalus.     VISUALIZED ORBITS/SINUSES/MASTOIDS: No intraorbital abnormality. No paranasal sinus mucosal disease. No middle ear or mastoid effusion.    BONES/SOFT TISSUES: No acute fracture. Cutaneous lesion at the frontal scalp vertex measuring up to 2.5 x 2.9 x 0.7 cm.      Impression    IMPRESSION:  1.  No acute intracranial process.  2.  Cutaneous lesion at the scalp vertex anteriorly, direct visualization recommended with tissue sampling as needed.                 US Lower Extremity Venous Duplex Bilateral    Narrative    EXAM: US LOWER EXTREMITY VENOUS DUPLEX BILATERAL  LOCATION: [facility]  [facility]  DATE: 4/11/2024    INDICATION: Left leg swelling.  COMPARISON: None.  TECHNIQUE: Venous Duplex ultrasound of bilateral lower extremities with and without compression, augmentation and duplex. Color flow and spectral Doppler with waveform analysis performed.    FINDINGS: Exam includes the common femoral, femoral, popliteal veins as well as segmentally visualized deep calf veins and greater saphenous vein.     RIGHT: No deep vein thrombosis. No superficial thrombophlebitis. No popliteal cyst.    LEFT: Nearly occlusive DVT popliteal and posterior tibial veins. No superficial thrombophlebitis. No popliteal cyst.      Impression    IMPRESSION:  1.  Nearly occlusive DVT within the left popliteal and posterior tibial veins.  2.  Right leg negative for DVT.        3.  Results of DVT given to Dr. Castillo at 0624             IR Pulmonary Angiogram Bilateral    Narrative    IR PULMONARY ANGIOGRAM BILATERAL   4/11/2024 2:34 PM     CLINICAL HISTORY/INDICATION: shortness of breath, syncope, hypoxia,  bilateral PE, dvt left pulmonary emboli with cor pulmonale.    PROCEDURES PERFORMED:   1. Ultrasound-guided right femoral venotomy.  2. Selective catheterization of the main pulmonary artery.  3. Selective catheterization of the right and left main pulmonary  arteries.  4. Selective catheterization of the right lower lobe , right upper and  left lower lobe pulmonary arteries.  5. Mechanical thrombectomy with extirpation of matter from the right  main, right lower lobe, left main and left lower lobe pulmonary  arteries.  6. Pulmonary artery pressures, pre and post.     MODERATE SEDATION: Versed 2 mg IV; Fentanyl 100 mcg IV. During the  time out, immediately prior to the administration of medications, the  patient was reassessed for adequacy to receive conscious sedation.  Under physician supervision, Versed and fentanyl were administered for  moderate sedation. Pulse oximetry, heart rate and blood pressure  were  continuously monitored by an independent trained observer. The  physician spent 66 minutes of face-to-face sedation time with the  patient.    ADDITIONAL MEDICATION: 5000 units heparin IV    CONTRAST: 90 mL Isovue    FLUORO: 8 minutes    AIR KERMA: 53.1 mGy    CONSENT: Following a discussion of the risks, benefits, indications  and alternatives to treatment, appropriate informed consent was  obtained.      TIMEOUT: A timeout was performed per universal protocol policy to  ensure correct patient, site, and procedure to be performed.     PROCEDURE AND FINDINGS: Following a discussion of the risks, benefits,  indications, and alternatives to treatment, appropriate informed  consent was obtained from the patient. The patient was brought to the  interventional radiology suite and placed supine on the table.  Bilateral groins were prepped and draped in a sterile fashion.  A  timeout was performed per universal protocol policy to confirm the  correct patient, site and procedure to be performed.    A preliminary ultrasound of the right  groin was performed and  demonstrates a patent right common femoral vein. A permanent  ultrasound image was recorded.  Using a combination of fluoroscopy and  ultrasound, an access site was determined.  The overlying skin was  anesthetized with 1% Lidocaine.  Using ultrasound guidance,  access  into the right common femoral vein was obtained with visualization of  needle entry into the vessel. A 0.018 wire was advanced through the  needle and exchange was made for a 5 Urdu micropuncture sheath. A  0.035 wire was advanced through the micropuncture sheath. A 6 Urdu  sheath was used to dilate the puncture arteriotomy. Then 2 Perclose  devices were deployed in staggered fashion in the typical Perclose  technique. A 0.035 wire was advanced into the IVC. Over the wire a 24  Urdu Inari sheath was advanced with the tip in the IVC. A total of  5000 Units Heparin IV was administered to  maintain an ACT of 250-300.     MAIN PULMONARY ARTERY:   Using a combination of wire and angled pigtail catheter access was  gained into the main pulmonary artery.     LEFT MAIN: Using a combination of wire and angled pigtail catheter  access was gained into the main pulmonary artery. The pigtail catheter  was removed over the wire and exchanged for the 24 Chinese FloTriever.  Which was advanced into the left main pulmonary artery to the level of  pulmonary emboli and extirpation of matter from the left main  pulmonary artery was performed with removal large amount of pulmonary  embolus. Post aspiration angiography was performed, images show some  improvement in the amount of thrombus.     LEFT LOWER LOBE: Using a combination of catheter and wire access was  gained into the left lower lobe pulmonary artery. Over the wire was  advanced into the into the left lower lobe pulmonary artery the level  of the pulmonary emboli and mechanical thrombectomy with extirpation  of matter from the left lower lobe pulmonary artery was performed with  with removal of a large amount of amount of pulmonary embolus. Post  aspiration angiography was performed, images significant improvement  in the amount of thrombus.     RIGHT MAIN  Using a combination of catheter and wire access was gained into the  right main pulmonary artery. The 24 Chinese FloTriever was then  advanced into the right main pulmonary artery to the level of the  pulmonary emboli and extirpation of matter from the right main  pulmonary artery was performed with removal of a significant amount of  embolus. Post aspiration angiography was performed, images show  significant improvement in the amount of thrombus.     RIGHT LOWER LOBE: Using a combination of catheter and wire, access was  gained into the right lower lobe pulmonary artery. Over the wire, the   FloTriever was advanced into the right lower lobe pulmonary artery to  the level of the pulmonary emboli and  mechanical thrombectomy with  extirpation of matter from the right lower lobe pulmonary artery was  performed with removal of a significant amount of pulmonary embolus.  Post aspiration angiography was performed, images show significant  improvement in the amount of thrombus throughout the right lower lobe  pulmonary arteries.     RIGHT UPPER LOBE: Using a combination of catheter and wire, access was  gained into the right upper lobe pulmonary artery. Over the wire, the  16 Moldovan curved FloTriever was advanced into the right upper lobe  pulmonary artery to the level of the pulmonary emboli and mechanical  thrombectomy with extirpation of matter from the right upper lobe  pulmonary artery was performed with removal of a significant amount of  pulmonary embolus. Post aspiration angiography was performed, images  show significant improvement in the amount of thrombus throughout the  right upper lobe pulmonary arteries.    The pigtail catheter was pulled back into the main pulmonary artery.  Final digital subtraction angiography was performed, images show  significant improvement in the clot burden throughout the right and  left pulmonary arteries including the saddle pulmonary embolus is no  longer visualized.    The FloTriever was removed over the wire.    Pulmonary pressures:  Pre treatment: 49 / 18 (33) mmHg  Post Treatment: 40 / 6 (22) mmHg    Following this, the vascular sheath was removed and the previously  placed Perclose sutures were tied down in standard fashion, obtaining  excellent hemostasis bilaterally. The incision with closed with 3-0  Monocryl desirable suture. Dermabond was placed over the incision and  a sterile dressing was applied.     Throughout the procedure, the patient was monitored by a radiology  nurse for cardiac rhythm and oxygen saturation which remained stable.  The patient tolerated the procedure well and left interventional  radiology in stable condition.      Impression    IMPRESSION:    1. Successful bilateral pulmonary angiography and mechanical  thrombectomy.    PLAN: Patient will follow-up with interventional radiology in one to 2  months with Dr. Elder.   Echocardiogram Complete     Value    LVEF  55-60%    [facility]    [id number]  [id number]  [id number]^[email or web address]^[email or web address]^MARKY     [facility]  Echocardiography Laboratory  [address] [place] [address]     Name: JOAQUIN CRUZ  MRN: [id number]  : 1945  Study Date: 2024 08:40 AM  Age: 79 yrs  Gender: Male  Patient Location: [facility]  Reason For Study: Pulmonary Emboli  Ordering Physician: SALEEM MCFARLAND  Referring Physician: Charlie Lawrence  Performed By: Catarina Ayala     BSA: 1.9 m2  Height: 67 in  Weight: 169 lb  HR: 103  BP: 159/103 mmHg  ______________________________________________________________________________  Procedure  Complete Portable Echo Adult. Optison (NDC #1827-2820) given intravenously.  ______________________________________________________________________________  Interpretation Summary     Technically challenging study due to patient body habitus.     Left ventricular systolic function is normal. The visual ejection fraction is  55-60%. Flattened septum is consistent with RV pressure overload.  Right ventricle is moderately dilated. The right ventricular systolic function  is moderately reduced. Relatively preserved contractility at the base and  apex, with severe hypokinesis of the lateral free wall, this pattern of wall  motion can be seen in the setting of acute pulmonary emoblism. Clinical  correlation recommended.  Pulmonary hypertension present. The right ventricular systolic pressure is  approximated at 41mmHg plus the right atrial pressure.  Dilation of the inferior vena cava is present with abnormal respiratory  variation in diameter.     Findings communicated to RN who will interface with  staff.  ______________________________________________________________________________  Left Ventricle  The left ventricle is normal in size. There is normal left ventricular wall  thickness. Left ventricular systolic function is normal. The visual ejection  fraction is 55-60%. Left ventricular diastolic function is indeterminate.  Flattened septum is consistent with RV pressure overload.     Right Ventricle  The right ventricle is moderately dilated. The right ventricular systolic  function is moderately reduced. Relatively preserved contractility at the base  and apex, with severe hypokinesis of the lateral free wall, this pattern of  wall motion can be seen in the setting of acute pulmonary emoblism. Clinical  correlation recommended.     Atria  Normal left atrial size. Right atrial size is normal. There is no color  Doppler evidence of an atrial shunt.     Mitral Valve  There is trace mitral regurgitation.     Tricuspid Valve  There is mild (1+) tricuspid regurgitation. The right ventricular systolic  pressure is approximated at 41mmHg plus the right atrial pressure. Pulmonary  hypertension.     Aortic Valve  The aortic valve is normal in structure and function.     Pulmonic Valve  The pulmonic valve is not well seen, but is grossly normal.     Vessels  The aortic root is normal size. Normal size ascending aorta. Dilation of the  inferior vena cava is present with abnormal respiratory variation in diameter.     ______________________________________________________________________________  MMode/2D Measurements & Calculations  IVSd: 1.0 cm  LVIDd: 4.3 cm  LVIDs: 2.8 cm  LVPWd: 0.88 cm  FS: 35.4 %  LV mass(C)d: 132.8 grams  LV mass(C)dI: 70.5 grams/m2  Ao root diam: 3.9 cm  LA dimension: 2.9 cm  asc Aorta Diam: 3.5 cm  LA/Ao: 0.75  LVOT diam: 2.1 cm  LVOT area: 3.5 cm2     Ao root diam index Ht(cm/m): 2.3  Ao root diam index BSA (cm/m2): 2.1  Asc Ao diam index BSA (cm/m2): 1.8  Asc Ao diam index Ht(cm/m): 2.0  EF  Biplane: 60.2 %  RV Base: 5.3 cm  RWT: 0.41  TAPSE: 1.9 cm     Doppler Measurements & Calculations  MV E max bobby: 52.7 cm/sec  MV A max bobby: 80.1 cm/sec  MV E/A: 0.66  MV dec time: 0.18 sec  Ao V2 max: 92.6 cm/sec  Ao max PG: 3.0 mmHg  Ao V2 mean: 62.5 cm/sec  Ao mean P.0 mmHg  Ao V2 VTI: 14.4 cm  DEVAUGHN(I,D): 2.8 cm2  DEVAUGHN(V,D): 3.0 cm2  LV V1 max P.5 mmHg  LV V1 max: 78.7 cm/sec  LV V1 VTI: 11.7 cm  SV(LVOT): 40.6 ml  SI(LVOT): 21.6 ml/m2  TR max bobby: 319.0 cm/sec  TR max P.7 mmHg     AV Bobby Ratio (DI): 0.85  DEVAUGHN Index (cm2/m2): 1.5  E/E' av.5  Lateral E/e': 11.3  Medial E/e': 7.7  RV S Bobby: 11.6 cm/sec     ______________________________________________________________________________  Report approved by: Silverio Mcdonald 2024 09:26 AM

## 2024-04-13 NOTE — PLAN OF CARE
Neuro: A&Ox4  Tele/Cardiac: SR w/ occ PACs  Resp: NOC O2 study completed overnight. Pt required 1L NC due to sustained low SPO2 on RA. ELLISON  Activity: A1 gb/walker  Pain: denies  Drips/IV: SL  GI/: WDL  Skin: WDL  Diet: Diet: Regular Diet Adult  Diet     Test/Procedures: n/a  Plan: discharge today

## 2024-04-15 ENCOUNTER — PATIENT OUTREACH (OUTPATIENT)
Dept: INTERNAL MEDICINE | Facility: CLINIC | Age: 79
End: 2024-04-15
Payer: COMMERCIAL

## 2024-04-15 DIAGNOSIS — I82.402 DEEP VEIN THROMBOSIS (DVT) OF LEFT LOWER EXTREMITY, UNSPECIFIED CHRONICITY, UNSPECIFIED VEIN (H): ICD-10-CM

## 2024-04-15 DIAGNOSIS — I26.99 BILATERAL PULMONARY EMBOLISM (H): Primary | ICD-10-CM

## 2024-04-15 NOTE — TELEPHONE ENCOUNTER
Patient Contact    Attempt # 1    Was call answered?  No.  Unable to leave message. Phone continued to ring and never went to voicemail.     Thank you,  Rachael Robles RN

## 2024-04-15 NOTE — TELEPHONE ENCOUNTER
What type of discharge? Inpatient  Risk of Hospital admission or ED visit: 59.7%  Is a TCM episode required? Yes  When should the patient follow up with PCP? 7 days of discharge.    Kim Handy RN  [facility]

## 2024-04-16 ENCOUNTER — TELEPHONE (OUTPATIENT)
Dept: OTHER | Facility: CLINIC | Age: 79
End: 2024-04-16
Payer: COMMERCIAL

## 2024-04-16 NOTE — TELEPHONE ENCOUNTER
Called patient and the mailbox was full on his home phone.    LM on patient's mobile phone to call and schedule imaging US Lower Extremity Venous Duplex Left and an appointment with Dr. Elder on 5/13/24.    Appt Note:  s/p left DVT, pulmonary thrombectomy done on 4/11 with Dr. Elder 1 mo f/u

## 2024-04-16 NOTE — TELEPHONE ENCOUNTER
Future Appointments   Date Time Provider Department Center   4/24/2024  3:00 PM Chino Morales MD [facility]   5/13/2024  1:30 PM [facility]   5/13/2024  2:10 PM Jennifer Elder DO [facility]

## 2024-04-16 NOTE — TELEPHONE ENCOUNTER
Since this was attempt #2 but unsuccessful at reaching pt, routing to PCP to advise if further action is needed.       Patient Contact    Attempt # 2    Was call answered?  No.  Unable to leave message.

## 2024-04-16 NOTE — TELEPHONE ENCOUNTER
LM on mobile phone for patient to call us back to schedule his Consult Appointment. Unable to LM on home number as the mailbox is full.

## 2024-04-16 NOTE — TELEPHONE ENCOUNTER
Future Appointments   Date Time Provider Department Center   4/24/2024  3:00 PM Chino Morales MD [facility]   5/13/2024  1:30 PM [facility]   5/13/2024  2:10 PM Jennifer Elder DO [facility]

## 2024-04-22 DIAGNOSIS — N40.1 BPH ASSOCIATED WITH NOCTURIA: ICD-10-CM

## 2024-04-22 DIAGNOSIS — R35.1 BPH ASSOCIATED WITH NOCTURIA: ICD-10-CM

## 2024-04-22 RX ORDER — FINASTERIDE 5 MG/1
1 TABLET, FILM COATED ORAL DAILY
Qty: 90 TABLET | Refills: 0 | Status: SHIPPED | OUTPATIENT
Start: 2024-04-22 | End: 2024-07-04

## 2024-04-24 ENCOUNTER — OFFICE VISIT (OUTPATIENT)
Dept: OTHER | Facility: CLINIC | Age: 79
End: 2024-04-24
Attending: INTERNAL MEDICINE
Payer: COMMERCIAL

## 2024-04-24 VITALS
SYSTOLIC BLOOD PRESSURE: 101 MMHG | DIASTOLIC BLOOD PRESSURE: 66 MMHG | HEIGHT: 67 IN | HEART RATE: 94 BPM | OXYGEN SATURATION: 93 % | BODY MASS INDEX: 26.62 KG/M2 | WEIGHT: 169.6 LBS

## 2024-04-24 DIAGNOSIS — I82.432 ACUTE DEEP VEIN THROMBOSIS (DVT) OF POPLITEAL VEIN OF LEFT LOWER EXTREMITY (H): ICD-10-CM

## 2024-04-24 DIAGNOSIS — N20.0 NEPHROLITHIASIS: ICD-10-CM

## 2024-04-24 DIAGNOSIS — I10 BENIGN ESSENTIAL HYPERTENSION: ICD-10-CM

## 2024-04-24 DIAGNOSIS — I26.99 BILATERAL PULMONARY EMBOLISM (H): Primary | ICD-10-CM

## 2024-04-24 PROCEDURE — G0463 HOSPITAL OUTPT CLINIC VISIT: HCPCS | Performed by: INTERNAL MEDICINE

## 2024-04-24 PROCEDURE — 99205 OFFICE O/P NEW HI 60 MIN: CPT | Performed by: INTERNAL MEDICINE

## 2024-04-24 PROCEDURE — G2211 COMPLEX E/M VISIT ADD ON: HCPCS | Performed by: INTERNAL MEDICINE

## 2024-04-24 NOTE — PROGRESS NOTES
"Patient is here to discuss consult    /66 (BP Location: Left arm, Patient Position: Chair, Cuff Size: Adult Regular)   Pulse 94   Ht 5' 7\" (1.702 m)   Wt 169 lb 9.6 oz (76.9 kg)   SpO2 93%   BMI 26.56 kg/m      Questions patient would like addressed today are: N/A.    Refills are needed: No    Has homecare services and agency name:  [facility]    "

## 2024-04-24 NOTE — PROGRESS NOTES
[facility] VASCULAR [facility] CENTER INITIAL VASCULAR MEDICINE CONSULT    ( New patient visit)       PRIMARY HEALTH CARE PROVIDER:  Charlie Lawrence MD      REFERRING HEALTH CARE PROVIDER;  Belgica Quiroz MD      REASON FOR CONSULT: Evaluation and management of first lifetime thromboembolic event in the form of extensive bilateral pulmonary embolism with right heart strain and left lower extremity DVT involving popliteal vein and below-knee veins and this appears unprovoked , admitted to the hospital on April 11, 2024 underwent mechanical thrombectomy currently on Xarelto      HPI: Stanford Andersen is a 79 year old very pleasant male never smoked, no personal history of malignancy, no family history of hypercoagulable status, no recent history of COVID infection, no recent history of COVID vaccination, no recent travel and no recent hospitalization admitted to the hospital with history of shortness of breath and fall appears syncope. he was hypoxic  Underwent workup at the emergency room and April 11, 2022 for CT chest PE protocol extensive bilateral segmental and subsegmental PE with right heart strain and also left lower extremity DVT occlusive involving popliteal vein and below-knee veins.  Echo showed flattened septum with a right heart strain right ventricular dysfunction but normal LV function.  He was initiated IV heparin and then followed by underwent successful mechanical pulmonary thrombectomy and then started Xarelto 15 mg 2 times a day for 21 days then followed by 20 mg daily.  For some reason he was prescribed Xarelto 2.5 mg tablets 972 of them appears epic error but patient says he has given a starter pack taking 15 mg twice a day then followed by 20 mg daily.  He also says he has a multiple pill bottles in the bag and he never checked the dose.  Overall he is feeling better  Not using any compression stockings  Walks with a walker  He has a history of Zollinger-Patricio syndrome and  recurrent nephrolithiasis previous lithotripsy  No hematuria since initiation of anticoagulation  He used to be on amlodipine and this was stopped after the hospitalization blood pressure is good range    He is new to me reviewed available extensive records in the epic and updated chart      PAST MEDICAL HISTORY  Past Medical History:   Diagnosis Date    Arthritis     hand    Chronic back pain     Complication of anesthesia     URINARY RETENTION, Sleepy after surgery    Gastro-oesophageal reflux disease     Hypertension     Renal disease     kidney stone    Ventral hernia without obstruction or gangrene 10/08/2018    Zollinger-Patricio syndrome 1995       CURRENT MEDICATIONS  Current Outpatient Medications   Medication Sig Dispense Refill    finasteride (PROSCAR) 5 MG tablet TAKE ONE TABLET BY MOUTH EVERY DAY 90 tablet 0    omeprazole (PRILOSEC OTC) 20 MG EC tablet Take 20 mg by mouth 3 times daily AM, Dinner & HS      rivaroxaban ANTICOAGULANT (XARELTO) 2.5 MG TABS tablet Take 6 tablets (15 mg) by mouth 2 times daily (with meals) for 21 days, THEN 8 tablets (20 mg) daily (with dinner) for 90 days. 972 tablet 0    tamsulosin (FLOMAX) 0.4 MG capsule TAKE 1 CAPSULE BY MOUTH EVERY EVENING 90 capsule 3     No current facility-administered medications for this visit.       PAST SURGICAL HISTORY:  Past Surgical History:   Procedure Laterality Date    ABDOMEN SURGERY  2003    Exploratory laparoscopy    COMBINED CYSTOSCOPY, INSERT STENT URETER(S) Left 8/31/2021    Procedure: Cystoscopy with left ureteral stent placement;  Surgeon: Devan Bergeron MD;  Location:  OR    COMBINED CYSTOSCOPY, RETROGRADES, URETEROSCOPY, LASER HOLMIUM LITHOTRIPSY URETER(S), INSERT STENT Right 5/23/2019    Procedure: Video cystoscopy, exam under anesthesia, right ureteroscopy with holmium laser lithotripsy and stone extractions, right double-J stent (5-Welsh x 24 cm).;  Surgeon: Satinder Almanza MD;  Location:  OR    COMBINED  CYSTOSCOPY, RETROGRADES, URETEROSCOPY, LASER HOLMIUM LITHOTRIPSY URETER(S), INSERT STENT Left 9/30/2021    Procedure: Cystoscopy, left ureteral stent exchange, left ureteroscopy with holmium laser lithotripsy and stone basketing. left retrogrades;  Surgeon: Devan Bergeron MD;  Location:  OR    EXTRACORPOREAL SHOCK WAVE LITHOTRIPSY (ESWL) Left 8/31/2021    Procedure: Bilateral extracorporeal shockwave lithotripsy;  Surgeon: Devan Bergeron MD;  Location:  OR    EXTRACORPOREAL SHOCK WAVE LITHOTRIPSY (ESWL) Bilateral 5/25/2023    Procedure: BILATERAL EXTRACORPOREAL SHOCK WAVE LITHOTRIPSY;  Surgeon: Devan Bergeron MD;  Location:  OR    HC EXCISE VARICOCELE  age 21     varicocele repair    HERNIORRHAPHY UMBILICAL N/A 11/30/2018    Procedure: OPEN UMBILLICAL HERNIA REPAIR ;  Surgeon: Ike Catalan MD;  Location:  OR    IR PULMONARY ANGIOGRAM BILATERAL  4/11/2024    KIDNEY SURGERY      lithotripsy x 3    LAPAROSCOPIC HERNIORRHAPHY INGUINAL Left 9/26/2016    Procedure: LAPAROSCOPIC HERNIORRHAPHY INGUINAL;  Surgeon: Ike Catalan MD;  Location:  [facility]    LITHOTRIPSY      three times    STOMACH SURGERY  2003    exploratory scoping looking for tumors related to ZE syndrome       ALLERGIES     Allergies   Allergen Reactions    Dye [Contrast Dye] Anaphylaxis and Hives     Patient developed throat swelling, nausea, vomiting and respiratory distress 4/11.    Penicillins Anaphylaxis     Swelling, arm turned blue    Tramadol Itching    Oxycodone-Acetaminophen Rash       FAMILY HISTORY  Family History   Problem Relation Age of Onset    Heart Disease Mother         ablation    C.A.D. Mother     Heart Disease Father     Respiratory Father         COPD    C.A.D. Father     Spina bifida Son        VASCULAR FAMILY HISTORY  1st order relative with atherosclerotic PAD: No  1st order relative with AAA: No  Family history of Familial Hyperlipidemia No  Family History of Hypercoagulable  state:No    VASCULAR RISK FACTORS  1. Diabetes:No   2. Smoking: has never smoked.  3. HTN: controlled  4.Hyperlipidemia: NO      SOCIAL HISTORY  Social History     Socioeconomic History    Marital status:      Spouse name: Not on file    Number of children: Not on file    Years of education: Not on file    Highest education level: Not on file   Occupational History    Not on file   Tobacco Use    Smoking status: Never    Smokeless tobacco: Never   Vaping Use    Vaping status: Not on file   Substance and Sexual Activity    Alcohol use: Yes     Comment: seldom    Drug use: No    Sexual activity: Not Currently   Other Topics Concern    Parent/sibling w/ CABG, MI or angioplasty before 65F 55M? No   Social History Narrative    Not on file     Social Determinants of Health     Financial Resource Strain: Low Risk  (12/20/2023)    Financial Resource Strain     Within the past 12 months, have you or your family members you live with been unable to get utilities (heat, electricity) when it was really needed?: No   Food Insecurity: Low Risk  (12/20/2023)    Food Insecurity     Within the past 12 months, did you worry that your food would run out before you got money to buy more?: No     Within the past 12 months, did the food you bought just not last and you didn t have money to get more?: No   Transportation Needs: Low Risk  (12/20/2023)    Transportation Needs     Within the past 12 months, has lack of transportation kept you from medical appointments, getting your medicines, non-medical meetings or appointments, work, or from getting things that you need?: No   Physical Activity: Not on file   Stress: Not on file   Social Connections: Not on file   Interpersonal Safety: Low Risk  (12/20/2023)    Interpersonal Safety     Do you feel physically and emotionally safe where you currently live?: Yes     Within the past 12 months, have you been hit, slapped, kicked or otherwise physically hurt by someone?: No     Within  "the past 12 months, have you been humiliated or emotionally abused in other ways by your partner or ex-partner?: No   Housing Stability: Low Risk  (12/20/2023)    Housing Stability     Do you have housing? : Yes     Are you worried about losing your housing?: No       ROS:   General: No change in weight, sleep or appetite.  Normal energy.  No fever or chills  Eyes: Negative for vision changes or eye problems  ENT: No problems with ears, nose or throat.  No difficulty swallowing.  Resp: No coughing, wheezing or shortness of breath  CV: No chest pains or palpitations  GI: No nausea, vomiting,  heartburn, abdominal pain, diarrhea, constipation or change in bowel habits  : No urinary frequency or dysuria, bladder or kidney problems  Musculoskeletal: No significant muscle or joint pains  Neurologic: No headaches, numbness, tingling, weakness, problems with balance or coordination  Psychiatric: No problems with anxiety, depression or mental health  Heme/immune/allergy: No history of bleeding or clotting problems or anemia.  No allergies or immune system problems  Endocrine: No history of thyroid disease, diabetes or other endocrine disorders  Skin: No rashes,worrisome lesions or skin problems  Vascular: Recent history of left lower extremity DVT involving popliteal vein and also below-knee veins  Bilateral extensive PE status post mechanical thrombectomy in April 11, 2024    EXAM:  /66 (BP Location: Left arm, Patient Position: Chair, Cuff Size: Adult Regular)   Pulse 94   Ht 5' 7\" (1.702 m)   Wt 169 lb 9.6 oz (76.9 kg)   SpO2 93%   BMI 26.56 kg/m    In general, the patient is a pleasant male in no apparent distress.    HEENT: NC/AT.  PERRLA.  EOMI.  Sclerae white, not injected.  Nares clear.  Pharynx without erythema or exudate.  Dentition intact.    Neck: No adenopathy.  No thyromegaly. Carotids +2/2 bilaterally without bruits.  No jugular venous distension.   Heart: RRR. Normal S1, S2 splits " physiologically. No murmur, rub, click, or gallop. The PMI is in the 5th ICS in the midclavicular line. There is no heave.    Lungs: CTA.  No ronchi, wheezes, rales.  No dullness to percussion.   Abdomen: Soft, nontender, nondistended. No organomegaly. No AAA.  No bruits.   Extremities: Left leg is slightly larger than right leg but well-perfused  Palpable symmetrical peripheral pulses      Labs:  LIPID RESULTS:  Lab Results   Component Value Date    CHOL 207 (H) 03/01/2023    CHOL 190 04/30/2021    HDL 42 03/01/2023    HDL 45 04/30/2021     (H) 03/01/2023     (H) 04/30/2021    TRIG 148 03/01/2023    TRIG 165 (H) 04/30/2021    CHOLHDLRATIO 5.3 (H) 09/14/2015       LIVER ENZYME RESULTS:  Lab Results   Component Value Date    AST 27 04/11/2024    AST 17 04/30/2021    ALT 32 04/11/2024    ALT 25 04/30/2021       CBC RESULTS:  Lab Results   Component Value Date    WBC 6.1 04/12/2024    WBC 7.0 04/30/2021    RBC 4.30 (L) 04/12/2024    RBC 5.22 04/30/2021    HGB 10.5 (L) 04/12/2024    HGB 14.7 04/30/2021    HCT 33.9 (L) 04/12/2024    HCT 46.3 04/30/2021    MCV 79 04/12/2024    MCV 89 04/30/2021    MCH 24.4 (L) 04/12/2024    MCH 28.2 04/30/2021    MCHC 31.0 (L) 04/12/2024    MCHC 31.7 04/30/2021    RDW 15.9 (H) 04/12/2024    RDW 14.2 04/30/2021     04/12/2024     04/30/2021       BMP RESULTS:  Lab Results   Component Value Date     04/12/2024     04/30/2021    POTASSIUM 4.5 04/12/2024    POTASSIUM 3.9 08/18/2021    POTASSIUM 3.7 04/30/2021    CHLORIDE 106 04/12/2024    CHLORIDE 106 07/21/2021    CHLORIDE 107 04/30/2021    CO2 26 04/12/2024    CO2 30 07/21/2021    CO2 30 04/30/2021    ANIONGAP 9 04/12/2024    ANIONGAP 1 (L) 07/21/2021    ANIONGAP 2 (L) 04/30/2021     (H) 04/12/2024     (H) 07/21/2021    GLC 94 04/30/2021    BUN 15.8 04/12/2024    BUN 16 07/21/2021    BUN 18 04/30/2021    CR 0.95 04/12/2024    CR 0.95 04/30/2021    GFRESTIMATED 82 04/12/2024     "GFRESTIMATED 78 04/30/2021    GFRESTBLACK >90 04/30/2021    CLIFTON 8.5 (L) 04/12/2024    CLIFTON 8.9 04/30/2021        Procedures:     ECHO 4/11/24   \"Interpretation Summary     Technically challenging study due to patient body habitus.     Left ventricular systolic function is normal. The visual ejection fraction is  55-60%. Flattened septum is consistent with RV pressure overload.  Right ventricle is moderately dilated. The right ventricular systolic function  is moderately reduced. Relatively preserved contractility at the base and  apex, with severe hypokinesis of the lateral free wall, this pattern of wall  motion can be seen in the setting of acute pulmonary emoblism. Clinical  correlation recommended.  Pulmonary hypertension present. The right ventricular systolic pressure is  approximated at 41mmHg plus the right atrial pressure.  Dilation of the inferior vena cava is present with abnormal respiratory  variation in diameter.     Findings communicated to RN who will interface with staff.\"      EXAM: US LOWER EXTREMITY VENOUS DUPLEX BILATERAL  LOCATION: [facility]  DATE: 4/11/2024     INDICATION: Left leg swelling.  COMPARISON: None.  TECHNIQUE: Venous Duplex ultrasound of bilateral lower extremities with and without compression, augmentation and duplex. Color flow and spectral Doppler with waveform analysis performed.     FINDINGS: Exam includes the common femoral, femoral, popliteal veins as well as segmentally visualized deep calf veins and greater saphenous vein.      RIGHT: No deep vein thrombosis. No superficial thrombophlebitis. No popliteal cyst.     LEFT: Nearly occlusive DVT popliteal and posterior tibial veins. No superficial thrombophlebitis. No popliteal cyst.                                                                      IMPRESSION:  1.  Nearly occlusive DVT within the left popliteal and posterior tibial veins.  2.  Right leg negative for DVT.         3.  Results of DVT " given to Dr. Castillo at 0624    EXAM: CT CHEST PULMONARY EMBOLISM W CONTRAST  LOCATION: [facility]  DATE: 4/11/2024     INDICATION: shortness of breath, hypoxia; Male sex; No prior imaging in the last 24 hours; Pulmonary Embolism Rule Out Criteria (PERC) score > 0; Revised Acadia Score (RGS) not >= 11; No D dimer result available; D dimer not ordered  COMPARISON: Same day 2 view chest radiograph  TECHNIQUE: CT chest pulmonary angiogram during arterial phase injection of IV contrast. Multiplanar reformats and MIP reconstructions were performed. Dose reduction techniques were used.   CONTRAST: 64mL isovue 370     FINDINGS:  ANGIOGRAM CHEST: Extensive bilateral pulmonary artery emboli involving the distal main pulmonary arteries and extending into numerous lobar, segmental, and subsegmental branches of both lungs. There is asymmetric enlargement of the right heart compatible   with a degree of right heart strain. Thoracic aorta is negative for dissection. Mild atherosclerotic calcifications of the aortic arch.     LUNGS AND PLEURA: No focal pulmonary consolidation or pleural effusion. No pneumothorax. Scattered subsegmental atelectasis.     MEDIASTINUM/AXILLAE: Prominent mediastinal lymph nodes which may be reactive.     CORONARY ARTERY CALCIFICATION: No significant.     UPPER ABDOMEN: Hepatic cysts which require no dedicated follow-up. Partially visualized prominent left renal collecting system and small nonobstructing left renal calyceal calculi.     MUSCULOSKELETAL: Mild degenerative changes of the cervicothoracic spine. No acute osseous abnormality or suspicious bony lesion.                                                                      IMPRESSION:  1.  Extensive bilateral pulmonary artery emboli involving the distal main pulmonary arteries and extending into numerous lobar, segmental, and subsegmental branches of both lungs. There is asymmetric enlargement of the right heart  compatible with a   degree of right heart strain.         Assessment and Plan:     1. Bilateral pulmonary embolism with RT heart strain s/p pulm Thrombectomy 4/11/2024  (H) First life time Te event unprovoked  - Vascular Medicine Referral  - Echocardiogram Complete; Future    2. Acute deep vein thrombosis (DVT) of popliteal vein and PTV  of left lower extremity (H) 4/11/2024  - Compression Sleeve/Stocking Order for DME - ONLY FOR DME    3. Benign essential hypertension    4. Nephrolithiasis    This is a very pleasant 79-year-old male with recent extensive bilateral pulmonary embolism with syncope and left lower extremity DVT admitted to the hospital underwent successful pulmonary thrombectomy and he was hypoxic still using overnight oxygen now and then taking Xarelto 15 mg twice a day for 21 days then followed by 20 mg daily tolerating without any problems.  He is not using any compression stockings.  He was hypotensive and his amlodipine was stopped in the hospital and still blood pressure systolic 101-1 20 range  He never smoked.  Up-to-date with age and gender appropriate cancer screening  No personal history of malignancy and no family history of hypercoagulable status  This is his first lifetime thromboembolic event appears unprovoked.  Echocardiogram flattened septum with positive Lovett sign and decreased RV function but normal LV function    Since it is first left him thromboembolic event and unprovoked no need for hypercoagulable studies  For for 6 months he will benefit with full dose anticoagulation  He is a long-term/lifetime candidate for anticoagulation if tolerates maintenance dose  Will benefit with compression stockings both knee-high and thigh-high 20 to 30 mmHg prescribed suggested him to use the thigh-high ideally if he cannot handle it then try the knee-high during the daytime and elevate the legs  DME prescription given    Suggested patient to check the pill bottles at home whether he has a  15 mg tablets and 20 mg tablets of Xarelto or 2.5 mg and update clinic.    Follow-up with interventional radiologist Dr. Elder after leg venous duplex in May as scheduled    Follow-up with me in 1 month after completion of the echocardiogram, order placed  Watch for any blood in the urine given history of nephrolithiasis and recent initiation of blood thinners          60 minutes spent on the date of the encounter doing chart review, history and exam, documentation, and further activities as noted above.    The longitudinal care of plan for the above diagnoses was addressed during this visit. Due to added complexity of care, we will continue to supprt Stanford Andersen and the subsequent management of this/these conditions and with ongoing continuity of care for this/these conditions.     AVS with written instructions given    Thank you for the consultation  This note was dictated by dragon dragon software    Copy of this note to primary care physician and referring physician    Chino Morales MD,FAHA,FSVM,FNLA, FACP  Vascular Medicine  Clinical Hypertension Specialist   Clinical Lipidologist

## 2024-04-24 NOTE — PATIENT INSTRUCTIONS
Continue Xarelto 15 mg twice a day for 21 days then take 20 mg daily with food     Please check your pill bottles and update the dose of Xarelto     Watch for any blood in urine given history of kidney stones     Follow up with Dr. Elder after leg ultrasound     Use compression stockings day time and elevate legs , ideally thigh high if not knee high DME Rx given     Follow up with me in one month after Echocardiogram

## 2024-04-30 ENCOUNTER — TELEPHONE (OUTPATIENT)
Dept: OTHER | Facility: CLINIC | Age: 79
End: 2024-04-30
Payer: COMMERCIAL

## 2024-05-01 NOTE — TELEPHONE ENCOUNTER
Left voicemail with instructions for patient to call back to schedule their appointment(s)    May 1, 2024 , 9:56 AM

## 2024-05-02 NOTE — TELEPHONE ENCOUNTER
Future Appointments   Date Time Provider Department Center   5/29/2024  1:45 PM [id number]   6/5/2024  2:00 PM Chino Morales MD [facility]

## 2024-05-03 ENCOUNTER — LAB (OUTPATIENT)
Dept: LAB | Facility: CLINIC | Age: 79
End: 2024-05-03
Payer: COMMERCIAL

## 2024-05-03 DIAGNOSIS — N20.0 NEPHROLITHIASIS: ICD-10-CM

## 2024-05-03 DIAGNOSIS — Z13.6 CARDIOVASCULAR SCREENING; LDL GOAL LESS THAN 130: ICD-10-CM

## 2024-05-03 DIAGNOSIS — E16.4 ZOLLINGER-ELLISON SYNDROME: ICD-10-CM

## 2024-05-03 DIAGNOSIS — I10 ESSENTIAL HYPERTENSION: Chronic | ICD-10-CM

## 2024-05-03 LAB
ERYTHROCYTE [DISTWIDTH] IN BLOOD BY AUTOMATED COUNT: 15.4 % (ref 10–15)
HCT VFR BLD AUTO: 30.5 % (ref 40–53)
HGB BLD-MCNC: 9.2 G/DL (ref 13.3–17.7)
MCH RBC QN AUTO: 24 PG (ref 26.5–33)
MCHC RBC AUTO-ENTMCNC: 30.2 G/DL (ref 31.5–36.5)
MCV RBC AUTO: 79 FL (ref 78–100)
PLATELET # BLD AUTO: 352 10E3/UL (ref 150–450)
RBC # BLD AUTO: 3.84 10E6/UL (ref 4.4–5.9)
WBC # BLD AUTO: 7.8 10E3/UL (ref 4–11)

## 2024-05-03 PROCEDURE — 80061 LIPID PANEL: CPT

## 2024-05-03 PROCEDURE — 36415 COLL VENOUS BLD VENIPUNCTURE: CPT

## 2024-05-03 PROCEDURE — 80048 BASIC METABOLIC PNL TOTAL CA: CPT

## 2024-05-03 PROCEDURE — 85027 COMPLETE CBC AUTOMATED: CPT

## 2024-05-04 LAB
ANION GAP SERPL CALCULATED.3IONS-SCNC: 12 MMOL/L (ref 7–15)
BUN SERPL-MCNC: 19.7 MG/DL (ref 8–23)
CALCIUM SERPL-MCNC: 8.8 MG/DL (ref 8.8–10.2)
CHLORIDE SERPL-SCNC: 106 MMOL/L (ref 98–107)
CHOLEST SERPL-MCNC: 176 MG/DL
CREAT SERPL-MCNC: 1 MG/DL (ref 0.67–1.17)
DEPRECATED HCO3 PLAS-SCNC: 25 MMOL/L (ref 22–29)
EGFRCR SERPLBLD CKD-EPI 2021: 77 ML/MIN/1.73M2
FASTING STATUS PATIENT QL REPORTED: YES
GLUCOSE SERPL-MCNC: 106 MG/DL (ref 70–99)
HDLC SERPL-MCNC: 35 MG/DL
LDLC SERPL CALC-MCNC: 116 MG/DL
NONHDLC SERPL-MCNC: 141 MG/DL
POTASSIUM SERPL-SCNC: 3.9 MMOL/L (ref 3.4–5.3)
SODIUM SERPL-SCNC: 143 MMOL/L (ref 135–145)
TRIGL SERPL-MCNC: 127 MG/DL

## 2024-05-07 ENCOUNTER — OFFICE VISIT (OUTPATIENT)
Dept: INTERNAL MEDICINE | Facility: CLINIC | Age: 79
End: 2024-05-07
Payer: COMMERCIAL

## 2024-05-07 VITALS
TEMPERATURE: 98.1 F | RESPIRATION RATE: 20 BRPM | OXYGEN SATURATION: 97 % | DIASTOLIC BLOOD PRESSURE: 76 MMHG | BODY MASS INDEX: 26.18 KG/M2 | HEIGHT: 67 IN | HEART RATE: 97 BPM | SYSTOLIC BLOOD PRESSURE: 126 MMHG | WEIGHT: 166.8 LBS

## 2024-05-07 DIAGNOSIS — R35.1 BPH ASSOCIATED WITH NOCTURIA: ICD-10-CM

## 2024-05-07 DIAGNOSIS — B07.9 FILIFORM WART: ICD-10-CM

## 2024-05-07 DIAGNOSIS — D62 ANEMIA DUE TO BLOOD LOSS, ACUTE: ICD-10-CM

## 2024-05-07 DIAGNOSIS — E16.4 ZOLLINGER-ELLISON SYNDROME: ICD-10-CM

## 2024-05-07 DIAGNOSIS — N40.1 BPH ASSOCIATED WITH NOCTURIA: ICD-10-CM

## 2024-05-07 DIAGNOSIS — L57.0 ACTINIC KERATOSIS: ICD-10-CM

## 2024-05-07 DIAGNOSIS — Z00.00 MEDICARE ANNUAL WELLNESS VISIT, SUBSEQUENT: Primary | ICD-10-CM

## 2024-05-07 DIAGNOSIS — I26.99 BILATERAL PULMONARY EMBOLISM (H): ICD-10-CM

## 2024-05-07 PROBLEM — J96.01 ACUTE RESPIRATORY FAILURE WITH HYPOXIA (H): Status: RESOLVED | Noted: 2024-04-11 | Resolved: 2024-05-07

## 2024-05-07 LAB
HGB BLD-MCNC: 9.3 G/DL (ref 13.3–17.7)
IRON BINDING CAPACITY (ROCHE): 284 UG/DL (ref 240–430)
IRON SATN MFR SERPL: 5 % (ref 15–46)
IRON SERPL-MCNC: 14 UG/DL (ref 61–157)

## 2024-05-07 PROCEDURE — 85018 HEMOGLOBIN: CPT | Performed by: INTERNAL MEDICINE

## 2024-05-07 PROCEDURE — 83540 ASSAY OF IRON: CPT | Performed by: INTERNAL MEDICINE

## 2024-05-07 PROCEDURE — G0439 PPPS, SUBSEQ VISIT: HCPCS | Performed by: INTERNAL MEDICINE

## 2024-05-07 PROCEDURE — 83550 IRON BINDING TEST: CPT | Performed by: INTERNAL MEDICINE

## 2024-05-07 PROCEDURE — 99214 OFFICE O/P EST MOD 30 MIN: CPT | Mod: 25 | Performed by: INTERNAL MEDICINE

## 2024-05-07 PROCEDURE — 36415 COLL VENOUS BLD VENIPUNCTURE: CPT | Performed by: INTERNAL MEDICINE

## 2024-05-07 RX ORDER — OMEPRAZOLE 40 MG/1
40 CAPSULE, DELAYED RELEASE ORAL 2 TIMES DAILY
Qty: 180 CAPSULE | Refills: 3 | Status: SHIPPED | OUTPATIENT
Start: 2024-05-07

## 2024-05-07 SDOH — HEALTH STABILITY: PHYSICAL HEALTH: ON AVERAGE, HOW MANY DAYS PER WEEK DO YOU ENGAGE IN MODERATE TO STRENUOUS EXERCISE (LIKE A BRISK WALK)?: 2 DAYS

## 2024-05-07 SDOH — HEALTH STABILITY: PHYSICAL HEALTH: ON AVERAGE, HOW MANY MINUTES DO YOU ENGAGE IN EXERCISE AT THIS LEVEL?: 20 MIN

## 2024-05-07 ASSESSMENT — PAIN SCALES - GENERAL: PAINLEVEL: NO PAIN (0)

## 2024-05-07 ASSESSMENT — SOCIAL DETERMINANTS OF HEALTH (SDOH): HOW OFTEN DO YOU GET TOGETHER WITH FRIENDS OR RELATIVES?: ONCE A WEEK

## 2024-05-07 NOTE — PATIENT INSTRUCTIONS
Take Omeprazole 40 mg twice per day every day.  Use the Good RX coupon if needed for cheaper price.      Continue the anticoagulation medications as instructed (for at least 6 months)     Let us know if you develop any serious bleeding in the urine, or if you develop black tarry stools.       Follow all direction from the vascular clinic.     Follow up with the Cardiology Clinic as planned in June.      Continue all other medications at the same doses.  Contact your usual pharmacy if you need refills.      Tamsulosin (Flomax) in the evening, take finasteride at any time.       Investigate a Shingrix shingles vaccine with your pharmacist .  They can tell you the coverage and cost and then give it to you if the price is acceptable.    Medicare sometimes does not cover these Shingrix shingles vaccines, and with Medicare insurance it is usually cheaper to receive this shingles vaccine from a pharmacist in a pharmacy rather than in our clinic.    At this time, you only need the 2 Shingrix vaccines and then you are done.      Return to see the urologist to discuss the bladder symptoms, kidney stones.   If the vascular doctor is concerned about cancer screening, he may want your bladder looked at.       Make sure to see Dermatology Clinic to evaluate and possibly remove the large growth on the top of your scalp.  You will need to be on the anticoagulation medication for 6 months, so they may not be able to remove it for a few motnhs, but at least they can determine how urgent it is to remove.     [facility]   [address]   [facility] [address]   Phone: [phone]       OR    [facility]   [address]  [address]   [facility] [address]   Phone: [phone]          Return to see me in 6 months, sooner if needed.  Use [employer] or Call [phone] to schedule the appointment with me.     Preventive Health Recommendations:   Male Ages 75 and  "over    Yearly exam:             See your health care provider every year in order to  o   Review health changes.   o   Discuss preventive care.    o   Review your medicines if your doctor has prescribed any.  Regular screening for diabetes. If you are at risk for diabetes, you should have this test more often.  At least every 5 years, have a cholesterol test. Have this test more often if you are at risk for high cholesterol or heart disease.   Routine colon cancer screening no longer indicated over age 80.  (If you ever need a colonoscopy over age 80, we would perform it but it would be a \"diagnostic\" colonoscopy)  Routine prostate cancer screening no longer indicated over age 75.  Talk to with your health care provider about screening for Abdominal Aortic Aneurysm if you have a family history of AAA or have a history of smoking.    Shots:   Get an annual influenza vaccine (flu shot) each year.   Get a tetanus shot every 10 years.   Everyone over 65 should make sure to receive pneumonia vaccines to prevent the most common type of bacterial pneumonia: Pneumococcal pneumonia. There are now two you should receive - Pneumovax (PPSV 23) and Prevnar (PCV 20).   Talk to your doctor about a shingles vaccine.   Talk to your doctor about the hepatitis B vaccine.  Covid vaccines are now recommended annually.  Consider getting this at the same time as the annual influenza vaccine.   Nutrition:   Eat at least 5 servings of fruits and vegetables each day.   Eat whole-grain bread, whole-wheat pasta and brown rice instead of white grains and rice.   Talk to your provider about Calcium and Vitamin D.      --Good Grains:  Oats, brown rice, Quinoa (these do not raise the blood sugar as much)     --Bad grains:  Anything made from wheat or white rice     (because these raise the blood sugars significantly, and the possible gluten issue from wheat for some people).      --Proteins:  Aim for \"lean proteins\" including chicken, fish, " seafood, pork, turkey, and eggs (in moderation); Eat red meat only occasionally    Lifestyle  Exercise for at least 150 minutes a week (30 minutes a day, 5 days a week). This will help you control your weight and prevent disease.   Limit alcohol to one drink per day.   No smoking.   Wear sunscreen to prevent skin cancer.   See your dentist every six months for an exam and cleaning.   See your eye doctor every 1 to 2 years to screen for conditions such as glaucoma, macular degeneration, cataracts, etc

## 2024-05-07 NOTE — PROGRESS NOTES
Preventive Care Visit  [facility]  Charlie Lawrence MD, Internal Medicine  May 7, 2024      Assessment & Plan     (Z00.00) Medicare annual wellness visit, subsequent  (primary encounter diagnosis)  Comment: Discussed cardiac disease risk factors and cardiac disease risk factor modification, including diabetes screening, blood pressure screening (and management if indicated), and cholesterol screening.   Reviewed immunzation guidelines, including pneumococcal vaccines, annual influenza, and shingles vaccines.   Discussed routine cancer screenings, including skin cancer, colon cancer screening for everyone until age 80, prostate cancer screening in men until age 75, mammogram and PAP/pelvic for women until age 75.   Recommended regular dentist visits to care for remaining teeth.   Recommended regular screening for vision and glaucoma.   Recommended safe driving and accident avoidance.    Counseling:    Reviewed preventive health counseling, as reflected in patient instructions       Regular exercise       Healthy diet/nutrition       Vision screening       Hearing screening       Immunizations  Recommended routine vaccination updates, but the patient declined today.         Colorectal cancer screening       Routine prostate cancer screening no longer indicated        Patient has been advised of split billing requirements and indicates understanding: Yes   Plan:     (D62) Anemia due to blood loss, acute  Comment: recheck labs   Plan: Hemoglobin, Iron & Iron Binding Capacity            (E16.4) Zollinger-Patricio syndrome  Comment:   Plan: omeprazole (PRILOSEC) 40 MG DR capsule            (I26.99) Bilateral pulmonary embolism (H)  Comment: This condition is currently controlled on the current medical regimen.  Continue current therapy.   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  Continue current  "therapy.   Plan:     (B07.9) Filiform wart  Comment: referral to derma for removal   Plan: Adult Dermatology  Referral, Adult         Dermatology  Referral            (L57.0) Actinic keratosis  Comment:   Plan: Adult Dermatology  Referral, Adult         Dermatology  Referral               Patient has been advised of split billing requirements and indicates understanding: Yes        BMI  Estimated body mass index is 26.12 kg/m  as calculated from the following:    Height as of this encounter: 1.702 m (5' 7\").    Weight as of this encounter: 75.7 kg (166 lb 12.8 oz).       Counseling  Appropriate preventive services were discussed with this patient, including applicable screening as appropriate for fall prevention, nutrition, physical activity, Tobacco-use cessation, weight loss and cognition.  Checklist reviewing preventive services available has been given to the patient.          Celsa Coyne is a 79 year old, presenting for the following:  Medicare Visit        5/7/2024    12:54 PM   Additional Questions   Roomed by Lacy ROME [facility]     Health Care Directive  Patient does not have a Health Care Directive or Living Will:     HPI        1.)  history of zollinger mccurdy syndrome. Taking PPI daily.   Still has mild symptoms of acid refluc, no melena.     2.)  history of recurrnet PE  Takign xarelto without reported side effects or difficulties.           5/7/2024   General Health   How would you rate your overall physical health? (!) POOR   Feel stress (tense, anxious, or unable to sleep) To some extent   (!) STRESS CONCERN      5/7/2024   Nutrition   Diet: Regular (no restrictions)         5/7/2024   Exercise   Days per week of moderate/strenous exercise 2 days   Average minutes spent exercising at this level 20 min   (!) EXERCISE CONCERN      5/7/2024   Social Factors   Frequency of gathering with friends or relatives Once a week   Worry food won't last until get money to " buy more No   Food not last or not have enough money for food? No   Do you have housing?  Yes   Are you worried about losing your housing? No   Lack of transportation? Yes   Unable to get utilities (heat,electricity)? No    (!) TRANSPORTATION CONCERN PRESENT      5/7/2024   Fall Risk   Fallen 2 or more times in the past year? No   Trouble with walking or balance? Yes   Gait Speed Test (Document in seconds) 4.66   Gait Speed Test Interpretation Less than or equal to 5.00 seconds - PASS          5/7/2024   Activities of Daily Living- Home Safety   Needs help with the following daily activites None of the above   Safety concerns in the home None of the above         5/7/2024   Dental   Dentist two times every year? Yes         5/7/2024   Hearing Screening   Hearing concerns? (!) I NEED TO ASK PEOPLE TO SPEAK UP OR REPEAT THEMSELVES.    (!) TROUBLE UNDERSTANDING SOFT OR WHISPERED SPEECH.         5/7/2024   Driving Risk Screening   Patient/family members have concerns about driving No         5/7/2024   General Alertness/Fatigue Screening   Have you been more tired than usual lately? (!) YES         5/7/2024   Urinary Incontinence Screening   Bothered by leaking urine in past 6 months Yes         5/7/2024   TB Screening   Were you born outside of the [place]? No         Today's PHQ-2 Score:       5/7/2024    12:18 PM   PHQ-2 ( 1999 Pf[employer])   Q1: Little interest or pleasure in doing things 1   Q2: Feeling down, depressed or hopeless 1   PHQ-2 Score 2   Q1: Little interest or pleasure in doing things Several days   Q2: Feeling down, depressed or hopeless Several days   PHQ-2 Score 2           5/7/2024   Substance Use   Alcohol more than 3/day or more than 7/wk No   Do you have a current opioid prescription? No   How severe/bad is pain from 1 to 10? 2/10   Do you use any other substances recreationally? No     Social History     Tobacco Use    Smoking status: Never    Smokeless tobacco: Never   Vaping Use    Vaping status: Never  Used   Substance Use Topics    Alcohol use: Yes     Comment: seldom    Drug use: No                 Reviewed and updated as needed this visit by Provider     Meds                **I reviewed the information recorded in the patient's EPIC chart (including but not limited to medical history, surgical history, family history, problem list, medication list, and allergy list) and updated the information as indicated based on the patients reported information.     Current providers sharing in care for this patient include:  Patient Care Team:  Charlie Lawrence MD as PCP - General (Internal Medicine)  Wanda Owens PA-C as Physician Assistant (Urology)  Charlie Lawrence MD as Assigned PCP  Devan Bergeron MD as Assigned Surgical Provider  Jennifer Elder DO as Assigned Heart and Vascular Provider    The following health maintenance items are reviewed in Epic and correct as of today:  Health Maintenance   Topic Date Due    ZOSTER IMMUNIZATION (1 of 2) Never done    COVID-19 Vaccine (3 - 2023-24 season) 09/01/2023    Pneumococcal Vaccine: 65+ Years (2 of 2 - PCV) 03/16/2024    ANNUAL REVIEW OF HM ORDERS  12/20/2024    BMP  05/03/2025    LIPID  05/03/2025    CBC  05/03/2025    MEDICARE ANNUAL WELLNESS VISIT  05/07/2025    FALL RISK ASSESSMENT  05/07/2025    GLUCOSE  05/03/2027    ADVANCE CARE PLANNING  03/05/2028    DTAP/TDAP/TD IMMUNIZATION (3 - Td or Tdap) 12/20/2033    HEPATITIS C SCREENING  Completed    PHQ-2 (once per calendar year)  Completed    INFLUENZA VACCINE  Completed    RSV VACCINE (Pregnancy & 60+)  Completed    IPV IMMUNIZATION  Aged Out    HPV IMMUNIZATION  Aged Out    MENINGITIS IMMUNIZATION  Aged Out    RSV MONOCLONAL ANTIBODY  Aged Out    COLORECTAL CANCER SCREENING  Discontinued         Review of Systems  Constitutional, HEENT, cardiovascular, pulmonary, gi and gu systems are negative, except as otherwise noted.     Objective    Exam  /76   Pulse 97   Temp  "98.1  F (36.7  C) (Oral)   Resp 20   Ht 1.702 m (5' 7\")   Wt 75.7 kg (166 lb 12.8 oz)   SpO2 97%   BMI 26.12 kg/m     Estimated body mass index is 26.12 kg/m  as calculated from the following:    Height as of this encounter: 1.702 m (5' 7\").    Weight as of this encounter: 75.7 kg (166 lb 12.8 oz).    Physical Exam  GENERAL alert and no distress  EYES:  Normal sclera,conjunctiva, EOMI  HENT: oral and posterior pharynx without lesions or erythema, facies symmetric  NECK: Neck supple. No LAD, without thyroidmegaly.  RESP: Clear to ausculation bilaterally without wheezes or crackles. Normal BS in all fields.  CV: RRR normal S1S2 without murmurs, rubs or gallops.  LYMPH: no cervical lymph adenopathy appreciated  MS: extremities- no gross deformities of the visible extremities noted,   EXT:  no lower extremity edema  PSYCH: Alert and oriented times 3; speech- coherent  SKIN:  No obvious significant skin lesions on visible portions of face   Many actinic keratoses on trunk and chest.        5/7/2024   Mini Cog   Clock Draw Score 2 Normal   3 Item Recall 3 objects recalled   Mini Cog Total Score 5              Signed Electronically by: Charlie Lawrence MD    "

## 2024-05-07 NOTE — COMMUNITY RESOURCES LIST (ENGLISH)
May 7, 2024           YOUR PERSONALIZED LIST OF SERVICES & PROGRAMS               Medical Transportation, (NEMT)      - Transportation to medical appointments  [address] [facility] [address] [place] [address] (Distance: 10.5 miles)  Phone: [phone]  Website: https://www.[employer]/  Language: English  Fee: Self pay      - [place] - Non-Emergency Medical Transportation  [address] [facility] [address] [place] [address] (Distance: 7.5 miles)  Phone: [phone]  Website: http://[email or web address]/[place]/  Language: English, [place] citizen, [place] African  Fee: Insurance  Accessibility: Ada accessible      Social Service [facility] - Neighbor to Neighbor Program  Phone: ([phone]) [phone]  Email: [email or web address]@[email or web address]  Website: https://www.[facility].org/services/older-adults/-services/neighbor-to-neighbor  Language: English  Hours: Mon 8:00 AM - 5:00 PM Tue 8:00 AM - 5:00 PM Wed 8:00 AM - 5:00 PM Thu 8:00 AM - 5:00 PM Fri 8:00 AM - 5:00 PM  Fee: Insurance, Self pay  Accessibility: Deaf or hard of hearing, Blind accommodation, Translation services    Expense Assistance      [place] - Transit Link  [address] [place] [address] (Distance: 12.7 miles)  Phone: (395) [phone]  Website: https://[employer]/Transportation/Services/Transit-Link.aspx  Language: English  Fee: Self pay      Transit - [place] - Transit Assistance Program (TAP) - Transportation expense assistance  [address] [place] [address] (Distance: 12.6 miles)  Language: English, [place] citizen  Fee: Free, Sliding scale, Self pay  Accessibility: Translation services      - Dislocated Worker/Adult [employer] Employment Program  Phone: (989) 598-8050  Email: mauri@[email or web address]  Website: https://[employer]org/services/employment-services/dislocated-worker-program/  Language: English, [place] African  Hours: Mon 8:00 AM - 4:30 PM Tue 8:00 AM - 4:30 PM Wed 8:00 AM - 4:30 PM Thu 8:00 AM - 4:30 PM Fri 8:00 AM - 4:30 PM  Fee: Free   Accessibility: Ada accessible    Coordination      [facility] - Bus Passes - Free or low-cost transportation  [address] [place] [address] (Distance: 10.8 miles)  Phone: [phone]) [phone]  Language: English  Fee: Free  Accessibility: Deaf or hard of hearing, [place] accessible      Transportation - Ride coordination  [address] [place] [address] (Distance: 8.0 miles)  Website: https://[facility]  Language: English  Fee: Self pay, Insurance  Accessibility: [place] accessible      - Services  Phone: (662) 344-5833  Website: https://Open[employer]/#howitworks-section  Hours: Sun 12:00 AM - 11:45 PM Mon 12:00 AM - 11:45 PM Tue 12:00 AM - 11:45 PM Wed 12:00 AM - 11:45 PM Thu 12:00 AM - 11:45 PM Fri 12:00 AM - 11:45 PM Sat 12:00 AM - 11:45 PM  Fee: Self pay  Accessibility: [place] accessible, Blind accommodation, Deaf or hard of hearing               IMPORTANT NUMBERS & WEBSITES        Emergency Services  911  .   [facility]  211 http://211unitedway.org  .   Poison Control  (246) 858-2188 http://[place]poison.org http://w[place]poison.org  .     Suicide and Crisis Lifeline  988 http://988lifeline.org  .   Childhelp National Child Abuse Hotline  654.373.4550 http://Childhelphotline.org   .   National Sexual Assault Hotline  (489) 353-2538 (HOPE) http://Rainn.org   .     National Runaway Safeline  (990) 197-7551 (RUNAWAY) http://1800runaway.org  .   Pregnancy & Postpartum Support  Call/text 947-374-4409  [place]: http://ppsupportmn.org  [place]: http://ZupCat.com/wi  .   Substance Abuse National Helpline ([facility])  598-727-HELP (0767) http://Findtreatment.gov   .                DISCLAIMER: These resources have been generated via the [employer] Platform. [employer] does not endorse any service providers mentioned in this resource list. [employer] does not guarantee that the services mentioned in this resource list will be available to you or will improve your health or wellness.    [facility]

## 2024-05-13 ENCOUNTER — HOSPITAL ENCOUNTER (OUTPATIENT)
Dept: ULTRASOUND IMAGING | Facility: CLINIC | Age: 79
Discharge: HOME | End: 2024-05-13
Attending: RADIOLOGY
Payer: COMMERCIAL

## 2024-05-13 ENCOUNTER — OFFICE VISIT (OUTPATIENT)
Dept: OTHER | Facility: CLINIC | Age: 79
End: 2024-05-13
Attending: INTERNAL MEDICINE
Payer: COMMERCIAL

## 2024-05-13 VITALS — HEART RATE: 96 BPM | SYSTOLIC BLOOD PRESSURE: 98 MMHG | DIASTOLIC BLOOD PRESSURE: 63 MMHG

## 2024-05-13 DIAGNOSIS — I82.402 DEEP VEIN THROMBOSIS (DVT) OF LEFT LOWER EXTREMITY, UNSPECIFIED CHRONICITY, UNSPECIFIED VEIN (H): ICD-10-CM

## 2024-05-13 DIAGNOSIS — I26.99 BILATERAL PULMONARY EMBOLISM (H): Primary | ICD-10-CM

## 2024-05-13 PROCEDURE — G0463 HOSPITAL OUTPT CLINIC VISIT: HCPCS | Mod: 25 | Performed by: RADIOLOGY

## 2024-05-13 PROCEDURE — 93971 EXTREMITY STUDY: CPT | Mod: LT

## 2024-05-13 NOTE — PATIENT INSTRUCTIONS
The blood clot in your leg is almost gone.  Just a small amount is left behind the knee.  Continue on your blood thinners.  If you lightheadness, persists this week contact Dr. Lawrence office.  Watch for blood in your urine or in your stool.  Increase your fluid intake.  Follow up with Dr. Morales.  We will no longer need to see you.  Marissa Abdi RN  IR nurse clinician  [phone]

## 2024-05-13 NOTE — PROGRESS NOTES
[facility]        Patient is here for a  follow up.    Pt is currently taking Xarelto.    BP 98/63 (BP Location: Left arm, Patient Position: Chair, Cuff Size: Adult Regular)   Pulse 96     The provider has been notified that the patient has no concerns.     Questions patient would like addressed today are: N/A.    Refills are needed: N/A    Has homecare services and agency name:  Winnie Parker MA

## 2024-05-14 NOTE — PROGRESS NOTES
VASCULAR OUTPATIENT CONSULT OR VISIT  PHYSICIAN:  Dr. Jennifer Elder      LOCATION: [facility]    Stanford Andersen   Medical Record #:  [id number]  YOB: 1945  Age:  79 year old     Date of Service: 5/13/2024    PRIMARY CARE PROVIDER: Charlie Lawrence      HPI:  Stanford Andersen is a 79 year old male who presented to [facility] emergency room via EMS on 4/11/2023 with shortness of breath and syncopal episode.  The patient does live with his son.  The patient had reported the shortness of breath for a few days associated with a cough.  He also presented with left leg swelling.  CT chest PE protocol was then performed.  This showed extensive bilateral pulmonary artery emboli involving the distal main pulmonary arteries and extending into the numerous lobar, segmental, and subsegmental branches of both lungs.  There was asymmetric enlargement of the right heart compatible with a degree of right heart strain.  Bilateral lower extremity venous ultrasound was also performed that showed nearly occlusive DVT within the left popliteal and posterior tibial veins.  The right leg was negative for DVT.  The patient then underwent a emergent successful bilateral pulmonary angiography with mechanical thrombectomy.  Pretreatment pulmonary pressures were 49/18 with a mean of 33, posttreatment pulmonary pressures were 40/6 with a mean of 22.  Approximately 1 to 2 hours postop the patient developed left lower extremity involuntary spastic movements as well as dry heaving and cough.  The patient also endorsed shortness of breath, throat swelling and nausea.  This did resolve with IV Benadryl, Famotidine, Solumedrol and racemic epinephrine nebulizer.  Patient does have a contrast dye allergy, and how done well when pretreated. Uncertain if this event was related to contrast dye however will  definitely need to be pretreated for contrast dye allergy in the future.  Today,  the patient states his shortness of breath and chest pain have resolved.  He has not had any recurrent syncopal episodes.  He does report occasional dizziness.  He does state that he mowed his lawn yesterday.  He currently is on Xarelto with no reports of unusual bleeding.  He currently is being followed by Dr. Morales for AC management.     [facility]:    Past Medical History:   Diagnosis Date    Arthritis     hand    Chronic back pain     Complication of anesthesia     URINARY RETENTION, Sleepy after surgery    Gastro-oesophageal reflux disease     Hypertension     Renal disease     kidney stone    Ventral hernia without obstruction or gangrene 10/08/2018    Zollinger-Patricio syndrome 1995        Past Surgical History:   Procedure Laterality Date    ABDOMEN SURGERY  2003    Exploratory laparoscopy    COMBINED CYSTOSCOPY, INSERT STENT URETER(S) Left 8/31/2021    Procedure: Cystoscopy with left ureteral stent placement;  Surgeon: Devan Bergeron MD;  Location: [facility] OR    COMBINED CYSTOSCOPY, RETROGRADES, URETEROSCOPY, LASER HOLMIUM LITHOTRIPSY URETER(S), INSERT STENT Right 5/23/2019    Procedure: Video cystoscopy, exam under anesthesia, right ureteroscopy with holmium laser lithotripsy and stone extractions, right double-J stent (5-Italian x 24 cm).;  Surgeon: Satinder Almanza MD;  Location:  OR    COMBINED CYSTOSCOPY, RETROGRADES, URETEROSCOPY, LASER HOLMIUM LITHOTRIPSY URETER(S), INSERT STENT Left 9/30/2021    Procedure: Cystoscopy, left ureteral stent exchange, left ureteroscopy with holmium laser lithotripsy and stone basketing. left retrogrades;  Surgeon: Devan Bergeron MD;  Location: [facility] OR    EXTRACORPOREAL SHOCK WAVE LITHOTRIPSY (ESWL) Left 8/31/2021    Procedure: Bilateral extracorporeal shockwave lithotripsy;  Surgeon: Devan Bergeron MD;  Location: [facility] OR    EXTRACORPOREAL SHOCK WAVE LITHOTRIPSY (ESWL) Bilateral 5/25/2023    Procedure: BILATERAL EXTRACORPOREAL SHOCK WAVE LITHOTRIPSY;   Surgeon: Devan Bergeron MD;  Location: [facility] OR    HC EXCISE VARICOCELE  age 21     varicocele repair    HERNIORRHAPHY UMBILICAL N/A 11/30/2018    Procedure: OPEN UMBILLICAL HERNIA REPAIR ;  Surgeon: Ike Catalan MD;  Location: [facility] OR    IR PULMONARY ANGIOGRAM BILATERAL  4/11/2024    KIDNEY SURGERY      lithotripsy x 3    LAPAROSCOPIC HERNIORRHAPHY INGUINAL Left 9/26/2016    Procedure: LAPAROSCOPIC HERNIORRHAPHY INGUINAL;  Surgeon: Ike Catalan MD;  Location:  [facility]    LITHOTRIPSY      three times    STOMACH SURGERY  2003    exploratory scoping looking for tumors related to ZE syndrome       ALLERGIES:  Dye [contrast dye], Penicillins, Tramadol, and Oxycodone-acetaminophen    MEDS:    Current Outpatient Medications:     finasteride (PROSCAR) 5 MG tablet, TAKE ONE TABLET BY MOUTH EVERY DAY, Disp: 90 tablet, Rfl: 0    omeprazole (PRILOSEC) 40 MG DR capsule, Take 1 capsule (40 mg) by mouth 2 times daily, Disp: 180 capsule, Rfl: 3    rivaroxaban ANTICOAGULANT (XARELTO) 20 MG TABS tablet, Take 20 mg by mouth daily (with dinner), Disp: , Rfl:     tamsulosin (FLOMAX) 0.4 MG capsule, TAKE 1 CAPSULE BY MOUTH EVERY EVENING, Disp: 90 capsule, Rfl: 3    omeprazole (PRILOSEC OTC) 20 MG EC tablet, Take 20 mg by mouth 3 times daily AM, Dinner & HS (Patient not taking: Reported on 5/13/2024), Disp: , Rfl:     rivaroxaban ANTICOAGULANT (XARELTO) 2.5 MG TABS tablet, Take 6 tablets (15 mg) by mouth 2 times daily (with meals) for 21 days, THEN 8 tablets (20 mg) daily (with dinner) for 90 days. (Patient not taking: Reported on 5/13/2024), Disp: 972 tablet, Rfl: 0    SOCIAL HABITS:    History   Smoking Status    Never   Smokeless Tobacco    Never     Social History    Substance and Sexual Activity      Alcohol use: Yes        Comment: seldom      History   Drug Use No       FAMILY HISTORY:    Family History   Problem Relation Age of Onset    Heart Disease Mother         ablation    C.A.D. Mother     Heart Disease  Father     Respiratory Father         COPD    C.A.D. Father     Spina bifida Son        REVIEW OF SYSTEMS:    A 12 point ROS was reviewed and except for what is listed in the HPI above, all others are negative    PE:  BP 98/63 (BP Location: Left arm, Patient Position: Chair, Cuff Size: Adult Regular)   Pulse 96   Wt Readings from Last 1 Encounters:   05/07/24 166 lb 12.8 oz (75.7 kg)     There is no height or weight on file to calculate BMI.    EXAM:  GENERAL: This is a well-developed 79 year old male who appears his stated age  EYES: Grossly normal.  MOUTH: Buccal mucosa normal   MUSCULOSKELETAL: Grossly normal and both lower extremities are intact.  HEME/LYMPH: No lymphedema  NEUROLOGIC: Focally intact, Alert and oriented x 3.   PSYCH: appropriate affect  INTEGUMENT: No open lesions or ulcers  Lower extremity:  mild swelling in the left leg.  Distal pulses are strong and intact        DIAGNOSTIC STUDIES:     Images:  US Lower Extremity Venous Duplex Left    Result Date: 5/13/2024  VENOUS ULTRASOUND LEFT LEG  5/13/2024 1:03 PM HISTORY: s/p left DVT, pulmonary thrombectomy done on 4/11 with Dr. Elder  1 mo f/u; Deep vein thrombosis (DVT) of left lower extremity, unspecified chronicity, unspecified vein (H) COMPARISON: 4/11/2024 FINDINGS:  Examination of the deep veins with graded compression and color flow Doppler with spectral wave form analysis was performed. There is nonocclusive DVT in the left popliteal vein. Previously seen thrombus in a the left posterior tibial vein has resolved.     IMPRESSION: Improving DVT in the left lower extremity. XIOMY MORAN MD   SYSTEM ID:  [id number]        LABS:      Sodium   Date Value Ref Range Status   05/03/2024 143 135 - 145 mmol/L Final     Comment:     Reference intervals for this test were updated on 09/26/2023 to more accurately reflect our healthy population. There may be differences in the flagging of prior results with similar values performed with this method.  Interpretation of those prior results can be made in the context of the updated reference intervals.    04/12/2024 141 135 - 145 mmol/L Final     Comment:     Reference intervals for this test were updated on 09/26/2023 to more accurately reflect our healthy population. There may be differences in the flagging of prior results with similar values performed with this method. Interpretation of those prior results can be made in the context of the updated reference intervals.    04/11/2024 140 135 - 145 mmol/L Final     Comment:     Reference intervals for this test were updated on 09/26/2023 to more accurately reflect our healthy population. There may be differences in the flagging of prior results with similar values performed with this method. Interpretation of those prior results can be made in the context of the updated reference intervals.    04/30/2021 139 133 - 144 mmol/L Final   12/10/2019 138 133 - 144 mmol/L Final   05/03/2019 140 133 - 144 mmol/L Final     Urea Nitrogen   Date Value Ref Range Status   05/03/2024 19.7 8.0 - 23.0 mg/dL Final   04/12/2024 15.8 8.0 - 23.0 mg/dL Final   04/11/2024 20.0 8.0 - 23.0 mg/dL Final   07/21/2021 16 7 - 30 mg/dL Final   04/30/2021 18 7 - 30 mg/dL Final   12/10/2019 19 7 - 30 mg/dL Final   05/03/2019 19 7 - 30 mg/dL Final     Hemoglobin   Date Value Ref Range Status   05/07/2024 9.3 (L) 13.3 - 17.7 g/dL Final   05/03/2024 9.2 (L) 13.3 - 17.7 g/dL Final   04/12/2024 10.5 (L) 13.3 - 17.7 g/dL Final   04/30/2021 14.7 13.3 - 17.7 g/dL Final   12/10/2019 16.2 13.3 - 17.7 g/dL Final   11/13/2019 16.4 13.3 - 17.7 g/dL Final     Platelet Count   Date Value Ref Range Status   05/03/2024 352 150 - 450 10e3/uL Final   04/12/2024 233 150 - 450 10e3/uL Final   04/11/2024 293 150 - 450 10e3/uL Final   04/30/2021 223 150 - 450 10e9/L Final   12/10/2019 246 150 - 450 10e9/L Final   11/13/2019 262 150 - 450 10e9/L Final     Assessment/plan:  This is a pleasant 79-year-old male who  underwent successful pulmonary angiography with mechanical thrombectomy on 4/11/2024.  The patient continues to do well.  He is not had a recurrent syncopal episode, and denies shortness of breath and chest pain.  We discussed the results of his left lower extremity venous ultrasound that was performed today.  There is nonocclusive DVT in the left popliteal vein.  The previously seen thrombus in the left posterior tibial vein has resolved.  Plan: continue follow-up with Dr. Nesbitt as planned.  Monitor dizziness, if persistent contact Dr. Lawrence for further evaluation.  We will no longer need to see this patient unless concerns arise.  This was a in person visit in which 40 minutes of  total time was spent (either in face-to-face or non-face-to-face time).    Dr. Jennifer Elder DO, RPVI  Pager: [phone]  Interventional Radiology   [facility]  [phone]

## 2024-05-29 ENCOUNTER — HOSPITAL ENCOUNTER (OUTPATIENT)
Dept: CARDIOLOGY | Facility: CLINIC | Age: 79
Discharge: HOME | End: 2024-05-29
Attending: INTERNAL MEDICINE | Admitting: INTERNAL MEDICINE
Payer: COMMERCIAL

## 2024-05-29 DIAGNOSIS — I26.99 BILATERAL PULMONARY EMBOLISM (H): ICD-10-CM

## 2024-05-29 LAB — LVEF ECHO: NORMAL

## 2024-05-29 PROCEDURE — 93308 TTE F-UP OR LMTD: CPT | Mod: 26 | Performed by: INTERNAL MEDICINE

## 2024-05-29 PROCEDURE — 93321 DOPPLER ECHO F-UP/LMTD STD: CPT | Mod: 26 | Performed by: INTERNAL MEDICINE

## 2024-05-29 PROCEDURE — 93325 DOPPLER ECHO COLOR FLOW MAPG: CPT

## 2024-05-29 PROCEDURE — 93325 DOPPLER ECHO COLOR FLOW MAPG: CPT | Mod: 26 | Performed by: INTERNAL MEDICINE

## 2024-06-05 ENCOUNTER — OFFICE VISIT (OUTPATIENT)
Dept: OTHER | Facility: CLINIC | Age: 79
End: 2024-06-05
Attending: INTERNAL MEDICINE
Payer: COMMERCIAL

## 2024-06-05 VITALS
HEART RATE: 97 BPM | WEIGHT: 166.8 LBS | SYSTOLIC BLOOD PRESSURE: 127 MMHG | OXYGEN SATURATION: 93 % | HEIGHT: 68 IN | DIASTOLIC BLOOD PRESSURE: 76 MMHG | BODY MASS INDEX: 25.28 KG/M2

## 2024-06-05 DIAGNOSIS — R05.3 CHRONIC COUGH: ICD-10-CM

## 2024-06-05 DIAGNOSIS — D50.9 IRON DEFICIENCY ANEMIA, UNSPECIFIED IRON DEFICIENCY ANEMIA TYPE: ICD-10-CM

## 2024-06-05 DIAGNOSIS — I10 BENIGN ESSENTIAL HYPERTENSION: ICD-10-CM

## 2024-06-05 DIAGNOSIS — I82.432 ACUTE DEEP VEIN THROMBOSIS (DVT) OF POPLITEAL VEIN OF LEFT LOWER EXTREMITY (H): ICD-10-CM

## 2024-06-05 DIAGNOSIS — R53.83 OTHER FATIGUE: ICD-10-CM

## 2024-06-05 DIAGNOSIS — I26.99 BILATERAL PULMONARY EMBOLISM (H): Primary | ICD-10-CM

## 2024-06-05 PROCEDURE — 99215 OFFICE O/P EST HI 40 MIN: CPT | Performed by: INTERNAL MEDICINE

## 2024-06-05 PROCEDURE — G0463 HOSPITAL OUTPT CLINIC VISIT: HCPCS | Performed by: INTERNAL MEDICINE

## 2024-06-05 PROCEDURE — G2211 COMPLEX E/M VISIT ADD ON: HCPCS | Performed by: INTERNAL MEDICINE

## 2024-06-05 RX ORDER — FERROUS GLUCONATE 324(38)MG
324 TABLET ORAL
Qty: 60 TABLET | Refills: 3 | Status: SHIPPED | OUTPATIENT
Start: 2024-06-05

## 2024-06-05 NOTE — PROGRESS NOTES
[facility] VASCULAR [facility] VASCULAR MEDICINE     PRIMARY HEALTH CARE PROVIDER:  Charlie Lawrence MD      Follow up visit  Review of ECHO , venous duplex etc   Hx of PE s/p mech thrombectomy and LLE DVT April 2024   Recent venous duplex improved DVT  Seen and evaluated by interventional radiologist Dr. Elder  Ongoing COUGH, MAKING DIZZY, DIFFICULT TO SLEEP since the pulmonary thrombectomy  Occasionally throat feels tight  History of GERD takes high-dose PPI twice a day  Anemia iron deficiency with fatigue tiredness     He was initially seen on 4/24/24 for evaluation and management of first lifetime thromboembolic event in the form of extensive bilateral pulmonary embolism with right heart strain and left lower extremity DVT involving popliteal vein and below-knee veins and this appears unprovoked , admitted to the hospital on April 11, 2024 underwent mechanical thrombectomy currently on Xarelto    For full details please see my initial consult note on April 24, 2024      HPI: Stanford Andersen is a 79 year old very pleasant male never smoked, no personal history of malignancy, no family history of hypercoagulable status, no recent history of COVID infection, no recent history of COVID vaccination, no recent travel and no recent hospitalization admitted to the hospital with history of shortness of breath and fall appears syncope. he was hypoxic  Underwent workup at the emergency room and April 11, 2022 for CT chest PE protocol extensive bilateral segmental and subsegmental PE with right heart strain and also left lower extremity DVT occlusive involving popliteal vein and below-knee veins.  Echo showed flattened septum with a right heart strain right ventricular dysfunction but normal LV function.  He was initiated IV heparin and then followed by underwent successful mechanical pulmonary thrombectomy and then started Xarelto 15 mg 2 times a day for 21 days then followed by 20 mg daily.  For  some reason he was prescribed Xarelto 2.5 mg tablets 972 of them appears epic error but patient says he has given a starter pack taking 15 mg twice a day then followed by 20 mg daily.  He also says he has a multiple pill bottles in the bag and he never checked the dose.  Overall he is feeling better  Not using any compression stockings  Walks with a walker  He has a history of Zollinger-Patricio syndrome and recurrent nephrolithiasis previous lithotripsy  No hematuria since initiation of anticoagulation  He used to be on amlodipine and this was stopped after the hospitalization blood pressure is good range      PAST MEDICAL HISTORY  Past Medical History:   Diagnosis Date    Arthritis     hand    Chronic back pain     Complication of anesthesia     URINARY RETENTION, Sleepy after surgery    Gastro-oesophageal reflux disease     Hypertension     Renal disease     kidney stone    Ventral hernia without obstruction or gangrene 10/08/2018    Zollinger-Patricio syndrome 1995       CURRENT MEDICATIONS  Current Outpatient Medications   Medication Sig Dispense Refill    finasteride (PROSCAR) 5 MG tablet TAKE ONE TABLET BY MOUTH EVERY DAY 90 tablet 0    omeprazole (PRILOSEC) 40 MG DR capsule Take 1 capsule (40 mg) by mouth 2 times daily 180 capsule 3    rivaroxaban ANTICOAGULANT (XARELTO) 20 MG TABS tablet Take 20 mg by mouth daily (with dinner)      tamsulosin (FLOMAX) 0.4 MG capsule TAKE 1 CAPSULE BY MOUTH EVERY EVENING 90 capsule 3    omeprazole (PRILOSEC OTC) 20 MG EC tablet Take 20 mg by mouth 3 times daily AM, Dinner & HS (Patient not taking: Reported on 5/13/2024)      rivaroxaban ANTICOAGULANT (XARELTO) 2.5 MG TABS tablet Take 6 tablets (15 mg) by mouth 2 times daily (with meals) for 21 days, THEN 8 tablets (20 mg) daily (with dinner) for 90 days. (Patient not taking: Reported on 5/13/2024) 972 tablet 0     No current facility-administered medications for this visit.       PAST SURGICAL HISTORY:  Past Surgical History:    Procedure Laterality Date    ABDOMEN SURGERY  2003    Exploratory laparoscopy    COMBINED CYSTOSCOPY, INSERT STENT URETER(S) Left 8/31/2021    Procedure: Cystoscopy with left ureteral stent placement;  Surgeon: Devan Bergeron MD;  Location: [facility] OR    COMBINED CYSTOSCOPY, RETROGRADES, URETEROSCOPY, LASER HOLMIUM LITHOTRIPSY URETER(S), INSERT STENT Right 5/23/2019    Procedure: Video cystoscopy, exam under anesthesia, right ureteroscopy with holmium laser lithotripsy and stone extractions, right double-J stent (5-Libyan x 24 cm).;  Surgeon: Satinder Almanza MD;  Location: [facility] OR    COMBINED CYSTOSCOPY, RETROGRADES, URETEROSCOPY, LASER HOLMIUM LITHOTRIPSY URETER(S), INSERT STENT Left 9/30/2021    Procedure: Cystoscopy, left ureteral stent exchange, left ureteroscopy with holmium laser lithotripsy and stone basketing. left retrogrades;  Surgeon: Devan Bergeron MD;  Location:  OR    EXTRACORPOREAL SHOCK WAVE LITHOTRIPSY (ESWL) Left 8/31/2021    Procedure: Bilateral extracorporeal shockwave lithotripsy;  Surgeon: Devan Bergeron MD;  Location:  OR    EXTRACORPOREAL SHOCK WAVE LITHOTRIPSY (ESWL) Bilateral 5/25/2023    Procedure: BILATERAL EXTRACORPOREAL SHOCK WAVE LITHOTRIPSY;  Surgeon: Devan Bergeron MD;  Location:  OR    HC EXCISE VARICOCELE  age 21     varicocele repair    HERNIORRHAPHY UMBILICAL N/A 11/30/2018    Procedure: OPEN UMBILLICAL HERNIA REPAIR ;  Surgeon: Ike Catalan MD;  Location:  OR    IR PULMONARY ANGIOGRAM BILATERAL  4/11/2024    KIDNEY SURGERY      lithotripsy x 3    LAPAROSCOPIC HERNIORRHAPHY INGUINAL Left 9/26/2016    Procedure: LAPAROSCOPIC HERNIORRHAPHY INGUINAL;  Surgeon: Ike Catalan MD;  Location:  [facility]    LITHOTRIPSY      three times    STOMACH SURGERY  2003    exploratory scoping looking for tumors related to ZE syndrome       ALLERGIES     Allergies   Allergen Reactions    Dye [Contrast Dye] Anaphylaxis and Hives     Patient  developed throat swelling, nausea, vomiting and respiratory distress 4/11.    Penicillins Anaphylaxis     Swelling, arm turned blue    Tramadol Itching    Oxycodone-Acetaminophen Rash       FAMILY HISTORY  Family History   Problem Relation Age of Onset    Heart Disease Mother         ablation    C.A.D. Mother     Heart Disease Father     Respiratory Father         COPD    C.A.D. Father     Spina bifida Son        VASCULAR FAMILY HISTORY  1st order relative with atherosclerotic PAD: No  1st order relative with AAA: No  Family history of Familial Hyperlipidemia No  Family History of Hypercoagulable state:No    VASCULAR RISK FACTORS  1. Diabetes:No   2. Smoking: has never smoked.  3. HTN: controlled  4.Hyperlipidemia: NO      SOCIAL HISTORY  Social History     Socioeconomic History    Marital status:      Spouse name: Not on file    Number of children: Not on file    Years of education: Not on file    Highest education level: Not on file   Occupational History    Not on file   Tobacco Use    Smoking status: Never    Smokeless tobacco: Never   Vaping Use    Vaping status: Never Used   Substance and Sexual Activity    Alcohol use: Not Currently     Comment: seldom    Drug use: No    Sexual activity: Not Currently   Other Topics Concern    Parent/sibling w/ CABG, MI or angioplasty before 65F 55M? No   Social History Narrative    Not on file     Social Determinants of Health     Financial Resource Strain: Low Risk  (5/7/2024)    Financial Resource Strain     Within the past 12 months, have you or your family members you live with been unable to get utilities (heat, electricity) when it was really needed?: No   Food Insecurity: Low Risk  (5/7/2024)    Food Insecurity     Within the past 12 months, did you worry that your food would run out before you got money to buy more?: No     Within the past 12 months, did the food you bought just not last and you didn t have money to get more?: No   Transportation Needs: High  Risk (5/7/2024)    Transportation Needs     Within the past 12 months, has lack of transportation kept you from medical appointments, getting your medicines, non-medical meetings or appointments, work, or from getting things that you need?: Yes   Physical Activity: Insufficiently Active (5/7/2024)    Exercise Vital Sign     Days of Exercise per Week: 2 days     Minutes of Exercise per Session: 20 min   Stress: Stress Concern Present (5/7/2024)    [employer] [place] [employer] - Occupational Stress Questionnaire     Feeling of Stress : To some extent   Social Connections: Unknown (5/7/2024)    Social Connection and Isolation Panel [NHANES]     Frequency of Communication with Friends and Family: Not on file     Frequency of Social Gatherings with Friends and Family: Once a week     Attends Orthodox Services: Not on file     Active Member of Clubs or Organizations: Not on file     Attends Club or Organization Meetings: Not on file     Marital Status: Not on file   Interpersonal Safety: Low Risk  (5/7/2024)    Interpersonal Safety     Do you feel physically and emotionally safe where you currently live?: Yes     Within the past 12 months, have you been hit, slapped, kicked or otherwise physically hurt by someone?: No     Within the past 12 months, have you been humiliated or emotionally abused in other ways by your partner or ex-partner?: No   Housing Stability: Low Risk  (5/7/2024)    Housing Stability     Do you have housing? : Yes     Are you worried about losing your housing?: No       ROS:   General: No change in weight, sleep or appetite.  Normal energy.  No fever or chills  Eyes: Negative for vision changes or eye problems  ENT: No problems with ears, nose or throat.  No difficulty swallowing.  Resp: No coughing, wheezing or shortness of breath  CV: No chest pains or palpitations  GI: No nausea, vomiting,  heartburn, abdominal pain, diarrhea, constipation or change in bowel habits  : No urinary  "frequency or dysuria, bladder or kidney problems  Musculoskeletal: No significant muscle or joint pains  Neurologic: No headaches, numbness, tingling, weakness, problems with balance or coordination  Psychiatric: No problems with anxiety, depression or mental health  Heme/immune/allergy: No history of bleeding or clotting problems or anemia.  No allergies or immune system problems  Endocrine: No history of thyroid disease, diabetes or other endocrine disorders  Skin: No rashes,worrisome lesions or skin problems  Vascular: Recent history of left lower extremity DVT involving popliteal vein and also below-knee veins  Bilateral extensive PE status post mechanical thrombectomy in April 11, 2024    EXAM:  /76 (BP Location: Right arm, Patient Position: Chair, Cuff Size: Adult Regular)   Pulse 97   Ht 5' 8\" (1.727 m)   Wt 166 lb 12.8 oz (75.7 kg)   SpO2 93%   BMI 25.36 kg/m    In general, the patient is a pleasant male in no apparent distress.    HEENT: NC/AT.  PERRLA.  EOMI.  Sclerae white, not injected.  Nares clear.  Pharynx without erythema or exudate.  Dentition intact.    Neck: No adenopathy.  No thyromegaly. Carotids +2/2 bilaterally without bruits.  No jugular venous distension.   Heart: RRR. Normal S1, S2 splits physiologically. No murmur, rub, click, or gallop. The PMI is in the 5th ICS in the midclavicular line. There is no heave.    Lungs: CTA.  No ronchi, wheezes, rales.  No dullness to percussion.   Abdomen: Soft, nontender, nondistended. No organomegaly. No AAA.  No bruits.   Extremities: Left leg is slightly larger than right leg but well-perfused  Palpable symmetrical peripheral pulses      Labs:  LIPID RESULTS:  Lab Results   Component Value Date    CHOL 176 05/03/2024    CHOL 190 04/30/2021    HDL 35 (L) 05/03/2024    HDL 45 04/30/2021     (H) 05/03/2024     (H) 04/30/2021    TRIG 127 05/03/2024    TRIG 165 (H) 04/30/2021    CHOLHDLRATIO 5.3 (H) 09/14/2015       LIVER ENZYME " "RESULTS:  Lab Results   Component Value Date    AST 27 04/11/2024    AST 17 04/30/2021    ALT 32 04/11/2024    ALT 25 04/30/2021       CBC RESULTS:  Lab Results   Component Value Date    WBC 7.8 05/03/2024    WBC 7.0 04/30/2021    RBC 3.84 (L) 05/03/2024    RBC 5.22 04/30/2021    HGB 9.3 (L) 05/07/2024    HGB 14.7 04/30/2021    HCT 30.5 (L) 05/03/2024    HCT 46.3 04/30/2021    MCV 79 05/03/2024    MCV 89 04/30/2021    MCH 24.0 (L) 05/03/2024    MCH 28.2 04/30/2021    MCHC 30.2 (L) 05/03/2024    MCHC 31.7 04/30/2021    RDW 15.4 (H) 05/03/2024    RDW 14.2 04/30/2021     05/03/2024     04/30/2021       BMP RESULTS:  Lab Results   Component Value Date     05/03/2024     04/30/2021    POTASSIUM 3.9 05/03/2024    POTASSIUM 3.9 08/18/2021    POTASSIUM 3.7 04/30/2021    CHLORIDE 106 05/03/2024    CHLORIDE 106 07/21/2021    CHLORIDE 107 04/30/2021    CO2 25 05/03/2024    CO2 30 07/21/2021    CO2 30 04/30/2021    ANIONGAP 12 05/03/2024    ANIONGAP 1 (L) 07/21/2021    ANIONGAP 2 (L) 04/30/2021     (H) 05/03/2024     (H) 07/21/2021    GLC 94 04/30/2021    BUN 19.7 05/03/2024    BUN 16 07/21/2021    BUN 18 04/30/2021    CR 1.00 05/03/2024    CR 0.95 04/30/2021    GFRESTIMATED 77 05/03/2024    GFRESTIMATED 78 04/30/2021    GFRESTBLACK >90 04/30/2021    CLIFTON 8.8 05/03/2024    CLIFTON 8.9 04/30/2021        Procedures:     ECHO 4/11/24   \"Interpretation Summary     Technically challenging study due to patient body habitus.     Left ventricular systolic function is normal. The visual ejection fraction is  55-60%. Flattened septum is consistent with RV pressure overload.  Right ventricle is moderately dilated. The right ventricular systolic function  is moderately reduced. Relatively preserved contractility at the base and  apex, with severe hypokinesis of the lateral free wall, this pattern of wall  motion can be seen in the setting of acute pulmonary emoblism. Clinical  correlation " "recommended.  Pulmonary hypertension present. The right ventricular systolic pressure is  approximated at 41mmHg plus the right atrial pressure.  Dilation of the inferior vena cava is present with abnormal respiratory  variation in diameter.     Findings communicated to RN who will interface with staff.\"      EXAM: US LOWER EXTREMITY VENOUS DUPLEX BILATERAL  LOCATION: [facility]  DATE: 4/11/2024     INDICATION: Left leg swelling.  COMPARISON: None.  TECHNIQUE: Venous Duplex ultrasound of bilateral lower extremities with and without compression, augmentation and duplex. Color flow and spectral Doppler with waveform analysis performed.     FINDINGS: Exam includes the common femoral, femoral, popliteal veins as well as segmentally visualized deep calf veins and greater saphenous vein.      RIGHT: No deep vein thrombosis. No superficial thrombophlebitis. No popliteal cyst.     LEFT: Nearly occlusive DVT popliteal and posterior tibial veins. No superficial thrombophlebitis. No popliteal cyst.                                                                      IMPRESSION:  1.  Nearly occlusive DVT within the left popliteal and posterior tibial veins.  2.  Right leg negative for DVT.         3.  Results of DVT given to Dr. Castillo at 0624    EXAM: CT CHEST PULMONARY EMBOLISM W CONTRAST  LOCATION: [facility]  DATE: 4/11/2024     INDICATION: shortness of breath, hypoxia; Male sex; No prior imaging in the last 24 hours; Pulmonary Embolism Rule Out Criteria (PERC) score > 0; Revised Trigg Score (RGS) not >= 11; No D dimer result available; D dimer not ordered  COMPARISON: Same day 2 view chest radiograph  TECHNIQUE: CT chest pulmonary angiogram during arterial phase injection of IV contrast. Multiplanar reformats and MIP reconstructions were performed. Dose reduction techniques were used.   CONTRAST: 64mL isovue 370     FINDINGS:  ANGIOGRAM CHEST: Extensive bilateral " pulmonary artery emboli involving the distal main pulmonary arteries and extending into numerous lobar, segmental, and subsegmental branches of both lungs. There is asymmetric enlargement of the right heart compatible   with a degree of right heart strain. Thoracic aorta is negative for dissection. Mild atherosclerotic calcifications of the aortic arch.     LUNGS AND PLEURA: No focal pulmonary consolidation or pleural effusion. No pneumothorax. Scattered subsegmental atelectasis.     MEDIASTINUM/AXILLAE: Prominent mediastinal lymph nodes which may be reactive.     CORONARY ARTERY CALCIFICATION: No significant.     UPPER ABDOMEN: Hepatic cysts which require no dedicated follow-up. Partially visualized prominent left renal collecting system and small nonobstructing left renal calyceal calculi.     MUSCULOSKELETAL: Mild degenerative changes of the cervicothoracic spine. No acute osseous abnormality or suspicious bony lesion.                                                                      IMPRESSION:  1.  Extensive bilateral pulmonary artery emboli involving the distal main pulmonary arteries and extending into numerous lobar, segmental, and subsegmental branches of both lungs. There is asymmetric enlargement of the right heart compatible with a   degree of right heart strain.       VENOUS ULTRASOUND LEFT LEG  5/13/2024 1:03 PM      HISTORY: s/p left DVT, pulmonary thrombectomy done on 4/11 with Dr. Elder  1 mo f/u; Deep vein thrombosis (DVT) of left lower  extremity, unspecified chronicity, unspecified vein (H)     COMPARISON: 4/11/2024     FINDINGS:  Examination of the deep veins with graded compression and  color flow Doppler with spectral wave form analysis was performed.   There is nonocclusive DVT in the left popliteal vein. Previously seen  thrombus in a the left posterior tibial vein has resolved.                                                                      IMPRESSION: Improving DVT in the left  "lower extremity.     XIOMY MORAN MD     ECHO 4/24/2024  Interpretation Summary     Limited echocardiogram performed per study order.  Left ventricular systolic function is normal.  The visual ejection fraction is 60-65%.  No regional wall motion abnormalities.  Normal right ventricular size and systolic function.  Trace tricuspid and mitral valve regurgitation.  Estimated pulmonary artery pressure is normal.  Normal inferior vena cava.  No pericardial effusion.     Compared to the recent study dated 4/11/2024, right ventricular size and  systolic function have normalized and estimated pulmonary artery pressure has  normalized.\"    Assessment and Plan:     1. Bilateral pulmonary embolism with RT heart strain s/p pulm Thrombectomy 4/11/2024  (H) First life time Te event unprovoked    2.  History of deep vein thrombosis (DVT) of popliteal vein and PTV  of left lower extremity (H) 4/11/2024    3.  Chronic cough difficulty to sleep at nighttime since the pulmonary thrombectomy    4.  Iron deficiency anemia    5.  Fatigue, tiredness    6. Benign essential hypertension    7. Nephrolithiasis    This is a very pleasant 79-year-old male with recent extensive bilateral pulmonary embolism with syncope and left lower extremity DVT admitted to the hospital underwent successful pulmonary thrombectomy and he was hypoxic still using overnight oxygen now and then taking Xarelto 15 mg twice a day for 21 days then followed by 20 mg daily tolerating without any problems.  He is not using any compression stockings.  He was hypotensive and his amlodipine was stopped in the hospital and still blood pressure systolic 101-1 20 range  He never smoked.  Up-to-date with age and gender appropriate cancer screening  No personal history of malignancy and no family history of hypercoagulable status  This is his first lifetime thromboembolic event appears unprovoked.  Echocardiogram flattened septum with positive Lovett sign and decreased RV " function but normal LV function at the time of admission    Recently underwent repeat echocardiogram which is normal  He also underwent venous duplex ultrasound improved blood clot    Since it is first left him thromboembolic event and unprovoked no need for hypercoagulable studies  For 6 months he will benefit with full dose anticoagulation  He is a long-term/lifetime candidate for anticoagulation if tolerates maintenance dose  Will benefit with compression stockings both knee-high and thigh-high 20 to 30 mmHg prescribed suggested him to use the thigh-high ideally if he cannot handle it then try the knee-high during the daytime and elevate the legs  DME prescription given    His main issue is cough with the spasm and difficulty to sleep  He also has iron deficiency anemia having ongoing fatigue tiredness  Not on any replacement therapy    Given ongoing cough with the dizziness and lightheadedness  I will get CTA of the chest and arrange referral to see pulmonary specialist  Treat iron deficiency anemia with ferrous gluconate 1 pill daily new Rx sent  Continue Xarelto same  Use compression    Follow-up with me 1 week after CT chest virtually      40 minutes spent on the date of the encounter doing chart review, review of recent imaging studies, IR evaluation, history, exam, documentation and addressed above-mentioned issues    Arrange CT chest and referral to pulmonary was done  Prescription sent    The longitudinal care of plan for the above diagnoses was addressed during this visit. Due to added complexity of care, we will continue to supprt Stanford Andersen and the subsequent management of this/these conditions and with ongoing continuity of care for this/these conditions.         Chino Morales MD,FADARCI,FSVM,FNLA, FACP  Vascular Medicine  Clinical Hypertension Specialist   Clinical Lipidologist

## 2024-06-05 NOTE — PROGRESS NOTES
"Patient is here to discuss follow up    /76 (BP Location: Right arm, Patient Position: Chair, Cuff Size: Adult Regular)   Pulse 97   Ht 5' 8\" (1.727 m)   Wt 166 lb 12.8 oz (75.7 kg)   SpO2 93%   BMI 25.36 kg/m      Questions patient would like addressed today are: N/A.    Refills are needed: No    Has homecare services and agency name:  [facility]    "

## 2024-06-05 NOTE — PATIENT INSTRUCTIONS
Please go for CTA chest order placed our staff will schedule test     Take Ferrous gluconate one pill daily for iron deficiency anemia and fatigue     See lung specialist referral done     Continue rest of the medications same     Recent ECHO looks good     Virtual visit a week after CT scan

## 2024-06-12 DIAGNOSIS — R05.9 COUGH, UNSPECIFIED TYPE: Primary | ICD-10-CM

## 2024-06-12 NOTE — Clinical Note
Future Appointments 6/17/2024  2:00 PM     PULMONARY FUNCTION      [facility]SPUL[facility]              [facility] 6/17/2024  3:00 PM    Sabrina Cancino MD     [facility] 8/1/2024   3:00 PM    Arabella Foley,* [facility]

## 2024-06-17 ENCOUNTER — OFFICE VISIT (OUTPATIENT)
Dept: PULMONOLOGY | Facility: CLINIC | Age: 79
End: 2024-06-17
Payer: COMMERCIAL

## 2024-06-17 VITALS
HEART RATE: 99 BPM | RESPIRATION RATE: 18 BRPM | DIASTOLIC BLOOD PRESSURE: 75 MMHG | BODY MASS INDEX: 25.24 KG/M2 | WEIGHT: 166 LBS | OXYGEN SATURATION: 96 % | SYSTOLIC BLOOD PRESSURE: 111 MMHG

## 2024-06-17 DIAGNOSIS — I26.99 BILATERAL PULMONARY EMBOLISM (H): ICD-10-CM

## 2024-06-17 DIAGNOSIS — R05.3 CHRONIC COUGH: Primary | ICD-10-CM

## 2024-06-17 DIAGNOSIS — J45.40 MODERATE PERSISTENT ASTHMA, UNSPECIFIED WHETHER COMPLICATED: ICD-10-CM

## 2024-06-17 DIAGNOSIS — R05.9 COUGH, UNSPECIFIED TYPE: ICD-10-CM

## 2024-06-17 LAB — PULMONARY FUNCTION TEST-FENO: 9 PPB (ref 0–40)

## 2024-06-17 PROCEDURE — 95012 NITRIC OXIDE EXP GAS DETER: CPT | Performed by: INTERNAL MEDICINE

## 2024-06-17 PROCEDURE — 99205 OFFICE O/P NEW HI 60 MIN: CPT | Mod: 25 | Performed by: INTERNAL MEDICINE

## 2024-06-17 PROCEDURE — 94375 RESPIRATORY FLOW VOLUME LOOP: CPT | Performed by: INTERNAL MEDICINE

## 2024-06-17 RX ORDER — PREDNISONE 10 MG/1
TABLET ORAL
Qty: 60 TABLET | Refills: 0 | Status: SHIPPED | OUTPATIENT
Start: 2024-06-17 | End: 2024-07-04

## 2024-06-17 RX ORDER — BUDESONIDE AND FORMOTEROL FUMARATE DIHYDRATE 160; 4.5 UG/1; UG/1
2 AEROSOL RESPIRATORY (INHALATION) 2 TIMES DAILY
Qty: 10.2 G | Refills: 2 | Status: SHIPPED | OUTPATIENT
Start: 2024-06-17

## 2024-06-17 ASSESSMENT — PAIN SCALES - GENERAL: PAINLEVEL: NO PAIN (0)

## 2024-06-17 NOTE — PATIENT INSTRUCTIONS
-Start prednisone 60 mg daily x 5 days, then 40 mg daily x 3 days, 30 mg daily x 3 days, 20 mg daily x 3 days and 10 mg daily x 3 days  -Start Symbicort 2 puffs twice daily.  Remember to rinse mouth out with water following use.  -Referral placed to ENT to have them look at your throat and vocal cords.    Return to clinic in 3 mos with me or Bianca.

## 2024-06-17 NOTE — PROGRESS NOTES
Stanford Andersen comes into clinic today at the request of Dr. Cancino, Ordering Provider for PFT and FENO    This service provided today was under the supervising provider of the day Dr. Cancino, who was available if needed.    Enrique Willis, RT

## 2024-06-17 NOTE — PROGRESS NOTES
Pulmonary Clinic New Patient Consult  Reason for Consult: chronic cough; bilateral PE  History of Present Illness    Mr. Stanford Andersen is a 79 yom with a history of bilateral PE s/p mechanical thrombectomy who presents to pulmonary clinic today for further evaluation of cough.  Review of the record is notable for the following:  -Admitted 4/11 to the hospital with acute bilateral PE with right heart strain and DVT, unprovoked.  Treated with mechanical thrombecctomy and started on anti-coagulation with Xarelto.    -Saw vascular surgery on 6/5 for ongoing management.  They referred him to pulmonary for ongoing cough and also planned to obtain a repeat CTA.    Mr. Andersen presents to clinic today and reports the following:  -Has had the cough for about 2 months. He noticed it about 1 day after being discharged from the hospital in April with his PE.  -Review of hospital documentation suggests he had an RRT called on 4/11 for shortness of breath and incessant cough.  Notes suggest he reported a sensation of throat swelling then that seems to have contributed to his coughing spell.  Expiratory wheezing over the trachea without stridor was noted in that documentation. He was presumed to have an adverse reaction to the contrast dye and treated for severe allergic reaction with solumedrol and H2 blockers.  -Does not recall any soreness or feeling sick around the time that the cough started but does report that he feels like his throat is closing up when he tries to lay down.  -He has frequent hard coughing fits to the point of pulling stomach and groin muscles.  He also gets quite dizzy with his coughing spells.  -The cough is quite disruptive to his sleep.  -The cough has been mostly dry up until 2 days ago but more recently has started producing phlegm.  No hemoptysis.  -Has been able to sleep a little better recently but only if he sleeps propped up on multiple pillows, nearly sitting straight up.  -Feels like the cough  is coming from his throat.  Feels like something is closing up or whistling through there.  -Does note some shortness of breath with activity.  Legs feel tired and hasn't been able to do much as he just doesn't have the energy.  -Prior to PEs he had no known issues with his lungs, cough, or respiratory symptoms.  -Didn't take anything for his cough for a long time because he was concerned about it reacting with his Xarelto.  Recently took Nyquil for 2 days which seemed to help some.  -cough seems to be perhaps mildly improving - less wrenching and severe.  -Does have some history of seasonal allergies/hayever.  Does note some exercise induced asthma in teenage years.  No issues since then.  -Takes prilosec for GERD.  Also has Zollinger-Patricio syndrome.    -Never smoker.  -No pets at home.    No family history of lung disease.        Review of Systems:  10 of 14 systems reviewed and are negative unless otherwise stated in HPI.    Past Medical History:   Diagnosis Date    Arthritis     hand    Chronic back pain     Complication of anesthesia     URINARY RETENTION, Sleepy after surgery    Gastro-oesophageal reflux disease     Hypertension     Renal disease     kidney stone    Ventral hernia without obstruction or gangrene 10/08/2018    Zollinger-Patricio syndrome 1995       Allergies   Allergen Reactions    Dye [Contrast Dye] Anaphylaxis and Hives     Patient developed throat swelling, nausea, vomiting and respiratory distress 4/11.    Penicillins Anaphylaxis     Swelling, arm turned blue    Tramadol Itching    Oxycodone-Acetaminophen Rash         Current Outpatient Medications:     ferrous gluconate (FERGON) 324 (38 Fe) MG tablet, Take 1 tablet (324 mg) by mouth daily (with breakfast), Disp: 60 tablet, Rfl: 3    finasteride (PROSCAR) 5 MG tablet, TAKE ONE TABLET BY MOUTH EVERY DAY, Disp: 90 tablet, Rfl: 0    omeprazole (PRILOSEC) 40 MG DR capsule, Take 1 capsule (40 mg) by mouth 2 times daily, Disp: 180 capsule, Rfl:  3    rivaroxaban ANTICOAGULANT (XARELTO) 20 MG TABS tablet, Take 20 mg by mouth daily (with dinner), Disp: , Rfl:     tamsulosin (FLOMAX) 0.4 MG capsule, TAKE 1 CAPSULE BY MOUTH EVERY EVENING, Disp: 90 capsule, Rfl: 3      Physical Exam:  /75 (BP Location: Left arm, Patient Position: Sitting, Cuff Size: Adult Regular)   Pulse 99   Resp 18   Wt 75.3 kg (166 lb)   SpO2 96%   BMI 25.24 kg/m    GENERAL: Well developed, well nourished, alert, and in no apparent distress.  HEENT: Normocephalic, atraumatic. PERRL, EOMI. Oral mucosa is moist. No perioral cyanosis.  NECK: supple, no obvious masses.  RESP:  Normal respiratory effort.  Multiple severe coughing fits. Bilateral lungs without rales, wheezes, rhonchi but seemingly diminished air entry.  No cyanosis or clubbing.  CV: Normal S1, S2, regular rhythm, normal rate. No murmur.  No LE edema.   ABDOMEN: non-distended.   SKIN: warm and dry. No rash.  NEURO: AAOx3.  Normal gait.  Fluent speech.  PSYCH: mentation appears normal.     Results (personally reviewed in clinic today):  PFTs: attempted to obtain but not able to complete due to severe coughing fit.  FeNO 10 ppb suggesting low likelihood of eosinophilic airways inflammation.  Imaging:  CTA 4/11:   Extensive bilateral pulmonary artery emboli involving the distal main pulmonary arteries and extending into numerous lobar, segmental, and subsegmental branches of both lungs. There is asymmetric enlargement of the right heart compatible with a degree of right heart strain. No focal pulmonary consolidation or pleural effusion. No pneumothorax. Scattered subsegmental atelectasis.       Assessment and Plan:   Stanford Andersen is a 79 year old male with a history of bilateral PE s/p mechanical thrombectomy, now on Xarelto who presents to pulmonary clinic today for further evaluation of cough.  Chronic cough.  Unable to obtain PFTs due to coughing fit.  FeNO of 10 indicates lower likelihood of eosinophilic airways  inflammation.  At this point I am uncertain as to the etiology of his severe cough, which started during his hospitalization for PE.  Given history of asthma and the fact that his cough seems to have been triggered by a severe hypersensitivity reaction to contrast, I do wonder about cough variant asthma.  To this end, I am recommending a burst and taper of prednisone 60 mg daily x 5days, 40 mg daily x 3d, 30 mg daily x3d, 20 mg daily x3d, and 10 mg daily x 3d.  I will also start high dose LABA/ICS with Symbicort.  He was instructed to rinse his mouth out following use. Obtaining CT imaging of the chest would also be reasonable for further evaluation.  I have deferred ordering a CT today as it sounds like Dr. Morales plans to obtain repeat CTA in the near future.  While this will not be the ideal protocol for lung parenchymal evaluation, it will be plenty sufficient to exclude obvious abnormalities and possible etiologies of cough.  Since Stanford describes a frequent and recurrent sensation of his throat closing and his cough coming from his throat, I have placed a referral to ENT for evaluation of his throat and upper airway to ensure there is no anatomical abnormality or vocal cord pathology.  Of note it does not appear that he was intubated during his thrombectomy.  Finally, it does appear that cough could be a lesser known side effect of Xarelto.  If the above measures and evaluation do not yield improvement or explanation for cough, I might suggest a trial of a different anti-coagulant in case this is a medication adverse effect.    PE, unprovoked, s/p mechanical thrombectomy.  Currently on Xarelto.  Continue to follow with vascular.  Questions and concerns were answered to the patient's satisfaction.  he was provided with my contact information should new questions or concerns arise in the interim.  he should return to clinic in 3 months with myself or Bianca for follow up.  Up to date on pneumonia vaccines  (2023).    Mary Cancino MD  Pulmonary and Critical Care Medicine    The above note was dictated using voice recognition software and may include typographical errors. Please contact the author for any clarifications.    I spent 85 minutes on the date of encounter doing chart review, review of outside records, review of test results, conducting the patient visit, completing documentation and further activities as noted above.

## 2024-06-17 NOTE — LETTER
6/17/2024      Stanford Andersen  [id number] [facility][1] [id number]      Dear Colleague,    Thank you for referring your patient, Stanford Andersen, to the [facility] [place]. Please see a copy of my visit note below.    Pulmonary Clinic New Patient Consult  Reason for Consult: chronic cough; bilateral PE  History of Present Illness    Mr. Stanford Andersen is a 79 yom with a history of bilateral PE s/p mechanical thrombectomy who presents to pulmonary clinic today for further evaluation of cough.  Review of the record is notable for the following:  -Admitted 4/11 to the hospital with acute bilateral PE with right heart strain and DVT, unprovoked.  Treated with mechanical thrombecctomy and started on anti-coagulation with Xarelto.    -Saw vascular surgery on 6/5 for ongoing management.  They referred him to pulmonary for ongoing cough and also planned to obtain a repeat CTA.    Mr. Andersen presents to clinic today and reports the following:  -Has had the cough for about 2 months. He noticed it about 1 day after being discharged from the hospital in April with his PE.  -Review of hospital documentation suggests he had an RRT called on 4/11 for shortness of breath and incessant cough.  Notes suggest he reported a sensation of throat swelling then that seems to have contributed to his coughing spell.  Expiratory wheezing over the trachea without stridor was noted in that documentation. He was presumed to have an adverse reaction to the contrast dye and treated for severe allergic reaction with solumedrol and H2 blockers.  -Does not recall any soreness or feeling sick around the time that the cough started but does report that he feels like his throat is closing up when he tries to lay down.  -He has frequent hard coughing fits to the point of pulling stomach and groin muscles.  He also gets quite dizzy with his coughing spells.  -The cough is quite disruptive to his sleep.  -The cough has been  mostly dry up until 2 days ago but more recently has started producing phlegm.  No hemoptysis.  -Has been able to sleep a little better recently but only if he sleeps propped up on multiple pillows, nearly sitting straight up.  -Feels like the cough is coming from his throat.  Feels like something is closing up or whistling through there.  -Does note some shortness of breath with activity.  Legs feel tired and hasn't been able to do much as he just doesn't have the energy.  -Prior to PEs he had no known issues with his lungs, cough, or respiratory symptoms.  -Didn't take anything for his cough for a long time because he was concerned about it reacting with his Xarelto.  Recently took Nyquil for 2 days which seemed to help some.  -cough seems to be perhaps mildly improving - less wrenching and severe.  -Does have some history of seasonal allergies/hayever.  Does note some exercise induced asthma in teenage years.  No issues since then.  -Takes prilosec for GERD.  Also has Zollinger-Patricio syndrome.    -Never smoker.  -No pets at home.    No family history of lung disease.        Review of Systems:  10 of 14 systems reviewed and are negative unless otherwise stated in HPI.    Past Medical History:   Diagnosis Date    Arthritis     hand    Chronic back pain     Complication of anesthesia     URINARY RETENTION, Sleepy after surgery    Gastro-oesophageal reflux disease     Hypertension     Renal disease     kidney stone    Ventral hernia without obstruction or gangrene 10/08/2018    Zollinger-Patricio syndrome 1995       Allergies   Allergen Reactions    Dye [Contrast Dye] Anaphylaxis and Hives     Patient developed throat swelling, nausea, vomiting and respiratory distress 4/11.    Penicillins Anaphylaxis     Swelling, arm turned blue    Tramadol Itching    Oxycodone-Acetaminophen Rash         Current Outpatient Medications:     ferrous gluconate (FERGON) 324 (38 Fe) MG tablet, Take 1 tablet (324 mg) by mouth daily  (with breakfast), Disp: 60 tablet, Rfl: 3    finasteride (PROSCAR) 5 MG tablet, TAKE ONE TABLET BY MOUTH EVERY DAY, Disp: 90 tablet, Rfl: 0    omeprazole (PRILOSEC) 40 MG DR capsule, Take 1 capsule (40 mg) by mouth 2 times daily, Disp: 180 capsule, Rfl: 3    rivaroxaban ANTICOAGULANT (XARELTO) 20 MG TABS tablet, Take 20 mg by mouth daily (with dinner), Disp: , Rfl:     tamsulosin (FLOMAX) 0.4 MG capsule, TAKE 1 CAPSULE BY MOUTH EVERY EVENING, Disp: 90 capsule, Rfl: 3      Physical Exam:  /75 (BP Location: Left arm, Patient Position: Sitting, Cuff Size: Adult Regular)   Pulse 99   Resp 18   Wt 75.3 kg (166 lb)   SpO2 96%   BMI 25.24 kg/m    GENERAL: Well developed, well nourished, alert, and in no apparent distress.  HEENT: Normocephalic, atraumatic. PERRL, EOMI. Oral mucosa is moist. No perioral cyanosis.  NECK: supple, no obvious masses.  RESP:  Normal respiratory effort.  Multiple severe coughing fits. Bilateral lungs without rales, wheezes, rhonchi but seemingly diminished air entry.  No cyanosis or clubbing.  CV: Normal S1, S2, regular rhythm, normal rate. No murmur.  No LE edema.   ABDOMEN: non-distended.   SKIN: warm and dry. No rash.  NEURO: AAOx3.  Normal gait.  Fluent speech.  PSYCH: mentation appears normal.     Results (personally reviewed in clinic today):  PFTs: attempted to obtain but not able to complete due to severe coughing fit.  FeNO 10 ppb suggesting low likelihood of eosinophilic airways inflammation.  Imaging:  CTA 4/11:   Extensive bilateral pulmonary artery emboli involving the distal main pulmonary arteries and extending into numerous lobar, segmental, and subsegmental branches of both lungs. There is asymmetric enlargement of the right heart compatible with a degree of right heart strain. No focal pulmonary consolidation or pleural effusion. No pneumothorax. Scattered subsegmental atelectasis.       Assessment and Plan:   Stanford Andersen is a 79 year old male with a history of  bilateral PE s/p mechanical thrombectomy, now on Xarelto who presents to pulmonary clinic today for further evaluation of cough.  Chronic cough.  Unable to obtain PFTs due to coughing fit.  FeNO of 10 indicates lower likelihood of eosinophilic airways inflammation.  At this point I am uncertain as to the etiology of his severe cough, which started during his hospitalization for PE.  Given history of asthma and the fact that his cough seems to have been triggered by a severe hypersensitivity reaction to contrast, I do wonder about cough variant asthma.  To this end, I am recommending a burst and taper of prednisone 60 mg daily x 5days, 40 mg daily x 3d, 30 mg daily x3d, 20 mg daily x3d, and 10 mg daily x 3d.  I will also start high dose LABA/ICS with Symbicort.  He was instructed to rinse his mouth out following use. Obtaining CT imaging of the chest would also be reasonable for further evaluation.  I have deferred ordering a CT today as it sounds like Dr. Morales plans to obtain repeat CTA in the near future.  While this will not be the ideal protocol for lung parenchymal evaluation, it will be plenty sufficient to exclude obvious abnormalities and possible etiologies of cough.  Since Stanford describes a frequent and recurrent sensation of his throat closing and his cough coming from his throat, I have placed a referral to ENT for evaluation of his throat and upper airway to ensure there is no anatomical abnormality or vocal cord pathology.  Of note it does not appear that he was intubated during his thrombectomy.  Finally, it does appear that cough could be a lesser known side effect of Xarelto.  If the above measures and evaluation do not yield improvement or explanation for cough, I might suggest a trial of a different anti-coagulant in case this is a medication adverse effect.    PE, unprovoked, s/p mechanical thrombectomy.  Currently on Xarelto.  Continue to follow with vascular.  Questions and concerns were  answered to the patient's satisfaction.  he was provided with my contact information should new questions or concerns arise in the interim.  he should return to clinic in 3 months with myself or Bianca for follow up.  Up to date on pneumonia vaccines (2023).    Mary Cancino MD  Pulmonary and Critical Care Medicine    The above note was dictated using voice recognition software and may include typographical errors. Please contact the author for any clarifications.    I spent 85 minutes on the date of encounter doing chart review, review of outside records, review of test results, conducting the patient visit, completing documentation and further activities as noted above.

## 2024-06-18 ENCOUNTER — TELEPHONE (OUTPATIENT)
Facility: CLINIC | Age: 79
End: 2024-06-18
Payer: COMMERCIAL

## 2024-06-18 ENCOUNTER — TELEPHONE (OUTPATIENT)
Dept: PULMONOLOGY | Facility: CLINIC | Age: 79
End: 2024-06-18
Payer: COMMERCIAL

## 2024-06-18 LAB
EXPTIME-PRE: 2.87 SEC
FEF2575-%PRED-PRE: 91 %
FEF2575-PRE: 1.73 L/SEC
FEF2575-PRED: 1.89 L/SEC
FEFMAX-%PRED-PRE: 77 %
FEFMAX-PRE: 5.41 L/SEC
FEFMAX-PRED: 7.01 L/SEC
FEV1-%PRED-PRE: 72 %
FEV1-PRE: 1.85 L
FEV1FEV6-PRE: 85 %
FEV1FEV6-PRED: 77 %
FEV1FVC-PRE: 85 %
FEV1FVC-PRED: 76 %
FIFMAX-PRE: 3.4 L/SEC
FVC-%PRED-PRE: 64 %
FVC-PRE: 2.19 L
FVC-PRED: 3.39 L

## 2024-06-18 NOTE — TELEPHONE ENCOUNTER
ALISTAIRM to schedule 3 month f/u with Dr. Cancino. Dr. Cancino first availble is showing currently around 10/21. If pt would like to be seen sooner they can schedule with ABDIEL Ames.

## 2024-07-01 ENCOUNTER — MYC MEDICAL ADVICE (OUTPATIENT)
Dept: INTERNAL MEDICINE | Facility: CLINIC | Age: 79
End: 2024-07-01
Payer: COMMERCIAL

## 2024-07-01 DIAGNOSIS — I26.99 BILATERAL PULMONARY EMBOLISM (H): Primary | ICD-10-CM

## 2024-07-03 DIAGNOSIS — N40.1 BPH ASSOCIATED WITH NOCTURIA: ICD-10-CM

## 2024-07-03 DIAGNOSIS — R35.1 BPH ASSOCIATED WITH NOCTURIA: ICD-10-CM

## 2024-07-04 RX ORDER — FINASTERIDE 5 MG/1
1 TABLET, FILM COATED ORAL DAILY
Qty: 90 TABLET | Refills: 0 | Status: SHIPPED | OUTPATIENT
Start: 2024-07-04 | End: 2024-10-04

## 2024-08-01 ENCOUNTER — OFFICE VISIT (OUTPATIENT)
Dept: SLEEP MEDICINE | Facility: CLINIC | Age: 79
End: 2024-08-01
Attending: HOSPITALIST
Payer: COMMERCIAL

## 2024-08-01 VITALS
DIASTOLIC BLOOD PRESSURE: 74 MMHG | WEIGHT: 168 LBS | BODY MASS INDEX: 25.46 KG/M2 | HEIGHT: 68 IN | OXYGEN SATURATION: 93 % | HEART RATE: 88 BPM | SYSTOLIC BLOOD PRESSURE: 127 MMHG

## 2024-08-01 DIAGNOSIS — I10 ESSENTIAL HYPERTENSION: Chronic | ICD-10-CM

## 2024-08-01 DIAGNOSIS — G47.34 NOCTURNAL HYPOXEMIA: Primary | ICD-10-CM

## 2024-08-01 DIAGNOSIS — R06.81 WITNESSED APNEIC SPELLS: ICD-10-CM

## 2024-08-01 PROCEDURE — 99204 OFFICE O/P NEW MOD 45 MIN: CPT | Performed by: PHYSICIAN ASSISTANT

## 2024-08-01 ASSESSMENT — SLEEP AND FATIGUE QUESTIONNAIRES
HOW LIKELY ARE YOU TO NOD OFF OR FALL ASLEEP WHILE WATCHING TV: SLIGHT CHANCE OF DOZING
HOW LIKELY ARE YOU TO NOD OFF OR FALL ASLEEP WHILE LYING DOWN TO REST IN THE AFTERNOON WHEN CIRCUMSTANCES PERMIT: SLIGHT CHANCE OF DOZING
HOW LIKELY ARE YOU TO NOD OFF OR FALL ASLEEP WHILE SITTING INACTIVE IN A PUBLIC PLACE: WOULD NEVER DOZE
HOW LIKELY ARE YOU TO NOD OFF OR FALL ASLEEP IN A CAR, WHILE STOPPED FOR A FEW MINUTES IN TRAFFIC: WOULD NEVER DOZE
HOW LIKELY ARE YOU TO NOD OFF OR FALL ASLEEP WHEN YOU ARE A PASSENGER IN A CAR FOR AN HOUR WITHOUT A BREAK: MODERATE CHANCE OF DOZING
HOW LIKELY ARE YOU TO NOD OFF OR FALL ASLEEP WHILE SITTING QUIETLY AFTER LUNCH WITHOUT ALCOHOL: WOULD NEVER DOZE
HOW LIKELY ARE YOU TO NOD OFF OR FALL ASLEEP WHILE SITTING AND TALKING TO SOMEONE: WOULD NEVER DOZE
HOW LIKELY ARE YOU TO NOD OFF OR FALL ASLEEP WHILE SITTING AND READING: WOULD NEVER DOZE

## 2024-08-01 NOTE — PROGRESS NOTES
Outpatient Sleep Medicine Consultation:      Name: Stanford Andersen MRN# [id number]   Age: 79 year old YOB: 1945     Date of Consultation: August 1, 2024  Consultation is requested by: Belgica Quiroz MD  [address] [place] [address] Belgica Quiroz  Primary care provider: Charlie Lawrence       Reason for Sleep Consult:     Stanford Andersen is sent by Belgica Quiroz for a sleep consultation regarding 1. Bilateral pulmonary embolism (H)    Patient s Reason for visit  Stanford Andersen main reason for visit: i do not know  Patient states problem(s) started: april 2024  Stanford Andersen's goals for this visit: none applied         Assessment and Plan:     1. Nocturnal hypoxemia  2. Witnessed apneic spells  3. Essential hypertension    Patient presents to clinic for evaluation of suspected sleep apnea. Nocturnal hypoxemia noted when patient was hospitalized back in April with bilateral PE, thought that underlying NIDHI was contributing not from PE alone.  He was discharged on supplemental O2 1 L with sleep and has been continuing ever since.  Patient does not have any concerns with his sleep today and he does not believe he has sleep apnea, though he sleeps alone so no bed partner to comment on classic symptoms.  He does believe nursing staff told him he stopped breathing in his sleep during hospitalization however.  He denies any classical snoring or gasp arousals.  Was struggling with nighttime cough few weeks ago that resolved with steroid.  Denies excessive daytime sleepiness.  No insomnia.  We reviewed pathophysiology of NIDHI and cardiovascular complications associated with untreated sleep apnea present. We discussed having him come in for in lab polysomnogram to evaluate for sleep disordered breathing and he politely declined at this time.  Not at all interested in pursuing sleep study and if positive for NIDHI would not be interested in treating.  Patient is predominantly concerned  "about getting his house ready to sell as he and his son plan to move to [place].  I let him know that if he ever changes his mind and he wants me to place orders for sleep study I would be happy to do so.  He plans to continue nocturnal oxygen for now and follow-up with his pulmonologist for continuing care in that regard. Follow-up with me prn.          History of Present Illness:     Stanford Andersen is a 79 year old male with HTN, BPH, recent bilateral PE who presents to clinic today with his son Crispin for discussion of possible sleep apnea.  Nocturnal hypoxemia noted when hospitalized with his PE back in April and concern of sleep apnea contributing.  Was discharged with 1 L O2 with sleep and continues at home.  No issues tolerating.  Patient had nocturnal cough few weeks ago that resolved with steroids.     SLEEP-WAKE SCHEDULE:     Work/School Days: Patient goes to school/work: No   Usually gets into bed at 12:00AM  Sleep latency \"sometimes right away and sometimes a long time there is no reason for it\"   Has trouble falling asleep 4 nights per week  Wakes up in the middle of the night 1-2 time.  Wakes up due to Use the bathroom  He has trouble falling back asleep couple times a week.   It usually takes half hour to get back to sleep  Patient is usually up at 10:00AM  Uses alarm: No    Weekends/Non-work Days/All Other Days:  Usually gets into bed at 12:00AM  Takes patient about 15 minutes or so to fall asleep  Patient is usually up at 10:00AM  Uses alarm: No    Sleep Need  Patient estimates he gets 7 hours nightly sleep on average   Patient thinks he needs about i guess 8 hours sleep    Stanford Andersen prefers to sleep in this position(s): Side   Patient states they do the following activities in bed: Read    Naps  Patient takes a purposeful nap 2 times a week and naps are usually half hour to hour in duration  He feels better after a nap: Yes  He dozes off unintentionally 0 days per week, rarely can late at " night before bed in front of TV but not during the day  Patient has had a driving accident or near-miss due to sleepiness/drowsiness: No  He had a total Carlisle Sleepiness Scale of 4 (08/01/24 1517), with scores of 10 or higher indicating abnormal levels of sleepiness.     SLEEP DISRUPTIONS:    Breathing/Snoring  Patient snores:No  Other people complain about his snoring: No  Patient has been told he stops breathing in his sleep:No, ?by hospital staff   Gasping/choking arousals: No  He has issues with the following: Morning mouth dryness;Getting up to urinate more than once  Denies morning headache, morning nasal congestion, nocturnal GERD    Movement:  Patient gets pain, discomfort, with an urge to move:  No  Patient has been told he kicks his legs at night:   No      Behaviors in Sleep:  Stanford Andersen has experienced the following behaviors while sleeping: Sleep-talking  Denies sleepwalking, sleep eating, bruxism, dream enactment, recurring nightmares, night terrors, sleep paralysis, hypnagogic/hypnopompic hallucinations, cataplexy    Is there anything else you would like your sleep provider to know: no    CAFFEINE AND OTHER SUBSTANCES:    Patient consumes caffeinated beverages per day:  2  Last caffeine use is usually: 5 pm  List of any prescribed or over the counter stimulants that patient takes:  None  List of any prescribed or over the counter sleep medication patient takes: None  Patient drinks alcohol to help them sleep: No  Patient drinks alcohol near bedtime: No    Family History:  Patient has a family member been diagnosed with a sleep disorder: No      SCALES:    EPWORTH SLEEPINESS SCALE         8/1/2024     3:17 PM    Carlisle Sleepiness Scale ( RONALD Ashby  [id number]<br>ESS - [place]/English - Final version - 21 Nov 07 - [facility] [place].)   Sitting and reading Would never doze   Watching TV Slight chance of dozing   Sitting, inactive in a public place (e.g. a theatre or a meeting) Would never doze    As a passenger in a car for an hour without a break Moderate chance of dozing   Lying down to rest in the afternoon when circumstances permit Slight chance of dozing   Sitting and talking to someone Would never doze   Sitting quietly after a lunch without alcohol Would never doze   In a car, while stopped for a few minutes in traffic Would never doze   Enterprise Score (MC) 4   Enterprise Score (Sleep) 4       INSOMNIA SEVERITY INDEX (NILDA)          8/1/2024     2:48 PM   Insomnia Severity Index (NILDA)   Difficulty falling asleep 1   Difficulty staying asleep 0   Problems waking up too early 1   How SATISFIED/DISSATISFIED are you with your CURRENT sleep pattern? 1   How NOTICEABLE to others do you think your sleep problem is in terms of impairing the quality of your life? 0   How WORRIED/DISTRESSED are you about your current sleep problem? 0   To what extent do you consider your sleep problem to INTERFERE with your daily functioning (e.g. daytime fatigue, mood, ability to function at work/daily chores, concentration, memory, mood, etc.) CURRENTLY? 1   NILDA Total Score 4       Guidelines for Scoring/Interpretation:  Total score categories:  0-7 = No clinically significant insomnia   8-14 = Subthreshold insomnia   15-21 = Clinical insomnia (moderate severity)  22-28 = Clinical insomnia (severe)  Used via courtesy of www.[employer]Mith.va.gov with permission from Andrew Ferrer PhD., [facility]    Allergies:    Allergies   Allergen Reactions    Dye [Contrast Dye] Anaphylaxis and Hives     Patient developed throat swelling, nausea, vomiting and respiratory distress 4/11.    Penicillins Anaphylaxis     Swelling, arm turned blue    Tramadol Itching    Oxycodone-Acetaminophen Rash       Medications:    Current Outpatient Medications   Medication Sig Dispense Refill    budesonide-formoterol (SYMBICORT) 160-4.5 MCG/ACT Inhaler Inhale 2 puffs into the lungs 2 times daily (Patient not taking: Reported on 8/1/2024) 10.2 g 2     ferrous gluconate (FERGON) 324 (38 Fe) MG tablet Take 1 tablet (324 mg) by mouth daily (with breakfast) 60 tablet 3    finasteride (PROSCAR) 5 MG tablet TAKE ONE TABLET BY MOUTH EVERY DAY 90 tablet 0    omeprazole (PRILOSEC) 40 MG DR capsule Take 1 capsule (40 mg) by mouth 2 times daily 180 capsule 3    rivaroxaban ANTICOAGULANT (XARELTO) 20 MG TABS tablet Take 1 tablet (20 mg) by mouth daily (with dinner) for 180 days 90 tablet 1    tamsulosin (FLOMAX) 0.4 MG capsule TAKE 1 CAPSULE BY MOUTH EVERY EVENING 90 capsule 3       Problem List:  Patient Active Problem List    Diagnosis Date Noted    Elevated troponin 04/11/2024     Priority: Medium    Bilateral pulmonary embolism (H) 04/11/2024     Priority: Medium    Nephrolithiasis 05/18/2023     Priority: Medium    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     Priority: Medium     Left      Recurrent kidney stones 05/03/2019     Priority: Medium    BPH associated with nocturia 04/16/2019     Priority: Medium    Erectile dysfunction, unspecified erectile dysfunction type 10/08/2018     Priority: Medium    Ventral hernia without obstruction or gangrene 10/08/2018     Priority: Medium    Numbness and tingling of both feet 10/08/2018     Priority: Medium    Stress 08/02/2017     Priority: Medium    ACP (advance care planning) 12/21/2015     Priority: Medium     Advance Care Planning 12/21/2015: ACP Review of Chart / Resources Provided:  Reviewed chart for advance care plan.  Stanford Andersen has no plan or code status on file. Discussed available resources and provided with information. . Added by Zee Stewart            Essential hypertension 02/05/2014     Priority: Medium    Hyperlipidemia 09/04/2013     Priority: Medium    Family history of coronary artery disease 09/04/2013     Priority: Medium    Change in bowel function 09/04/2013     Priority: Medium    Zollinger-Patricio syndrome      Priority: Medium    Chest pain 03/02/2012     Priority: Medium        Past Medical/Surgical  History:  Past Medical History:   Diagnosis Date    Arthritis     hand    Chronic back pain     Complication of anesthesia     URINARY RETENTION, Sleepy after surgery    Gastro-oesophageal reflux disease     Hypertension     Renal disease     kidney stone    Ventral hernia without obstruction or gangrene 10/08/2018    Zollinger-Patricio syndrome 1995     Past Surgical History:   Procedure Laterality Date    ABDOMEN SURGERY  2003    Exploratory laparoscopy    COMBINED CYSTOSCOPY, INSERT STENT URETER(S) Left 8/31/2021    Procedure: Cystoscopy with left ureteral stent placement;  Surgeon: Devan Bergeron MD;  Location: [facility] OR    COMBINED CYSTOSCOPY, RETROGRADES, URETEROSCOPY, LASER HOLMIUM LITHOTRIPSY URETER(S), INSERT STENT Right 5/23/2019    Procedure: Video cystoscopy, exam under anesthesia, right ureteroscopy with holmium laser lithotripsy and stone extractions, right double-J stent (5-Syrian x 24 cm).;  Surgeon: Satinder Almanza MD;  Location: [facility] OR    COMBINED CYSTOSCOPY, RETROGRADES, URETEROSCOPY, LASER HOLMIUM LITHOTRIPSY URETER(S), INSERT STENT Left 9/30/2021    Procedure: Cystoscopy, left ureteral stent exchange, left ureteroscopy with holmium laser lithotripsy and stone basketing. left retrogrades;  Surgeon: Devan Bergeron MD;  Location: [facility]    EXTRACORPOREAL SHOCK WAVE LITHOTRIPSY (ESWL) Left 8/31/2021    Procedure: Bilateral extracorporeal shockwave lithotripsy;  Surgeon: Devan Bergeron MD;  Location: [facility]    EXTRACORPOREAL SHOCK WAVE LITHOTRIPSY (ESWL) Bilateral 5/25/2023    Procedure: BILATERAL EXTRACORPOREAL SHOCK WAVE LITHOTRIPSY;  Surgeon: Devan Bergeron MD;  Location:  OR    HC EXCISE VARICOCELE  age 21     varicocele repair    HERNIORRHAPHY UMBILICAL N/A 11/30/2018    Procedure: OPEN UMBILLICAL HERNIA REPAIR ;  Surgeon: Ike Catalan MD;  Location:  OR    IR PULMONARY ANGIOGRAM BILATERAL  4/11/2024    KIDNEY SURGERY      lithotripsy x 3     LAPAROSCOPIC HERNIORRHAPHY INGUINAL Left 9/26/2016    Procedure: LAPAROSCOPIC HERNIORRHAPHY INGUINAL;  Surgeon: Ike Catalan MD;  Location:  [facility]    LITHOTRIPSY      three times    STOMACH SURGERY  2003    exploratory scoping looking for tumors related to ZE syndrome       Social History:  Social History     Socioeconomic History    Marital status:      Spouse name: Not on file    Number of children: Not on file    Years of education: Not on file    Highest education level: Not on file   Occupational History    Not on file   Tobacco Use    Smoking status: Never    Smokeless tobacco: Never   Vaping Use    Vaping status: Never Used   Substance and Sexual Activity    Alcohol use: Not Currently     Comment: seldom    Drug use: No    Sexual activity: Not Currently   Other Topics Concern    Parent/sibling w/ CABG, MI or angioplasty before 65F 55M? No   Social History Narrative    Not on file     Social Determinants of Health     Financial Resource Strain: Low Risk  (5/7/2024)    Financial Resource Strain     Within the past 12 months, have you or your family members you live with been unable to get utilities (heat, electricity) when it was really needed?: No   Food Insecurity: Low Risk  (5/7/2024)    Food Insecurity     Within the past 12 months, did you worry that your food would run out before you got money to buy more?: No     Within the past 12 months, did the food you bought just not last and you didn t have money to get more?: No   Transportation Needs: High Risk (5/7/2024)    Transportation Needs     Within the past 12 months, has lack of transportation kept you from medical appointments, getting your medicines, non-medical meetings or appointments, work, or from getting things that you need?: Yes   Physical Activity: Insufficiently Active (5/7/2024)    Exercise Vital Sign     Days of Exercise per Week: 2 days     Minutes of Exercise per Session: 20 min   Stress: Stress Concern Present (5/7/2024)     [place] [employer] - Occupational Stress Questionnaire     Feeling of Stress : To some extent   Social Connections: Unknown (5/7/2024)    Social Connection and Isolation Panel [NHANES]     Frequency of Communication with Friends and Family: Not on file     Frequency of Social Gatherings with Friends and Family: Once a week     Attends Scientologist Services: Not on file     Active Member of Clubs or Organizations: Not on file     Attends Club or Organization Meetings: Not on file     Marital Status: Not on file   Interpersonal Safety: Low Risk  (5/7/2024)    Interpersonal Safety     Do you feel physically and emotionally safe where you currently live?: Yes     Within the past 12 months, have you been hit, slapped, kicked or otherwise physically hurt by someone?: No     Within the past 12 months, have you been humiliated or emotionally abused in other ways by your partner or ex-partner?: No   Housing Stability: Low Risk  (5/7/2024)    Housing Stability     Do you have housing? : Yes     Are you worried about losing your housing?: No       Family History:  Family History   Problem Relation Age of Onset    Heart Disease Mother         ablation    C.A.D. Mother     Heart Disease Father     Respiratory Father         COPD    C.A.D. Father     Spina bifida Son        Review of Systems:  In the last TWO WEEKS have you experienced any of the following symptoms?  Fevers: No  Night Sweats: No  Weight Gain: No  Pain at Night: Yes  Double Vision: No  Changes in Vision: Yes  Difficulty Breathing through Nose: No  Sore Throat in Morning: No  Dry Mouth in the Morning: Yes  Shortness of Breath Lying Flat: No  Shortness of Breath With Activity: Yes  Awakening with Shortness of Breath: No  Increased Cough: No  Heart Racing at Night: No  Swelling in Feet or Legs: No  Diarrhea at Night: Yes  Heartburn at Night: No  Urinating More than Once at Night: Yes  Losing Control of Urine at Night: No  Joint Pains at Night:  "No  Headaches in Morning: No  Weakness in Arms or Legs: No  Depressed Mood: Yes  Anxiety: Yes     Physical Examination:  Vitals: /74   Pulse 88   Ht 1.727 m (5' 8\")   Wt 76.2 kg (168 lb)   SpO2 93%   BMI 25.54 kg/m    BMI= Body mass index is 25.54 kg/m .  General appearance: Awake, alert, cooperative. Well groomed. Sitting comfortably in chair. In no apparent distress.  HEENT: Head: Normocephalic, atraumatic. Eyes: PERRL. Conjunctiva clear. Sclera normal. Nose: External appearance normal.  Neck: Neck Cir (cm): 42 cm (8/1/2024  3:00 PM)  Skin:  No rashes or significant lesions.   Neurologic: Alert, oriented x3. No focal neurological deficit.   Psychiatric: Mood euthymic. Affect congruent with full range and intensity.         Data: All pertinent previous laboratory data reviewed     Recent Labs   Lab Test 05/03/24  0843 04/12/24  0419    141   POTASSIUM 3.9 4.5   CHLORIDE 106 106   CO2 25 26   ANIONGAP 12 9   * 159*   BUN 19.7 15.8   CR 1.00 0.95   CLIFTON 8.8 8.5*       Recent Labs   Lab Test 05/07/24  1343 05/03/24  0843   WBC  --  7.8   RBC  --  3.84*   HGB 9.3* 9.2*   HCT  --  30.5*   MCV  --  79   MCH  --  24.0*   MCHC  --  30.2*   RDW  --  15.4*   PLT  --  352       Recent Labs   Lab Test 04/11/24  0219   PROTTOTAL 6.5   ALBUMIN 3.6   BILITOTAL 0.3   ALKPHOS 92   AST 27   ALT 32       No results found for: \"TSH\"    No results found for: \"UAMP\", \"UBARB\", \"BENZODIAZEUR\", \"UCANN\", \"UCOC\", \"OPIT\", \"UPCP\"    Iron Sat Index   Date/Time Value Ref Range Status   05/07/2024 01:43 PM 5 (L) 15 - 46 % Final       No results found for: \"PH\", \"PHARTERIAL\", \"PO2\", \"OR9HLODXFPR\", \"SAT\", \"PCO2\", \"HCO3\", \"BASEEXCESS\", \"SON\", \"BEB\"    @LABRCNTIPR(phv:4,pco2v:4,po2v:4,hco3v:4,jeanine:4,o2per:4)@    Echocardiology  Study Date: 05/29/2024 01:08 PM  Age: 79 yrs  Gender: Male  Patient [facility]: [facility]  Reason For Study: Bilateral pulmonary embolism (H)  Ordering Physician: LIZZETTE MISHRA  Referring " Physician: LIZZETTE MISHRA  Performed By: Faviola Neely     BSA: 1.9 m2  Height: 68 in  Weight: 168 lb  BP: 133/76 mmHg  ______________________________________________________________________________  Procedure  Limited Echo Adult.  ______________________________________________________________________________  Interpretation Summary     Limited echocardiogram performed per study order.  Left ventricular systolic function is normal.  The visual ejection fraction is 60-65%.  No regional wall motion abnormalities.  Normal right ventricular size and systolic function.  Trace tricuspid and mitral valve regurgitation.  Estimated pulmonary artery pressure is normal.  Normal inferior vena cava.  No pericardial effusion.     Compared to the recent study dated 4/11/2024, right ventricular size and  systolic function have normalized and estimated pulmonary artery pressure has  normalized.       Chest x-ray:   XR Chest 2 Views 04/11/2024    Narrative  EXAM: XR CHEST 2 VIEWS  LOCATION: [facility]  DATE: 4/11/2024    INDICATION: Shortness of breath  COMPARISON: 05/18/2023    Impression  IMPRESSION: Negative chest. Moderate elevation of the right hemidiaphragm is unchanged.      Chest CT:   CT Chest Pulmonary Embolism w Contrast 04/11/2024    Narrative  EXAM: CT CHEST PULMONARY EMBOLISM W CONTRAST  LOCATION: [facility]  DATE: 4/11/2024    INDICATION: shortness of breath, hypoxia; Male sex; No prior imaging in the last 24 hours; Pulmonary Embolism Rule Out Criteria (PERC) score > 0; Revised [place] Score (RGS) not >= 11; No D dimer result available; D dimer not ordered  COMPARISON: Same day 2 view chest radiograph  TECHNIQUE: CT chest pulmonary angiogram during arterial phase injection of IV contrast. Multiplanar reformats and MIP reconstructions were performed. Dose reduction techniques were used.  CONTRAST: 64mL isovue 370    FINDINGS:  ANGIOGRAM CHEST: Extensive  bilateral pulmonary artery emboli involving the distal main pulmonary arteries and extending into numerous lobar, segmental, and subsegmental branches of both lungs. There is asymmetric enlargement of the right heart compatible  with a degree of right heart strain. Thoracic aorta is negative for dissection. Mild atherosclerotic calcifications of the aortic arch.    LUNGS AND PLEURA: No focal pulmonary consolidation or pleural effusion. No pneumothorax. Scattered subsegmental atelectasis.    MEDIASTINUM/AXILLAE: Prominent mediastinal lymph nodes which may be reactive.    CORONARY ARTERY CALCIFICATION: No significant.    UPPER ABDOMEN: Hepatic cysts which require no dedicated follow-up. Partially visualized prominent left renal collecting system and small nonobstructing left renal calyceal calculi.    MUSCULOSKELETAL: Mild degenerative changes of the cervicothoracic spine. No acute osseous abnormality or suspicious bony lesion.    Impression  IMPRESSION:  1.  Extensive bilateral pulmonary artery emboli involving the distal main pulmonary arteries and extending into numerous lobar, segmental, and subsegmental branches of both lungs. There is asymmetric enlargement of the right heart compatible with a  degree of right heart strain.      [Critical Result: New diagnosis of pulmonary embolism]    Finding was identified on 4/11/2024 5:20 AM CDT.    Dr. Castillo was contacted by me on 4/11/2024 5:26 AM CDT and verbalized understanding of the critical result.      PFT: Most Recent [facility] Pulmonary Function Testing    FVC-Pred   Date Value Ref Range Status   06/17/2024 3.39 L      FVC-Pre   Date Value Ref Range Status   06/17/2024 2.19 L      FVC-%Pred-Pre   Date Value Ref Range Status   06/17/2024 64 %      FEV1-Pre   Date Value Ref Range Status   06/17/2024 1.85 L      FEV1-%Pred-Pre   Date Value Ref Range Status   06/17/2024 72 %      FEV1FVC-Pred   Date Value Ref Range Status   06/17/2024 76 %      FEV1FVC-Pre   Date Value  "Ref Range Status   06/17/2024 85 %      No results found for: \"20029\"  FEFMax-Pred   Date Value Ref Range Status   06/17/2024 7.01 L/sec      FEFMax-Pre   Date Value Ref Range Status   06/17/2024 5.41 L/sec      FEFMax-%Pred-Pre   Date Value Ref Range Status   06/17/2024 77 %      ExpTime-Pre   Date Value Ref Range Status   06/17/2024 2.87 sec      FIFMax-Pre   Date Value Ref Range Status   06/17/2024 3.40 L/sec      FEV1FEV6-Pred   Date Value Ref Range Status   06/17/2024 77 %      FEV1FEV6-Pre   Date Value Ref Range Status   06/17/2024 85 %      No results found for: \"20055\"      Arabella Foley PA-C 8/1/2024     [facility]  [address], [place] [id number] [place] [address]  [address] [address]    Chart documentation was completed, in part, with [employer] voice-recognition software. Even though reviewed, some grammatical, spelling, and word errors may remain.    45 minutes spent on day of encounter reviewing medical records, history and physical examination, review of previous testing and interpretation, documentation and further activities as noted above        "

## 2024-08-01 NOTE — NURSING NOTE
"Chief Complaint   Patient presents with    Sleep Problem     Recent hospital stay, referred from them       Initial /74   Pulse 88   Ht 1.727 m (5' 8\")   Wt 76.2 kg (168 lb)   SpO2 93%   BMI 25.54 kg/m   Estimated body mass index is 25.54 kg/m  as calculated from the following:    Height as of this encounter: 1.727 m (5' 8\").    Weight as of this encounter: 76.2 kg (168 lb).    Medication Reconciliation: complete  ESS   Neck circumference: 42 centimeters.  Cindy Fontana MA   "

## 2024-10-03 DIAGNOSIS — R35.1 BPH ASSOCIATED WITH NOCTURIA: ICD-10-CM

## 2024-10-03 DIAGNOSIS — N40.1 BPH ASSOCIATED WITH NOCTURIA: ICD-10-CM

## 2024-10-04 RX ORDER — FINASTERIDE 5 MG/1
1 TABLET, FILM COATED ORAL DAILY
Qty: 90 TABLET | Refills: 0 | Status: SHIPPED | OUTPATIENT
Start: 2024-10-04

## 2024-10-23 ENCOUNTER — OFFICE VISIT (OUTPATIENT)
Dept: PULMONOLOGY | Facility: CLINIC | Age: 79
End: 2024-10-23
Attending: INTERNAL MEDICINE
Payer: COMMERCIAL

## 2024-10-23 VITALS
WEIGHT: 174.5 LBS | BODY MASS INDEX: 26.53 KG/M2 | HEART RATE: 95 BPM | SYSTOLIC BLOOD PRESSURE: 138 MMHG | DIASTOLIC BLOOD PRESSURE: 87 MMHG | OXYGEN SATURATION: 94 %

## 2024-10-23 DIAGNOSIS — I26.99 BILATERAL PULMONARY EMBOLISM (H): ICD-10-CM

## 2024-10-23 DIAGNOSIS — R05.3 CHRONIC COUGH: ICD-10-CM

## 2024-10-23 DIAGNOSIS — J45.40 MODERATE PERSISTENT ASTHMA, UNSPECIFIED WHETHER COMPLICATED: ICD-10-CM

## 2024-10-23 PROCEDURE — 99214 OFFICE O/P EST MOD 30 MIN: CPT | Performed by: PHYSICIAN ASSISTANT

## 2024-10-23 RX ORDER — BUDESONIDE AND FORMOTEROL FUMARATE DIHYDRATE 160; 4.5 UG/1; UG/1
2 AEROSOL RESPIRATORY (INHALATION) 2 TIMES DAILY
Qty: 30.6 G | Refills: 3 | Status: SHIPPED | OUTPATIENT
Start: 2024-10-23

## 2024-10-23 ASSESSMENT — ASTHMA QUESTIONNAIRES
QUESTION_3 LAST FOUR WEEKS HOW OFTEN DID YOUR ASTHMA SYMPTOMS (WHEEZING, COUGHING, SHORTNESS OF BREATH, CHEST TIGHTNESS OR PAIN) WAKE YOU UP AT NIGHT OR EARLIER THAN USUAL IN THE MORNING: ONCE A WEEK
ACT_TOTALSCORE: 17
QUESTION_4 LAST FOUR WEEKS HOW OFTEN HAVE YOU USED YOUR RESCUE INHALER OR NEBULIZER MEDICATION (SUCH AS ALBUTEROL): NOT AT ALL
QUESTION_1 LAST FOUR WEEKS HOW MUCH OF THE TIME DID YOUR ASTHMA KEEP YOU FROM GETTING AS MUCH DONE AT WORK, SCHOOL OR AT HOME: NONE OF THE TIME
QUESTION_5 LAST FOUR WEEKS HOW WOULD YOU RATE YOUR ASTHMA CONTROL: SOMEWHAT CONTROLLED
ACT_TOTALSCORE: 17
QUESTION_2 LAST FOUR WEEKS HOW OFTEN HAVE YOU HAD SHORTNESS OF BREATH: MORE THAN ONCE A DAY

## 2024-10-23 ASSESSMENT — PAIN SCALES - GENERAL: PAINLEVEL_OUTOF10: NO PAIN (0)

## 2024-10-23 NOTE — NURSING NOTE
Chief Complaint   Patient presents with    RECHECK     Chronic cough +2 more       Vitals:    10/23/24 1424   BP: 138/87   BP Location: Left arm   Patient Position: Sitting   Cuff Size: Adult Regular   Pulse: 95   SpO2: 94%   Weight: 79.2 kg (174 lb 8 oz)       Body mass index is 26.53 kg/m .    Rosa Garcia, [facility]

## 2024-10-23 NOTE — LETTER
10/23/2024      Stanford Andersen  [address] [facility] [address]      Dear Colleague,    Thank you for referring your patient, Stanford Andersen, to the [facility] [place]. Please see a copy of my visit note below.    Pulmonary Clinic New Patient Consult  Reason for Consult: chronic cough; bilateral PE  History of Present Illness    Mr. Stanford Andersen is a 79 yom with a history of bilateral PE s/p mechanical thrombectomy who presents to pulmonary clinic today for follow up of cough. Last seen in clinic with Dr. Cancino 6/2024. Review of the record is notable for the following:  -Admitted 4/11 to the hospital with acute bilateral PE with right heart strain and DVT, unprovoked.  Treated with mechanical thrombecctomy and started on anti-coagulation with Xarelto.    -Saw vascular surgery on 6/5 for ongoing management.  They referred him to pulmonary for ongoing cough   -Seen with Dr. Cancino 4/2024, started prednisone taper and high dose symbicort for possible asthma.     Mr. Andersen presents to clinic today and reports the following:  -Has had the cough for about 2 months. He noticed it about 1 day after being discharged from the hospital in April with his PE.  -Review of hospital documentation suggests he had an RRT called on 4/11 for shortness of breath and incessant cough.  Notes suggest he reported a sensation of throat swelling then that seems to have contributed to his coughing spell.  Expiratory wheezing over the trachea without stridor was noted in that documentation. He was presumed to have an adverse reaction to the contrast dye and treated for severe allergic reaction with solumedrol and H2 blockers.  - Cough significantly improved/nearly resolved with prednisone and the inhaler but ran out of inhaler. Starting to cough a little again with a little clear phlegm.   - Denies nasal congestion or postnasal drip but feels there is something plugged up in his throat but when he  was on prednisone and in the inhaler this symptom resolved.    No hemoptysis.  -Has been able to sleep a little better recently  -Does note some shortness of breath with activity.  Legs feel tired and hasn't been able to do much as he just doesn't have the energy.  -Prior to PEs he had no known issues with his lungs, cough, or respiratory symptoms.  -Does have some history of seasonal allergies/hayever.  Does note some exercise induced asthma in teenage years.  No issues since then.  -Takes prilosec for GERD.  Also has Zollinger-Patricio syndrome.    -Never smoker.  -No pets at home.    No family history of lung disease.        Review of Systems:  10 of 14 systems reviewed and are negative unless otherwise stated in HPI.    Past Medical History:   Diagnosis Date     Arthritis     hand     Chronic back pain      Complication of anesthesia     URINARY RETENTION, Sleepy after surgery     Gastro-oesophageal reflux disease      Hypertension      Renal disease     kidney stone     Ventral hernia without obstruction or gangrene 10/08/2018     Zollinger-Patricio syndrome 1995       Allergies   Allergen Reactions     Dye [Contrast Dye] Anaphylaxis and Hives     Patient developed throat swelling, nausea, vomiting and respiratory distress 4/11.     Penicillins Anaphylaxis     Swelling, arm turned blue     Tramadol Itching     Oxycodone-Acetaminophen Rash         Current Outpatient Medications:      finasteride (PROSCAR) 5 MG tablet, TAKE ONE TABLET BY MOUTH EVERY DAY, Disp: 90 tablet, Rfl: 0     omeprazole (PRILOSEC) 40 MG DR capsule, Take 1 capsule (40 mg) by mouth 2 times daily, Disp: 180 capsule, Rfl: 3     rivaroxaban ANTICOAGULANT (XARELTO) 20 MG TABS tablet, Take 1 tablet (20 mg) by mouth daily (with dinner) for 180 days, Disp: 90 tablet, Rfl: 1     tamsulosin (FLOMAX) 0.4 MG capsule, TAKE 1 CAPSULE BY MOUTH EVERY EVENING, Disp: 90 capsule, Rfl: 3     budesonide-formoterol (SYMBICORT) 160-4.5 MCG/ACT Inhaler, Inhale 2  puffs into the lungs 2 times daily (Patient not taking: Reported on 10/23/2024), Disp: 10.2 g, Rfl: 2     ferrous gluconate (FERGON) 324 (38 Fe) MG tablet, Take 1 tablet (324 mg) by mouth daily (with breakfast) (Patient not taking: Reported on 10/23/2024), Disp: 60 tablet, Rfl: 3      Physical Exam:  /87 (BP Location: Left arm, Patient Position: Sitting, Cuff Size: Adult Regular)   Pulse 95   Wt 79.2 kg (174 lb 8 oz)   SpO2 94%   BMI 26.53 kg/m    GENERAL: Well developed, well nourished, alert, and in no apparent distress.  HEENT: Normocephalic, atraumatic. PERRL, EOMI. No perioral cyanosis.  NECK: supple, no obvious masses.  RESP:  Normal respiratory effort.  No coughing noted. Bilateral lungs without rales, wheezes, rhonchi but seemingly diminished air entry.  No cyanosis or clubbing.  CV: Normal S1, S2, regular rhythm, normal rate. No murmur.  No LE edema.   SKIN: warm and dry. No rash.  NEURO: AAOx3.  Normal gait.  Fluent speech.  PSYCH: mentation appears normal.     Results (personally reviewed in clinic today):  PFTs: attempted to obtain but not able to complete due to severe coughing fit.  FeNO 10 ppb suggesting low likelihood of eosinophilic airways inflammation.  Imaging:  CTA 4/11:   Extensive bilateral pulmonary artery emboli involving the distal main pulmonary arteries and extending into numerous lobar, segmental, and subsegmental branches of both lungs. There is asymmetric enlargement of the right heart compatible with a degree of right heart strain. No focal pulmonary consolidation or pleural effusion. No pneumothorax. Scattered subsegmental atelectasis.       Assessment and Plan:   Stanford Andersen is a 79 year old male with a history of bilateral PE s/p mechanical thrombectomy, now on Xarelto who presents to pulmonary clinic today for follow up of cough.   Chronic cough secondary to asthma.  Unable to obtain PFTs due to coughing fit.  FeNO of 10 indicates lower likelihood of eosinophilic  airways inflammation.  Nearly resolved with prednisone taper (60 mg daily x 5days, 40 mg daily x 3d, 30 mg daily x3d, 20 mg daily x3d, and 10 mg daily x 3d) and high dose LABA/ICS with Symbicort. Cough has somewhat returned but mild after running out of Symbicort. Clinical course and symptom resolution with corticosteroids + ICS/LABA is consistent with asthma. Recommend resuming maintenance Symbicort.  PE, unprovoked, s/p mechanical thrombectomy.  Currently on Xarelto.  Continue to follow with vascular.  Questions and concerns were answered to the patient's satisfaction.  he was provided with my contact information should new questions or concerns arise in the interim.  1 year follow up  Up to date on pneumonia vaccines (2023).  Bianca Ames PA-C  Interventional and General Pulmonology  Department of Pulmonary, Allergy, Critical Care and Sleep Medicine   [facility]     The above note was dictated using voice recognition software and may include typographical errors. Please contact the author for any clarifications.    I spent 35 minutes on the date of encounter doing chart review, review of outside records, review of test results, conducting the patient visit, completing documentation and further activities as noted above.                                    Again, thank you for allowing me to participate in the care of your patient.        Sincerely,        Bianca Ames PA-C

## 2024-10-23 NOTE — PROGRESS NOTES
Pulmonary Clinic New Patient Consult  Reason for Consult: chronic cough; bilateral PE  History of Present Illness    Mr. Stanford Andersen is a 79 yom with a history of bilateral PE s/p mechanical thrombectomy who presents to pulmonary clinic today for follow up of cough. Last seen in clinic with Dr. Cancino 6/2024. Review of the record is notable for the following:  -Admitted 4/11 to the hospital with acute bilateral PE with right heart strain and DVT, unprovoked.  Treated with mechanical thrombecctomy and started on anti-coagulation with Xarelto.    -Saw vascular surgery on 6/5 for ongoing management.  They referred him to pulmonary for ongoing cough   -Seen with Dr. Cancino 4/2024, started prednisone taper and high dose symbicort for possible asthma.     Mr. Andersen presents to clinic today and reports the following:  -Has had the cough for about 2 months. He noticed it about 1 day after being discharged from the hospital in April with his PE.  -Review of hospital documentation suggests he had an RRT called on 4/11 for shortness of breath and incessant cough.  Notes suggest he reported a sensation of throat swelling then that seems to have contributed to his coughing spell.  Expiratory wheezing over the trachea without stridor was noted in that documentation. He was presumed to have an adverse reaction to the contrast dye and treated for severe allergic reaction with solumedrol and H2 blockers.  - Cough significantly improved/nearly resolved with prednisone and the inhaler but ran out of inhaler. Starting to cough a little again with a little clear phlegm.   - Denies nasal congestion or postnasal drip but feels there is something plugged up in his throat but when he was on prednisone and in the inhaler this symptom resolved.    No hemoptysis.  -Has been able to sleep a little better recently  -Does note some shortness of breath with activity.  Legs feel tired and hasn't been able to do much as he just doesn't  have the energy.  -Prior to PEs he had no known issues with his lungs, cough, or respiratory symptoms.  -Does have some history of seasonal allergies/hayever.  Does note some exercise induced asthma in teenage years.  No issues since then.  -Takes prilosec for GERD.  Also has Zollinger-Patricio syndrome.    -Never smoker.  -No pets at home.    No family history of lung disease.        Review of Systems:  10 of 14 systems reviewed and are negative unless otherwise stated in HPI.    Past Medical History:   Diagnosis Date    Arthritis     hand    Chronic back pain     Complication of anesthesia     URINARY RETENTION, Sleepy after surgery    Gastro-oesophageal reflux disease     Hypertension     Renal disease     kidney stone    Ventral hernia without obstruction or gangrene 10/08/2018    Zollinger-Patricio syndrome 1995       Allergies   Allergen Reactions    Dye [Contrast Dye] Anaphylaxis and Hives     Patient developed throat swelling, nausea, vomiting and respiratory distress 4/11.    Penicillins Anaphylaxis     Swelling, arm turned blue    Tramadol Itching    Oxycodone-Acetaminophen Rash         Current Outpatient Medications:     finasteride (PROSCAR) 5 MG tablet, TAKE ONE TABLET BY MOUTH EVERY DAY, Disp: 90 tablet, Rfl: 0    omeprazole (PRILOSEC) 40 MG DR capsule, Take 1 capsule (40 mg) by mouth 2 times daily, Disp: 180 capsule, Rfl: 3    rivaroxaban ANTICOAGULANT (XARELTO) 20 MG TABS tablet, Take 1 tablet (20 mg) by mouth daily (with dinner) for 180 days, Disp: 90 tablet, Rfl: 1    tamsulosin (FLOMAX) 0.4 MG capsule, TAKE 1 CAPSULE BY MOUTH EVERY EVENING, Disp: 90 capsule, Rfl: 3    budesonide-formoterol (SYMBICORT) 160-4.5 MCG/ACT Inhaler, Inhale 2 puffs into the lungs 2 times daily (Patient not taking: Reported on 10/23/2024), Disp: 10.2 g, Rfl: 2    ferrous gluconate (FERGON) 324 (38 Fe) MG tablet, Take 1 tablet (324 mg) by mouth daily (with breakfast) (Patient not taking: Reported on 10/23/2024), Disp: 60  tablet, Rfl: 3      Physical Exam:  /87 (BP Location: Left arm, Patient Position: Sitting, Cuff Size: Adult Regular)   Pulse 95   Wt 79.2 kg (174 lb 8 oz)   SpO2 94%   BMI 26.53 kg/m    GENERAL: Well developed, well nourished, alert, and in no apparent distress.  HEENT: Normocephalic, atraumatic. PERRL, EOMI. No perioral cyanosis.  NECK: supple, no obvious masses.  RESP:  Normal respiratory effort.  No coughing noted. Bilateral lungs without rales, wheezes, rhonchi but seemingly diminished air entry.  No cyanosis or clubbing.  CV: Normal S1, S2, regular rhythm, normal rate. No murmur.  No LE edema.   SKIN: warm and dry. No rash.  NEURO: AAOx3.  Normal gait.  Fluent speech.  PSYCH: mentation appears normal.     Results (personally reviewed in clinic today):  PFTs: attempted to obtain but not able to complete due to severe coughing fit.  FeNO 10 ppb suggesting low likelihood of eosinophilic airways inflammation.  Imaging:  CTA 4/11:   Extensive bilateral pulmonary artery emboli involving the distal main pulmonary arteries and extending into numerous lobar, segmental, and subsegmental branches of both lungs. There is asymmetric enlargement of the right heart compatible with a degree of right heart strain. No focal pulmonary consolidation or pleural effusion. No pneumothorax. Scattered subsegmental atelectasis.       Assessment and Plan:   Stanford Andersen is a 79 year old male with a history of bilateral PE s/p mechanical thrombectomy, now on Xarelto who presents to pulmonary clinic today for follow up of cough.   Chronic cough secondary to asthma.  Unable to obtain PFTs due to coughing fit.  FeNO of 10 indicates lower likelihood of eosinophilic airways inflammation.  Nearly resolved with prednisone taper (60 mg daily x 5days, 40 mg daily x 3d, 30 mg daily x3d, 20 mg daily x3d, and 10 mg daily x 3d) and high dose LABA/ICS with Symbicort. Cough has somewhat returned but mild after running out of Symbicort.  Clinical course and symptom resolution with corticosteroids + ICS/LABA is consistent with asthma. Recommend resuming maintenance Symbicort.  PE, unprovoked, s/p mechanical thrombectomy.  Currently on Xarelto.  Continue to follow with vascular.  Questions and concerns were answered to the patient's satisfaction.  he was provided with my contact information should new questions or concerns arise in the interim.  1 year follow up  Up to date on pneumonia vaccines (2023).  Bainca Ames PA-C  Interventional and General Pulmonology  Department of Pulmonary, Allergy, Critical Care and Sleep Medicine   [facility]     The above note was dictated using voice recognition software and may include typographical errors. Please contact the author for any clarifications.    I spent 35 minutes on the date of encounter doing chart review, review of outside records, review of test results, conducting the patient visit, completing documentation and further activities as noted above.

## 2024-12-29 DIAGNOSIS — I26.99 BILATERAL PULMONARY EMBOLISM (H): ICD-10-CM

## 2024-12-29 DIAGNOSIS — R35.1 BPH ASSOCIATED WITH NOCTURIA: ICD-10-CM

## 2024-12-29 DIAGNOSIS — N40.1 BPH ASSOCIATED WITH NOCTURIA: ICD-10-CM

## 2024-12-30 RX ORDER — RIVAROXABAN 20 MG/1
20 TABLET, FILM COATED ORAL
Qty: 90 TABLET | Refills: 1 | Status: SHIPPED | OUTPATIENT
Start: 2024-12-30

## 2024-12-30 RX ORDER — FINASTERIDE 5 MG/1
1 TABLET, FILM COATED ORAL DAILY
Qty: 90 TABLET | Refills: 0 | Status: SHIPPED | OUTPATIENT
Start: 2024-12-30

## 2025-02-19 ENCOUNTER — OFFICE VISIT (OUTPATIENT)
Dept: INTERNAL MEDICINE | Facility: CLINIC | Age: 80
End: 2025-02-19
Payer: COMMERCIAL

## 2025-02-19 VITALS
RESPIRATION RATE: 16 BRPM | DIASTOLIC BLOOD PRESSURE: 84 MMHG | TEMPERATURE: 98.4 F | HEIGHT: 68 IN | HEART RATE: 91 BPM | OXYGEN SATURATION: 94 % | SYSTOLIC BLOOD PRESSURE: 143 MMHG | BODY MASS INDEX: 26.05 KG/M2 | WEIGHT: 171.9 LBS

## 2025-02-19 DIAGNOSIS — R05.3 CHRONIC COUGH: ICD-10-CM

## 2025-02-19 DIAGNOSIS — Z01.818 PREOP GENERAL PHYSICAL EXAM: Primary | ICD-10-CM

## 2025-02-19 DIAGNOSIS — J45.40 MODERATE PERSISTENT ASTHMA, UNSPECIFIED WHETHER COMPLICATED: ICD-10-CM

## 2025-02-19 DIAGNOSIS — E16.4 ZOLLINGER-ELLISON SYNDROME: ICD-10-CM

## 2025-02-19 DIAGNOSIS — R35.1 BPH ASSOCIATED WITH NOCTURIA: ICD-10-CM

## 2025-02-19 DIAGNOSIS — I26.99 BILATERAL PULMONARY EMBOLISM (H): ICD-10-CM

## 2025-02-19 DIAGNOSIS — H02.9 EYELID ABNORMALITY: ICD-10-CM

## 2025-02-19 DIAGNOSIS — B07.9 FILIFORM WART: ICD-10-CM

## 2025-02-19 DIAGNOSIS — D50.9 IRON DEFICIENCY ANEMIA, UNSPECIFIED IRON DEFICIENCY ANEMIA TYPE: ICD-10-CM

## 2025-02-19 DIAGNOSIS — N40.1 BPH ASSOCIATED WITH NOCTURIA: ICD-10-CM

## 2025-02-19 LAB
ANION GAP SERPL CALCULATED.3IONS-SCNC: 11 MMOL/L (ref 7–15)
BUN SERPL-MCNC: 15.3 MG/DL (ref 8–23)
CALCIUM SERPL-MCNC: 9.5 MG/DL (ref 8.8–10.4)
CHLORIDE SERPL-SCNC: 105 MMOL/L (ref 98–107)
CREAT SERPL-MCNC: 1.12 MG/DL (ref 0.67–1.17)
EGFRCR SERPLBLD CKD-EPI 2021: 67 ML/MIN/1.73M2
ERYTHROCYTE [DISTWIDTH] IN BLOOD BY AUTOMATED COUNT: 16.6 % (ref 10–15)
GLUCOSE SERPL-MCNC: 106 MG/DL (ref 70–99)
HCO3 SERPL-SCNC: 26 MMOL/L (ref 22–29)
HCT VFR BLD AUTO: 32.5 % (ref 40–53)
HGB BLD-MCNC: 9.7 G/DL (ref 13.3–17.7)
IRON BINDING CAPACITY (ROCHE): 356 UG/DL (ref 240–430)
IRON SATN MFR SERPL: 4 % (ref 15–46)
IRON SERPL-MCNC: 15 UG/DL (ref 61–157)
MCH RBC QN AUTO: 20.4 PG (ref 26.5–33)
MCHC RBC AUTO-ENTMCNC: 29.8 G/DL (ref 31.5–36.5)
MCV RBC AUTO: 68 FL (ref 78–100)
PLATELET # BLD AUTO: 288 10E3/UL (ref 150–450)
POTASSIUM SERPL-SCNC: 3.9 MMOL/L (ref 3.4–5.3)
RBC # BLD AUTO: 4.75 10E6/UL (ref 4.4–5.9)
SODIUM SERPL-SCNC: 142 MMOL/L (ref 135–145)
WBC # BLD AUTO: 7.9 10E3/UL (ref 4–11)

## 2025-02-19 PROCEDURE — 85027 COMPLETE CBC AUTOMATED: CPT | Performed by: INTERNAL MEDICINE

## 2025-02-19 PROCEDURE — G2211 COMPLEX E/M VISIT ADD ON: HCPCS | Performed by: INTERNAL MEDICINE

## 2025-02-19 PROCEDURE — 83540 ASSAY OF IRON: CPT | Performed by: INTERNAL MEDICINE

## 2025-02-19 PROCEDURE — 80048 BASIC METABOLIC PNL TOTAL CA: CPT | Performed by: INTERNAL MEDICINE

## 2025-02-19 PROCEDURE — 83550 IRON BINDING TEST: CPT | Performed by: INTERNAL MEDICINE

## 2025-02-19 PROCEDURE — 36415 COLL VENOUS BLD VENIPUNCTURE: CPT | Performed by: INTERNAL MEDICINE

## 2025-02-19 PROCEDURE — 99214 OFFICE O/P EST MOD 30 MIN: CPT | Performed by: INTERNAL MEDICINE

## 2025-02-19 RX ORDER — ALBUTEROL SULFATE 90 UG/1
2 INHALANT RESPIRATORY (INHALATION) EVERY 6 HOURS PRN
Qty: 18 G | Refills: 11 | Status: SHIPPED | OUTPATIENT
Start: 2025-02-19

## 2025-02-19 ASSESSMENT — ASTHMA QUESTIONNAIRES
QUESTION_5 LAST FOUR WEEKS HOW WOULD YOU RATE YOUR ASTHMA CONTROL: COMPLETELY CONTROLLED
ACT_TOTALSCORE: 21
ACT_TOTALSCORE: 21
QUESTION_3 LAST FOUR WEEKS HOW OFTEN DID YOUR ASTHMA SYMPTOMS (WHEEZING, COUGHING, SHORTNESS OF BREATH, CHEST TIGHTNESS OR PAIN) WAKE YOU UP AT NIGHT OR EARLIER THAN USUAL IN THE MORNING: NOT AT ALL
QUESTION_1 LAST FOUR WEEKS HOW MUCH OF THE TIME DID YOUR ASTHMA KEEP YOU FROM GETTING AS MUCH DONE AT WORK, SCHOOL OR AT HOME: NONE OF THE TIME
QUESTION_4 LAST FOUR WEEKS HOW OFTEN HAVE YOU USED YOUR RESCUE INHALER OR NEBULIZER MEDICATION (SUCH AS ALBUTEROL): ONE OR TWO TIMES PER DAY
QUESTION_2 LAST FOUR WEEKS HOW OFTEN HAVE YOU HAD SHORTNESS OF BREATH: ONCE OR TWICE A WEEK

## 2025-02-19 NOTE — PROGRESS NOTES
Preoperative Evaluation  [address] [address]  Phone: [phone]  Primary Provider: Charlie Lawrence MD  Pre-op Performing Provider: Charlie Lawrence MD  Feb 19, 2025 2/19/2025   Surgical Information   What procedure is being done? lower lid repair   Facility or Hospital where procedure/surgery will be performed: [facility]   Who is doing the procedure / surgery? karina pinon md   Date of surgery / procedure: 2/25/2025   Time of surgery / procedure: 2:00 pm   Where do you plan to recover after surgery? at home with family     Fax number for surgical facility: [phone]    Assessment & Plan     The proposed surgical procedure is considered LOW risk.    1. Preop general physical exam    2. Eyelid abnormality    3. Moderate persistent asthma, unspecified whether complicated    4. Bilateral pulmonary embolism with RT heart strain s/p pulm Thrombectomy 4/11/2024  (H) First life time Te event unprovoked    5. Chronic cough    6. Iron deficiency anemia, unspecified iron deficiency anemia type    7. BPH associated with nocturia    8. Zollinger-Patricio syndrome    9. Filiform wart                Risks and Recommendations  The patient has the following additional risks and recommendations for perioperative complications:    Pulmonary:   --history of asthma  --Observe for post op bronchospasm, treat with albuterol nebs if needed.         Antiplatelet or Anticoagulation Medication Instructions   - apixaban (Eliquis), edoxaban (Savaysa), rivaroxaban (Xarelto): . DO NOT TAKE 3 days before surgery.     Additional Medication Instructions  Take all scheduled medications on the day of surgery    Recommendation  Approval given to proceed with proposed procedure, without further diagnostic evaluation.    Celsa Coyne is a 79 year old, presenting for the following:  Pre-Op Exam        HPI related to upcoming  procedure: excessive eyelid tissue        2/19/2025   Pre-Op Questionnaire   Have you ever had a heart attack or stroke? No   Have you ever had surgery on your heart or blood vessels, such as a stent placement, a coronary artery bypass, or surgery on an artery in your head, neck, heart, or legs? YES  4/11/24: pulmonary angiogram with mechanical thrombectomy for PE   Do you have chest pain with activity? No   Do you have a history of heart failure? No   Do you currently have a cold, bronchitis or symptoms of other infection? No   Do you have a cough, shortness of breath, or wheezing? (!) YES history of asthma.     Do you or anyone in your family have previous history of blood clots? 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 No   Have you ever had problems with anemia or been told to take iron pills? No   Have you had any abnormal blood loss such as black, tarry or bloody stools? No   Have you ever had a blood transfusion? No   Are you willing to have a blood transfusion if it is medically needed before, during, or after your surgery? Yes   Have you or any of your relatives ever had problems with anesthesia? No   Do you have sleep apnea, excessive snoring or daytime drowsiness? No   Do you have any artifical heart valves or other implanted medical devices like a pacemaker, defibrillator, or continuous glucose monitor? No   Do you have artificial joints? No   Are you allergic to latex? No     Health Care Directive  Patient does not have a Health Care Directive: Discussed advance care planning with patient; however, patient declined at this time.    Preoperative Review of    reviewed - no record of controlled substances prescribed.        *  No recent infectious illnesses.    *  No recent cardiac or pulmonary issues or symptoms.    *  No problems performing  vigorous physical activity, no changes in exercise tolerance.    *  No personal or family history of anesthesia complications.    *  No personal or family history of bleeding or clotting disorders.           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     Priority: Medium     Left      Recurrent kidney stones 05/03/2019     Priority: Medium    BPH associated with nocturia 04/16/2019     Priority: Medium    Erectile dysfunction, unspecified erectile dysfunction type 10/08/2018     Priority: Medium    Ventral hernia without obstruction or gangrene 10/08/2018     Priority: Medium    Numbness and tingling of both feet 10/08/2018     Priority: Medium    Stress 08/02/2017     Priority: Medium    ACP (advance care planning) 12/21/2015     Priority: Medium     Advance Care Planning 12/21/2015: ACP Review of Chart / Resources Provided:  Reviewed chart for advance care plan.  [facility] Andersen has no plan or code status on file. Discussed available resources and provided with information. . Added by Zee Stewart            Essential hypertension 02/05/2014     Priority: Medium    Hyperlipidemia 09/04/2013     Priority: Medium    Family history of coronary artery disease 09/04/2013     Priority: Medium    Change in bowel function 09/04/2013     Priority: Medium    Zollinger-Patricio syndrome 04/11/2012     Priority: Medium     Diagnosed 1995 by Dr. Graham with [facility]. On Prilosec without issues.      Chest pain 03/02/2012     Priority: Medium      Past Medical History:   Diagnosis Date    Arthritis     hand    Chronic back pain     Complication of anesthesia     URINARY RETENTION, Sleepy after surgery    Gastro-oesophageal reflux  disease     Hypertension     Renal disease     kidney stone    Ventral hernia without obstruction or gangrene 10/08/2018    Zollinger-Patricio syndrome 1995     Past Surgical History:   Procedure Laterality Date    ABDOMEN SURGERY  2003    Exploratory laparoscopy    COMBINED CYSTOSCOPY, INSERT STENT URETER(S) Left 8/31/2021    Procedure: Cystoscopy with left ureteral stent placement;  Surgeon: Devan Bergeron MD;  Location: [facility] OR    COMBINED CYSTOSCOPY, RETROGRADES, URETEROSCOPY, LASER HOLMIUM LITHOTRIPSY URETER(S), INSERT STENT Right 5/23/2019    Procedure: Video cystoscopy, exam under anesthesia, right ureteroscopy with holmium laser lithotripsy and stone extractions, right double-J stent (5-Norwegian x 24 cm).;  Surgeon: Satinder Almanza MD;  Location: [facility] OR    COMBINED CYSTOSCOPY, RETROGRADES, URETEROSCOPY, LASER HOLMIUM LITHOTRIPSY URETER(S), INSERT STENT Left 9/30/2021    Procedure: Cystoscopy, left ureteral stent exchange, left ureteroscopy with holmium laser lithotripsy and stone basketing. left retrogrades;  Surgeon: Devan Bergeron MD;  Location: [facility] OR    EXTRACORPOREAL SHOCK WAVE LITHOTRIPSY (ESWL) Left 8/31/2021    Procedure: Bilateral extracorporeal shockwave lithotripsy;  Surgeon: Devan Bergeron MD;  Location: [facility]    EXTRACORPOREAL SHOCK WAVE LITHOTRIPSY (ESWL) Bilateral 5/25/2023    Procedure: BILATERAL EXTRACORPOREAL SHOCK WAVE LITHOTRIPSY;  Surgeon: Devan Bergeron MD;  Location:  OR    HC EXCISE VARICOCELE  age 21     varicocele repair    HERNIORRHAPHY UMBILICAL N/A 11/30/2018    Procedure: OPEN UMBILLICAL HERNIA REPAIR ;  Surgeon: Ike Catalan MD;  Location:  OR    IR PULMONARY ANGIOGRAM BILATERAL  4/11/2024    KIDNEY SURGERY      lithotripsy x 3    LAPAROSCOPIC HERNIORRHAPHY INGUINAL Left 9/26/2016    Procedure: LAPAROSCOPIC HERNIORRHAPHY INGUINAL;  Surgeon: Ike Catalan MD;  Location:  [facility]    LITHOTRIPSY      three times    STOMACH  "SURGERY  2003    exploratory scoping looking for tumors related to ZE syndrome     Current Outpatient Medications   Medication Sig Dispense Refill    albuterol (PROAIR HFA/PROVENTIL HFA/VENTOLIN HFA) 108 (90 Base) MCG/ACT inhaler Inhale 2 puffs into the lungs every 6 hours as needed for shortness of breath, wheezing or cough. 18 g 11    budesonide-formoterol (SYMBICORT) 160-4.5 MCG/ACT Inhaler Inhale 2 puffs into the lungs 2 times daily. 30.6 g 3    finasteride (PROSCAR) 5 MG tablet TAKE ONE TABLET BY MOUTH EVERY DAY 90 tablet 0    omeprazole (PRILOSEC) 40 MG DR capsule Take 1 capsule (40 mg) by mouth 2 times daily 180 capsule 3    tamsulosin (FLOMAX) 0.4 MG capsule TAKE 1 CAPSULE BY MOUTH EVERY EVENING 90 capsule 3    XARELTO ANTICOAGULANT 20 MG TABS tablet TAKE ONE TABLET BY MOUTH EVERY DAY WITH DINNER 90 tablet 1       Allergies   Allergen Reactions    Dye [Contrast Dye] Anaphylaxis and Hives     Patient developed throat swelling, nausea, vomiting and respiratory distress 4/11.    Penicillins Anaphylaxis     Swelling, arm turned blue    Tramadol Itching    Oxycodone-Acetaminophen Rash        Social History     Tobacco Use    Smoking status: Never    Smokeless tobacco: Never   Substance Use Topics    Alcohol use: Not Currently     Comment: seldom     Family History   Problem Relation Age of Onset    Heart Disease Mother         ablation    C.A.D. Mother     Heart Disease Father     Respiratory Father         COPD    C.A.D. Father     Spina bifida Son      History   Drug Use No             Review of Systems  Constitutional, neuro, ENT, endocrine, pulmonary, cardiac, gastrointestinal, genitourinary, musculoskeletal, integument and psychiatric systems are negative, except as otherwise noted.    Objective    BP (!) 143/84   Pulse 91   Temp 98.4  F (36.9  C) (Temporal)   Resp 16   Ht 1.727 m (5' 8\")   Wt 78 kg (171 lb 14.4 oz)   SpO2 94%   BMI 26.14 kg/m     Estimated body mass index is 26.14 kg/m  as calculated " "from the following:    Height as of this encounter: 1.727 m (5' 8\").    Weight as of this encounter: 78 kg (171 lb 14.4 oz).  Physical Exam  Physical Exam  Vitals and nursing note reviewed.   Constitutional:       Comments: Moves normally, alert, no apparent distress  HENT:      Head: Normocephalic and atraumatic.      Nose: Nose normal.   Eyes:      Extraocular Movements: Extraocular movements intact.   Cardiovascular:      Rate and Rhythm: Normal rate and regular rhythm.      Heart sounds: Normal heart sounds.   Pulmonary:      Effort: Pulmonary effort is normal.      Breath sounds: Normal breath sounds.   Abdominal:      General: There is no distension.      Palpations: There is no mass.      Tenderness: No abdominal tenderness, no distention.     Bowel sounds: normal  Musculoskeletal:         General: Normal range of motion, moves into the room normal, moves in and out of chair normally.    Skin:     General: Skin is warm and dry.   Hueg wart on top of head  Neurological:      General: No focal deficit present.      Mental Status: Alert, responds to questions, Speech coherent  Psychiatric:         Mood and Affect: Mood normal.      Recent Labs   Lab Test 05/07/24  1343 05/03/24  0843 04/12/24  0419   HGB 9.3* 9.2* 10.5*   PLT  --  352 233   NA  --  143 141   POTASSIUM  --  3.9 4.5   CR  --  1.00 0.95        Results for orders placed or performed in visit on 02/19/25   CBC with platelets     Status: Abnormal   Result Value Ref Range    WBC Count 7.9 4.0 - 11.0 10e3/uL    RBC Count 4.75 4.40 - 5.90 10e6/uL    Hemoglobin 9.7 (L) 13.3 - 17.7 g/dL    Hematocrit 32.5 (L) 40.0 - 53.0 %    MCV 68 (L) 78 - 100 fL    MCH 20.4 (L) 26.5 - 33.0 pg    MCHC 29.8 (L) 31.5 - 36.5 g/dL    RDW 16.6 (H) 10.0 - 15.0 %    Platelet Count 288 150 - 450 10e3/uL   Basic metabolic panel     Status: Abnormal   Result Value Ref Range    Sodium 142 135 - 145 mmol/L    Potassium 3.9 3.4 - 5.3 mmol/L    Chloride 105 98 - 107 mmol/L    Carbon " Dioxide (CO2) 26 22 - 29 mmol/L    Anion Gap 11 7 - 15 mmol/L    Urea Nitrogen 15.3 8.0 - 23.0 mg/dL    Creatinine 1.12 0.67 - 1.17 mg/dL    GFR Estimate 67 >60 mL/min/1.73m2    Calcium 9.5 8.8 - 10.4 mg/dL    Glucose 106 (H) 70 - 99 mg/dL   Iron & Iron Binding Capacity     Status: Abnormal   Result Value Ref Range    Iron 15 (L) 61 - 157 ug/dL    Iron Binding Capacity 356 240 - 430 ug/dL    Iron Sat Index 4 (L) 15 - 46 %        Diagnostics  Labs pending at this time.  Results will be reviewed when available.     EKG (4/11/24): sinus tachycardia    ECHOCARDIOGRAM (5/29/24):  Limited echocardiogram performed per study order.  Left ventricular systolic function is normal.  The visual ejection fraction is 60-65%.  No regional wall motion abnormalities.  Normal right ventricular size and systolic function.  Trace tricuspid and mitral valve regurgitation.  Estimated pulmonary artery pressure is normal.  Normal inferior vena cava.  No pericardial effusion.      Revised Cardiac Risk Index (RCRI)  The patient has the following serious cardiovascular risks for perioperative complications:   - No serious cardiac risks = 0 points     RCRI Interpretation: 0 points: Class I (very low risk - 0.4% complication rate)         The longitudinal plan of care for the diagnosis(es)/condition(s) as documented were addressed during this visit. Due to the added complexity in care, I will continue to support Stanford in the subsequent management and with ongoing continuity of care.      Signed Electronically by: Charlie Lawrence MD  A copy of this evaluation report is provided to the requesting physician.

## 2025-02-19 NOTE — PATIENT INSTRUCTIONS
Rechecking labs today to confirm that your blood counts went back to normal after the hospital stay.      Referral to Dermatology Clinic to evaluate your scalp lesions and discuss treatment options.   [facility] will call you to coordinate your care as prescribed by your provider. If you don't hear from a representative within 2 business days, please call [phone]      STOP XARELTO FOR 3 DAYS PRIOR TO THE DERMATOLOGY APPOINTMENT SO THEY CAN TREAT YOU ON THE DAY THAT YOU ARE SEEN.        Use the Albuterol inhaler as needed for any coughing, wheezing, tightness in the breathing, or shortness of breath.   Consider using it before any known triggers for our coughing or wheezing (usually these include cold or hot temperatures, humidity, dust, air pollutants, smoke).  Expect that your airways may remain more easily irritated for a few weeks after upper respiratory infections.     These are the available forms of Albuterol, your insurance formulary will determine which one is preferred.  They all work the same.       Continue all other medications at the same doses.  Contact your usual pharmacy if you need refills.        Return to see me in May for your Annual Wellness exam.               PRE-OPERATIVE INSTRUCTIONS:     Contact your surgeon if there is any change in your health. This includes signs of new infection, such as cold or flu (such as a sore throat, runny nose, cough, rash or fever).  Elective surgeries may need to be postponed if there is significant illness present.    Pre-procedure Covid testing is no longer required unless specifically requested by the surgeon or surgical facility.         STOP XARELTO FOR 3 DAYS PRIOR TO THE SURGERY (OK to resume again after the surgery unless instructed otherwise by the surgeon)      Stop any Fish Oil or multi vitamin (and specifically anything containing vitamin E) supplements for 7 days prior to surgery because these can affect platelet function.   "    Tylenol (acetaminophen) is OK to take if needed, this medication has no effect on platelet function.  Follow instructions on the bottle    Follow any preparation instructions as given by the surgeons.  Prepare your body as instructed by the surgery clinic.  If instructed, Take a shower or bath the night before surgery. Use any special soaps or cleaning instructions according to instructions from your surgeon.  If you do not have soap from your surgeon, use your regular soap. Do not shave or scrub the surgery site.  Wear clean pajamas and have clean sheets on your bed     No solid food after midnight    ON THE MORNING OF SURGERY:     --Take all other usual medications.       Resume all medications at the same doses after surgery (no \"make up\" doses needed), unless instructed otherwise by the medical staff.     Follow all specific postoperative instructions (including any specific medications) as given by the surgeons    Attend all follow up appointments with the surgeons (and/or therapists if applicable) as instructed.     Contact the surgeon's office for any specific questions about after-surgery cares and follow up instructions.      You do not need to necessarily return to see anyone in the primary care clinic after your surgery unless specifically instructed to do so.      If this specific planned surgery gets re-scheduled to a date that falls outside the 30 day window from the date of this pre-op exam, you do not need to return to see us for another pre-op exam for this specific procedure.  Depending on the rescheduled date, we can probably still clear you for this surgery for this specific procedure with this exam performed today, but I will have to write and addendum from the pre-op exam from today dated within the 30 days of the surgery date.   Please contact me with any changes in the date, time, and place of surgery.     "

## 2025-03-12 DIAGNOSIS — R35.1 BPH ASSOCIATED WITH NOCTURIA: ICD-10-CM

## 2025-03-12 DIAGNOSIS — N40.1 BPH ASSOCIATED WITH NOCTURIA: ICD-10-CM

## 2025-03-12 RX ORDER — TAMSULOSIN HYDROCHLORIDE 0.4 MG/1
0.4 CAPSULE ORAL EVERY EVENING
Qty: 90 CAPSULE | Refills: 0 | Status: SHIPPED | OUTPATIENT
Start: 2025-03-12

## 2025-03-18 DIAGNOSIS — E16.4 ZOLLINGER-ELLISON SYNDROME: ICD-10-CM

## 2025-03-18 RX ORDER — OMEPRAZOLE 40 MG/1
40 CAPSULE, DELAYED RELEASE ORAL 2 TIMES DAILY
Qty: 180 CAPSULE | Refills: 3 | OUTPATIENT
Start: 2025-03-18

## 2025-04-14 DIAGNOSIS — R35.1 BPH ASSOCIATED WITH NOCTURIA: ICD-10-CM

## 2025-04-14 DIAGNOSIS — N40.1 BPH ASSOCIATED WITH NOCTURIA: ICD-10-CM

## 2025-04-14 RX ORDER — FINASTERIDE 5 MG/1
1 TABLET, FILM COATED ORAL DAILY
Qty: 90 TABLET | Refills: 1 | Status: SHIPPED | OUTPATIENT
Start: 2025-04-14

## 2025-04-30 DIAGNOSIS — E16.4 ZOLLINGER-ELLISON SYNDROME: ICD-10-CM

## 2025-04-30 RX ORDER — OMEPRAZOLE 40 MG/1
40 CAPSULE, DELAYED RELEASE ORAL 2 TIMES DAILY
Qty: 180 CAPSULE | Refills: 2 | Status: SHIPPED | OUTPATIENT
Start: 2025-04-30

## 2025-05-13 ENCOUNTER — TELEPHONE (OUTPATIENT)
Dept: OTHER | Facility: CLINIC | Age: 80
End: 2025-05-13

## 2025-05-13 ENCOUNTER — OFFICE VISIT (OUTPATIENT)
Dept: INTERNAL MEDICINE | Facility: CLINIC | Age: 80
End: 2025-05-13
Payer: COMMERCIAL

## 2025-05-13 VITALS
BODY MASS INDEX: 26.14 KG/M2 | TEMPERATURE: 98.7 F | OXYGEN SATURATION: 98 % | WEIGHT: 172.5 LBS | DIASTOLIC BLOOD PRESSURE: 80 MMHG | HEIGHT: 68 IN | HEART RATE: 94 BPM | SYSTOLIC BLOOD PRESSURE: 138 MMHG

## 2025-05-13 DIAGNOSIS — R35.1 BPH ASSOCIATED WITH NOCTURIA: ICD-10-CM

## 2025-05-13 DIAGNOSIS — N40.1 BPH ASSOCIATED WITH NOCTURIA: ICD-10-CM

## 2025-05-13 DIAGNOSIS — L82.1: ICD-10-CM

## 2025-05-13 DIAGNOSIS — R05.3 CHRONIC COUGH: ICD-10-CM

## 2025-05-13 DIAGNOSIS — L57.0 ACTINIC KERATOSIS: ICD-10-CM

## 2025-05-13 DIAGNOSIS — H02.9 EYELID ABNORMALITY: ICD-10-CM

## 2025-05-13 DIAGNOSIS — I26.99 BILATERAL PULMONARY EMBOLISM (H): ICD-10-CM

## 2025-05-13 DIAGNOSIS — D50.0 IRON DEFICIENCY ANEMIA DUE TO CHRONIC BLOOD LOSS: ICD-10-CM

## 2025-05-13 DIAGNOSIS — L98.9 SKIN LESION: ICD-10-CM

## 2025-05-13 DIAGNOSIS — E78.5 HYPERLIPIDEMIA LDL GOAL <130: ICD-10-CM

## 2025-05-13 DIAGNOSIS — E16.4 ZOLLINGER-ELLISON SYNDROME: ICD-10-CM

## 2025-05-13 DIAGNOSIS — Z01.818 PREOP GENERAL PHYSICAL EXAM: Primary | ICD-10-CM

## 2025-05-13 DIAGNOSIS — J45.40 MODERATE PERSISTENT ASTHMA, UNSPECIFIED WHETHER COMPLICATED: ICD-10-CM

## 2025-05-13 LAB
ERYTHROCYTE [DISTWIDTH] IN BLOOD BY AUTOMATED COUNT: 18.4 % (ref 10–15)
HCT VFR BLD AUTO: 29.2 % (ref 40–53)
HGB BLD-MCNC: 8.6 G/DL (ref 13.3–17.7)
MCH RBC QN AUTO: 19.5 PG (ref 26.5–33)
MCHC RBC AUTO-ENTMCNC: 29.5 G/DL (ref 31.5–36.5)
MCV RBC AUTO: 66 FL (ref 78–100)
PLATELET # BLD AUTO: 291 10E3/UL (ref 150–450)
RBC # BLD AUTO: 4.41 10E6/UL (ref 4.4–5.9)
WBC # BLD AUTO: 8.5 10E3/UL (ref 4–11)

## 2025-05-13 PROCEDURE — G2211 COMPLEX E/M VISIT ADD ON: HCPCS | Performed by: INTERNAL MEDICINE

## 2025-05-13 PROCEDURE — 36415 COLL VENOUS BLD VENIPUNCTURE: CPT | Performed by: INTERNAL MEDICINE

## 2025-05-13 PROCEDURE — 83550 IRON BINDING TEST: CPT | Performed by: INTERNAL MEDICINE

## 2025-05-13 PROCEDURE — 80061 LIPID PANEL: CPT | Performed by: INTERNAL MEDICINE

## 2025-05-13 PROCEDURE — 99214 OFFICE O/P EST MOD 30 MIN: CPT | Performed by: INTERNAL MEDICINE

## 2025-05-13 PROCEDURE — 83540 ASSAY OF IRON: CPT | Performed by: INTERNAL MEDICINE

## 2025-05-13 PROCEDURE — 93000 ELECTROCARDIOGRAM COMPLETE: CPT | Performed by: INTERNAL MEDICINE

## 2025-05-13 PROCEDURE — 3079F DIAST BP 80-89 MM HG: CPT | Performed by: INTERNAL MEDICINE

## 2025-05-13 PROCEDURE — 84460 ALANINE AMINO (ALT) (SGPT): CPT | Performed by: INTERNAL MEDICINE

## 2025-05-13 PROCEDURE — 85027 COMPLETE CBC AUTOMATED: CPT | Performed by: INTERNAL MEDICINE

## 2025-05-13 PROCEDURE — 1126F AMNT PAIN NOTED NONE PRSNT: CPT | Performed by: INTERNAL MEDICINE

## 2025-05-13 PROCEDURE — 3075F SYST BP GE 130 - 139MM HG: CPT | Performed by: INTERNAL MEDICINE

## 2025-05-13 RX ORDER — EPINEPHRINE 1 MG/ML
0.3 INJECTION, SOLUTION, CONCENTRATE INTRAVENOUS EVERY 5 MIN PRN
OUTPATIENT
Start: 2025-05-20

## 2025-05-13 RX ORDER — ALBUTEROL SULFATE 90 UG/1
1-2 INHALANT RESPIRATORY (INHALATION)
Start: 2025-05-20

## 2025-05-13 RX ORDER — DIPHENHYDRAMINE HYDROCHLORIDE 50 MG/ML
50 INJECTION, SOLUTION INTRAMUSCULAR; INTRAVENOUS
Start: 2025-05-20

## 2025-05-13 RX ORDER — TAMSULOSIN HYDROCHLORIDE 0.4 MG/1
0.4 CAPSULE ORAL EVERY EVENING
Qty: 90 CAPSULE | Refills: 1 | Status: SHIPPED | OUTPATIENT
Start: 2025-05-13

## 2025-05-13 RX ORDER — ALBUTEROL SULFATE 0.83 MG/ML
2.5 SOLUTION RESPIRATORY (INHALATION)
OUTPATIENT
Start: 2025-05-20

## 2025-05-13 RX ORDER — HEPARIN SODIUM,PORCINE 10 UNIT/ML
5-20 VIAL (ML) INTRAVENOUS DAILY PRN
OUTPATIENT
Start: 2025-05-20

## 2025-05-13 RX ORDER — METHYLPREDNISOLONE SODIUM SUCCINATE 40 MG/ML
40 INJECTION INTRAMUSCULAR; INTRAVENOUS
Start: 2025-05-20

## 2025-05-13 RX ORDER — HEPARIN SODIUM (PORCINE) LOCK FLUSH IV SOLN 100 UNIT/ML 100 UNIT/ML
5 SOLUTION INTRAVENOUS
OUTPATIENT
Start: 2025-05-20

## 2025-05-13 RX ORDER — MEPERIDINE HYDROCHLORIDE 25 MG/ML
25 INJECTION INTRAMUSCULAR; INTRAVENOUS; SUBCUTANEOUS
OUTPATIENT
Start: 2025-05-20

## 2025-05-13 RX ORDER — DIPHENHYDRAMINE HYDROCHLORIDE 50 MG/ML
25 INJECTION, SOLUTION INTRAMUSCULAR; INTRAVENOUS
Start: 2025-05-20

## 2025-05-13 ASSESSMENT — PAIN SCALES - GENERAL: PAINLEVEL_OUTOF10: NO PAIN (0)

## 2025-05-13 NOTE — PATIENT INSTRUCTIONS
Vascular medicine referral to re-evaluate your anticoagulation management after your large blood clots into the lungs.  I want them to determine if you need to be on the anticoagulation medication indefinitely or else stop after a full year.    I suspect that you may require anticoagulation indefinitely.   [facility]   [address]   [place] [address]   Phone: [phone]   Fax: [phone]        Rechecking lab tests today.  If iron level low as expected, I will arrange for you to receive iron infusinos at [facility] infusion center.     [facility] will call you to coordinate your infusion therapy as prescribed by your Provider.  If you don't hear from a representative within 2 business days, please call: [place] [phone] or [place] [phone] to schedule your appointment.  Call member services at your health plan with any benefit or coverage questions.          Continue all medications at the same doses.  Contact your usual pharmacy if you need refills.      Referral to Dermatology clinic to evaluate and hopefully remove some of the large skin lesions.  You contact them to make the appointment.    Stop the Xarelto for 2 days prior to the Dermatology appointment so they can perform any procedures needed for you.      [facility] [place] [id number]  [id number]   [facility] [address]   Phone: [phone]        Return to see me in approximately October 2025 for your Annual Wellness Exam, schedule a follow up visit sooner if needed for anything else.  Please get nonfasting labs done in the [facility] or at any other [facility] lab 1-2 days before this appointment.  If you get the labs done at another clinic, make arrangements with that clinic directly.  The orders will be in place.  Use [employer] or Call [phone] to schedule the appointment with me and lab appointment.           PRE-OPERATIVE  "INSTRUCTIONS:     Contact your surgeon if there is any change in your health. This includes signs of new infection, such as cold or flu (such as a sore throat, runny nose, cough, rash or fever).  Elective surgeries may need to be postponed if there is significant illness present.    Pre-procedure Covid testing is no longer required unless specifically requested by the surgeon or surgical facility.      Stop aspirin of any kind for 7 days before procedure.   (even low dose daily aspirin).    No NSAIDs (Motrin, Advil, ibuprofen, Aleve, etc) within 5-7 days of surgery.    Stop any Fish Oil or multi vitamin (and specifically anything containing vitamin E) supplements for 7 days prior to surgery because these can affect platelet function.      Tylenol (acetaminophen) is OK to take if needed, this medication has no effect on platelet function.  Follow instructions on the bottle     STOP THE RIVAROXABAN (XARELTO) FOR 2 DAYS PRIOR TO THE SURGERY    Follow any preparation instructions as given by the surgeons.  Prepare your body as instructed by the surgery clinic.  If instructed, Take a shower or bath the night before surgery. Use any special soaps or cleaning instructions according to instructions from your surgeon.  If you do not have soap from your surgeon, use your regular soap. Do not shave or scrub the surgery site.  Wear clean pajamas and have clean sheets on your bed     No solid food after midnight    ON THE MORNING OF SURGERY:     -- Take all usual morning medications.      --Use your inhalers.      Resume all medications at the same doses after surgery (no \"make up\" doses needed), unless instructed otherwise by the medical staff.     Follow all specific postoperative instructions (including any specific medications) as given by the surgeons    Attend all follow up appointments with the surgeons (and/or therapists if applicable) as instructed.     Contact the surgeon's office for any specific questions about " after-surgery cares and follow up instructions.      You do not need to necessarily return to see anyone in the primary care clinic after your surgery unless specifically instructed to do so.      If this specific planned surgery gets re-scheduled to a date that falls outside the 30 day window from the date of this pre-op exam, you do not need to return to see us for another pre-op exam for this specific procedure.  Depending on the rescheduled date, we can probably still clear you for this surgery for this specific procedure with this exam performed today, but I will have to write and addendum from the pre-op exam from today dated within the 30 days of the surgery date.   Please contact me with any changes in the date, time, and place of surgery.

## 2025-05-13 NOTE — TELEPHONE ENCOUNTER
Referral received via [employer] on 05/13/2025.    Referred by Dr. Deangelo Lawrence for bilateral PE.  06/05/2024 LOV with Dr. Morales.    Previous imaging completed (pertinent to referral):  See EPIC    Routing to scheduling to coordinate the following:  RETURN VASCULAR PATIENT consult with Dr. Morales  Please schedule this at next available      Appt note:  Referred by Dr. Deangelo Lawrence for bilateral PE.  06/05/2024 LOV with Dr. Morales.

## 2025-05-13 NOTE — PROGRESS NOTES
Preoperative Evaluation  [address] [address]  Phone: [phone]  Primary Provider: Charlie Lawrence MD  Pre-op Performing Provider: Charlie Lawrence MD  May 13, 2025         5/12/2025   Surgical Information   What procedure is being done? Electropion Repair Eye surgery   Facility or Hospital where procedure/surgery will be performed: [facility] [address] [facility] [address]   Who is doing the procedure / surgery? Parish Chavez MD   Date of surgery / procedure: May 30th, 2025   Time of surgery / procedure: 1:45 PM   Where do you plan to recover after surgery? at home with family     Fax number for surgical facility: Note does not need to be faxed, will be available electronically in Epic.    Assessment & Plan     The proposed surgical procedure is considered LOW risk.    1. Preop general physical exam    2. Eyelid abnormality    3. Hyperlipidemia LDL goal <130    4. Moderate persistent asthma, unspecified whether complicated    5. Bilateral pulmonary embolism with RT heart strain s/p pulm Thrombectomy 4/11/2024  (H) First life time Te event unprovoked    6. Chronic cough    7. BPH associated with nocturia    8. Zollinger-Patricio syndrome    9. Iron deficiency anemia due to chronic blood loss    10. Skin lesion    11. Verruca seborrheica    12. Actinic keratosis                Risks and Recommendations  The patient has the following additional risks and recommendations for perioperative complications:    Anemia/Bleeding/Clotting:   --History of  PE, will discontinue rivaroxaban 2 days before the procedure then resume afterwards.  No bridging anticoagulation is needed    --History of mild chronic iron deficiency anemia due to chronic blood loss from anticoagulation.   He is scheduled for outpatient intravenous iron infusions.  --There is minimal if any blood loss expected from this procedure.    Pulmonary/asthma:  --  Has mild asthma, symptoms are currently stable on his current regimen.  -- Observe and treat any bronchospasm with albuterol nebulizer.    Antiplatelet or Anticoagulation Medication Instructions   - apixaban (Eliquis), edoxaban (Savaysa), rivaroxaban (Xarelto): Bleeding risk is moderate or high for this procedure AND CrCl  (>=) 50 mL/min. DO NOT TAKE 2 days before surgery.     Additional Medication Instructions  Take all scheduled medications on the day of surgery    Recommendation  Approval given to proceed with proposed procedure, without further diagnostic evaluation.        Celsa Coyne is a 80 year old, presenting for the following:  Pre-Op Exam          5/13/2025     1:32 PM   Additional Questions   Roomed by Lacy ROME CMA     HPI: eyelid abnormality          5/12/2025   Pre-Op Questionnaire   Have you ever had a heart attack or stroke? No   Have you ever had surgery on your heart or blood vessels, such as a stent placement, a coronary artery bypass, or surgery on an artery in your head, neck, heart, or legs? (!) YES 4/11/24: pulmonary angiogram with mechanical thrombectomy for PE    Do you have chest pain with activity? No   Do you have a history of heart failure? No   Do you currently have a cold, bronchitis or symptoms of other infection? No   Do you have a cough, shortness of breath, or wheezing? (!) YES history of asthma   Do you or anyone in your family have previous history of blood clots? (!) YES personal history of DVT 2024, remains on chronic anticoagulation    Do you or does anyone in your family have a serious bleeding problem such as prolonged bleeding following surgeries or cuts? No   Have you ever had problems with anemia or been told to take iron pills? No   Have you had any abnormal blood loss such as black, tarry or bloody stools? No   Have you ever had a blood transfusion? No   Are you willing to have a blood transfusion if it is medically needed before, during, or after your  surgery? Yes   Have you or any of your relatives ever had problems with anesthesia? No   Do you have sleep apnea, excessive snoring or daytime drowsiness? No   Do you have any artifical heart valves or other implanted medical devices like a pacemaker, defibrillator, or continuous glucose monitor? No   Do you have artificial joints? No   Are you allergic to latex? No     Advance Care Planning    Discussed advance care planning with patient; however, patient declined at this time.    Preoperative Review of    reviewed - no record of controlled substances prescribed.        *  No recent infectious illnesses.    *  No recent cardiac or pulmonary issues or symptoms.    *  No problems performing vigorous physical activity, no changes in exercise tolerance.    *  No personal or family history of anesthesia complications.    *  No personal or family history of bleeding or clotting disorders.       GERD stable.  Taking meds as ordered, no reported side effects from medicines.  Eating properly to avoid sx.   No melena, no hematochezia, no diarrhea.  No unintended weigth loss.  No dysphagia.  Reports no significant regular difficulties swallowing foods or medications.     Asthma stable.  Has not had any recent breathing troubles beyond usual baseline.  Has not any recent acute respiratory events.  Using medication as directed with reported side effects        10/23/2024     2:27 PM 2/19/2025    12:18 PM   ACT Total Scores   ACT TOTAL SCORE (Goal Greater than or Equal to 20) 17 21    In the past 12 months, how many times did you visit the emergency room for your asthma without being admitted to the hospital? 0 0   In the past 12 months, how many times were you hospitalized overnight because of your asthma? 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     Priority: Medium     Left      Recurrent kidney stones 05/03/2019     Priority: Medium    BPH associated with nocturia 04/16/2019     Priority: Medium    Erectile dysfunction, unspecified erectile dysfunction type 10/08/2018     Priority: Medium    Ventral hernia without obstruction or gangrene 10/08/2018     Priority: Medium    Numbness and tingling of both feet 10/08/2018     Priority: Medium    Stress 08/02/2017     Priority: Medium    ACP (advance care planning) 12/21/2015     Priority: Medium     Advance Care Planning 12/21/2015: ACP Review of Chart / Resources Provided:  Reviewed chart for advance care plan.  Stanford Andersen has no plan or code status on file. Discussed available resources and provided with information. . Added by Zee Stewart            Essential hypertension 02/05/2014     Priority: Medium    Hyperlipidemia 09/04/2013     Priority: Medium    Family history of coronary artery disease 09/04/2013     Priority: Medium    Change in bowel function 09/04/2013     Priority: Medium    Zollinger-Patricio syndrome 04/11/2012     Priority: Medium     Diagnosed 1995 by Dr. Graham with [facility]. On Prilosec without issues.      Chest pain 03/02/2012     Priority: Medium      Past Medical History:   Diagnosis Date    Arthritis     hand    Chronic back pain     Complication of anesthesia     URINARY RETENTION, Sleepy after surgery    Gastro-oesophageal reflux disease     Hypertension     Renal disease     kidney stone    Ventral hernia without obstruction or gangrene 10/08/2018    Zollinger-Patricio syndrome 1995     Past Surgical History:   Procedure Laterality Date    ABDOMEN SURGERY  2003    Exploratory laparoscopy    COMBINED CYSTOSCOPY, INSERT STENT  URETER(S) Left 08/31/2021    Procedure: Cystoscopy with left ureteral stent placement;  Surgeon: Devan Bergeron MD;  Location: [facility] OR    COMBINED CYSTOSCOPY, RETROGRADES, URETEROSCOPY, LASER HOLMIUM LITHOTRIPSY URETER(S), INSERT STENT Right 05/23/2019    Procedure: Video cystoscopy, exam under anesthesia, right ureteroscopy with holmium laser lithotripsy and stone extractions, right double-J stent (5-Macedonian x 24 cm).;  Surgeon: Satinder Almanza MD;  Location: [facility] OR    COMBINED CYSTOSCOPY, RETROGRADES, URETEROSCOPY, LASER HOLMIUM LITHOTRIPSY URETER(S), INSERT STENT Left 09/30/2021    Procedure: Cystoscopy, left ureteral stent exchange, left ureteroscopy with holmium laser lithotripsy and stone basketing. left retrogrades;  Surgeon: Devan Bergeron MD;  Location:  OR    EXTRACORPOREAL SHOCK WAVE LITHOTRIPSY (ESWL) Left 08/31/2021    Procedure: Bilateral extracorporeal shockwave lithotripsy;  Surgeon: Devan Bergeron MD;  Location:  OR    EXTRACORPOREAL SHOCK WAVE LITHOTRIPSY (ESWL) Bilateral 05/25/2023    Procedure: BILATERAL EXTRACORPOREAL SHOCK WAVE LITHOTRIPSY;  Surgeon: Devan Bergeron MD;  Location:  OR    EYE SURGERY      HC EXCISE VARICOCELE  age 21     varicocele repair    HERNIORRHAPHY UMBILICAL N/A 11/30/2018    Procedure: OPEN UMBILLICAL HERNIA REPAIR ;  Surgeon: Ike Catalan MD;  Location:  OR    IR PULMONARY ANGIOGRAM BILATERAL  04/11/2024    KIDNEY SURGERY      lithotripsy x 3    LAPAROSCOPIC HERNIORRHAPHY INGUINAL Left 09/26/2016    Procedure: LAPAROSCOPIC HERNIORRHAPHY INGUINAL;  Surgeon: Ike Catalan MD;  Location:  [facility]    LITHOTRIPSY      three times    STOMACH SURGERY  2003    exploratory scoping looking for tumors related to ZE syndrome     Current Outpatient Medications   Medication Sig Dispense Refill    rivaroxaban ANTICOAGULANT (XARELTO ANTICOAGULANT) 20 MG TABS tablet Take 1 tablet (20 mg) by mouth daily (with dinner). 90 tablet 1  "   tamsulosin (FLOMAX) 0.4 MG capsule Take 1 capsule (0.4 mg) by mouth every evening. 90 capsule 1    albuterol (PROAIR HFA/PROVENTIL HFA/VENTOLIN HFA) 108 (90 Base) MCG/ACT inhaler Inhale 2 puffs into the lungs every 6 hours as needed for shortness of breath, wheezing or cough. 18 g 11    budesonide-formoterol (SYMBICORT) 160-4.5 MCG/ACT Inhaler Inhale 2 puffs into the lungs 2 times daily. 30.6 g 3    finasteride (PROSCAR) 5 MG tablet TAKE ONE TABLET BY MOUTH EVERY DAY 90 tablet 1    omeprazole (PRILOSEC) 40 MG DR capsule Take 1 capsule (40 mg) by mouth 2 times daily. 180 capsule 2       Allergies   Allergen Reactions    Dye [Contrast Dye] Anaphylaxis and Hives     Patient developed throat swelling, nausea, vomiting and respiratory distress 4/11.    Penicillins Anaphylaxis     Swelling, arm turned blue    Tramadol Itching    Ferrous Gluconate Diarrhea    Oxycodone-Acetaminophen Rash        Social History     Tobacco Use    Smoking status: Never    Smokeless tobacco: Never   Substance Use Topics    Alcohol use: Not Currently     Comment: seldom     Family History   Problem Relation Age of Onset    Heart Disease Mother         ablation    C.A.D. Mother     Heart Disease Father     Respiratory Father         COPD    C.A.D. Father     Spina bifida Son      History   Drug Use No             Review of Systems  Constitutional, neuro, ENT, endocrine, pulmonary, cardiac, gastrointestinal, genitourinary, musculoskeletal, integument and psychiatric systems are negative, except as otherwise noted.    Objective    /80   Pulse 94   Temp 98.7  F (37.1  C) (Temporal)   Ht 1.727 m (5' 8\")   Wt 78.2 kg (172 lb 8 oz)   SpO2 98%   BMI 26.23 kg/m     Estimated body mass index is 26.23 kg/m  as calculated from the following:    Height as of this encounter: 1.727 m (5' 8\").    Weight as of this encounter: 78.2 kg (172 lb 8 oz).  Physical Exam  Physical Exam  Vitals and nursing note reviewed.   Constitutional:       Comments: " Moves normally, alert, no apparent distress  HENT:      Head: Normocephalic and atraumatic.      Nose: Nose normal.   Eyes:      Extraocular Movements: Extraocular movements intact.   Cardiovascular:      Rate and Rhythm: Normal rate and regular rhythm.      Heart sounds: Normal heart sounds.   Pulmonary:      Effort: Pulmonary effort is normal.      Breath sounds: Normal breath sounds.   Abdominal:      General: There is no distension.      Palpations: There is no mass.      Tenderness: No abdominal tenderness, no distention.     Bowel sounds: normal  Musculoskeletal:         General: Normal range of motion, moves into the room normal, moves in and out of chair normally.    Skin:     General: Numerous actinic keratoses scattered throughout his torso and back.  Large verrucous lesions on his scalp.   Neurological:      General: No focal deficit present.      Mental Status: Alert, responds to questions, Speech coherent  Psychiatric:         Mood and Affect: Mood normal.      Recent Labs   Lab Test 02/19/25  1341   HGB 9.7*         POTASSIUM 3.9   CR 1.12        Diagnostics     EKG (5/13/2025): appears normal, NSR, normal axis, normal intervals, no acute ST/T changes c/w ischemia, no LVH by voltage criteria, unchanged from previous tracings    Revised Cardiac Risk Index (RCRI)  The patient has the following serious cardiovascular risks for perioperative complications:   - No serious cardiac risks = 0 points     RCRI Interpretation: 0 points: Class I (very low risk - 0.4% complication rate)       The longitudinal plan of care for the diagnosis(es)/condition(s) as documented were addressed during this visit. Due to the added complexity in care, I will continue to support Stanford in the subsequent management and with ongoing continuity of care.      Signed Electronically by: Charlie Lawrence MD  A copy of this evaluation report is provided to the requesting physician.

## 2025-05-14 LAB
ALT SERPL W P-5'-P-CCNC: 12 U/L (ref 0–70)
CHOLEST SERPL-MCNC: 183 MG/DL
FASTING STATUS PATIENT QL REPORTED: NO
HDLC SERPL-MCNC: 44 MG/DL
IRON BINDING CAPACITY (ROCHE): 331 UG/DL (ref 240–430)
IRON SATN MFR SERPL: 4 % (ref 15–46)
IRON SERPL-MCNC: 14 UG/DL (ref 61–157)
LDLC SERPL CALC-MCNC: 109 MG/DL
NONHDLC SERPL-MCNC: 139 MG/DL
TRIGL SERPL-MCNC: 149 MG/DL

## 2025-06-14 ENCOUNTER — HEALTH MAINTENANCE LETTER (OUTPATIENT)
Age: 80
End: 2025-06-14

## 2025-06-16 ENCOUNTER — TELEPHONE (OUTPATIENT)
Dept: OTHER | Facility: CLINIC | Age: 80
End: 2025-06-16
Payer: COMMERCIAL

## 2025-06-16 NOTE — TELEPHONE ENCOUNTER
[facility]    Who is the name of the provider?:  LIZZETTE MISHRA   What is the location you see this provider at/preferred location?: [place]  Person calling / Facility: Stanford Andersen  Phone number: [phone]  Nurse call back needed:  no     Reason for call:  Patient called to schedule an appointment with .No active orders.Please advise what needs to be scheduled.      Can we leave a detailed message on this number?  YES     6/16/2025, 2:13 PM

## 2025-06-16 NOTE — TELEPHONE ENCOUNTER
See encounter from 5/13/25  LOV 6/5/24     Routing to scheduling to coordinate the following:    In person follow up   Please schedule this next available     Appt note: Follow up to 6/5/24    Marisa KUMAR RN    [facility]  Office: [phone]  Fax: [phone]

## 2025-07-02 ENCOUNTER — TRANSFERRED RECORDS (OUTPATIENT)
Dept: HEALTH INFORMATION MANAGEMENT | Facility: CLINIC | Age: 80
End: 2025-07-02
Payer: COMMERCIAL

## 2025-07-03 ENCOUNTER — OFFICE VISIT (OUTPATIENT)
Dept: OTHER | Facility: CLINIC | Age: 80
End: 2025-07-03
Attending: INTERNAL MEDICINE
Payer: COMMERCIAL

## 2025-07-03 VITALS
DIASTOLIC BLOOD PRESSURE: 79 MMHG | SYSTOLIC BLOOD PRESSURE: 142 MMHG | OXYGEN SATURATION: 97 % | WEIGHT: 172 LBS | BODY MASS INDEX: 26.15 KG/M2 | HEART RATE: 85 BPM

## 2025-07-03 DIAGNOSIS — I82.432 ACUTE DEEP VEIN THROMBOSIS (DVT) OF POPLITEAL VEIN OF LEFT LOWER EXTREMITY (H): ICD-10-CM

## 2025-07-03 DIAGNOSIS — R05.3 CHRONIC COUGH: ICD-10-CM

## 2025-07-03 DIAGNOSIS — I26.99 BILATERAL PULMONARY EMBOLISM (H): Primary | ICD-10-CM

## 2025-07-03 DIAGNOSIS — I10 BENIGN ESSENTIAL HYPERTENSION: ICD-10-CM

## 2025-07-03 DIAGNOSIS — R53.83 OTHER FATIGUE: ICD-10-CM

## 2025-07-03 DIAGNOSIS — D50.9 IRON DEFICIENCY ANEMIA, UNSPECIFIED IRON DEFICIENCY ANEMIA TYPE: ICD-10-CM

## 2025-07-03 PROCEDURE — G0463 HOSPITAL OUTPT CLINIC VISIT: HCPCS | Performed by: INTERNAL MEDICINE

## 2025-07-03 NOTE — PATIENT INSTRUCTIONS
Reduced Xarelto dose to 10 mg daily and take with food , new RX sent     Continue rest of the medications same     Wear compression stockings day time knee high 20-30 mm hg and elevate legs when able DME Rx given     Follow up in a year

## 2025-07-03 NOTE — PROGRESS NOTES
[facility]        Patient is here for a follow up.    Pt is currently taking Xarelto.    BP (!) 142/79 (BP Location: Right arm, Patient Position: Sitting, Cuff Size: Adult Regular)   Pulse 85   Wt 172 lb (78 kg)   SpO2 97%   BMI 26.15 kg/m      The provider has been notified that the patient has no concerns.     Questions patient would like addressed today are: N/A.    Refills are needed: N/A    Has homecare services and agency name:  No     Patient has been identified as a fall risk.    Interventions were completed as noted below:  [x]Exam tables/chairs remained in the lowest position.   []Patient remained in wheelchair.    []Provider requested patient in exam chair.  Patient required assist and use of transfer belt.  [x]Exam room door remained open unless with a provider.  []A bell provided to patient to notify staff if assistance was needed.  [x]Provider notified patient was identified as a fall risk.      Sindy Alston MA     fall precautions/left hip precautions

## 2025-07-03 NOTE — PROGRESS NOTES
[facility] VASCULAR [facility] VASCULAR MEDICINE     PRIMARY HEALTH CARE PROVIDER:  Charlie Lawrence MD    Follow up visit  Med refills  Taking Xarelto 20 daily >one year   Requesting DME RX   Hx of PE s/p mech thrombectomy and LLE DVT April 2024   venous duplex improved DVT  Cough is better   Fatigue   ANGELINE  History of GERD takes high-dose PPI twice a day     He was initially seen on 4/24/24 for evaluation and management of first lifetime thromboembolic event in the form of extensive bilateral pulmonary embolism with right heart strain and left lower extremity DVT involving popliteal vein and below-knee veins and this appears unprovoked , admitted to the hospital on April 11, 2024 underwent mechanical thrombectomy currently on Xarelto    For full details please see my previous visit notes       HPI: Stanford Andersen is a 80 year old very pleasant male never smoked, no personal history of malignancy, no family history of hypercoagulable status, no recent history of COVID infection, no recent history of COVID vaccination, no recent travel and no recent hospitalization admitted to the hospital with history of shortness of breath and fall appears syncope. he was hypoxic  Underwent workup at the emergency room and April 11, 2022 for CT chest PE protocol extensive bilateral segmental and subsegmental PE with right heart strain and also left lower extremity DVT occlusive involving popliteal vein and below-knee veins.  Echo showed flattened septum with a right heart strain right ventricular dysfunction but normal LV function.  He was initiated IV heparin and then followed by underwent successful mechanical pulmonary thrombectomy and then started Xarelto 15 mg 2 times a day for 21 days then followed by 20 mg daily.  For some reason he was prescribed Xarelto 2.5 mg tablets 972 of them appears epic error but patient says he has given a starter pack taking 15 mg twice a day then followed by 20 mg daily.  He  also says he has a multiple pill bottles in the bag and he never checked the dose.  Overall he is feeling better  Not using any compression stockings  Walks with a walker  He has a history of Zollinger-Patricio syndrome and recurrent nephrolithiasis previous lithotripsy  No hematuria since initiation of anticoagulation  He used to be on amlodipine and this was stopped after the hospitalization blood pressure is good range      PAST MEDICAL HISTORY  Past Medical History:   Diagnosis Date    Arthritis     hand    Chronic back pain     Complication of anesthesia     URINARY RETENTION, Sleepy after surgery    Gastro-oesophageal reflux disease     Hypertension     Renal disease     kidney stone    Ventral hernia without obstruction or gangrene 10/08/2018    Zollinger-Patricio syndrome 1995       CURRENT MEDICATIONS  Current Outpatient Medications   Medication Sig Dispense Refill    albuterol (PROAIR HFA/PROVENTIL HFA/VENTOLIN HFA) 108 (90 Base) MCG/ACT inhaler Inhale 2 puffs into the lungs every 6 hours as needed for shortness of breath, wheezing or cough. 18 g 11    budesonide-formoterol (SYMBICORT) 160-4.5 MCG/ACT Inhaler Inhale 2 puffs into the lungs 2 times daily. 30.6 g 3    finasteride (PROSCAR) 5 MG tablet TAKE ONE TABLET BY MOUTH EVERY DAY 90 tablet 1    omeprazole (PRILOSEC) 40 MG DR capsule Take 1 capsule (40 mg) by mouth 2 times daily. 180 capsule 2    rivaroxaban ANTICOAGULANT (XARELTO) 10 MG TABS tablet Take 1 tablet (10 mg) by mouth daily (with dinner). 90 tablet 3    tamsulosin (FLOMAX) 0.4 MG capsule Take 1 capsule (0.4 mg) by mouth every evening. 90 capsule 1     No current facility-administered medications for this visit.       PAST SURGICAL HISTORY:  Past Surgical History:   Procedure Laterality Date    ABDOMEN SURGERY  2003    Exploratory laparoscopy    COMBINED CYSTOSCOPY, INSERT STENT URETER(S) Left 08/31/2021    Procedure: Cystoscopy with left ureteral stent placement;  Surgeon: Devan Bergeron  MD Day[place];  Location:  OR    COMBINED CYSTOSCOPY, RETROGRADES, URETEROSCOPY, LASER HOLMIUM LITHOTRIPSY URETER(S), INSERT STENT Right 05/23/2019    Procedure: Video cystoscopy, exam under anesthesia, right ureteroscopy with holmium laser lithotripsy and stone extractions, right double-J stent (5-Armenian x 24 cm).;  Surgeon: Satinder Almanza MD;  Location: [facility] OR    COMBINED CYSTOSCOPY, RETROGRADES, URETEROSCOPY, LASER HOLMIUM LITHOTRIPSY URETER(S), INSERT STENT Left 09/30/2021    Procedure: Cystoscopy, left ureteral stent exchange, left ureteroscopy with holmium laser lithotripsy and stone basketing. left retrogrades;  Surgeon: Devan Bergeron MD;  Location:  OR    EXTRACORPOREAL SHOCK WAVE LITHOTRIPSY (ESWL) Left 08/31/2021    Procedure: Bilateral extracorporeal shockwave lithotripsy;  Surgeon: Devan Bergeron MD;  Location:  OR    EXTRACORPOREAL SHOCK WAVE LITHOTRIPSY (ESWL) Bilateral 05/25/2023    Procedure: BILATERAL EXTRACORPOREAL SHOCK WAVE LITHOTRIPSY;  Surgeon: Devan Bergeron MD;  Location:  OR    EYE SURGERY      HC EXCISE VARICOCELE  age 21     varicocele repair    HERNIORRHAPHY UMBILICAL N/A 11/30/2018    Procedure: OPEN UMBILLICAL HERNIA REPAIR ;  Surgeon: Ike Catalan MD;  Location:  OR    IR PULMONARY ANGIOGRAM BILATERAL  04/11/2024    KIDNEY SURGERY      lithotripsy x 3    LAPAROSCOPIC HERNIORRHAPHY INGUINAL Left 09/26/2016    Procedure: LAPAROSCOPIC HERNIORRHAPHY INGUINAL;  Surgeon: Ike Catalan MD;  Location:  [facility]    LITHOTRIPSY      three times    STOMACH SURGERY  2003    exploratory scoping looking for tumors related to ZE syndrome       ALLERGIES     Allergies   Allergen Reactions    Dye [Contrast Dye] Anaphylaxis and Hives     Patient developed throat swelling, nausea, vomiting and respiratory distress 4/11.    Penicillins Anaphylaxis     Swelling, arm turned blue    Tramadol Itching    Ferrous Gluconate Diarrhea    Oxycodone-Acetaminophen  Rash       FAMILY HISTORY  Family History   Problem Relation Age of Onset    Heart Disease Mother         ablation    C.A.D. Mother     Heart Disease Father     Respiratory Father         COPD    C.A.D. Father     Spina bifida Son        VASCULAR FAMILY HISTORY  1st order relative with atherosclerotic PAD: No  1st order relative with AAA: No  Family history of Familial Hyperlipidemia No  Family History of Hypercoagulable state:No    VASCULAR RISK FACTORS  1. Diabetes:No   2. Smoking: has never smoked.  3. HTN: controlled  4.Hyperlipidemia: NO      SOCIAL HISTORY  Social History     Socioeconomic History    Marital status:      Spouse name: Not on file    Number of children: Not on file    Years of education: Not on file    Highest education level: Not on file   Occupational History    Not on file   Tobacco Use    Smoking status: Never    Smokeless tobacco: Never   Vaping Use    Vaping status: Never Used   Substance and Sexual Activity    Alcohol use: Not Currently     Comment: seldom    Drug use: No    Sexual activity: Not Currently   Other Topics Concern    Parent/sibling w/ CABG, MI or angioplasty before 65F 55M? No   Social History Narrative    Not on file     Social Drivers of Health     Financial Resource Strain: Low Risk  (5/7/2024)    Financial Resource Strain     Within the past 12 months, have you or your family members you live with been unable to get utilities (heat, electricity) when it was really needed?: No   Food Insecurity: Low Risk  (5/7/2024)    Food Insecurity     Within the past 12 months, did you worry that your food would run out before you got money to buy more?: No     Within the past 12 months, did the food you bought just not last and you didn t have money to get more?: No   Transportation Needs: High Risk (5/7/2024)    Transportation Needs     Within the past 12 months, has lack of transportation kept you from medical appointments, getting your medicines, non-medical meetings or  appointments, work, or from getting things that you need?: Yes   Physical Activity: Insufficiently Active (5/7/2024)    Exercise Vital Sign     Days of Exercise per Week: 2 days     Minutes of Exercise per Session: 20 min   Stress: Stress Concern Present (5/7/2024)    [place] [employer] - Occupational Stress Questionnaire     Feeling of Stress : To some extent   Social Connections: Unknown (5/7/2024)    Social Connection and Isolation Panel [NHANES]     Frequency of Communication with Friends and Family: Not on file     Frequency of Social Gatherings with Friends and Family: Once a week     Attends Nondenominational Services: Not on file     Active Member of Clubs or Organizations: Not on file     Attends Club or Organization Meetings: Not on file     Marital Status: Not on file   Interpersonal Safety: Low Risk  (5/13/2025)    Interpersonal Safety     Do you feel physically and emotionally safe where you currently live?: Yes     Within the past 12 months, have you been hit, slapped, kicked or otherwise physically hurt by someone?: No     Within the past 12 months, have you been humiliated or emotionally abused in other ways by your partner or ex-partner?: No   Housing Stability: Low Risk  (5/7/2024)    Housing Stability     Do you have housing? : Yes     Are you worried about losing your housing?: No       ROS:   General: No change in weight, sleep or appetite.  Normal energy.  No fever or chills  Eyes: Negative for vision changes or eye problems  ENT: No problems with ears, nose or throat.  No difficulty swallowing.  Resp: No coughing, wheezing or shortness of breath  CV: No chest pains or palpitations  GI: No nausea, vomiting,  heartburn, abdominal pain, diarrhea, constipation or change in bowel habits  : No urinary frequency or dysuria, bladder or kidney problems  Musculoskeletal: No significant muscle or joint pains  Neurologic: No headaches, numbness, tingling, weakness, problems with balance or  coordination  Psychiatric: No problems with anxiety, depression or mental health  Heme/immune/allergy: No history of bleeding or clotting problems or anemia.  No allergies or immune system problems  Endocrine: No history of thyroid disease, diabetes or other endocrine disorders  Skin: No rashes,worrisome lesions or skin problems  Vascular: Recent history of left lower extremity DVT involving popliteal vein and also below-knee veins  Bilateral extensive PE status post mechanical thrombectomy in April 11, 2024    EXAM:  BP (!) 142/79 (BP Location: Right arm, Patient Position: Sitting, Cuff Size: Adult Regular)   Pulse 85   Wt 172 lb (78 kg)   SpO2 97%   BMI 26.15 kg/m    In general, the patient is a pleasant male in no apparent distress.    HEENT: NC/AT.  PERRLA.  EOMI.  Sclerae white, not injected.  Nares clear.  Pharynx without erythema or exudate.  Dentition intact.    Neck: No adenopathy.  No thyromegaly. Carotids +2/2 bilaterally without bruits.  No jugular venous distension.   Heart: RRR. Normal S1, S2 splits physiologically. No murmur, rub, click, or gallop. The PMI is in the 5th ICS in the midclavicular line. There is no heave.    Lungs: CTA.  No ronchi, wheezes, rales.  No dullness to percussion.   Abdomen: Soft, nontender, nondistended. No organomegaly. No AAA.  No bruits.   Extremities: Left leg is slightly larger than right leg but well-perfused  Palpable symmetrical peripheral pulses      Labs:  LIPID RESULTS:  Lab Results   Component Value Date    CHOL 183 05/13/2025    CHOL 190 04/30/2021    HDL 44 05/13/2025    HDL 45 04/30/2021     (H) 05/13/2025     (H) 04/30/2021    TRIG 149 05/13/2025    TRIG 165 (H) 04/30/2021    CHOLHDLRATIO 5.3 (H) 09/14/2015       LIVER ENZYME RESULTS:  Lab Results   Component Value Date    AST 27 04/11/2024    AST 17 04/30/2021    ALT 12 05/13/2025    ALT 25 04/30/2021       CBC RESULTS:  Lab Results   Component Value Date    WBC 8.5 05/13/2025    WBC 7.0  "04/30/2021    RBC 4.41 05/13/2025    RBC 5.22 04/30/2021    HGB 8.6 (L) 05/13/2025    HGB 14.7 04/30/2021    HCT 29.2 (L) 05/13/2025    HCT 46.3 04/30/2021    MCV 66 (L) 05/13/2025    MCV 89 04/30/2021    MCH 19.5 (L) 05/13/2025    MCH 28.2 04/30/2021    MCHC 29.5 (L) 05/13/2025    MCHC 31.7 04/30/2021    RDW 18.4 (H) 05/13/2025    RDW 14.2 04/30/2021     05/13/2025     04/30/2021       BMP RESULTS:  Lab Results   Component Value Date     02/19/2025     04/30/2021    POTASSIUM 3.9 02/19/2025    POTASSIUM 3.9 08/18/2021    POTASSIUM 3.7 04/30/2021    CHLORIDE 105 02/19/2025    CHLORIDE 106 07/21/2021    CHLORIDE 107 04/30/2021    CO2 26 02/19/2025    CO2 30 07/21/2021    CO2 30 04/30/2021    ANIONGAP 11 02/19/2025    ANIONGAP 1 (L) 07/21/2021    ANIONGAP 2 (L) 04/30/2021     (H) 02/19/2025     (H) 07/21/2021    GLC 94 04/30/2021    BUN 15.3 02/19/2025    BUN 16 07/21/2021    BUN 18 04/30/2021    CR 1.12 02/19/2025    CR 0.95 04/30/2021    GFRESTIMATED 67 02/19/2025    GFRESTIMATED 78 04/30/2021    GFRESTBLACK >90 04/30/2021    CLIFTON 9.5 02/19/2025    CLIFTON 8.9 04/30/2021        Procedures:     ECHO 4/11/24   \"Interpretation Summary     Technically challenging study due to patient body habitus.     Left ventricular systolic function is normal. The visual ejection fraction is  55-60%. Flattened septum is consistent with RV pressure overload.  Right ventricle is moderately dilated. The right ventricular systolic function  is moderately reduced. Relatively preserved contractility at the base and  apex, with severe hypokinesis of the lateral free wall, this pattern of wall  motion can be seen in the setting of acute pulmonary emoblism. Clinical  correlation recommended.  Pulmonary hypertension present. The right ventricular systolic pressure is  approximated at 41mmHg plus the right atrial pressure.  Dilation of the inferior vena cava is present with abnormal respiratory  variation in " "diameter.     Findings communicated to RN who will interface with staff.\"    ECHO 4/24/2024  Interpretation Summary     Limited echocardiogram performed per study order.  Left ventricular systolic function is normal.  The visual ejection fraction is 60-65%.  No regional wall motion abnormalities.  Normal right ventricular size and systolic function.  Trace tricuspid and mitral valve regurgitation.  Estimated pulmonary artery pressure is normal.  Normal inferior vena cava.  No pericardial effusion.     Compared to the recent study dated 4/11/2024, right ventricular size and  systolic function have normalized and estimated pulmonary artery pressure has  normalized.\"    Assessment and Plan:     1. Bilateral pulmonary embolism with RT heart strain s/p pulm Thrombectomy 4/11/2024  (H) First life time Te event unprovoked    2.  History of deep vein thrombosis (DVT) of popliteal vein and PTV  of left lower extremity (H) 4/11/2024    3.  Chronic cough difficulty to sleep at nighttime since the pulmonary thrombectomy    4.  Iron deficiency anemia    5.  Fatigue, tiredness    6. Benign essential hypertension    7. Nephrolithiasis    This is a very pleasant 80-year-old male with recent extensive bilateral pulmonary embolism with syncope and left lower extremity DVT admitted to the hospital underwent successful pulmonary thrombectomy and he was hypoxic still using overnight oxygen now and then taking Xarelto 15 mg twice a day for 21 days then followed by 20 mg daily tolerating without any problems for the last 1 year.  He is using any compression stockings.    He never smoked.  Up-to-date with age and gender appropriate cancer screening    No personal history of malignancy and no family history of hypercoagulable status    This is his first lifetime thromboembolic event appears unprovoked.    Echocardiogram flattened septum with positive Lovett sign and decreased RV function but normal LV function at the time of admission, " repeat echocardiogram is normal    He also underwent venous duplex ultrasound improved blood clot    Since it is first Life time  thromboembolic event and unprovoked no need for hypercoagulable studies  Completed full dose xarelto 20 daily for more than a year, I will   Reduce dose to 10 mg daily New Rx sent    He is a long-term/lifetime candidate for anticoagulation if tolerates maintenance dose      Knee-high compression stockings 20 to 30 mmHg during the daytime and elevate the legs when able DME prescription given  Follow-up with me in a year      40 minutes spent on the date of the encounter doing chart review, review of previous evaluation, previous imaging studies, history, exam, documentation and addressed above-mentioned issues adjusted medications new prescription sent DME Rx given.  He had a lot of questions all of them were answered    The longitudinal care of plan for the above diagnoses was addressed during this visit. Due to added complexity of care, we will continue to supprt Stanford Andersen and the subsequent management of this/these conditions and with ongoing continuity of care for this/these conditions.     Copy of this note to primary care physician    Chino Morales MD,FAHA,FSVM,FNLA, FACP  Vascular Medicine  Clinical Hypertension Specialist   Clinical Lipidologist

## 2025-07-14 DIAGNOSIS — I26.99 BILATERAL PULMONARY EMBOLISM (H): ICD-10-CM

## 2025-07-14 DIAGNOSIS — E16.4 ZOLLINGER-ELLISON SYNDROME: ICD-10-CM

## 2025-07-14 DIAGNOSIS — D50.0 IRON DEFICIENCY ANEMIA DUE TO CHRONIC BLOOD LOSS: Primary | ICD-10-CM

## 2025-07-14 RX ORDER — DIPHENHYDRAMINE HYDROCHLORIDE 50 MG/ML
50 INJECTION, SOLUTION INTRAMUSCULAR; INTRAVENOUS
Status: CANCELLED
Start: 2025-07-21

## 2025-07-14 RX ORDER — EPINEPHRINE 1 MG/ML
0.3 INJECTION, SOLUTION, CONCENTRATE INTRAVENOUS EVERY 5 MIN PRN
Status: CANCELLED | OUTPATIENT
Start: 2025-07-21

## 2025-07-14 RX ORDER — ALBUTEROL SULFATE 90 UG/1
1-2 INHALANT RESPIRATORY (INHALATION)
Status: CANCELLED
Start: 2025-07-21

## 2025-07-14 RX ORDER — HEPARIN SODIUM,PORCINE 10 UNIT/ML
5-20 VIAL (ML) INTRAVENOUS DAILY PRN
Status: CANCELLED | OUTPATIENT
Start: 2025-07-21

## 2025-07-14 RX ORDER — HEPARIN SODIUM (PORCINE) LOCK FLUSH IV SOLN 100 UNIT/ML 100 UNIT/ML
5 SOLUTION INTRAVENOUS
Status: CANCELLED | OUTPATIENT
Start: 2025-07-21

## 2025-07-14 RX ORDER — DIPHENHYDRAMINE HYDROCHLORIDE 50 MG/ML
25 INJECTION, SOLUTION INTRAMUSCULAR; INTRAVENOUS
Status: CANCELLED
Start: 2025-07-21

## 2025-07-14 RX ORDER — MEPERIDINE HYDROCHLORIDE 25 MG/ML
25 INJECTION INTRAMUSCULAR; INTRAVENOUS; SUBCUTANEOUS
Status: CANCELLED | OUTPATIENT
Start: 2025-07-21

## 2025-07-14 RX ORDER — METHYLPREDNISOLONE SODIUM SUCCINATE 40 MG/ML
40 INJECTION INTRAMUSCULAR; INTRAVENOUS
Status: CANCELLED
Start: 2025-07-21

## 2025-07-14 RX ORDER — ALBUTEROL SULFATE 0.83 MG/ML
2.5 SOLUTION RESPIRATORY (INHALATION)
Status: CANCELLED | OUTPATIENT
Start: 2025-07-21

## 2025-07-14 NOTE — PROGRESS NOTES
Received communication from [facility] about patients upcomign iron infusion on 7/17/25.  Originally was ordered to receive Venofer, but this is not likely covered by his insurance.   They said that Injectafer would be more likely covered.     Old therapy plan for Venofer was discontinued.     New plan for Injectafer was ordered.     Since I will be out of office for the next few weeks, have Dr. Powell trouble shoot any problems.     Additionally, please have the patient make an appointment to see someone in middle to late August for follow up appointment and repeat labs, perhaps Dr. Clemons, or else Dr. Powell.    I dont want to wait until I return in September before he is rechecked.      Close encounter when done.

## 2025-07-17 ENCOUNTER — INFUSION THERAPY VISIT (OUTPATIENT)
Dept: INFUSION THERAPY | Facility: CLINIC | Age: 80
End: 2025-07-17
Attending: INTERNAL MEDICINE
Payer: COMMERCIAL

## 2025-07-17 VITALS
RESPIRATION RATE: 18 BRPM | OXYGEN SATURATION: 94 % | TEMPERATURE: 98.1 F | DIASTOLIC BLOOD PRESSURE: 78 MMHG | HEART RATE: 90 BPM | SYSTOLIC BLOOD PRESSURE: 137 MMHG

## 2025-07-17 DIAGNOSIS — D50.0 IRON DEFICIENCY ANEMIA DUE TO CHRONIC BLOOD LOSS: Primary | ICD-10-CM

## 2025-07-17 DIAGNOSIS — I26.99 BILATERAL PULMONARY EMBOLISM (H): ICD-10-CM

## 2025-07-17 DIAGNOSIS — E16.4 ZOLLINGER-ELLISON SYNDROME: ICD-10-CM

## 2025-07-17 PROCEDURE — 250N000011 HC RX IP 250 OP 636: Mod: JZ | Performed by: INTERNAL MEDICINE

## 2025-07-17 PROCEDURE — 258N000003 HC RX IP 258 OP 636: Performed by: INTERNAL MEDICINE

## 2025-07-17 RX ORDER — METHYLPREDNISOLONE SODIUM SUCCINATE 40 MG/ML
40 INJECTION INTRAMUSCULAR; INTRAVENOUS
Start: 2025-07-24

## 2025-07-17 RX ORDER — MEPERIDINE HYDROCHLORIDE 25 MG/ML
25 INJECTION INTRAMUSCULAR; INTRAVENOUS; SUBCUTANEOUS
OUTPATIENT
Start: 2025-07-24

## 2025-07-17 RX ORDER — ALBUTEROL SULFATE 0.83 MG/ML
2.5 SOLUTION RESPIRATORY (INHALATION)
OUTPATIENT
Start: 2025-07-24

## 2025-07-17 RX ORDER — HEPARIN SODIUM (PORCINE) LOCK FLUSH IV SOLN 100 UNIT/ML 100 UNIT/ML
5 SOLUTION INTRAVENOUS
OUTPATIENT
Start: 2025-07-24

## 2025-07-17 RX ORDER — EPINEPHRINE 1 MG/ML
0.3 INJECTION, SOLUTION INTRAMUSCULAR; SUBCUTANEOUS EVERY 5 MIN PRN
OUTPATIENT
Start: 2025-07-24

## 2025-07-17 RX ORDER — DIPHENHYDRAMINE HYDROCHLORIDE 50 MG/ML
25 INJECTION, SOLUTION INTRAMUSCULAR; INTRAVENOUS
Start: 2025-07-24

## 2025-07-17 RX ORDER — HEPARIN SODIUM,PORCINE 10 UNIT/ML
5-20 VIAL (ML) INTRAVENOUS DAILY PRN
OUTPATIENT
Start: 2025-07-24

## 2025-07-17 RX ORDER — DIPHENHYDRAMINE HYDROCHLORIDE 50 MG/ML
50 INJECTION, SOLUTION INTRAMUSCULAR; INTRAVENOUS
Start: 2025-07-24

## 2025-07-17 RX ORDER — ALBUTEROL SULFATE 90 UG/1
1-2 INHALANT RESPIRATORY (INHALATION)
Start: 2025-07-24

## 2025-07-17 RX ADMIN — SODIUM CHLORIDE 250 ML: 0.9 INJECTION, SOLUTION INTRAVENOUS at 14:00

## 2025-07-17 RX ADMIN — FERRIC CARBOXYMALTOSE INJECTION 750 MG: 50 INJECTION, SOLUTION INTRAVENOUS at 14:00

## 2025-07-17 NOTE — PROGRESS NOTES
Infusion Nursing Note:  Stanford Andersen presents today for Injectafer #1/3.    Patient seen by provider today: No   present during visit today: Not Applicable.    Note: N/A.      Intravenous Access:  Peripheral IV placed.    Treatment Conditions:  Not Applicable.      Post Infusion Assessment:  Patient tolerated infusion without incident.  Patient observed for 30 minutes post Injectafer per protocol.  Blood return noted pre and post infusion.  Site patent and intact, free from redness, edema or discomfort.  No evidence of extravasations.  Access discontinued per protocol.       Discharge Plan:   Discharge instructions reviewed with: Patient.  Patient and/or family verbalized understanding of discharge instructions and all questions answered.  Copy of AVS reviewed with patient and/or family.  Patient will return 7/29/25 for next appointment.  Patient discharged in stable condition accompanied by: son.  Departure Mode: Ambulatory.      Galileo Santamaria RN

## 2025-07-29 ENCOUNTER — INFUSION THERAPY VISIT (OUTPATIENT)
Dept: INFUSION THERAPY | Facility: CLINIC | Age: 80
End: 2025-07-29
Attending: INTERNAL MEDICINE
Payer: COMMERCIAL

## 2025-07-29 VITALS
HEART RATE: 87 BPM | RESPIRATION RATE: 16 BRPM | TEMPERATURE: 97.9 F | DIASTOLIC BLOOD PRESSURE: 79 MMHG | SYSTOLIC BLOOD PRESSURE: 146 MMHG | OXYGEN SATURATION: 97 %

## 2025-07-29 DIAGNOSIS — I26.99 BILATERAL PULMONARY EMBOLISM (H): ICD-10-CM

## 2025-07-29 DIAGNOSIS — E16.4 ZOLLINGER-ELLISON SYNDROME: Primary | ICD-10-CM

## 2025-07-29 DIAGNOSIS — D50.0 IRON DEFICIENCY ANEMIA DUE TO CHRONIC BLOOD LOSS: ICD-10-CM

## 2025-07-29 PROCEDURE — 96365 THER/PROPH/DIAG IV INF INIT: CPT

## 2025-07-29 PROCEDURE — 250N000011 HC RX IP 250 OP 636: Mod: JZ | Performed by: INTERNAL MEDICINE

## 2025-07-29 PROCEDURE — 258N000003 HC RX IP 258 OP 636: Performed by: INTERNAL MEDICINE

## 2025-07-29 RX ORDER — ALBUTEROL SULFATE 90 UG/1
1-2 INHALANT RESPIRATORY (INHALATION)
Start: 2025-07-31

## 2025-07-29 RX ORDER — MEPERIDINE HYDROCHLORIDE 25 MG/ML
25 INJECTION INTRAMUSCULAR; INTRAVENOUS; SUBCUTANEOUS
OUTPATIENT
Start: 2025-07-31

## 2025-07-29 RX ORDER — EPINEPHRINE 1 MG/ML
0.3 INJECTION, SOLUTION INTRAMUSCULAR; SUBCUTANEOUS EVERY 5 MIN PRN
OUTPATIENT
Start: 2025-07-31

## 2025-07-29 RX ORDER — HEPARIN SODIUM (PORCINE) LOCK FLUSH IV SOLN 100 UNIT/ML 100 UNIT/ML
5 SOLUTION INTRAVENOUS
OUTPATIENT
Start: 2025-07-31

## 2025-07-29 RX ORDER — METHYLPREDNISOLONE SODIUM SUCCINATE 40 MG/ML
40 INJECTION INTRAMUSCULAR; INTRAVENOUS
Start: 2025-07-31

## 2025-07-29 RX ORDER — ALBUTEROL SULFATE 0.83 MG/ML
2.5 SOLUTION RESPIRATORY (INHALATION)
OUTPATIENT
Start: 2025-07-31

## 2025-07-29 RX ORDER — DIPHENHYDRAMINE HYDROCHLORIDE 50 MG/ML
25 INJECTION, SOLUTION INTRAMUSCULAR; INTRAVENOUS
Start: 2025-07-31

## 2025-07-29 RX ORDER — HEPARIN SODIUM,PORCINE 10 UNIT/ML
5-20 VIAL (ML) INTRAVENOUS DAILY PRN
OUTPATIENT
Start: 2025-07-31

## 2025-07-29 RX ORDER — DIPHENHYDRAMINE HYDROCHLORIDE 50 MG/ML
50 INJECTION, SOLUTION INTRAMUSCULAR; INTRAVENOUS
Start: 2025-07-31

## 2025-07-29 RX ADMIN — FERRIC CARBOXYMALTOSE INJECTION 750 MG: 50 INJECTION, SOLUTION INTRAVENOUS at 14:29

## 2025-07-29 RX ADMIN — SODIUM CHLORIDE 250 ML: 0.9 INJECTION, SOLUTION INTRAVENOUS at 14:28

## 2025-07-29 ASSESSMENT — PAIN SCALES - GENERAL: PAINLEVEL_OUTOF10: NO PAIN (0)

## 2025-07-29 NOTE — PROGRESS NOTES
Infusion Nursing Note:  Stanford Andersen presents today for injectafer 2/3    Patient seen by provider today: No   present during visit today: Not Applicable.    Note: Pt reports he tolerated first dose of injectafer well.      Intravenous Access:  Peripheral IV placed.    Treatment Conditions:  Not Applicable.      Post Infusion Assessment:  Patient tolerated infusion without incident.  Patient observed for 30 minutes post injectafer per protocol.  Blood return noted pre and post infusion.  Site patent and intact, free from redness, edema or discomfort.  No evidence of extravasations.  Access discontinued per protocol.       Discharge Plan:   Discharge instructions reviewed with: Patient.  Patient and/or family verbalized understanding of discharge instructions and all questions answered.  Pt will return 8/7/25 for next appointment.   AVS to patient via [employer].  Patient discharged in stable condition accompanied by: self.  Departure Mode: Ambulatory walker.      Martinez Callahan RN

## 2025-08-07 ENCOUNTER — INFUSION THERAPY VISIT (OUTPATIENT)
Dept: INFUSION THERAPY | Facility: CLINIC | Age: 80
End: 2025-08-07
Attending: INTERNAL MEDICINE
Payer: COMMERCIAL

## 2025-08-07 VITALS
SYSTOLIC BLOOD PRESSURE: 125 MMHG | DIASTOLIC BLOOD PRESSURE: 73 MMHG | RESPIRATION RATE: 16 BRPM | HEART RATE: 74 BPM | OXYGEN SATURATION: 94 % | TEMPERATURE: 97.9 F

## 2025-08-07 DIAGNOSIS — E16.4 ZOLLINGER-ELLISON SYNDROME: Primary | ICD-10-CM

## 2025-08-07 DIAGNOSIS — I26.99 BILATERAL PULMONARY EMBOLISM (H): ICD-10-CM

## 2025-08-07 DIAGNOSIS — D50.0 IRON DEFICIENCY ANEMIA DUE TO CHRONIC BLOOD LOSS: ICD-10-CM

## 2025-08-07 PROCEDURE — 258N000003 HC RX IP 258 OP 636: Performed by: INTERNAL MEDICINE

## 2025-08-07 PROCEDURE — 250N000011 HC RX IP 250 OP 636: Mod: JZ | Performed by: INTERNAL MEDICINE

## 2025-08-07 RX ORDER — HEPARIN SODIUM,PORCINE 10 UNIT/ML
5-20 VIAL (ML) INTRAVENOUS DAILY PRN
OUTPATIENT
Start: 2025-08-12

## 2025-08-07 RX ORDER — HEPARIN SODIUM (PORCINE) LOCK FLUSH IV SOLN 100 UNIT/ML 100 UNIT/ML
5 SOLUTION INTRAVENOUS
OUTPATIENT
Start: 2025-08-12

## 2025-08-07 RX ORDER — MEPERIDINE HYDROCHLORIDE 25 MG/ML
25 INJECTION INTRAMUSCULAR; INTRAVENOUS; SUBCUTANEOUS
OUTPATIENT
Start: 2025-08-12

## 2025-08-07 RX ORDER — DIPHENHYDRAMINE HYDROCHLORIDE 50 MG/ML
25 INJECTION, SOLUTION INTRAMUSCULAR; INTRAVENOUS
Start: 2025-08-12

## 2025-08-07 RX ORDER — METHYLPREDNISOLONE SODIUM SUCCINATE 40 MG/ML
40 INJECTION INTRAMUSCULAR; INTRAVENOUS
Start: 2025-08-12

## 2025-08-07 RX ORDER — EPINEPHRINE 1 MG/ML
0.3 INJECTION, SOLUTION INTRAMUSCULAR; SUBCUTANEOUS EVERY 5 MIN PRN
OUTPATIENT
Start: 2025-08-12

## 2025-08-07 RX ORDER — ALBUTEROL SULFATE 0.83 MG/ML
2.5 SOLUTION RESPIRATORY (INHALATION)
OUTPATIENT
Start: 2025-08-12

## 2025-08-07 RX ORDER — ALBUTEROL SULFATE 90 UG/1
1-2 INHALANT RESPIRATORY (INHALATION)
Start: 2025-08-12

## 2025-08-07 RX ORDER — DIPHENHYDRAMINE HYDROCHLORIDE 50 MG/ML
50 INJECTION, SOLUTION INTRAMUSCULAR; INTRAVENOUS
Start: 2025-08-12

## 2025-08-07 RX ADMIN — SODIUM CHLORIDE 250 ML: 0.9 INJECTION, SOLUTION INTRAVENOUS at 13:55

## 2025-08-07 RX ADMIN — FERRIC CARBOXYMALTOSE INJECTION 750 MG: 50 INJECTION, SOLUTION INTRAVENOUS at 13:58

## (undated) DEVICE — PAD CHUX UNDERPAD 23X24" 7136

## (undated) DEVICE — GLOVE PROTEXIS BLUE W/NEU-THERA 7.5  2D73EB75

## (undated) DEVICE — GLOVE PROTEXIS W/NEU-THERA 7.5  2D73TE75

## (undated) DEVICE — SOL WATER IRRIG 1000ML BOTTLE 2F7114

## (undated) DEVICE — GUIDEWIRE URO STR STIFF .035"X150CM NITINOL 150NSS35

## (undated) DEVICE — PAD CHUX UNDERPAD 30X30"

## (undated) DEVICE — LASER FIBER HOLMIUM FLEXIVA 200UM M0068403910 840-391

## (undated) DEVICE — LINEN HALF SHEET 5512

## (undated) DEVICE — DRAPE LAP W/ARMBOARD 29410

## (undated) DEVICE — PACK CYSTOSCOPY SBA15CYFSI

## (undated) DEVICE — BASKET NITINOL TIPLESS HALO  1.5FRX120CM 554120

## (undated) DEVICE — SU PDS II 2-0 CT-2 27"  Z333H

## (undated) DEVICE — RAD RX ISOVUE 250 (50ML) 51% IOPAMIDOL 250MG/ML CHRG PER ML

## (undated) DEVICE — PREP CHLORAPREP 26ML TINTED ORANGE  260815

## (undated) DEVICE — CATH URETERAL FLEX TIP TIGERTAIL 06FRX70CM 139006

## (undated) DEVICE — SOL WATER IRRIG 3000ML BAG 2B7117

## (undated) DEVICE — SU VICRYL 4-0 PS-2 18" UND J496H

## (undated) DEVICE — LINEN FULL SHEET 5511

## (undated) DEVICE — LINEN TOWEL PACK X5 5464

## (undated) DEVICE — DRSG TEGADERM 4X4 3/4" 1626

## (undated) DEVICE — BAG CYSTO TABLE DRAIN

## (undated) DEVICE — PREP SCRUB CARE CHLOROXYLENOL (PCMX) 4OZ 29902-004

## (undated) DEVICE — PACK MINOR SBA15MIFSE

## (undated) DEVICE — COVER FOOTSWITCH W/CINCH 20X24" 923267

## (undated) DEVICE — SYR 01ML 27GA 0.5" NDL TBC 309623

## (undated) DEVICE — GLOVE PROTEXIS POWDER FREE SMT 7.5  2D72PT75X

## (undated) DEVICE — SU VICRYL 3-0 SH 27" J316H

## (undated) DEVICE — SHEATH URETERAL ACCESS NAVIGATOR HD 13/15FRX46CM M0062502290

## (undated) DEVICE — BAG CLEAR TRASH 1.3M 39X33" P4040C

## (undated) DEVICE — SYR 10ML LL W/O NDL

## (undated) DEVICE — SUCTION TIP YANKAUER STR K87

## (undated) DEVICE — SU VICRYL 0 CT-2 27" J334H

## (undated) DEVICE — BASKET RETRIEVAL 1.9FRX120CM ESCAPE NTNL 4 WIRE 390-201

## (undated) DEVICE — DRSG STERI STRIP 1/2X4" R1547

## (undated) DEVICE — Device

## (undated) DEVICE — PACK CYSTO CUSTOM RIDGES

## (undated) DEVICE — NDL 19GA 1.5"

## (undated) DEVICE — ESU ELEC BLADE 2.75" COATED/INSULATED E1455

## (undated) DEVICE — TUBING IRRIG TUR Y TYPE 96" LF 6543-01

## (undated) DEVICE — DECANTER VIAL 2006S

## (undated) DEVICE — SYR EAR BULB 3OZ 0035830

## (undated) DEVICE — NDL 22GA 1.5"

## (undated) RX ORDER — LIDOCAINE HYDROCHLORIDE 10 MG/ML
INJECTION, SOLUTION INFILTRATION; PERINEURAL
Status: DISPENSED
Start: 2024-04-11

## (undated) RX ORDER — DEXAMETHASONE SODIUM PHOSPHATE 4 MG/ML
INJECTION, SOLUTION INTRA-ARTICULAR; INTRALESIONAL; INTRAMUSCULAR; INTRAVENOUS; SOFT TISSUE
Status: DISPENSED
Start: 2018-11-30

## (undated) RX ORDER — FENTANYL CITRATE 50 UG/ML
INJECTION, SOLUTION INTRAMUSCULAR; INTRAVENOUS
Status: DISPENSED
Start: 2024-04-11

## (undated) RX ORDER — ATROPA BELLADONNA AND OPIUM 16.2; 3 MG/1; MG/1
SUPPOSITORY RECTAL
Status: DISPENSED
Start: 2021-08-31

## (undated) RX ORDER — ONDANSETRON 2 MG/ML
INJECTION INTRAMUSCULAR; INTRAVENOUS
Status: DISPENSED
Start: 2023-05-25

## (undated) RX ORDER — FENTANYL CITRATE 50 UG/ML
INJECTION, SOLUTION INTRAMUSCULAR; INTRAVENOUS
Status: DISPENSED
Start: 2019-05-23

## (undated) RX ORDER — LIDOCAINE HYDROCHLORIDE 20 MG/ML
JELLY TOPICAL
Status: DISPENSED
Start: 2021-09-30

## (undated) RX ORDER — DEXAMETHASONE SODIUM PHOSPHATE 4 MG/ML
INJECTION, SOLUTION INTRA-ARTICULAR; INTRALESIONAL; INTRAMUSCULAR; INTRAVENOUS; SOFT TISSUE
Status: DISPENSED
Start: 2023-05-25

## (undated) RX ORDER — EPINEPHRINE 1 MG/ML
INJECTION, SOLUTION INTRAMUSCULAR; SUBCUTANEOUS
Status: DISPENSED
Start: 2018-11-30

## (undated) RX ORDER — DIPHENHYDRAMINE HYDROCHLORIDE 50 MG/ML
INJECTION INTRAMUSCULAR; INTRAVENOUS
Status: DISPENSED
Start: 2024-04-11

## (undated) RX ORDER — KETOROLAC TROMETHAMINE 30 MG/ML
INJECTION, SOLUTION INTRAMUSCULAR; INTRAVENOUS
Status: DISPENSED
Start: 2018-11-30

## (undated) RX ORDER — KETOROLAC TROMETHAMINE 30 MG/ML
INJECTION, SOLUTION INTRAMUSCULAR; INTRAVENOUS
Status: DISPENSED
Start: 2023-05-25

## (undated) RX ORDER — HEPARIN SODIUM 200 [USP'U]/100ML
INJECTION, SOLUTION INTRAVENOUS
Status: DISPENSED
Start: 2024-04-11

## (undated) RX ORDER — LIDOCAINE HYDROCHLORIDE 20 MG/ML
INJECTION, SOLUTION EPIDURAL; INFILTRATION; INTRACAUDAL; PERINEURAL
Status: DISPENSED
Start: 2021-09-30

## (undated) RX ORDER — FENTANYL CITRATE 50 UG/ML
INJECTION, SOLUTION INTRAMUSCULAR; INTRAVENOUS
Status: DISPENSED
Start: 2021-09-30

## (undated) RX ORDER — ONDANSETRON 2 MG/ML
INJECTION INTRAMUSCULAR; INTRAVENOUS
Status: DISPENSED
Start: 2019-05-23

## (undated) RX ORDER — LIDOCAINE HYDROCHLORIDE 20 MG/ML
INJECTION, SOLUTION EPIDURAL; INFILTRATION; INTRACAUDAL; PERINEURAL
Status: DISPENSED
Start: 2018-11-30

## (undated) RX ORDER — FENTANYL CITRATE 0.05 MG/ML
INJECTION, SOLUTION INTRAMUSCULAR; INTRAVENOUS
Status: DISPENSED
Start: 2021-09-30

## (undated) RX ORDER — ONDANSETRON 2 MG/ML
INJECTION INTRAMUSCULAR; INTRAVENOUS
Status: DISPENSED
Start: 2021-08-31

## (undated) RX ORDER — CLINDAMYCIN PHOSPHATE 900 MG/50ML
INJECTION, SOLUTION INTRAVENOUS
Status: DISPENSED
Start: 2018-11-30

## (undated) RX ORDER — HYDROMORPHONE HCL IN WATER/PF 6 MG/30 ML
PATIENT CONTROLLED ANALGESIA SYRINGE INTRAVENOUS
Status: DISPENSED
Start: 2021-08-31

## (undated) RX ORDER — ATROPA BELLADONNA AND OPIUM 16.2; 3 MG/1; MG/1
SUPPOSITORY RECTAL
Status: DISPENSED
Start: 2021-09-30

## (undated) RX ORDER — FENTANYL CITRATE 50 UG/ML
INJECTION, SOLUTION INTRAMUSCULAR; INTRAVENOUS
Status: DISPENSED
Start: 2023-05-25

## (undated) RX ORDER — PROPOFOL 10 MG/ML
INJECTION, EMULSION INTRAVENOUS
Status: DISPENSED
Start: 2023-05-25

## (undated) RX ORDER — CIPROFLOXACIN 2 MG/ML
INJECTION, SOLUTION INTRAVENOUS
Status: DISPENSED
Start: 2023-05-25

## (undated) RX ORDER — CEFAZOLIN SODIUM 2 G/100ML
INJECTION, SOLUTION INTRAVENOUS
Status: DISPENSED
Start: 2021-09-30

## (undated) RX ORDER — DEXAMETHASONE SODIUM PHOSPHATE 4 MG/ML
INJECTION, SOLUTION INTRA-ARTICULAR; INTRALESIONAL; INTRAMUSCULAR; INTRAVENOUS; SOFT TISSUE
Status: DISPENSED
Start: 2021-09-30

## (undated) RX ORDER — ONDANSETRON 2 MG/ML
INJECTION INTRAMUSCULAR; INTRAVENOUS
Status: DISPENSED
Start: 2021-09-30

## (undated) RX ORDER — CEFAZOLIN SODIUM 2 G/100ML
INJECTION, SOLUTION INTRAVENOUS
Status: DISPENSED
Start: 2021-08-31

## (undated) RX ORDER — ONDANSETRON 2 MG/ML
INJECTION INTRAMUSCULAR; INTRAVENOUS
Status: DISPENSED
Start: 2018-11-30

## (undated) RX ORDER — FENTANYL CITRATE 50 UG/ML
INJECTION, SOLUTION INTRAMUSCULAR; INTRAVENOUS
Status: DISPENSED
Start: 2018-11-30

## (undated) RX ORDER — HYDRALAZINE HYDROCHLORIDE 20 MG/ML
INJECTION INTRAMUSCULAR; INTRAVENOUS
Status: DISPENSED
Start: 2021-09-30

## (undated) RX ORDER — LIDOCAINE HYDROCHLORIDE 20 MG/ML
INJECTION, SOLUTION EPIDURAL; INFILTRATION; INTRACAUDAL; PERINEURAL
Status: DISPENSED
Start: 2021-08-31

## (undated) RX ORDER — DEXAMETHASONE SODIUM PHOSPHATE 4 MG/ML
INJECTION, SOLUTION INTRA-ARTICULAR; INTRALESIONAL; INTRAMUSCULAR; INTRAVENOUS; SOFT TISSUE
Status: DISPENSED
Start: 2021-08-31

## (undated) RX ORDER — ACETAMINOPHEN 325 MG/1
TABLET ORAL
Status: DISPENSED
Start: 2018-11-30

## (undated) RX ORDER — HEPARIN SODIUM 1000 [USP'U]/ML
INJECTION, SOLUTION INTRAVENOUS; SUBCUTANEOUS
Status: DISPENSED
Start: 2024-04-11

## (undated) RX ORDER — PROPOFOL 10 MG/ML
INJECTION, EMULSION INTRAVENOUS
Status: DISPENSED
Start: 2021-08-31

## (undated) RX ORDER — FENTANYL CITRATE 50 UG/ML
INJECTION, SOLUTION INTRAMUSCULAR; INTRAVENOUS
Status: DISPENSED
Start: 2021-08-31

## (undated) RX ORDER — PROPOFOL 10 MG/ML
INJECTION, EMULSION INTRAVENOUS
Status: DISPENSED
Start: 2018-11-30

## (undated) RX ORDER — BUPIVACAINE HYDROCHLORIDE 5 MG/ML
INJECTION, SOLUTION EPIDURAL; INTRACAUDAL
Status: DISPENSED
Start: 2018-11-30

## (undated) RX ORDER — LABETALOL 20 MG/4 ML (5 MG/ML) INTRAVENOUS SYRINGE
Status: DISPENSED
Start: 2019-05-23

## (undated) RX ORDER — CIPROFLOXACIN 2 MG/ML
INJECTION, SOLUTION INTRAVENOUS
Status: DISPENSED
Start: 2021-08-31